# Patient Record
Sex: MALE | Race: WHITE | URBAN - METROPOLITAN AREA
[De-identification: names, ages, dates, MRNs, and addresses within clinical notes are randomized per-mention and may not be internally consistent; named-entity substitution may affect disease eponyms.]

---

## 2018-12-19 ENCOUNTER — INPATIENT (INPATIENT)
Facility: HOSPITAL | Age: 83
LOS: 1 days | Discharge: HOME | End: 2018-12-21
Attending: INTERNAL MEDICINE | Admitting: INTERNAL MEDICINE
Payer: MEDICARE

## 2018-12-19 VITALS — HEIGHT: 68 IN | WEIGHT: 145.06 LBS

## 2018-12-19 LAB
ALBUMIN SERPL ELPH-MCNC: 3.3 G/DL — SIGNIFICANT CHANGE UP (ref 3.3–5)
ALP SERPL-CCNC: 84 U/L — SIGNIFICANT CHANGE UP (ref 40–120)
ALT FLD-CCNC: 21 U/L — SIGNIFICANT CHANGE UP (ref 12–78)
ANION GAP SERPL CALC-SCNC: 10 MMOL/L — SIGNIFICANT CHANGE UP (ref 5–17)
APTT BLD: 29.1 SEC — SIGNIFICANT CHANGE UP (ref 27.5–36.3)
AST SERPL-CCNC: 44 U/L — HIGH (ref 15–37)
BILIRUB SERPL-MCNC: 0.7 MG/DL — SIGNIFICANT CHANGE UP (ref 0.2–1.2)
BLD GP AB SCN SERPL QL: SIGNIFICANT CHANGE UP
BUN SERPL-MCNC: 43 MG/DL — HIGH (ref 7–23)
CALCIUM SERPL-MCNC: 10.3 MG/DL — HIGH (ref 8.5–10.1)
CHLORIDE SERPL-SCNC: 100 MMOL/L — SIGNIFICANT CHANGE UP (ref 96–108)
CK SERPL-CCNC: 128 U/L — SIGNIFICANT CHANGE UP (ref 26–308)
CO2 SERPL-SCNC: 29 MMOL/L — SIGNIFICANT CHANGE UP (ref 22–31)
CREAT SERPL-MCNC: 4.99 MG/DL — HIGH (ref 0.5–1.3)
GLUCOSE SERPL-MCNC: 100 MG/DL — HIGH (ref 70–99)
HCT VFR BLD CALC: 22.3 % — LOW (ref 39–50)
HGB BLD-MCNC: 6.8 G/DL — CRITICAL LOW (ref 13–17)
INR BLD: 1.19 RATIO — HIGH (ref 0.88–1.16)
MAGNESIUM SERPL-MCNC: 2.2 MG/DL — SIGNIFICANT CHANGE UP (ref 1.6–2.6)
MCHC RBC-ENTMCNC: 30.5 GM/DL — LOW (ref 32–36)
MCHC RBC-ENTMCNC: 31.3 PG — SIGNIFICANT CHANGE UP (ref 27–34)
MCV RBC AUTO: 102.8 FL — HIGH (ref 80–100)
PLATELET # BLD AUTO: 232 K/UL — SIGNIFICANT CHANGE UP (ref 150–400)
POTASSIUM SERPL-MCNC: 5 MMOL/L — SIGNIFICANT CHANGE UP (ref 3.5–5.3)
POTASSIUM SERPL-SCNC: 5 MMOL/L — SIGNIFICANT CHANGE UP (ref 3.5–5.3)
PROT SERPL-MCNC: 6.7 GM/DL — SIGNIFICANT CHANGE UP (ref 6–8.3)
PROTHROM AB SERPL-ACNC: 13.3 SEC — HIGH (ref 10–12.9)
RBC # BLD: 2.17 M/UL — LOW (ref 4.2–5.8)
RBC # FLD: 19.2 % — HIGH (ref 10.3–14.5)
SODIUM SERPL-SCNC: 139 MMOL/L — SIGNIFICANT CHANGE UP (ref 135–145)
TROPONIN I SERPL-MCNC: 0.72 NG/ML — HIGH (ref 0.01–0.04)
TYPE + AB SCN PNL BLD: SIGNIFICANT CHANGE UP
WBC # BLD: 7.94 K/UL — SIGNIFICANT CHANGE UP (ref 3.8–10.5)
WBC # FLD AUTO: 7.94 K/UL — SIGNIFICANT CHANGE UP (ref 3.8–10.5)

## 2018-12-19 PROCEDURE — 74176 CT ABD & PELVIS W/O CONTRAST: CPT | Mod: 26

## 2018-12-19 PROCEDURE — 93010 ELECTROCARDIOGRAM REPORT: CPT

## 2018-12-19 PROCEDURE — 71045 X-RAY EXAM CHEST 1 VIEW: CPT | Mod: 26

## 2018-12-19 NOTE — ED ADULT NURSE NOTE - OBJECTIVE STATEMENT
pt presents to the ed c/o low h+h, was sent from his md's office for blood transfusion. Pt denies sob / chest pain, no visual signs of acute distress.

## 2018-12-19 NOTE — ED PROVIDER NOTE - OBJECTIVE STATEMENT
84 y/o male with a PMHx of CAD with obstructing lesions unable to be stented 2 weeks ago, cardiomyopathy (recent EF 10%), ESRD newly on dialysis since oct 2018, HTN, HLD presents to the ED BIB daughter for anemia from outpatient labs drawn 2 days ago. Pt's daughter is an oncology nurse, rashaun labs and sent them to [place] dialysis center. Pt called back and pt's hemoglobin was 6.3. Pt denies HA, chest pain, SOB, weakness. States 2 weeks ago after cardiac catheterization he had hematoma in right groin, as of today no pain in right groin, abdomen or back. daughter is checking hematoma and states it is shrinking. No h/o blood transfusion but was sent to ED by Dr. Rogers, new nephrologist as of this week. Pt due for dialysis tomorrow. Denies blood in stools. Also has slight bruising associated with RUE fistula. 84 y/o male with a PMHx of CAD with obstructing lesions unable to be stented 2 weeks ago, cardiomyopathy (recent EF 10%), ESRD newly on dialysis since oct 2018, emphysema on home O2 PRN, HTN, HLD presents to the ED BIB daughter for anemia from outpatient labs drawn 2 days ago. Pt's daughter is an oncology nurse, rashaun labs and sent them to [place] dialysis center. Pt called back and pt's hemoglobin was 6.3. Pt denies HA, chest pain, SOB, weakness. States 2 weeks ago after cardiac catheterization he had hematoma in right groin, as of today no pain in right groin, abdomen or back. daughter is checking hematoma and states it is shrinking. No h/o blood transfusion but was sent to ED by Dr. Rogers, new nephrologist as of this week. Pt due for dialysis tomorrow. Denies blood in stools. Also has slight bruising associated with RUE fistula.

## 2018-12-19 NOTE — ED PROVIDER NOTE - PROGRESS NOTE DETAILS
Paged Dr. Rogers to discuss transfusion and dialysis that is needed in AM. Patients daughter who is a nurse has copy of patients EKG with her and todays EKG shows no changes.  Creatinine in [facility] in October was 11.  EF 10%.  Hgb on last discharge = 8 per daughter.  No aspirin will be given at this time due to no known etiology for drop in hgb.  CT pending to r/o retroperitoneal hematoma.

## 2018-12-19 NOTE — ED PROVIDER NOTE - MEDICAL DECISION MAKING DETAILS
Plan to recheck labs, EKG, call Dr. Bryson to discuss dialysis in the morning with concurrent transfusion.

## 2018-12-19 NOTE — ED ADULT NURSE NOTE - NSIMPLEMENTINTERV_GEN_ALL_ED
Implemented All Fall with Harm Risk Interventions:  [place] to call system. Call bell, personal items and telephone within reach. Instruct patient to call for assistance. Room bathroom lighting operational. Non-slip footwear when patient is off stretcher. Physically safe environment: no spills, clutter or unnecessary equipment. Stretcher in lowest position, wheels locked, appropriate side rails in place. Provide visual cue, wrist band, yellow gown, etc. Monitor gait and stability. Monitor for mental status changes and reorient to person, place, and time. Review medications for side effects contributing to fall risk. Reinforce activity limits and safety measures with patient and family. Provide visual clues: red socks.

## 2018-12-19 NOTE — ED PROVIDER NOTE - SKIN, MLM
+palpable thrill in RUE at fistula site, old bruising associated, no evidence of hematoma. Old bruising on right lower abdomen from heparin injections from prior admission. Healing purple and yellow areas of ecchymosis in right groin. No palpable tenderness or hematoma.

## 2018-12-19 NOTE — ED PROVIDER NOTE - NOTES
Dialysis recommended in AM.  1u pRBCs while in ED and 2nd unit while getting dialysis.  Agrees with ED workup.

## 2018-12-19 NOTE — ED ADULT TRIAGE NOTE - CHIEF COMPLAINT QUOTE
pt sent in by Dr. Rogers for low h&h. pt denies sob, dizziness. h&h 6.4 20.2. pt on dialysis tues thurs sat

## 2018-12-19 NOTE — ED ADULT NURSE NOTE - CHPI ED NUR SYMPTOMS NEG
no vomiting/no dizziness/no decreased eating/drinking/no weakness/no tingling/no fever/no nausea/no chills/no pain

## 2018-12-20 PROBLEM — Z00.00 ENCOUNTER FOR PREVENTIVE HEALTH EXAMINATION: Status: ACTIVE | Noted: 2018-12-20

## 2018-12-20 LAB
ANISOCYTOSIS BLD QL: SLIGHT — SIGNIFICANT CHANGE UP
BASOPHILS # BLD AUTO: 0.04 K/UL — SIGNIFICANT CHANGE UP (ref 0–0.2)
BASOPHILS NFR BLD AUTO: 0.5 % — SIGNIFICANT CHANGE UP (ref 0–2)
ELLIPTOCYTES BLD QL SMEAR: SLIGHT — SIGNIFICANT CHANGE UP
EOSINOPHIL # BLD AUTO: 0.76 K/UL — HIGH (ref 0–0.5)
EOSINOPHIL NFR BLD AUTO: 9.6 % — HIGH (ref 0–6)
HAV IGM SER-ACNC: SIGNIFICANT CHANGE UP
HBV CORE IGM SER-ACNC: SIGNIFICANT CHANGE UP
HBV SURFACE AG SER-ACNC: SIGNIFICANT CHANGE UP
HCT VFR BLD CALC: 24.4 % — LOW (ref 39–50)
HCV AB S/CO SERPL IA: 0.05 S/CO — SIGNIFICANT CHANGE UP
HCV AB SERPL-IMP: SIGNIFICANT CHANGE UP
HGB BLD-MCNC: 7.8 G/DL — LOW (ref 13–17)
HYPOCHROMIA BLD QL: SIGNIFICANT CHANGE UP
IMM GRANULOCYTES NFR BLD AUTO: 0.5 % — SIGNIFICANT CHANGE UP (ref 0–1.5)
LYMPHOCYTES # BLD AUTO: 0.96 K/UL — LOW (ref 1–3.3)
LYMPHOCYTES # BLD AUTO: 12.1 % — LOW (ref 13–44)
MACROCYTES BLD QL: SLIGHT — SIGNIFICANT CHANGE UP
MANUAL SMEAR VERIFICATION: SIGNIFICANT CHANGE UP
MONOCYTES # BLD AUTO: 1 K/UL — HIGH (ref 0–0.9)
MONOCYTES NFR BLD AUTO: 12.6 % — SIGNIFICANT CHANGE UP (ref 2–14)
NEUTROPHILS # BLD AUTO: 5.14 K/UL — SIGNIFICANT CHANGE UP (ref 1.8–7.4)
NEUTROPHILS NFR BLD AUTO: 64.7 % — SIGNIFICANT CHANGE UP (ref 43–77)
NRBC # BLD: 0 /100 WBCS — SIGNIFICANT CHANGE UP (ref 0–0)
OVALOCYTES BLD QL SMEAR: SIGNIFICANT CHANGE UP
PLAT MORPH BLD: NORMAL — SIGNIFICANT CHANGE UP
POIKILOCYTOSIS BLD QL AUTO: SIGNIFICANT CHANGE UP
RBC BLD AUTO: ABNORMAL
SCHISTOCYTES BLD QL AUTO: SLIGHT — SIGNIFICANT CHANGE UP
TROPONIN I SERPL-MCNC: 0.66 NG/ML — HIGH (ref 0.01–0.04)
TROPONIN I SERPL-MCNC: 0.71 NG/ML — HIGH (ref 0.01–0.04)

## 2018-12-20 PROCEDURE — 93306 TTE W/DOPPLER COMPLETE: CPT | Mod: 26

## 2018-12-20 PROCEDURE — 99285 EMERGENCY DEPT VISIT HI MDM: CPT

## 2018-12-20 RX ORDER — LEVOTHYROXINE SODIUM 125 MCG
88 TABLET ORAL DAILY
Qty: 0 | Refills: 0 | Status: DISCONTINUED | OUTPATIENT
Start: 2018-12-20 | End: 2018-12-21

## 2018-12-20 RX ORDER — ASPIRIN/CALCIUM CARB/MAGNESIUM 324 MG
81 TABLET ORAL DAILY
Qty: 0 | Refills: 0 | Status: DISCONTINUED | OUTPATIENT
Start: 2018-12-20 | End: 2018-12-21

## 2018-12-20 RX ORDER — CALCIUM ACETATE 667 MG
1334 TABLET ORAL
Qty: 0 | Refills: 0 | Status: DISCONTINUED | OUTPATIENT
Start: 2018-12-20 | End: 2018-12-21

## 2018-12-20 RX ORDER — DOCUSATE SODIUM 100 MG
100 CAPSULE ORAL THREE TIMES A DAY
Qty: 0 | Refills: 0 | Status: DISCONTINUED | OUTPATIENT
Start: 2018-12-20 | End: 2018-12-21

## 2018-12-20 RX ORDER — ATORVASTATIN CALCIUM 80 MG/1
20 TABLET, FILM COATED ORAL AT BEDTIME
Qty: 0 | Refills: 0 | Status: DISCONTINUED | OUTPATIENT
Start: 2018-12-20 | End: 2018-12-21

## 2018-12-20 RX ORDER — ONDANSETRON 8 MG/1
4 TABLET, FILM COATED ORAL EVERY 6 HOURS
Qty: 0 | Refills: 0 | Status: DISCONTINUED | OUTPATIENT
Start: 2018-12-20 | End: 2018-12-21

## 2018-12-20 RX ORDER — LANOLIN ALCOHOL/MO/W.PET/CERES
5 CREAM (GRAM) TOPICAL AT BEDTIME
Qty: 0 | Refills: 0 | Status: DISCONTINUED | OUTPATIENT
Start: 2018-12-20 | End: 2018-12-21

## 2018-12-20 RX ORDER — SENNA PLUS 8.6 MG/1
2 TABLET ORAL AT BEDTIME
Qty: 0 | Refills: 0 | Status: DISCONTINUED | OUTPATIENT
Start: 2018-12-20 | End: 2018-12-21

## 2018-12-20 RX ORDER — ACETAMINOPHEN 500 MG
650 TABLET ORAL EVERY 6 HOURS
Qty: 0 | Refills: 0 | Status: DISCONTINUED | OUTPATIENT
Start: 2018-12-20 | End: 2018-12-21

## 2018-12-20 RX ORDER — LOSARTAN POTASSIUM 100 MG/1
25 TABLET, FILM COATED ORAL DAILY
Qty: 0 | Refills: 0 | Status: DISCONTINUED | OUTPATIENT
Start: 2018-12-20 | End: 2018-12-21

## 2018-12-20 RX ORDER — ERYTHROPOIETIN 10000 [IU]/ML
8000 INJECTION, SOLUTION INTRAVENOUS; SUBCUTANEOUS
Qty: 0 | Refills: 0 | Status: DISCONTINUED | OUTPATIENT
Start: 2018-12-20 | End: 2018-12-21

## 2018-12-20 RX ORDER — CARVEDILOL PHOSPHATE 80 MG/1
3.12 CAPSULE, EXTENDED RELEASE ORAL EVERY 12 HOURS
Qty: 0 | Refills: 0 | Status: DISCONTINUED | OUTPATIENT
Start: 2018-12-20 | End: 2018-12-21

## 2018-12-20 RX ADMIN — Medication 5 MILLIGRAM(S): at 21:42

## 2018-12-20 RX ADMIN — Medication 100 MILLIGRAM(S): at 21:42

## 2018-12-20 RX ADMIN — Medication 1334 MILLIGRAM(S): at 18:05

## 2018-12-20 RX ADMIN — Medication 1 TABLET(S): at 18:06

## 2018-12-20 RX ADMIN — Medication 81 MILLIGRAM(S): at 18:05

## 2018-12-20 RX ADMIN — ATORVASTATIN CALCIUM 20 MILLIGRAM(S): 80 TABLET, FILM COATED ORAL at 21:42

## 2018-12-20 RX ADMIN — CARVEDILOL PHOSPHATE 3.12 MILLIGRAM(S): 80 CAPSULE, EXTENDED RELEASE ORAL at 18:05

## 2018-12-20 RX ADMIN — LOSARTAN POTASSIUM 25 MILLIGRAM(S): 100 TABLET, FILM COATED ORAL at 18:06

## 2018-12-20 RX ADMIN — ERYTHROPOIETIN 8000 UNIT(S): 10000 INJECTION, SOLUTION INTRAVENOUS; SUBCUTANEOUS at 16:15

## 2018-12-20 RX ADMIN — Medication 650 MILLIGRAM(S): at 19:53

## 2018-12-20 NOTE — CONSULT NOTE ADULT - ASSESSMENT
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 Pt's daughter is an oncology nurse, rashaun labs at dialysis center and was found to have hgb on 6.3. Pt denies HA, chest pain, SOB, weakness. States 2 weeks ago after cardiac catheterization he had hematoma in right groin, as of today no pain in right groin, abdomen or back. After further discussion with dalai, patient did not recieve any transfusion for the hematoma in [place] and his Hgb upon D/C was 7.5. His usual Hgb is 10 before starting HD.  Daughter is checking hematoma and states it is shrinking. Denies melena or hematochezia. No hematuria (pt still makes urine) PT is on DAPT for CEA in 2012.  Also has slight bruising associated with RUE fistula/hematoma which is now resolving  Pts HD labs from his previous HD unit confirmed that the HGB was low 2 days prior to his transfer.  Pts family was contacted ans advised to bring him in for evaluation ad further blood loss  IN ER pt is comfortable no SOB , has bruising over his R AVF.  pt received 1 U PRBC in ER   will repeat labs and transfuse on HD as needed  d/w pts daughter  CT negative for RP hematoma
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 Pt's daughter is an oncology nurse, rashaun labs at dialysis center and was found to have hgb on 6.3. Pt denies HA, chest pain, SOB, weakness. States 2 weeks ago after cardiac catheterization he had hematoma in right groin, as of today no pain in right groin, abdomen or back. After further discussion with savannah, patient did not recieve any transfusion for the hematoma in [place] and his Hgb upon D/C was 7.5. His usual Hgb is 10 before starting HD.  Daughter is checking hematoma and states it is shrinking. Denies melena or hematochezia. No hematuria (pt still makes urine) PT is on DAPT for CEA in 2012.  Also has slight bruising associated with RUE fistula/hematoma which is now resolving  he denies orthopnea, CP or SOb at the moment   he did recieve PRBCs since admiddion   echo- EF here is 50-55% but with mod to severe MR  EKG shows inverted T waves c/w ischmeia and or infarction anterolaterally , trop borderline elevated   1) cont medical management for now including fluid removal with HD , transfuse to keep Hb at least greater than 8 given CAD   2) cont asa/statin /DVT prophylaxis, resume plavix if H/H remains stable   3) will order right groin sonogram to look for persisitent leak and pseudoaneurysm

## 2018-12-20 NOTE — H&P ADULT - NSHPREVIEWOFSYSTEMS_GEN_ALL_CORE
REVIEW OF SYSTEMS:    CONSTITUTIONAL: No weakness, fevers or chills  EYES/ENT: No visual changes;  No vertigo or throat pain   NECK: No pain or stiffness  RESPIRATORY: No cough, wheezing, hemoptysis; No shortness of breath  CARDIOVASCULAR: No chest pain or palpitations  GASTROINTESTINAL: No abdominal or epigastric pain. No nausea, vomiting, or hematemesis; No diarrhea or constipation. No melena or hematochezia.  GENITOURINARY: No dysuria, frequency or hematuria  NEUROLOGICAL: No numbness or weakness  SKIN: No itching, burning, rashes, or lesions : right groin hematoma  All other review of systems is negative unless indicated above

## 2018-12-20 NOTE — H&P ADULT - NSHPLABSRESULTS_GEN_ALL_CORE
LABS: All Labs Reviewed:                        6.8    7.94  )-----------( 232      ( 19 Dec 2018 22:49 )             22.3     12-19    139  |  100  |  43<H>  ----------------------------<  100<H>  5.0   |  29  |  4.99<H>    Ca    10.3<H>      19 Dec 2018 22:50  Mg     2.2     12-19    TPro  6.7  /  Alb  3.3  /  TBili  0.7  /  DBili  x   /  AST  44<H>  /  ALT  21  /  AlkPhos  84  12-19    PT/INR - ( 19 Dec 2018 22:49 )   PT: 13.3 sec;   INR: 1.19 ratio         PTT - ( 19 Dec 2018 22:49 )  PTT:29.1 sec  CARDIAC MARKERS ( 20 Dec 2018 04:46 )  0.713 ng/mL / x     / x     / x     / x      CARDIAC MARKERS ( 20 Dec 2018 01:50 )  0.657 ng/mL / x     / x     / x     / x      CARDIAC MARKERS ( 19 Dec 2018 22:50 )  0.717 ng/mL / x     / 128 U/L / x     / x              Blood Culture:             < from: CT Abdomen and Pelvis No Cont (12.19.18 @ 23:54) >    IMPRESSION:     *  Right groin hematoma measuring 6.4 x 3.4 cm.  *  No retroperitoneal hematoma, as clinically questioned.    < end of copied text >

## 2018-12-20 NOTE — ED ADULT NURSE REASSESSMENT NOTE - NS ED NURSE REASSESS COMMENT FT1
pt resting comfortably in stretcher. denies pain. no s/s of acute distress noted. plan of care reviewed with pt. awaiting orders. will continue to monitor

## 2018-12-20 NOTE — CONSULT NOTE ADULT - SUBJECTIVE AND OBJECTIVE BOX
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 Pt's daughter is an oncology nurse, rashaun labs at dialysis center and was found to have hgb on 6.3. Pt denies HA, chest pain, SOB, weakness. States 2 weeks ago after cardiac catheterization he had hematoma in right groin, as of today no pain in right groin, abdomen or back. After further discussion with dalai, patient did not recieve any transfusion for the hematoma in [place] and his Hgb upon D/C was 7.5. His usual Hgb is 10 before starting HD.  Daughter is checking hematoma and states it is shrinking. Denies melena or hematochezia. No hematuria (pt still makes urine) PT is on DAPT for CEA in .  Also has slight bruising associated with RUE fistula/hematoma which is now resolving  Pts HD labs from his previous HD unit confirmed that the HGB was low 2 days prior to his transfer.  Pts family was contacted ans advised to bring him in for evaluation ad further blood loss  IN ER pt is comfortable no SOB , has bruising over his R AVF.  pt received 1 U PRBC in ER   will repeat labs and transfuse on HD as needed  d/w pts daughter	    	    PAST MEDICAL/SURGICAL/FAMILY/SOCIAL HISTORY:    Past Medical History:  CAD (coronary artery disease)    Cardiomyopathy    HLD (hyperlipidemia)    HTN (hypertension).    Shx: Appendectomy, CEA, balloon angioplasty, RUE fistula     Family History:  Mother  of MI at age 79    · Social Concerns: No toxic habits	     Tobacco Usage:  · Tobacco Usage	Former smoker (20 Dec 2018 09:36)      PAST MEDICAL & SURGICAL HISTORY:  HLD (hyperlipidemia)  HTN (hypertension)  Cardiomyopathy  CAD (coronary artery disease)      FAMILY HISTORY:  No pertinent family history in first degree relatives      MEDICATIONS  (STANDING):  aspirin  chewable 81 milliGRAM(s) Oral daily  atorvastatin 20 milliGRAM(s) Oral at bedtime  calcium acetate 1334 milliGRAM(s) Oral three times a day with meals  carvedilol 3.125 milliGRAM(s) Oral every 12 hours  docusate sodium 100 milliGRAM(s) Oral three times a day  epoetin brianna Injectable 8000 Unit(s) IV Push <User Schedule>  levothyroxine 88 MICROGram(s) Oral daily  losartan 25 milliGRAM(s) Oral daily  multivitamin 1 Tablet(s) Oral daily    MEDICATIONS  (PRN):  acetaminophen   Tablet .. 650 milliGRAM(s) Oral every 6 hours PRN Mild Pain (1 - 3)  ondansetron Injectable 4 milliGRAM(s) IV Push every 6 hours PRN Nausea  senna 2 Tablet(s) Oral at bedtime PRN Constipation        Allergies    No Known Allergies    Intolerances        I&O's Summary        REVIEW OF SYSTEMS:    CONSTITUTIONAL:  As per HPI.  CONSTITUTIONAL: No weakness, fevers or chills  EYES/ENT: No visual changes;  No vertigo or throat pain   NECK: No pain or stiffness  CARDIOVASCULAR: No chest pain or palpitations  GASTROINTESTINAL: No abdominal or epigastric pain. No nausea, vomiting, or hematemesis; No diarrhea or constipation. No melena or hematochezia.  GENITOURINARY: No dysuria, frequency or hematuria  NEUROLOGICAL: No numbness or weakness  SKIN: Bruising over R groin and AVF  All other review of systems is negative unless indicated above      Vital Signs Last 24 Hrs  T(C): 36.8 (20 Dec 2018 11:45), Max: 37.1 (19 Dec 2018 22:27)  T(F): 98.3 (20 Dec 2018 11:45), Max: 98.8 (19 Dec 2018 22:27)  HR: 67 (20 Dec 2018 11:45) (62 - 73)  BP: 140/59 (20 Dec 2018 11:45) (117/58 - 140/59)  BP(mean): --  RR: 18 (20 Dec 2018 11:45) (16 - 26)  SpO2: 96% (20 Dec 2018 11:45) (92% - 99%)  Daily Height in cm: 172.72 (19 Dec 2018 22:26)    Daily   I&O's Summary      PHYSICAL EXAM:    General:  Alert, well-developed ,No acute distress.    Neuro:  Alert and oriented to person, place, and time. Able to communicate  well.     lungs clear   heart RR   abd soft non tender   ext no edema  R avf w ecchymosis      LABS:                                   6.8    7.94  )-----------( 232      ( 19 Dec 2018 22:49 )             22.3           139  |  100  |  43<H>  ----------------------------<  100<H>  5.0   |  29  |  4.99<H>    Ca    10.3<H>      19 Dec 2018 22:50  Mg     2.2         TPro  6.7  /  Alb  3.3  /  TBili  0.7  /  DBili  x   /  AST  44<H>  /  ALT  21  /  AlkPhos  84

## 2018-12-20 NOTE — H&P ADULT - ASSESSMENT
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 Pt's daughter is an oncology nurse, rashaun labs at dialysis center and was found to have hgb on 6.3. Pt denies HA, chest pain, SOB, weakness. States 2 weeks ago after cardiac catheterization he had hematoma in right groin, as of today no pain in right groin, abdomen or back. After further discussion with savannah, patient did not recieve any transfusion for the hematoma in [place] and his Hgb upon D/C was 7.5. His usual Hgb is 10 before starting HD.  Daughter is checking hematoma and states it is shrinking. Denies melena or hematochezia. No hematuria (pt still makes urine) PT is on DAPT for CEA in 2012.  Also has slight bruising associated with RUE fistula/hematoma which is now resolving          A:  Acute blood loss anemia 2/2 to groin hematoma 2/2 to recent cath performed in [place] (present on arrival)  ESRD on HD  Cardiomyopathy with EF 20% -recent balloon angioplasty  Elevated troponins with no anginal equivalent      P:  Admit to tele  1 unit pRBC given by ED. Planned for 1 add'l unti during HD today  Renal and cardio consults.   Per Daughter, size of hematoma has been decreasing in size. No RP bleed seen on CT  Has obstructive lesions and h/o CEA.   will need Serial H/H and supportive care with transfusions  Would cont ASA for now. Hold plavix for now and resume when H/H stable. Cardio consult. Consider EP if ectopy. ARB/BB for CM.   Daily EKG. Fluid restrict  IMPROVE VTE Individual Risk Assessment    RISK                                                                Points    [  ] Previous VTE                                                  3    [  ] Thrombophilia                                               2    [  ] Lower limb paralysis                                      2        (unable to hold up >15 seconds)      [  ] Current Cancer                                              2         (within 6 months)    [  ] Immobilization > 24 hrs                                1    [  ] ICU/CCU stay > 24 hours                              1    [  x] Age > 60                                                      1  1  IMPROVE VTE Score _________    SCD for now with hematoma

## 2018-12-20 NOTE — CONSULT NOTE ADULT - SUBJECTIVE AND OBJECTIVE BOX
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 Pt's daughter is an oncology nurse, rashaun labs at dialysis center and was found to have hgb on 6.3. Pt denies HA, chest pain, SOB, weakness. States 2 weeks ago after cardiac catheterization he had hematoma in right groin, as of today no pain in right groin, abdomen or back. After further discussion with savannah, patient did not recieve any transfusion for the hematoma in [place] and his Hgb upon D/C was 7.5. His usual Hgb is 10 before starting HD.  Daughter is checking hematoma and states it is shrinking. Denies melena or hematochezia. No hematuria (pt still makes urine) PT is on DAPT for CEA in .  Also has slight bruising associated with RUE fistula/hematoma which is now resolving	  · Presenting Symptoms: +bruising	  · Negative Findings: no fever	  · Timing: constant	  · Duration: day(s)	  · Context: unknown	    PAST MEDICAL/SURGICAL/FAMILY/SOCIAL HISTORY:    Past Medical History:  CAD (coronary artery disease)    Cardiomyopathy    HLD (hyperlipidemia)    HTN (hypertension).    Shx: Appendectomy, CEA, balloon angioplasty, RUE fistula     Family History:  Mother  of MI at age 79    · Social Concerns: No toxic habits	     Tobacco Usage:  · Tobacco Usage	Former smoker (20 Dec 2018 09:36)      PAST MEDICAL & SURGICAL HISTORY:  HLD (hyperlipidemia)  HTN (hypertension)  Cardiomyopathy  CAD (coronary artery disease)                                    MEDICATIONS  (STANDING):  aspirin  chewable 81 milliGRAM(s) Oral daily  atorvastatin 20 milliGRAM(s) Oral at bedtime  calcium acetate 1334 milliGRAM(s) Oral three times a day with meals  carvedilol 3.125 milliGRAM(s) Oral every 12 hours  docusate sodium 100 milliGRAM(s) Oral three times a day  epoetin brianna Injectable 8000 Unit(s) IV Push <User Schedule>  levothyroxine 88 MICROGram(s) Oral daily  losartan 25 milliGRAM(s) Oral daily  multivitamin 1 Tablet(s) Oral daily    MEDICATIONS  (PRN):  acetaminophen   Tablet .. 650 milliGRAM(s) Oral every 6 hours PRN Mild Pain (1 - 3)  ondansetron Injectable 4 milliGRAM(s) IV Push every 6 hours PRN Nausea  senna 2 Tablet(s) Oral at bedtime PRN Constipation      FAMILY HISTORY:  No pertinent family history in first degree relatives      SOCIAL HISTORY: nonsmoker    CIGARETTES:        Vital Signs Last 24 Hrs  T(C): 36.8 (20 Dec 2018 18:03), Max: 37.1 (19 Dec 2018 22:27)  T(F): 98.2 (20 Dec 2018 18:03), Max: 98.8 (19 Dec 2018 22:27)  HR: 74 (20 Dec 2018 18:03) (62 - 78)  BP: 141/67 (20 Dec 2018 18:03) (105/56 - 141/67)  BP(mean): --  RR: 17 (20 Dec 2018 17:06) (16 - 26)  SpO2: 95% (20 Dec 2018 18:03) (92% - 99%)            INTERPRETATION OF TELEMETRY:    ECG:    I&O's Detail    20 Dec 2018 07:01  -  20 Dec 2018 18:39  --------------------------------------------------------  IN:    Packed Red Blood Cells: 314 mL  Total IN: 314 mL    OUT:  Total OUT: 0 mL    Total NET: 314 mL          LABS:                        7.8    x     )-----------( x        ( 20 Dec 2018 14:10 )             24.4         139  |  100  |  43<H>  ----------------------------<  100<H>  5.0   |  29  |  4.99<H>    Ca    10.3<H>      19 Dec 2018 22:50  Mg     2.2         TPro  6.7  /  Alb  3.3  /  TBili  0.7  /  DBili  x   /  AST  44<H>  /  ALT  21  /  AlkPhos  84  12-19    CARDIAC MARKERS ( 20 Dec 2018 04:46 )  0.713 ng/mL / x     / x     / x     / x      CARDIAC MARKERS ( 20 Dec 2018 01:50 )  0.657 ng/mL / x     / x     / x     / x      CARDIAC MARKERS ( 19 Dec 2018 22:50 )  0.717 ng/mL / x     / 128 U/L / x     / x          PT/INR - ( 19 Dec 2018 22:49 )   PT: 13.3 sec;   INR: 1.19 ratio         PTT - ( 19 Dec 2018 22:49 )  PTT:29.1 sec    I&O's Summary    20 Dec 2018 07:01  -  20 Dec 2018 18:39  --------------------------------------------------------  IN: 314 mL / OUT: 0 mL / NET: 314 mL      BNP  RADIOLOGY & ADDITIONAL STUDIES:

## 2018-12-20 NOTE — H&P ADULT - NSHPPHYSICALEXAM_GEN_ALL_CORE
PHYSICAL EXAM:    Constitutional: NAD, awake and alert, well-developed  HEENT: PERR, EOMI, Normal Hearing, MMM  Neck: Soft and supple, No LAD, No JVD  Respiratory: b/l rales at bases  Cardiovascular: S1 and S2, regular rate and rhythm, no Murmurs, gallops or rubs  Gastrointestinal: Bowel Sounds present, soft, nontender, nondistended, no guarding, no rebound  Extremities: No peripheral edema  Vascular: 2+ peripheral pulses;   Neurological: A/O x 3, no focal deficits  Musculoskeletal: 5/5 strength b/l upper and lower extremities: + right groin hematoma approx 3x 5 cm in size  Skin: No rashes

## 2018-12-20 NOTE — H&P ADULT - HISTORY OF PRESENT ILLNESS
· HPI Objective Statement: 82 y/o male with a PMHx of CAD with obstructing lesions unable to be stented 2 weeks ago, mild dementia cardiomyopathy (recent EF 20%), ESRD newly on dialysis since oct 2018 (T//S, emphysema on home O2 PRN, HTN, HLD presents to the ED BIB daughter for anemia from outpatient labs drawn 2 days ago. Pt's daughter is an oncology nurse, rashaun labs at dialysis center and was found to have hgb on 6.3. Pt denies HA, chest pain, SOB, weakness. States 2 weeks ago after cardiac catheterization he had hematoma in right groin, as of today no pain in right groin, abdomen or back. After further discussion with savannah, patient did not recieve any transfusion for the hematoma in [place] and his Hgb upon D/C was 7.5. His usual Hgb is 10 before starting HD.  Daughter is checking hematoma and states it is shrinking. Denies melena or hematochezia. No hematuria (pt still makes urine) PT is on DAPT for CEA in .  Also has slight bruising associated with RUE fistula/hematoma which is now resolving	  · Presenting Symptoms: +bruising	  · Negative Findings: no fever	  · Timing: constant	  · Duration: day(s)	  · Context: unknown	    PAST MEDICAL/SURGICAL/FAMILY/SOCIAL HISTORY:    Past Medical History:  CAD (coronary artery disease)    Cardiomyopathy    HLD (hyperlipidemia)    HTN (hypertension).    Shx: Appendectomy, CEA, balloon angioplasty, RUE fistula     Family History:  Mother  of MI at age 79    · Social Concerns: No toxic habits	     Tobacco Usage:  · Tobacco Usage	Former smoker

## 2018-12-21 ENCOUNTER — TRANSCRIPTION ENCOUNTER (OUTPATIENT)
Age: 83
End: 2018-12-21

## 2018-12-21 VITALS
RESPIRATION RATE: 18 BRPM | DIASTOLIC BLOOD PRESSURE: 67 MMHG | OXYGEN SATURATION: 90 % | SYSTOLIC BLOOD PRESSURE: 137 MMHG | TEMPERATURE: 98 F | HEART RATE: 73 BPM

## 2018-12-21 LAB
ANION GAP SERPL CALC-SCNC: 9 MMOL/L — SIGNIFICANT CHANGE UP (ref 5–17)
BUN SERPL-MCNC: 31 MG/DL — HIGH (ref 7–23)
CALCIUM SERPL-MCNC: 9.1 MG/DL — SIGNIFICANT CHANGE UP (ref 8.5–10.1)
CHLORIDE SERPL-SCNC: 103 MMOL/L — SIGNIFICANT CHANGE UP (ref 96–108)
CHOLEST SERPL-MCNC: 120 MG/DL — SIGNIFICANT CHANGE UP (ref 10–199)
CO2 SERPL-SCNC: 27 MMOL/L — SIGNIFICANT CHANGE UP (ref 22–31)
CREAT SERPL-MCNC: 3.5 MG/DL — HIGH (ref 0.5–1.3)
GLUCOSE SERPL-MCNC: 86 MG/DL — SIGNIFICANT CHANGE UP (ref 70–99)
HBA1C BLD-MCNC: 4.9 % — SIGNIFICANT CHANGE UP (ref 4–5.6)
HCT VFR BLD CALC: 25.9 % — LOW (ref 39–50)
HDLC SERPL-MCNC: 61 MG/DL — SIGNIFICANT CHANGE UP
HGB BLD-MCNC: 8.2 G/DL — LOW (ref 13–17)
IRON SATN MFR SERPL: 49 UG/DL — SIGNIFICANT CHANGE UP (ref 45–165)
LIPID PNL WITH DIRECT LDL SERPL: 48 MG/DL — SIGNIFICANT CHANGE UP
MCHC RBC-ENTMCNC: 31.3 PG — SIGNIFICANT CHANGE UP (ref 27–34)
MCHC RBC-ENTMCNC: 31.7 GM/DL — LOW (ref 32–36)
MCV RBC AUTO: 98.9 FL — SIGNIFICANT CHANGE UP (ref 80–100)
NRBC # BLD: 0 /100 WBCS — SIGNIFICANT CHANGE UP (ref 0–0)
PLATELET # BLD AUTO: 205 K/UL — SIGNIFICANT CHANGE UP (ref 150–400)
POTASSIUM SERPL-MCNC: 4 MMOL/L — SIGNIFICANT CHANGE UP (ref 3.5–5.3)
POTASSIUM SERPL-SCNC: 4 MMOL/L — SIGNIFICANT CHANGE UP (ref 3.5–5.3)
RBC # BLD: 2.62 M/UL — LOW (ref 4.2–5.8)
RBC # FLD: 19.3 % — HIGH (ref 10.3–14.5)
SODIUM SERPL-SCNC: 139 MMOL/L — SIGNIFICANT CHANGE UP (ref 135–145)
TOTAL CHOLESTEROL/HDL RATIO MEASUREMENT: 2 RATIO — LOW (ref 3.4–9.6)
TRANSFERRIN SERPL-MCNC: 213 MG/DL — SIGNIFICANT CHANGE UP (ref 200–360)
TRIGL SERPL-MCNC: 54 MG/DL — SIGNIFICANT CHANGE UP (ref 10–149)
TSH SERPL-MCNC: 5.11 UU/ML — HIGH (ref 0.34–4.82)
WBC # BLD: 9.21 K/UL — SIGNIFICANT CHANGE UP (ref 3.8–10.5)
WBC # FLD AUTO: 9.21 K/UL — SIGNIFICANT CHANGE UP (ref 3.8–10.5)

## 2018-12-21 PROCEDURE — 93926 LOWER EXTREMITY STUDY: CPT | Mod: 26,RT

## 2018-12-21 RX ADMIN — LOSARTAN POTASSIUM 25 MILLIGRAM(S): 100 TABLET, FILM COATED ORAL at 05:17

## 2018-12-21 RX ADMIN — Medication 1 TABLET(S): at 12:34

## 2018-12-21 RX ADMIN — Medication 1334 MILLIGRAM(S): at 12:34

## 2018-12-21 RX ADMIN — Medication 100 MILLIGRAM(S): at 05:17

## 2018-12-21 RX ADMIN — Medication 1334 MILLIGRAM(S): at 10:14

## 2018-12-21 RX ADMIN — CARVEDILOL PHOSPHATE 3.12 MILLIGRAM(S): 80 CAPSULE, EXTENDED RELEASE ORAL at 05:17

## 2018-12-21 RX ADMIN — Medication 88 MICROGRAM(S): at 05:17

## 2018-12-21 RX ADMIN — Medication 81 MILLIGRAM(S): at 12:34

## 2018-12-21 NOTE — PROGRESS NOTE ADULT - ASSESSMENT
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 Pt's daughter is an oncology nurse, rashaun labs at dialysis center and was found to have hgb on 6.3. Pt denies HA, chest pain, SOB, weakness. States 2 weeks ago after cardiac catheterization he had hematoma in right groin, as of today no pain in right groin, abdomen or back. After further discussion with savannah, patient did not recieve any transfusion for the hematoma in [place] and his Hgb upon D/C was 7.5. His usual Hgb is 10 before starting HD.  Daughter is checking hematoma and states it is shrinking. Denies melena or hematochezia. No hematuria (pt still makes urine) PT is on DAPT for CEA in 2012.  Also has slight bruising associated with RUE fistula/hematoma which is now resolving  Pts HD labs from his previous HD unit confirmed that the HGB was low 2 days prior to his transfer.  Pts family was contacted ans advised to bring him in for evaluation ad further blood loss  IN ER pt is comfortable no SOB , has bruising over his R AVF.  pt received 1 U PRBC in ER   will repeat labs and transfuse on HD as needed  d/w pts daughter  CT negative for RP hematoma    12/21  feels well   for dc home  d/w pt and daughter, will have labs done monday at [facility] dialysis   f/u anemia,
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 Pt's daughter is an oncology nurse, rashaun labs at dialysis center and was found to have hgb on 6.3. Pt denies HA, chest pain, SOB, weakness. States 2 weeks ago after cardiac catheterization he had hematoma in right groin, as of today no pain in right groin, abdomen or back. After further discussion with savannah, patient did not recieve any transfusion for the hematoma in [place] and his Hgb upon D/C was 7.5. His usual Hgb is 10 before starting HD.  Daughter is checking hematoma and states it is shrinking. Denies melena or hematochezia. No hematuria (pt still makes urine) PT is on DAPT for CEA in 2012.  Also has slight bruising associated with RUE fistula/hematoma which is now resolving  he denies orthopnea, CP or SOb at the moment   he did recieve PRBCs since admiddion   echo- EF here is 50-55% but with mod to severe MR  EKG shows inverted T waves c/w ischmeia and or infarction anterolaterally , trop borderline elevated   1) cont medical management for now including fluid removal with HD , transfuse to keep Hb at least greater than 8 given CAD   2) cont asa/statin /DVT prophylaxis, resume plavix if H/H remains stable   3) will order right groin sonogram to look for persisitent leak and pseudoaneurysm

## 2018-12-21 NOTE — PROGRESS NOTE ADULT - SUBJECTIVE AND OBJECTIVE BOX
Patient is a 83y old  Male who presents with a chief complaint of Anemia (20 Dec 2018 18:30)    12/21- denies CP or SOb hb now above 8    MEDICATIONS  (STANDING):  aspirin  chewable 81 milliGRAM(s) Oral daily  atorvastatin 20 milliGRAM(s) Oral at bedtime  calcium acetate 1334 milliGRAM(s) Oral three times a day with meals  carvedilol 3.125 milliGRAM(s) Oral every 12 hours  docusate sodium 100 milliGRAM(s) Oral three times a day  epoetin brianna Injectable 8000 Unit(s) IV Push <User Schedule>  levothyroxine 88 MICROGram(s) Oral daily  losartan 25 milliGRAM(s) Oral daily  multivitamin 1 Tablet(s) Oral daily    MEDICATIONS  (PRN):  acetaminophen   Tablet .. 650 milliGRAM(s) Oral every 6 hours PRN Mild Pain (1 - 3)  melatonin 5 milliGRAM(s) Oral at bedtime PRN Insomnia  ondansetron Injectable 4 milliGRAM(s) IV Push every 6 hours PRN Nausea  senna 2 Tablet(s) Oral at bedtime PRN Constipation            Vital Signs Last 24 Hrs  T(C): 36.8 (21 Dec 2018 04:20), Max: 36.8 (20 Dec 2018 09:16)  T(F): 98.3 (21 Dec 2018 04:20), Max: 98.3 (20 Dec 2018 11:45)  HR: 64 (21 Dec 2018 04:20) (63 - 78)  BP: 130/50 (21 Dec 2018 04:20) (105/56 - 141/67)  BP(mean): --  RR: 17 (20 Dec 2018 17:06) (16 - 20)  SpO2: 93% (21 Dec 2018 04:20) (93% - 98%)            INTERPRETATION OF TELEMETRY:  SR   ECG:        LABS:                        8.2    9.21  )-----------( 205      ( 21 Dec 2018 05:47 )             25.9     12-21    139  |  103  |  31<H>  ----------------------------<  86  4.0   |  27  |  3.50<H>    Ca    9.1      21 Dec 2018 05:47  Mg     2.2     12-19    TPro  6.7  /  Alb  3.3  /  TBili  0.7  /  DBili  x   /  AST  44<H>  /  ALT  21  /  AlkPhos  84  12-19    CARDIAC MARKERS ( 20 Dec 2018 04:46 )  0.713 ng/mL / x     / x     / x     / x      CARDIAC MARKERS ( 20 Dec 2018 01:50 )  0.657 ng/mL / x     / x     / x     / x      CARDIAC MARKERS ( 19 Dec 2018 22:50 )  0.717 ng/mL / x     / 128 U/L / x     / x          PT/INR - ( 19 Dec 2018 22:49 )   PT: 13.3 sec;   INR: 1.19 ratio         PTT - ( 19 Dec 2018 22:49 )  PTT:29.1 sec    I&O's Summary    20 Dec 2018 07:01  -  21 Dec 2018 07:00  --------------------------------------------------------  IN: 314 mL / OUT: 0 mL / NET: 314 mL      BNP  RADIOLOGY & ADDITIONAL STUDIES:

## 2018-12-21 NOTE — DISCHARGE NOTE ADULT - CARE PROVIDER_API CALL
Martín Pugh (MD), Cardiovascular Disease; Nuclear Cardiology  [address] [place] [address]  Phone: [phone]  Fax: [phone]

## 2018-12-21 NOTE — DISCHARGE NOTE ADULT - PATIENT PORTAL LINK FT
You can access the [facility] Patient Portal, offered by [facility], by registering with the following website: [email or web address]

## 2018-12-21 NOTE — DISCHARGE NOTE ADULT - CARE PLAN
Principal Discharge DX:	Symptomatic anemia  Goal:	prevent further admission  Assessment and plan of treatment:	Follow up with PMD

## 2018-12-21 NOTE — PROGRESS NOTE ADULT - SUBJECTIVE AND OBJECTIVE BOX
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 Pt's daughter is an oncology nurse, rashaun labs at dialysis center and was found to have hgb on 6.3. Pt denies HA, chest pain, SOB, weakness. States 2 weeks ago after cardiac catheterization he had hematoma in right groin, as of today no pain in right groin, abdomen or back. After further discussion with dalai, patient did not recieve any transfusion for the hematoma in [place] and his Hgb upon D/C was 7.5. His usual Hgb is 10 before starting HD.  Daughter is checking hematoma and states it is shrinking. Denies melena or hematochezia. No hematuria (pt still makes urine) PT is on DAPT for CEA in .  Also has slight bruising associated with RUE fistula/hematoma which is now resolving  Pts HD labs from his previous HD unit confirmed that the HGB was low 2 days prior to his transfer.  Pts family was contacted ans advised to bring him in for evaluation ad further blood loss  IN ER pt is comfortable no SOB , has bruising over his R AVF.  pt received 1 U PRBC in ER   will repeat labs and transfuse on HD as needed  d/w pts daughter      feels well   for dc home  d/w pt and daughter, will have labs done monday at [facility] dialysis   f/u anemia, 	    	    PAST MEDICAL/SURGICAL/FAMILY/SOCIAL HISTORY:    Past Medical History:  CAD (coronary artery disease)    Cardiomyopathy    HLD (hyperlipidemia)    HTN (hypertension).    Shx: Appendectomy, CEA, balloon angioplasty, RUE fistula     Family History:  Mother  of MI at age 79    · Social Concerns: No toxic habits	     Tobacco Usage:  · Tobacco Usage	Former smoker (20 Dec 2018 09:36)      PAST MEDICAL & SURGICAL HISTORY:  HLD (hyperlipidemia)  HTN (hypertension)  Cardiomyopathy  CAD (coronary artery disease)      FAMILY HISTORY:  No pertinent family history in first degree relatives      MEDICATIONS  (STANDING):  aspirin  chewable 81 milliGRAM(s) Oral daily  atorvastatin 20 milliGRAM(s) Oral at bedtime  calcium acetate 1334 milliGRAM(s) Oral three times a day with meals  carvedilol 3.125 milliGRAM(s) Oral every 12 hours  docusate sodium 100 milliGRAM(s) Oral three times a day  epoetin brianna Injectable 8000 Unit(s) IV Push <User Schedule>  levothyroxine 88 MICROGram(s) Oral daily  losartan 25 milliGRAM(s) Oral daily  multivitamin 1 Tablet(s) Oral daily    MEDICATIONS  (PRN):  acetaminophen   Tablet .. 650 milliGRAM(s) Oral every 6 hours PRN Mild Pain (1 - 3)  ondansetron Injectable 4 milliGRAM(s) IV Push every 6 hours PRN Nausea  senna 2 Tablet(s) Oral at bedtime PRN Constipation        Allergies    No Known Allergies    Intolerances        I&O's Summary        REVIEW OF SYSTEMS:    CONSTITUTIONAL:  As per HPI.  CONSTITUTIONAL: No weakness, fevers or chills  EYES/ENT: No visual changes;  No vertigo or throat pain   NECK: No pain or stiffness  CARDIOVASCULAR: No chest pain or palpitations  GASTROINTESTINAL: No abdominal or epigastric pain. No nausea, vomiting, or hematemesis; No diarrhea or constipation. No melena or hematochezia.  GENITOURINARY: No dysuria, frequency or hematuria  NEUROLOGICAL: No numbness or weakness  SKIN: Bruising over R groin and AVF  All other review of systems is negative unless indicated above      Vital Signs Last 24 Hrs  T(C): 36.8 (20 Dec 2018 11:45), Max: 37.1 (19 Dec 2018 22:27)  T(F): 98.3 (20 Dec 2018 11:45), Max: 98.8 (19 Dec 2018 22:27)  HR: 67 (20 Dec 2018 11:45) (62 - 73)  BP: 140/59 (20 Dec 2018 11:45) (117/58 - 140/59)  BP(mean): --  RR: 18 (20 Dec 2018 11:45) (16 - 26)  SpO2: 96% (20 Dec 2018 11:45) (92% - 99%)  Daily Height in cm: 172.72 (19 Dec 2018 22:26)    Daily   I&O's Summary      PHYSICAL EXAM:    General:  Alert, well-developed ,No acute distress.    Neuro:  Alert and oriented to person, place, and time. Able to communicate  well.     lungs clear   heart RR   abd soft non tender   ext no edema  R avf w ecchymosis      LABS:                                   8.2    9.21  )-----------( 205      ( 21 Dec 2018 05:47 )             25.9

## 2018-12-21 NOTE — DISCHARGE NOTE ADULT - HOSPITAL COURSE
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 Pt's daughter is an oncology nurse, rashaun labs at dialysis center and was found to have hgb on 6.3. Pt denies HA, chest pain, SOB, weakness. States 2 weeks ago after cardiac catheterization he had hematoma in right groin, as of today no pain in right groin, abdomen or back. After further discussion with daugther, patient did not recieve any transfusion for the hematoma in [place] and his Hgb upon D/C was 7.5. His usual Hgb is 10 before starting HD.  Daughter is checking hematoma and states it is shrinking. Denies melena or hematochezia. No hematuria (pt still makes urine) PT is on DAPT for CEA in 2012.  Also has slight bruising associated with RUE fistula/hematoma which is now resolving.    Patient received  one unit  PRBC yesterday on HD. Patient feels much better today and anxious to go home. US: no psudoaneurysm. Discussed with daughter regarding d/c plan.       Vital Signs Last 24 Hrs  T(C): 36.8 (21 Dec 2018 11:52), Max: 36.8 (20 Dec 2018 18:03)  T(F): 98.2 (21 Dec 2018 11:52), Max: 98.3 (21 Dec 2018 04:20)  HR: 73 (21 Dec 2018 11:52) (64 - 76)  BP: 137/67 (21 Dec 2018 11:52) (110/53 - 141/67)  BP(mean): --  RR: 18 (21 Dec 2018 11:52) (17 - 18)  SpO2: 90% (21 Dec 2018 11:52) (90% - 95%)        PHYSICAL EXAM:    	Constitutional: NAD, awake and alert, well-developed  	HEENT: PERR, EOMI, Normal Hearing, MMM  	Neck: Soft and supple, No LAD, No JVD  	Respiratory: b/l rales at bases  	Cardiovascular: S1 and S2, regular rate and rhythm, no Murmurs, gallops or rubs  	Gastrointestinal: Bowel Sounds present, soft, nontender, nondistended, no guarding, no rebound  	Extremities: No peripheral edema  	Vascular: 2+ peripheral pulses;   	Neurological: A/O x 3, no focal deficits  	Musculoskeletal: 5/5 strength b/l upper and lower extremities: + right groin hematoma approx 3x 5 cm in size  Skin: No rashes      A:  Acute blood loss anemia 2/2 to groin hematoma 2/2 to recent cath performed in [place] (present on arrival)  ESRD on HD  Cardiomyopathy with EF 20% -recent balloon angioplasty  Elevated troponins with no anginal equivalent      P:  1 unit pRBC given by ED.   Renal and cardio consults appreciated  Per Daughter, size of hematoma has been decreasing in size. No RP bleed seen on CT  Has obstructive lesions and h/o CEA.   HGB 8.2 today  Home today  PMD aware of discharge  Spent more than 30 min to prepare the discharge.

## 2018-12-21 NOTE — DISCHARGE NOTE ADULT - MEDICATION SUMMARY - MEDICATIONS TO TAKE
I will START or STAY ON the medications listed below when I get home from the hospital:    aspirin 81 mg oral tablet  -- 1 tab(s) by mouth once a day  -- Indication: For CAD (coronary artery disease)    losartan 25 mg oral tablet  -- 1 tab(s) by mouth once a day  -- Indication: For HTN (hypertension)    Lipitor 20 mg oral tablet  -- 1 tab(s) by mouth once a day  -- Indication: For HLD (hyperlipidemia)    Plavix 75 mg oral tablet  -- 1 tab(s) by mouth once a day  -- Indication: For CAD (coronary artery disease)    Coreg 3.125 mg oral tablet  -- 1 tab(s) by mouth 2 times a day  -- Indication: For CAD (coronary artery disease)    calcium acetate 667 mg oral tablet  -- 2 tab(s) by mouth 3 times a day  -- Indication: For ESRD    Synthroid 88 mcg (0.088 mg) oral tablet  -- 1 tab(s) by mouth once a day  -- Indication: For Hypothyroidism    Nephrocaps oral capsule  -- 1 cap(s) by mouth once a day  -- Indication: For ESRD

## 2018-12-22 LAB — FERRITIN SERPL-MCNC: 397 NG/ML — SIGNIFICANT CHANGE UP (ref 30–400)
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
## 2018-12-26 DIAGNOSIS — D64.9 ANEMIA, UNSPECIFIED: ICD-10-CM

## 2018-12-26 DIAGNOSIS — I25.10 ATHEROSCLEROTIC HEART DISEASE OF NATIVE CORONARY ARTERY WITHOUT ANGINA PECTORIS: ICD-10-CM

## 2018-12-26 DIAGNOSIS — E78.5 HYPERLIPIDEMIA, UNSPECIFIED: ICD-10-CM

## 2018-12-26 DIAGNOSIS — Z87.891 PERSONAL HISTORY OF NICOTINE DEPENDENCE: ICD-10-CM

## 2018-12-26 DIAGNOSIS — D62 ACUTE POSTHEMORRHAGIC ANEMIA: ICD-10-CM

## 2018-12-26 DIAGNOSIS — D63.1 ANEMIA IN CHRONIC KIDNEY DISEASE: ICD-10-CM

## 2018-12-26 DIAGNOSIS — Z99.2 DEPENDENCE ON RENAL DIALYSIS: ICD-10-CM

## 2018-12-26 DIAGNOSIS — I12.0 HYPERTENSIVE CHRONIC KIDNEY DISEASE WITH STAGE 5 CHRONIC KIDNEY DISEASE OR END STAGE RENAL DISEASE: ICD-10-CM

## 2018-12-26 DIAGNOSIS — L76.32 POSTPROCEDURAL HEMATOMA OF SKIN AND SUBCUTANEOUS TISSUE FOLLOWING OTHER PROCEDURE: ICD-10-CM
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
## 2018-12-26 DIAGNOSIS — T82.898A OTHER SPECIFIED COMPLICATION OF VASCULAR PROSTHETIC DEVICES, IMPLANTS AND GRAFTS, INITIAL ENCOUNTER: ICD-10-CM
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
## 2018-12-26 DIAGNOSIS — N18.6 END STAGE RENAL DISEASE: ICD-10-CM

## 2019-03-12 NOTE — ED PROVIDER NOTE - NSTIMEPROVIDERCAREINITIATE_GEN_ER
ATTENDING STATEMENT    I discussed the case with the Resident. I agree with the Resident's findings and plan, as documented in today's note    Franklin Collins MD     19-Dec-2018 22:43

## 2021-08-09 NOTE — ED ADULT TRIAGE NOTE - DIRECT TO ROOM CARE INITIATED:
Appointment For: Sen Hunter ([id number])   Visit Type: MY ILLNESS/INJURY ([id number])      8/16/2021    2:10 PM  20 mins.  Tomeka Johnson MD      [facility] [id number]       Patient Comments:   Possible torn bicep tendon       To RN please triage message    19-Dec-2018 22:28

## 2022-01-01 ENCOUNTER — TRANSCRIPTION ENCOUNTER (OUTPATIENT)
Age: 87
End: 2022-01-01

## 2022-01-01 ENCOUNTER — EMERGENCY (EMERGENCY)
Facility: HOSPITAL | Age: 87
LOS: 0 days | Discharge: HOME | End: 2022-08-22
Attending: EMERGENCY MEDICINE
Payer: MEDICARE

## 2022-01-01 ENCOUNTER — INPATIENT (INPATIENT)
Facility: HOSPITAL | Age: 87
LOS: 8 days | Discharge: NURSING, REHAB OR LONG-TERM CARE | DRG: 811 | End: 2022-12-19
Attending: HOSPITALIST | Admitting: HOSPITALIST
Payer: MEDICARE

## 2022-01-01 ENCOUNTER — INPATIENT (INPATIENT)
Facility: HOSPITAL | Age: 87
LOS: 2 days | Discharge: NURSING, REHAB OR LONG-TERM CARE | DRG: 551 | End: 2022-09-07
Attending: INTERNAL MEDICINE | Admitting: HOSPITALIST
Payer: MEDICARE

## 2022-01-01 ENCOUNTER — EMERGENCY (EMERGENCY)
Facility: HOSPITAL | Age: 87
LOS: 0 days | Discharge: HOME | End: 2022-10-12
Attending: EMERGENCY MEDICINE
Payer: MEDICARE

## 2022-01-01 VITALS
SYSTOLIC BLOOD PRESSURE: 117 MMHG | RESPIRATION RATE: 18 BRPM | HEART RATE: 86 BPM | TEMPERATURE: 98 F | DIASTOLIC BLOOD PRESSURE: 61 MMHG

## 2022-01-01 VITALS
TEMPERATURE: 99 F | HEIGHT: 68 IN | WEIGHT: 179.9 LBS | DIASTOLIC BLOOD PRESSURE: 67 MMHG | SYSTOLIC BLOOD PRESSURE: 134 MMHG | HEART RATE: 80 BPM | RESPIRATION RATE: 18 BRPM | OXYGEN SATURATION: 96 %

## 2022-01-01 VITALS
TEMPERATURE: 99 F | OXYGEN SATURATION: 86 % | HEIGHT: 68 IN | RESPIRATION RATE: 18 BRPM | DIASTOLIC BLOOD PRESSURE: 56 MMHG | SYSTOLIC BLOOD PRESSURE: 93 MMHG | HEART RATE: 87 BPM | WEIGHT: 179.9 LBS

## 2022-01-01 VITALS
DIASTOLIC BLOOD PRESSURE: 85 MMHG | SYSTOLIC BLOOD PRESSURE: 138 MMHG | TEMPERATURE: 97 F | RESPIRATION RATE: 18 BRPM | HEART RATE: 82 BPM

## 2022-01-01 VITALS
SYSTOLIC BLOOD PRESSURE: 122 MMHG | DIASTOLIC BLOOD PRESSURE: 57 MMHG | RESPIRATION RATE: 18 BRPM | HEART RATE: 86 BPM | OXYGEN SATURATION: 93 % | TEMPERATURE: 98 F

## 2022-01-01 VITALS — WEIGHT: 149.91 LBS | HEIGHT: 68 IN

## 2022-01-01 VITALS — WEIGHT: 179.9 LBS | HEIGHT: 68 IN

## 2022-01-01 VITALS — SYSTOLIC BLOOD PRESSURE: 160 MMHG | HEART RATE: 78 BPM | DIASTOLIC BLOOD PRESSURE: 66 MMHG

## 2022-01-01 DIAGNOSIS — S90.512A ABRASION, LEFT ANKLE, INITIAL ENCOUNTER: ICD-10-CM

## 2022-01-01 DIAGNOSIS — S91.012A LACERATION WITHOUT FOREIGN BODY, LEFT ANKLE, INITIAL ENCOUNTER: ICD-10-CM

## 2022-01-01 DIAGNOSIS — Z79.2 LONG TERM (CURRENT) USE OF ANTIBIOTICS: ICD-10-CM

## 2022-01-01 DIAGNOSIS — Z86.79 PERSONAL HISTORY OF OTHER DISEASES OF THE CIRCULATORY SYSTEM: ICD-10-CM

## 2022-01-01 DIAGNOSIS — I77.0 ARTERIOVENOUS FISTULA, ACQUIRED: Chronic | ICD-10-CM

## 2022-01-01 DIAGNOSIS — I49.3 VENTRICULAR PREMATURE DEPOLARIZATION: ICD-10-CM

## 2022-01-01 DIAGNOSIS — J43.9 EMPHYSEMA, UNSPECIFIED: ICD-10-CM

## 2022-01-01 DIAGNOSIS — Z87.891 PERSONAL HISTORY OF NICOTINE DEPENDENCE: ICD-10-CM

## 2022-01-01 DIAGNOSIS — E78.5 HYPERLIPIDEMIA, UNSPECIFIED: ICD-10-CM

## 2022-01-01 DIAGNOSIS — Z86.79 PERSONAL HISTORY OF OTHER DISEASES OF THE CIRCULATORY SYSTEM: Chronic | ICD-10-CM

## 2022-01-01 DIAGNOSIS — U07.1 COVID-19: ICD-10-CM

## 2022-01-01 DIAGNOSIS — D63.1 ANEMIA IN CHRONIC KIDNEY DISEASE: ICD-10-CM

## 2022-01-01 DIAGNOSIS — Z79.01 LONG TERM (CURRENT) USE OF ANTICOAGULANTS: ICD-10-CM

## 2022-01-01 DIAGNOSIS — E87.6 HYPOKALEMIA: ICD-10-CM

## 2022-01-01 DIAGNOSIS — S32.010A WEDGE COMPRESSION FRACTURE OF FIRST LUMBAR VERTEBRA, INITIAL ENCOUNTER FOR CLOSED FRACTURE: ICD-10-CM

## 2022-01-01 DIAGNOSIS — K80.20 CALCULUS OF GALLBLADDER WITHOUT CHOLECYSTITIS WITHOUT OBSTRUCTION: ICD-10-CM
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
## 2022-01-01 DIAGNOSIS — G57.62 LESION OF PLANTAR NERVE, LEFT LOWER LIMB: ICD-10-CM

## 2022-01-01 DIAGNOSIS — E03.9 HYPOTHYROIDISM, UNSPECIFIED: ICD-10-CM

## 2022-01-01 DIAGNOSIS — Z79.82 LONG TERM (CURRENT) USE OF ASPIRIN: ICD-10-CM
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
## 2022-01-01 DIAGNOSIS — I27.20 PULMONARY HYPERTENSION, UNSPECIFIED: ICD-10-CM

## 2022-01-01 DIAGNOSIS — I42.9 CARDIOMYOPATHY, UNSPECIFIED: ICD-10-CM

## 2022-01-01 DIAGNOSIS — Z79.899 OTHER LONG TERM (CURRENT) DRUG THERAPY: ICD-10-CM
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
## 2022-01-01 DIAGNOSIS — C80.1 MALIGNANT (PRIMARY) NEOPLASM, UNSPECIFIED: ICD-10-CM

## 2022-01-01 DIAGNOSIS — M81.0 AGE-RELATED OSTEOPOROSIS WITHOUT CURRENT PATHOLOGICAL FRACTURE: ICD-10-CM

## 2022-01-01 DIAGNOSIS — R55 SYNCOPE AND COLLAPSE: ICD-10-CM

## 2022-01-01 DIAGNOSIS — Z99.2 DEPENDENCE ON RENAL DIALYSIS: ICD-10-CM

## 2022-01-01 DIAGNOSIS — I25.10 ATHEROSCLEROTIC HEART DISEASE OF NATIVE CORONARY ARTERY WITHOUT ANGINA PECTORIS: ICD-10-CM

## 2022-01-01 DIAGNOSIS — M48.061 SPINAL STENOSIS, LUMBAR REGION WITHOUT NEUROGENIC CLAUDICATION: ICD-10-CM

## 2022-01-01 DIAGNOSIS — N18.6 END STAGE RENAL DISEASE: ICD-10-CM

## 2022-01-01 DIAGNOSIS — T82.9XXA UNSPECIFIED COMPLICATION OF CARDIAC AND VASCULAR PROSTHETIC DEVICE, IMPLANT AND GRAFT, INITIAL ENCOUNTER: ICD-10-CM

## 2022-01-01 DIAGNOSIS — Z99.81 DEPENDENCE ON SUPPLEMENTAL OXYGEN: ICD-10-CM

## 2022-01-01 DIAGNOSIS — Z79.83 LONG TERM (CURRENT) USE OF BISPHOSPHONATES: ICD-10-CM

## 2022-01-01 DIAGNOSIS — D69.6 THROMBOCYTOPENIA, UNSPECIFIED: ICD-10-CM

## 2022-01-01 DIAGNOSIS — J96.90 RESPIRATORY FAILURE, UNSPECIFIED, UNSPECIFIED WHETHER WITH HYPOXIA OR HYPERCAPNIA: ICD-10-CM

## 2022-01-01 DIAGNOSIS — I47.1 SUPRAVENTRICULAR TACHYCARDIA: ICD-10-CM

## 2022-01-01 DIAGNOSIS — M51.26 OTHER INTERVERTEBRAL DISC DISPLACEMENT, LUMBAR REGION: ICD-10-CM

## 2022-01-01 DIAGNOSIS — D69.9 HEMORRHAGIC CONDITION, UNSPECIFIED: ICD-10-CM

## 2022-01-01 DIAGNOSIS — M43.16 SPONDYLOLISTHESIS, LUMBAR REGION: ICD-10-CM
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
## 2022-01-01 DIAGNOSIS — E27.8 OTHER SPECIFIED DISORDERS OF ADRENAL GLAND: ICD-10-CM

## 2022-01-01 DIAGNOSIS — K57.30 DIVERTICULOSIS OF LARGE INTESTINE WITHOUT PERFORATION OR ABSCESS WITHOUT BLEEDING: ICD-10-CM

## 2022-01-01 DIAGNOSIS — Z79.890 HORMONE REPLACEMENT THERAPY: ICD-10-CM

## 2022-01-01 DIAGNOSIS — I13.2 HYPERTENSIVE HEART AND CHRONIC KIDNEY DISEASE WITH HEART FAILURE AND WITH STAGE 5 CHRONIC KIDNEY DISEASE, OR END STAGE RENAL DISEASE: ICD-10-CM

## 2022-01-01 DIAGNOSIS — R74.8 ABNORMAL LEVELS OF OTHER SERUM ENZYMES: ICD-10-CM

## 2022-01-01 DIAGNOSIS — Z86.16 PERSONAL HISTORY OF COVID-19: ICD-10-CM
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
## 2022-01-01 DIAGNOSIS — F03.90 UNSPECIFIED DEMENTIA, UNSPECIFIED SEVERITY, WITHOUT BEHAVIORAL DISTURBANCE, PSYCHOTIC DISTURBANCE, MOOD DISTURBANCE, AND ANXIETY: ICD-10-CM

## 2022-01-01 DIAGNOSIS — I12.0 HYPERTENSIVE CHRONIC KIDNEY DISEASE WITH STAGE 5 CHRONIC KIDNEY DISEASE OR END STAGE RENAL DISEASE: ICD-10-CM

## 2022-01-01 DIAGNOSIS — I50.32 CHRONIC DIASTOLIC (CONGESTIVE) HEART FAILURE: ICD-10-CM

## 2022-01-01 DIAGNOSIS — M48.56XA COLLAPSED VERTEBRA, NOT ELSEWHERE CLASSIFIED, LUMBAR REGION, INITIAL ENCOUNTER FOR FRACTURE: ICD-10-CM

## 2022-01-01 DIAGNOSIS — I10 ESSENTIAL (PRIMARY) HYPERTENSION: ICD-10-CM

## 2022-01-01 DIAGNOSIS — F03.90 UNSPECIFIED DEMENTIA WITHOUT BEHAVIORAL DISTURBANCE: ICD-10-CM

## 2022-01-01 DIAGNOSIS — Z79.51 LONG TERM (CURRENT) USE OF INHALED STEROIDS: ICD-10-CM

## 2022-01-01 DIAGNOSIS — S32.040A WEDGE COMPRESSION FRACTURE OF FOURTH LUMBAR VERTEBRA, INITIAL ENCOUNTER FOR CLOSED FRACTURE: ICD-10-CM

## 2022-01-01 DIAGNOSIS — I50.9 HEART FAILURE, UNSPECIFIED: ICD-10-CM

## 2022-01-01 DIAGNOSIS — I07.1 RHEUMATIC TRICUSPID INSUFFICIENCY: ICD-10-CM

## 2022-01-01 DIAGNOSIS — N18.9 CHRONIC KIDNEY DISEASE, UNSPECIFIED: ICD-10-CM

## 2022-01-01 DIAGNOSIS — S33.141A DISLOCATION OF L4/L5 LUMBAR VERTEBRA, INITIAL ENCOUNTER: ICD-10-CM

## 2022-01-01 DIAGNOSIS — I82.461 ACUTE EMBOLISM AND THROMBOSIS OF RIGHT CALF MUSCULAR VEIN: ICD-10-CM

## 2022-01-01 DIAGNOSIS — E07.9 DISORDER OF THYROID, UNSPECIFIED: ICD-10-CM
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
## 2022-01-01 DIAGNOSIS — I24.8 OTHER FORMS OF ACUTE ISCHEMIC HEART DISEASE: ICD-10-CM

## 2022-01-01 DIAGNOSIS — F10.90 ALCOHOL USE, UNSPECIFIED, UNCOMPLICATED: ICD-10-CM
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
## 2022-01-01 DIAGNOSIS — E43 UNSPECIFIED SEVERE PROTEIN-CALORIE MALNUTRITION: ICD-10-CM

## 2022-01-01 DIAGNOSIS — T80.89XA OTHER COMPLICATIONS FOLLOWING INFUSION, TRANSFUSION AND THERAPEUTIC INJECTION, INITIAL ENCOUNTER: ICD-10-CM

## 2022-01-01 DIAGNOSIS — E88.09 OTHER DISORDERS OF PLASMA-PROTEIN METABOLISM, NOT ELSEWHERE CLASSIFIED: ICD-10-CM
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
## 2022-01-01 DIAGNOSIS — R09.89 OTHER SPECIFIED SYMPTOMS AND SIGNS INVOLVING THE CIRCULATORY AND RESPIRATORY SYSTEMS: ICD-10-CM

## 2022-01-01 DIAGNOSIS — J96.11 CHRONIC RESPIRATORY FAILURE WITH HYPOXIA: ICD-10-CM

## 2022-01-01 DIAGNOSIS — S81.802A UNSPECIFIED OPEN WOUND, LEFT LOWER LEG, INITIAL ENCOUNTER: ICD-10-CM

## 2022-01-01 DIAGNOSIS — E78.00 PURE HYPERCHOLESTEROLEMIA, UNSPECIFIED: ICD-10-CM

## 2022-01-01 DIAGNOSIS — E86.0 DEHYDRATION: ICD-10-CM

## 2022-01-01 DIAGNOSIS — Z91.81 HISTORY OF FALLING: ICD-10-CM

## 2022-01-01 DIAGNOSIS — I48.20 CHRONIC ATRIAL FIBRILLATION, UNSPECIFIED: ICD-10-CM

## 2022-01-01 LAB
ADD ON TEST-SPECIMEN IN LAB: SIGNIFICANT CHANGE UP
ALBUMIN SERPL ELPH-MCNC: 2.7 G/DL — LOW (ref 3.3–5)
ALBUMIN SERPL ELPH-MCNC: 2.8 G/DL — LOW (ref 3.3–5)
ALBUMIN SERPL ELPH-MCNC: 2.8 G/DL — LOW (ref 3.3–5)
ALBUMIN SERPL ELPH-MCNC: 2.9 G/DL — LOW (ref 3.3–5)
ALBUMIN SERPL ELPH-MCNC: 3 G/DL — LOW (ref 3.3–5)
ALBUMIN SERPL ELPH-MCNC: 3.1 G/DL — LOW (ref 3.3–5)
ALBUMIN SERPL ELPH-MCNC: 3.2 G/DL — LOW (ref 3.3–5)
ALBUMIN SERPL ELPH-MCNC: 3.2 G/DL — LOW (ref 3.3–5)
ALBUMIN SERPL ELPH-MCNC: 3.4 G/DL — SIGNIFICANT CHANGE UP (ref 3.3–5)
ALP SERPL-CCNC: 101 U/L — SIGNIFICANT CHANGE UP (ref 40–120)
ALP SERPL-CCNC: 125 U/L — HIGH (ref 40–120)
ALP SERPL-CCNC: 133 U/L — HIGH (ref 40–120)
ALP SERPL-CCNC: 140 U/L — HIGH (ref 40–120)
ALP SERPL-CCNC: 86 U/L — SIGNIFICANT CHANGE UP (ref 40–120)
ALT FLD-CCNC: 23 U/L — SIGNIFICANT CHANGE UP (ref 12–78)
ALT FLD-CCNC: 23 U/L — SIGNIFICANT CHANGE UP (ref 12–78)
ALT FLD-CCNC: 26 U/L — SIGNIFICANT CHANGE UP (ref 12–78)
ALT FLD-CCNC: 28 U/L — SIGNIFICANT CHANGE UP (ref 12–78)
ALT FLD-CCNC: 29 U/L — SIGNIFICANT CHANGE UP (ref 12–78)
ANION GAP SERPL CALC-SCNC: 10 MMOL/L — SIGNIFICANT CHANGE UP (ref 5–17)
ANION GAP SERPL CALC-SCNC: 11 MMOL/L — SIGNIFICANT CHANGE UP (ref 5–17)
ANION GAP SERPL CALC-SCNC: 12 MMOL/L — SIGNIFICANT CHANGE UP (ref 5–17)
ANION GAP SERPL CALC-SCNC: 12 MMOL/L — SIGNIFICANT CHANGE UP (ref 5–17)
ANION GAP SERPL CALC-SCNC: 5 MMOL/L — SIGNIFICANT CHANGE UP (ref 5–17)
ANION GAP SERPL CALC-SCNC: 7 MMOL/L — SIGNIFICANT CHANGE UP (ref 5–17)
ANION GAP SERPL CALC-SCNC: 8 MMOL/L — SIGNIFICANT CHANGE UP (ref 5–17)
ANION GAP SERPL CALC-SCNC: 9 MMOL/L — SIGNIFICANT CHANGE UP (ref 5–17)
APTT BLD: 136.6 SEC — CRITICAL HIGH (ref 27.5–35.5)
APTT BLD: 140.8 SEC — CRITICAL HIGH (ref 27.5–35.5)
APTT BLD: 163.5 SEC — CRITICAL HIGH (ref 27.5–35.5)
APTT BLD: 35.8 SEC — HIGH (ref 27.5–35.5)
APTT BLD: 37.1 SEC — HIGH (ref 27.5–35.5)
APTT BLD: 41.4 SEC — HIGH (ref 27.5–35.5)
APTT BLD: 50.7 SEC — HIGH (ref 27.5–35.5)
APTT BLD: 56.4 SEC — HIGH (ref 27.5–35.5)
APTT BLD: 65.3 SEC — HIGH (ref 27.5–35.5)
APTT BLD: 69.9 SEC — HIGH (ref 27.5–35.5)
APTT BLD: 70.4 SEC — HIGH (ref 27.5–35.5)
APTT BLD: 72.1 SEC — HIGH (ref 27.5–35.5)
APTT BLD: 78.4 SEC — HIGH (ref 27.5–35.5)
APTT BLD: 78.8 SEC — HIGH (ref 27.5–35.5)
APTT BLD: 81 SEC — HIGH (ref 27.5–35.5)
APTT BLD: 87.8 SEC — HIGH (ref 27.5–35.5)
APTT BLD: 97.3 SEC — HIGH (ref 27.5–35.5)
APTT BLD: > 200 SEC (ref 27.5–35.5)
APTT BLD: > 200 SEC (ref 27.5–35.5)
AST SERPL-CCNC: 31 U/L — SIGNIFICANT CHANGE UP (ref 15–37)
AST SERPL-CCNC: 32 U/L — SIGNIFICANT CHANGE UP (ref 15–37)
AST SERPL-CCNC: 36 U/L — SIGNIFICANT CHANGE UP (ref 15–37)
AST SERPL-CCNC: 36 U/L — SIGNIFICANT CHANGE UP (ref 15–37)
AST SERPL-CCNC: 41 U/L — HIGH (ref 15–37)
BASOPHILS # BLD AUTO: 0.03 K/UL — SIGNIFICANT CHANGE UP (ref 0–0.2)
BASOPHILS # BLD AUTO: 0.04 K/UL — SIGNIFICANT CHANGE UP (ref 0–0.2)
BASOPHILS # BLD AUTO: 0.05 K/UL — SIGNIFICANT CHANGE UP (ref 0–0.2)
BASOPHILS NFR BLD AUTO: 0.5 % — SIGNIFICANT CHANGE UP (ref 0–2)
BASOPHILS NFR BLD AUTO: 0.5 % — SIGNIFICANT CHANGE UP (ref 0–2)
BASOPHILS NFR BLD AUTO: 0.6 % — SIGNIFICANT CHANGE UP (ref 0–2)
BASOPHILS NFR BLD AUTO: 0.6 % — SIGNIFICANT CHANGE UP (ref 0–2)
BASOPHILS NFR BLD AUTO: 0.7 % — SIGNIFICANT CHANGE UP (ref 0–2)
BILIRUB SERPL-MCNC: 0.5 MG/DL — SIGNIFICANT CHANGE UP (ref 0.2–1.2)
BILIRUB SERPL-MCNC: 0.7 MG/DL — SIGNIFICANT CHANGE UP (ref 0.2–1.2)
BILIRUB SERPL-MCNC: 0.7 MG/DL — SIGNIFICANT CHANGE UP (ref 0.2–1.2)
BILIRUB SERPL-MCNC: 0.8 MG/DL — SIGNIFICANT CHANGE UP (ref 0.2–1.2)
BILIRUB SERPL-MCNC: 0.8 MG/DL — SIGNIFICANT CHANGE UP (ref 0.2–1.2)
BUN SERPL-MCNC: 22 MG/DL — SIGNIFICANT CHANGE UP (ref 7–23)
BUN SERPL-MCNC: 22 MG/DL — SIGNIFICANT CHANGE UP (ref 7–23)
BUN SERPL-MCNC: 23 MG/DL — SIGNIFICANT CHANGE UP (ref 7–23)
BUN SERPL-MCNC: 25 MG/DL — HIGH (ref 7–23)
BUN SERPL-MCNC: 38 MG/DL — HIGH (ref 7–23)
BUN SERPL-MCNC: 39 MG/DL — HIGH (ref 7–23)
BUN SERPL-MCNC: 39 MG/DL — HIGH (ref 7–23)
BUN SERPL-MCNC: 43 MG/DL — HIGH (ref 7–23)
BUN SERPL-MCNC: 49 MG/DL — HIGH (ref 7–23)
BUN SERPL-MCNC: 50 MG/DL — HIGH (ref 7–23)
BUN SERPL-MCNC: 53 MG/DL — HIGH (ref 7–23)
BUN SERPL-MCNC: 61 MG/DL — HIGH (ref 7–23)
BUN SERPL-MCNC: 63 MG/DL — HIGH (ref 7–23)
BUN SERPL-MCNC: 8 MG/DL — SIGNIFICANT CHANGE UP (ref 7–23)
BUN SERPL-MCNC: 8 MG/DL — SIGNIFICANT CHANGE UP (ref 7–23)
CALCIUM SERPL-MCNC: 10.2 MG/DL — HIGH (ref 8.5–10.1)
CALCIUM SERPL-MCNC: 10.2 MG/DL — HIGH (ref 8.5–10.1)
CALCIUM SERPL-MCNC: 8.8 MG/DL — SIGNIFICANT CHANGE UP (ref 8.5–10.1)
CALCIUM SERPL-MCNC: 9 MG/DL — SIGNIFICANT CHANGE UP (ref 8.5–10.1)
CALCIUM SERPL-MCNC: 9.1 MG/DL — SIGNIFICANT CHANGE UP (ref 8.5–10.1)
CALCIUM SERPL-MCNC: 9.2 MG/DL — SIGNIFICANT CHANGE UP (ref 8.5–10.1)
CALCIUM SERPL-MCNC: 9.3 MG/DL — SIGNIFICANT CHANGE UP (ref 8.5–10.1)
CALCIUM SERPL-MCNC: 9.5 MG/DL — SIGNIFICANT CHANGE UP (ref 8.5–10.1)
CALCIUM SERPL-MCNC: 9.5 MG/DL — SIGNIFICANT CHANGE UP (ref 8.5–10.1)
CALCIUM SERPL-MCNC: 9.6 MG/DL — SIGNIFICANT CHANGE UP (ref 8.5–10.1)
CALCIUM SERPL-MCNC: 9.7 MG/DL — SIGNIFICANT CHANGE UP (ref 8.5–10.1)
CALCIUM SERPL-MCNC: 9.7 MG/DL — SIGNIFICANT CHANGE UP (ref 8.5–10.1)
CALCIUM SERPL-MCNC: 9.8 MG/DL — SIGNIFICANT CHANGE UP (ref 8.5–10.1)
CHLORIDE SERPL-SCNC: 100 MMOL/L — SIGNIFICANT CHANGE UP (ref 96–108)
CHLORIDE SERPL-SCNC: 94 MMOL/L — LOW (ref 96–108)
CHLORIDE SERPL-SCNC: 95 MMOL/L — LOW (ref 96–108)
CHLORIDE SERPL-SCNC: 95 MMOL/L — LOW (ref 96–108)
CHLORIDE SERPL-SCNC: 96 MMOL/L — SIGNIFICANT CHANGE UP (ref 96–108)
CHLORIDE SERPL-SCNC: 96 MMOL/L — SIGNIFICANT CHANGE UP (ref 96–108)
CHLORIDE SERPL-SCNC: 97 MMOL/L — SIGNIFICANT CHANGE UP (ref 96–108)
CHLORIDE SERPL-SCNC: 98 MMOL/L — SIGNIFICANT CHANGE UP (ref 96–108)
CHLORIDE SERPL-SCNC: 99 MMOL/L — SIGNIFICANT CHANGE UP (ref 96–108)
CO2 SERPL-SCNC: 22 MMOL/L — SIGNIFICANT CHANGE UP (ref 22–31)
CO2 SERPL-SCNC: 24 MMOL/L — SIGNIFICANT CHANGE UP (ref 22–31)
CO2 SERPL-SCNC: 25 MMOL/L — SIGNIFICANT CHANGE UP (ref 22–31)
CO2 SERPL-SCNC: 26 MMOL/L — SIGNIFICANT CHANGE UP (ref 22–31)
CO2 SERPL-SCNC: 27 MMOL/L — SIGNIFICANT CHANGE UP (ref 22–31)
CO2 SERPL-SCNC: 27 MMOL/L — SIGNIFICANT CHANGE UP (ref 22–31)
CO2 SERPL-SCNC: 28 MMOL/L — SIGNIFICANT CHANGE UP (ref 22–31)
CO2 SERPL-SCNC: 28 MMOL/L — SIGNIFICANT CHANGE UP (ref 22–31)
CO2 SERPL-SCNC: 29 MMOL/L — SIGNIFICANT CHANGE UP (ref 22–31)
CO2 SERPL-SCNC: 30 MMOL/L — SIGNIFICANT CHANGE UP (ref 22–31)
CO2 SERPL-SCNC: 30 MMOL/L — SIGNIFICANT CHANGE UP (ref 22–31)
CO2 SERPL-SCNC: 31 MMOL/L — SIGNIFICANT CHANGE UP (ref 22–31)
CREAT SERPL-MCNC: 3.29 MG/DL — HIGH (ref 0.5–1.3)
CREAT SERPL-MCNC: 3.46 MG/DL — HIGH (ref 0.5–1.3)
CREAT SERPL-MCNC: 3.87 MG/DL — HIGH (ref 0.5–1.3)
CREAT SERPL-MCNC: 3.94 MG/DL — HIGH (ref 0.5–1.3)
CREAT SERPL-MCNC: 4.68 MG/DL — HIGH (ref 0.5–1.3)
CREAT SERPL-MCNC: 4.99 MG/DL — HIGH (ref 0.5–1.3)
CREAT SERPL-MCNC: 5.45 MG/DL — HIGH (ref 0.5–1.3)
CREAT SERPL-MCNC: 5.47 MG/DL — HIGH (ref 0.5–1.3)
CREAT SERPL-MCNC: 5.73 MG/DL — HIGH (ref 0.5–1.3)
CREAT SERPL-MCNC: 6.39 MG/DL — HIGH (ref 0.5–1.3)
CREAT SERPL-MCNC: 6.93 MG/DL — HIGH (ref 0.5–1.3)
CREAT SERPL-MCNC: 6.99 MG/DL — HIGH (ref 0.5–1.3)
CREAT SERPL-MCNC: 7.65 MG/DL — HIGH (ref 0.5–1.3)
CREAT SERPL-MCNC: 8.71 MG/DL — HIGH (ref 0.5–1.3)
CREAT SERPL-MCNC: 8.85 MG/DL — HIGH (ref 0.5–1.3)
D DIMER BLD IA.RAPID-MCNC: 307 NG/ML DDU — HIGH
EGFR: 10 ML/MIN/1.73M2 — LOW
EGFR: 10 ML/MIN/1.73M2 — LOW
EGFR: 11 ML/MIN/1.73M2 — LOW
EGFR: 12 ML/MIN/1.73M2 — LOW
EGFR: 14 ML/MIN/1.73M2 — LOW
EGFR: 15 ML/MIN/1.73M2 — LOW
EGFR: 17 ML/MIN/1.73M2 — LOW
EGFR: 18 ML/MIN/1.73M2 — LOW
EGFR: 5 ML/MIN/1.73M2 — LOW
EGFR: 6 ML/MIN/1.73M2 — LOW
EGFR: 6 ML/MIN/1.73M2 — LOW
EGFR: 7 ML/MIN/1.73M2 — LOW
EGFR: 7 ML/MIN/1.73M2 — LOW
EGFR: 8 ML/MIN/1.73M2 — LOW
EGFR: 9 ML/MIN/1.73M2 — LOW
EOSINOPHIL # BLD AUTO: 0.13 K/UL — SIGNIFICANT CHANGE UP (ref 0–0.5)
EOSINOPHIL # BLD AUTO: 0.16 K/UL — SIGNIFICANT CHANGE UP (ref 0–0.5)
EOSINOPHIL # BLD AUTO: 0.19 K/UL — SIGNIFICANT CHANGE UP (ref 0–0.5)
EOSINOPHIL # BLD AUTO: 0.21 K/UL — SIGNIFICANT CHANGE UP (ref 0–0.5)
EOSINOPHIL # BLD AUTO: 0.27 K/UL — SIGNIFICANT CHANGE UP (ref 0–0.5)
EOSINOPHIL NFR BLD AUTO: 1.9 % — SIGNIFICANT CHANGE UP (ref 0–6)
EOSINOPHIL NFR BLD AUTO: 2.3 % — SIGNIFICANT CHANGE UP (ref 0–6)
EOSINOPHIL NFR BLD AUTO: 2.8 % — SIGNIFICANT CHANGE UP (ref 0–6)
EOSINOPHIL NFR BLD AUTO: 2.9 % — SIGNIFICANT CHANGE UP (ref 0–6)
EOSINOPHIL NFR BLD AUTO: 4.2 % — SIGNIFICANT CHANGE UP (ref 0–6)
ETHANOL SERPL-MCNC: 113 MG/DL — HIGH (ref 0–10)
FLUAV AG NPH QL: SIGNIFICANT CHANGE UP
FLUBV AG NPH QL: SIGNIFICANT CHANGE UP
FOLATE SERPL-MCNC: >20 NG/ML — SIGNIFICANT CHANGE UP
GLUCOSE SERPL-MCNC: 102 MG/DL — HIGH (ref 70–99)
GLUCOSE SERPL-MCNC: 114 MG/DL — HIGH (ref 70–99)
GLUCOSE SERPL-MCNC: 122 MG/DL — HIGH (ref 70–99)
GLUCOSE SERPL-MCNC: 140 MG/DL — HIGH (ref 70–99)
GLUCOSE SERPL-MCNC: 142 MG/DL — HIGH (ref 70–99)
GLUCOSE SERPL-MCNC: 167 MG/DL — HIGH (ref 70–99)
GLUCOSE SERPL-MCNC: 67 MG/DL — LOW (ref 70–99)
GLUCOSE SERPL-MCNC: 84 MG/DL — SIGNIFICANT CHANGE UP (ref 70–99)
GLUCOSE SERPL-MCNC: 87 MG/DL — SIGNIFICANT CHANGE UP (ref 70–99)
GLUCOSE SERPL-MCNC: 89 MG/DL — SIGNIFICANT CHANGE UP (ref 70–99)
GLUCOSE SERPL-MCNC: 90 MG/DL — SIGNIFICANT CHANGE UP (ref 70–99)
GLUCOSE SERPL-MCNC: 91 MG/DL — SIGNIFICANT CHANGE UP (ref 70–99)
GLUCOSE SERPL-MCNC: 92 MG/DL — SIGNIFICANT CHANGE UP (ref 70–99)
GLUCOSE SERPL-MCNC: 95 MG/DL — SIGNIFICANT CHANGE UP (ref 70–99)
GLUCOSE SERPL-MCNC: 96 MG/DL — SIGNIFICANT CHANGE UP (ref 70–99)
HAV IGM SER-ACNC: SIGNIFICANT CHANGE UP
HBV CORE IGM SER-ACNC: SIGNIFICANT CHANGE UP
HBV SURFACE AG SER-ACNC: SIGNIFICANT CHANGE UP
HCT VFR BLD CALC: 28 % — LOW (ref 39–50)
HCT VFR BLD CALC: 28.9 % — LOW (ref 39–50)
HCT VFR BLD CALC: 29 % — LOW (ref 39–50)
HCT VFR BLD CALC: 29.3 % — LOW (ref 39–50)
HCT VFR BLD CALC: 29.3 % — LOW (ref 39–50)
HCT VFR BLD CALC: 29.5 % — LOW (ref 39–50)
HCT VFR BLD CALC: 29.7 % — LOW (ref 39–50)
HCT VFR BLD CALC: 30 % — LOW (ref 39–50)
HCT VFR BLD CALC: 30 % — LOW (ref 39–50)
HCT VFR BLD CALC: 30.4 % — LOW (ref 39–50)
HCT VFR BLD CALC: 30.9 % — LOW (ref 39–50)
HCT VFR BLD CALC: 30.9 % — LOW (ref 39–50)
HCT VFR BLD CALC: 31 % — LOW (ref 39–50)
HCT VFR BLD CALC: 31.3 % — LOW (ref 39–50)
HCT VFR BLD CALC: 32 % — LOW (ref 39–50)
HCT VFR BLD CALC: 32.4 % — LOW (ref 39–50)
HCT VFR BLD CALC: 33.1 % — LOW (ref 39–50)
HCT VFR BLD CALC: 33.9 % — LOW (ref 39–50)
HCV AB S/CO SERPL IA: 0.08 S/CO — SIGNIFICANT CHANGE UP (ref 0–0.99)
HCV AB S/CO SERPL IA: 0.08 S/CO — SIGNIFICANT CHANGE UP (ref 0–0.99)
HCV AB S/CO SERPL IA: 0.09 S/CO — SIGNIFICANT CHANGE UP (ref 0–0.99)
HCV AB SERPL-IMP: SIGNIFICANT CHANGE UP
HGB BLD-MCNC: 10.1 G/DL — LOW (ref 13–17)
HGB BLD-MCNC: 10.1 G/DL — LOW (ref 13–17)
HGB BLD-MCNC: 10.2 G/DL — LOW (ref 13–17)
HGB BLD-MCNC: 10.3 G/DL — LOW (ref 13–17)
HGB BLD-MCNC: 10.3 G/DL — LOW (ref 13–17)
HGB BLD-MCNC: 10.4 G/DL — LOW (ref 13–17)
HGB BLD-MCNC: 10.5 G/DL — LOW (ref 13–17)
HGB BLD-MCNC: 10.6 G/DL — LOW (ref 13–17)
HGB BLD-MCNC: 10.7 G/DL — LOW (ref 13–17)
HGB BLD-MCNC: 10.9 G/DL — LOW (ref 13–17)
HGB BLD-MCNC: 9.5 G/DL — LOW (ref 13–17)
HGB BLD-MCNC: 9.5 G/DL — LOW (ref 13–17)
HGB BLD-MCNC: 9.6 G/DL — LOW (ref 13–17)
HGB BLD-MCNC: 9.7 G/DL — LOW (ref 13–17)
HGB BLD-MCNC: 9.7 G/DL — LOW (ref 13–17)
HGB BLD-MCNC: 9.8 G/DL — LOW (ref 13–17)
HGB BLD-MCNC: 9.8 G/DL — LOW (ref 13–17)
HGB BLD-MCNC: 9.9 G/DL — LOW (ref 13–17)
IMM GRANULOCYTES NFR BLD AUTO: 0.3 % — SIGNIFICANT CHANGE UP (ref 0–0.9)
IMM GRANULOCYTES NFR BLD AUTO: 0.3 % — SIGNIFICANT CHANGE UP (ref 0–0.9)
IMM GRANULOCYTES NFR BLD AUTO: 0.3 % — SIGNIFICANT CHANGE UP (ref 0–1.5)
IMM GRANULOCYTES NFR BLD AUTO: 0.4 % — SIGNIFICANT CHANGE UP (ref 0–1.5)
IMM GRANULOCYTES NFR BLD AUTO: 0.6 % — SIGNIFICANT CHANGE UP (ref 0–1.5)
INR BLD: 1.36 RATIO — HIGH (ref 0.88–1.16)
INR BLD: 1.36 RATIO — HIGH (ref 0.88–1.16)
INR BLD: 1.4 RATIO — HIGH (ref 0.88–1.16)
INR BLD: 1.75 RATIO — HIGH (ref 0.88–1.16)
INR BLD: 1.76 RATIO — HIGH (ref 0.88–1.16)
INR BLD: 2.1 RATIO — HIGH (ref 0.88–1.16)
INR BLD: 2.11 RATIO — HIGH (ref 0.88–1.16)
INR BLD: 2.63 RATIO — HIGH (ref 0.88–1.16)
LYMPHOCYTES # BLD AUTO: 0.56 K/UL — LOW (ref 1–3.3)
LYMPHOCYTES # BLD AUTO: 0.62 K/UL — LOW (ref 1–3.3)
LYMPHOCYTES # BLD AUTO: 0.67 K/UL — LOW (ref 1–3.3)
LYMPHOCYTES # BLD AUTO: 0.87 K/UL — LOW (ref 1–3.3)
LYMPHOCYTES # BLD AUTO: 0.91 K/UL — LOW (ref 1–3.3)
LYMPHOCYTES # BLD AUTO: 10.6 % — LOW (ref 13–44)
LYMPHOCYTES # BLD AUTO: 11.6 % — LOW (ref 13–44)
LYMPHOCYTES # BLD AUTO: 14.2 % — SIGNIFICANT CHANGE UP (ref 13–44)
LYMPHOCYTES # BLD AUTO: 7.7 % — LOW (ref 13–44)
LYMPHOCYTES # BLD AUTO: 9.3 % — LOW (ref 13–44)
MAGNESIUM SERPL-MCNC: 2.1 MG/DL — SIGNIFICANT CHANGE UP (ref 1.6–2.6)
MCHC RBC-ENTMCNC: 32.2 GM/DL — SIGNIFICANT CHANGE UP (ref 32–36)
MCHC RBC-ENTMCNC: 32.3 GM/DL — SIGNIFICANT CHANGE UP (ref 32–36)
MCHC RBC-ENTMCNC: 32.3 GM/DL — SIGNIFICANT CHANGE UP (ref 32–36)
MCHC RBC-ENTMCNC: 32.4 GM/DL — SIGNIFICANT CHANGE UP (ref 32–36)
MCHC RBC-ENTMCNC: 32.7 GM/DL — SIGNIFICANT CHANGE UP (ref 32–36)
MCHC RBC-ENTMCNC: 32.7 GM/DL — SIGNIFICANT CHANGE UP (ref 32–36)
MCHC RBC-ENTMCNC: 32.8 GM/DL — SIGNIFICANT CHANGE UP (ref 32–36)
MCHC RBC-ENTMCNC: 32.9 GM/DL — SIGNIFICANT CHANGE UP (ref 32–36)
MCHC RBC-ENTMCNC: 33 GM/DL — SIGNIFICANT CHANGE UP (ref 32–36)
MCHC RBC-ENTMCNC: 33.1 GM/DL — SIGNIFICANT CHANGE UP (ref 32–36)
MCHC RBC-ENTMCNC: 33.2 GM/DL — SIGNIFICANT CHANGE UP (ref 32–36)
MCHC RBC-ENTMCNC: 33.2 GM/DL — SIGNIFICANT CHANGE UP (ref 32–36)
MCHC RBC-ENTMCNC: 33.3 GM/DL — SIGNIFICANT CHANGE UP (ref 32–36)
MCHC RBC-ENTMCNC: 33.4 GM/DL — SIGNIFICANT CHANGE UP (ref 32–36)
MCHC RBC-ENTMCNC: 33.6 GM/DL — SIGNIFICANT CHANGE UP (ref 32–36)
MCHC RBC-ENTMCNC: 33.7 GM/DL — SIGNIFICANT CHANGE UP (ref 32–36)
MCHC RBC-ENTMCNC: 33.9 GM/DL — SIGNIFICANT CHANGE UP (ref 32–36)
MCHC RBC-ENTMCNC: 34 PG — SIGNIFICANT CHANGE UP (ref 27–34)
MCHC RBC-ENTMCNC: 34.1 PG — HIGH (ref 27–34)
MCHC RBC-ENTMCNC: 34.1 PG — HIGH (ref 27–34)
MCHC RBC-ENTMCNC: 34.3 GM/DL — SIGNIFICANT CHANGE UP (ref 32–36)
MCHC RBC-ENTMCNC: 34.3 PG — HIGH (ref 27–34)
MCHC RBC-ENTMCNC: 34.6 PG — HIGH (ref 27–34)
MCHC RBC-ENTMCNC: 34.7 PG — HIGH (ref 27–34)
MCHC RBC-ENTMCNC: 34.7 PG — HIGH (ref 27–34)
MCHC RBC-ENTMCNC: 34.8 PG — HIGH (ref 27–34)
MCHC RBC-ENTMCNC: 34.9 PG — HIGH (ref 27–34)
MCHC RBC-ENTMCNC: 35.1 PG — HIGH (ref 27–34)
MCHC RBC-ENTMCNC: 35.3 PG — HIGH (ref 27–34)
MCHC RBC-ENTMCNC: 35.3 PG — HIGH (ref 27–34)
MCHC RBC-ENTMCNC: 35.5 PG — HIGH (ref 27–34)
MCHC RBC-ENTMCNC: 36 PG — HIGH (ref 27–34)
MCHC RBC-ENTMCNC: 36.3 PG — HIGH (ref 27–34)
MCHC RBC-ENTMCNC: 36.4 PG — HIGH (ref 27–34)
MCV RBC AUTO: 104.4 FL — HIGH (ref 80–100)
MCV RBC AUTO: 104.5 FL — HIGH (ref 80–100)
MCV RBC AUTO: 104.6 FL — HIGH (ref 80–100)
MCV RBC AUTO: 104.7 FL — HIGH (ref 80–100)
MCV RBC AUTO: 104.7 FL — HIGH (ref 80–100)
MCV RBC AUTO: 105 FL — HIGH (ref 80–100)
MCV RBC AUTO: 105.1 FL — HIGH (ref 80–100)
MCV RBC AUTO: 105.1 FL — HIGH (ref 80–100)
MCV RBC AUTO: 105.4 FL — HIGH (ref 80–100)
MCV RBC AUTO: 105.6 FL — HIGH (ref 80–100)
MCV RBC AUTO: 105.6 FL — HIGH (ref 80–100)
MCV RBC AUTO: 105.7 FL — HIGH (ref 80–100)
MCV RBC AUTO: 106 FL — HIGH (ref 80–100)
MCV RBC AUTO: 106.2 FL — HIGH (ref 80–100)
MCV RBC AUTO: 106.6 FL — HIGH (ref 80–100)
MCV RBC AUTO: 106.9 FL — HIGH (ref 80–100)
MCV RBC AUTO: 107.7 FL — HIGH (ref 80–100)
MCV RBC AUTO: 110 FL — HIGH (ref 80–100)
MONOCYTES # BLD AUTO: 0.76 K/UL — SIGNIFICANT CHANGE UP (ref 0–0.9)
MONOCYTES # BLD AUTO: 0.78 K/UL — SIGNIFICANT CHANGE UP (ref 0–0.9)
MONOCYTES # BLD AUTO: 0.83 K/UL — SIGNIFICANT CHANGE UP (ref 0–0.9)
MONOCYTES # BLD AUTO: 0.92 K/UL — HIGH (ref 0–0.9)
MONOCYTES # BLD AUTO: 1 K/UL — HIGH (ref 0–0.9)
MONOCYTES NFR BLD AUTO: 11.7 % — SIGNIFICANT CHANGE UP (ref 2–14)
MONOCYTES NFR BLD AUTO: 11.9 % — SIGNIFICANT CHANGE UP (ref 2–14)
MONOCYTES NFR BLD AUTO: 12.2 % — SIGNIFICANT CHANGE UP (ref 2–14)
MONOCYTES NFR BLD AUTO: 12.7 % — SIGNIFICANT CHANGE UP (ref 2–14)
MONOCYTES NFR BLD AUTO: 14.4 % — HIGH (ref 2–14)
NEUTROPHILS # BLD AUTO: 4.06 K/UL — SIGNIFICANT CHANGE UP (ref 1.8–7.4)
NEUTROPHILS # BLD AUTO: 4.39 K/UL — SIGNIFICANT CHANGE UP (ref 1.8–7.4)
NEUTROPHILS # BLD AUTO: 5.09 K/UL — SIGNIFICANT CHANGE UP (ref 1.8–7.4)
NEUTROPHILS # BLD AUTO: 5.5 K/UL — SIGNIFICANT CHANGE UP (ref 1.8–7.4)
NEUTROPHILS # BLD AUTO: 6.08 K/UL — SIGNIFICANT CHANGE UP (ref 1.8–7.4)
NEUTROPHILS NFR BLD AUTO: 68.5 % — SIGNIFICANT CHANGE UP (ref 43–77)
NEUTROPHILS NFR BLD AUTO: 70.4 % — SIGNIFICANT CHANGE UP (ref 43–77)
NEUTROPHILS NFR BLD AUTO: 74 % — SIGNIFICANT CHANGE UP (ref 43–77)
NEUTROPHILS NFR BLD AUTO: 75.8 % — SIGNIFICANT CHANGE UP (ref 43–77)
NEUTROPHILS NFR BLD AUTO: 76.1 % — SIGNIFICANT CHANGE UP (ref 43–77)
PHOSPHATE SERPL-MCNC: 2.8 MG/DL — SIGNIFICANT CHANGE UP (ref 2.5–4.5)
PHOSPHATE SERPL-MCNC: 2.9 MG/DL — SIGNIFICANT CHANGE UP (ref 2.5–4.5)
PHOSPHATE SERPL-MCNC: 3.1 MG/DL — SIGNIFICANT CHANGE UP (ref 2.5–4.5)
PHOSPHATE SERPL-MCNC: 3.7 MG/DL — SIGNIFICANT CHANGE UP (ref 2.5–4.5)
PHOSPHATE SERPL-MCNC: 4.3 MG/DL — SIGNIFICANT CHANGE UP (ref 2.5–4.5)
PHOSPHATE SERPL-MCNC: 4.6 MG/DL — HIGH (ref 2.5–4.5)
PLATELET # BLD AUTO: 111 K/UL — LOW (ref 150–400)
PLATELET # BLD AUTO: 114 K/UL — LOW (ref 150–400)
PLATELET # BLD AUTO: 119 K/UL — LOW (ref 150–400)
PLATELET # BLD AUTO: 120 K/UL — LOW (ref 150–400)
PLATELET # BLD AUTO: 122 K/UL — LOW (ref 150–400)
PLATELET # BLD AUTO: 123 K/UL — LOW (ref 150–400)
PLATELET # BLD AUTO: 125 K/UL — LOW (ref 150–400)
PLATELET # BLD AUTO: 126 K/UL — LOW (ref 150–400)
PLATELET # BLD AUTO: 126 K/UL — LOW (ref 150–400)
PLATELET # BLD AUTO: 129 K/UL — LOW (ref 150–400)
PLATELET # BLD AUTO: 130 K/UL — LOW (ref 150–400)
PLATELET # BLD AUTO: 138 K/UL — LOW (ref 150–400)
PLATELET # BLD AUTO: 141 K/UL — LOW (ref 150–400)
PLATELET # BLD AUTO: 143 K/UL — LOW (ref 150–400)
PLATELET # BLD AUTO: 164 K/UL — SIGNIFICANT CHANGE UP (ref 150–400)
PLATELET # BLD AUTO: 172 K/UL — SIGNIFICANT CHANGE UP (ref 150–400)
PLATELET # BLD AUTO: 181 K/UL — SIGNIFICANT CHANGE UP (ref 150–400)
PLATELET # BLD AUTO: 182 K/UL — SIGNIFICANT CHANGE UP (ref 150–400)
POTASSIUM SERPL-MCNC: 3.1 MMOL/L — LOW (ref 3.5–5.3)
POTASSIUM SERPL-MCNC: 3.5 MMOL/L — SIGNIFICANT CHANGE UP (ref 3.5–5.3)
POTASSIUM SERPL-MCNC: 3.5 MMOL/L — SIGNIFICANT CHANGE UP (ref 3.5–5.3)
POTASSIUM SERPL-MCNC: 3.6 MMOL/L — SIGNIFICANT CHANGE UP (ref 3.5–5.3)
POTASSIUM SERPL-MCNC: 3.6 MMOL/L — SIGNIFICANT CHANGE UP (ref 3.5–5.3)
POTASSIUM SERPL-MCNC: 4 MMOL/L — SIGNIFICANT CHANGE UP (ref 3.5–5.3)
POTASSIUM SERPL-MCNC: 4.1 MMOL/L — SIGNIFICANT CHANGE UP (ref 3.5–5.3)
POTASSIUM SERPL-MCNC: 4.4 MMOL/L — SIGNIFICANT CHANGE UP (ref 3.5–5.3)
POTASSIUM SERPL-MCNC: 4.5 MMOL/L — SIGNIFICANT CHANGE UP (ref 3.5–5.3)
POTASSIUM SERPL-MCNC: 4.6 MMOL/L — SIGNIFICANT CHANGE UP (ref 3.5–5.3)
POTASSIUM SERPL-MCNC: 4.9 MMOL/L — SIGNIFICANT CHANGE UP (ref 3.5–5.3)
POTASSIUM SERPL-SCNC: 3.1 MMOL/L — LOW (ref 3.5–5.3)
POTASSIUM SERPL-SCNC: 3.5 MMOL/L — SIGNIFICANT CHANGE UP (ref 3.5–5.3)
POTASSIUM SERPL-SCNC: 3.5 MMOL/L — SIGNIFICANT CHANGE UP (ref 3.5–5.3)
POTASSIUM SERPL-SCNC: 3.6 MMOL/L — SIGNIFICANT CHANGE UP (ref 3.5–5.3)
POTASSIUM SERPL-SCNC: 3.6 MMOL/L — SIGNIFICANT CHANGE UP (ref 3.5–5.3)
POTASSIUM SERPL-SCNC: 4 MMOL/L — SIGNIFICANT CHANGE UP (ref 3.5–5.3)
POTASSIUM SERPL-SCNC: 4.1 MMOL/L — SIGNIFICANT CHANGE UP (ref 3.5–5.3)
POTASSIUM SERPL-SCNC: 4.4 MMOL/L — SIGNIFICANT CHANGE UP (ref 3.5–5.3)
POTASSIUM SERPL-SCNC: 4.5 MMOL/L — SIGNIFICANT CHANGE UP (ref 3.5–5.3)
POTASSIUM SERPL-SCNC: 4.6 MMOL/L — SIGNIFICANT CHANGE UP (ref 3.5–5.3)
POTASSIUM SERPL-SCNC: 4.9 MMOL/L — SIGNIFICANT CHANGE UP (ref 3.5–5.3)
PROT SERPL-MCNC: 6.5 GM/DL — SIGNIFICANT CHANGE UP (ref 6–8.3)
PROT SERPL-MCNC: 6.6 GM/DL — SIGNIFICANT CHANGE UP (ref 6–8.3)
PROT SERPL-MCNC: 6.9 GM/DL — SIGNIFICANT CHANGE UP (ref 6–8.3)
PROT SERPL-MCNC: 7.1 GM/DL — SIGNIFICANT CHANGE UP (ref 6–8.3)
PROT SERPL-MCNC: 7.4 GM/DL — SIGNIFICANT CHANGE UP (ref 6–8.3)
PROTHROM AB SERPL-ACNC: 15.8 SEC — HIGH (ref 10.5–13.4)
PROTHROM AB SERPL-ACNC: 15.8 SEC — HIGH (ref 10.5–13.4)
PROTHROM AB SERPL-ACNC: 16.3 SEC — HIGH (ref 10.5–13.4)
PROTHROM AB SERPL-ACNC: 20.4 SEC — HIGH (ref 10.5–13.4)
PROTHROM AB SERPL-ACNC: 20.5 SEC — HIGH (ref 10.5–13.4)
PROTHROM AB SERPL-ACNC: 24.5 SEC — HIGH (ref 10.5–13.4)
PROTHROM AB SERPL-ACNC: 24.7 SEC — HIGH (ref 10.5–13.4)
PROTHROM AB SERPL-ACNC: 30.8 SEC — HIGH (ref 10.5–13.4)
RBC # BLD: 2.62 M/UL — LOW (ref 4.2–5.8)
RBC # BLD: 2.72 M/UL — LOW (ref 4.2–5.8)
RBC # BLD: 2.75 M/UL — LOW (ref 4.2–5.8)
RBC # BLD: 2.76 M/UL — LOW (ref 4.2–5.8)
RBC # BLD: 2.79 M/UL — LOW (ref 4.2–5.8)
RBC # BLD: 2.79 M/UL — LOW (ref 4.2–5.8)
RBC # BLD: 2.83 M/UL — LOW (ref 4.2–5.8)
RBC # BLD: 2.84 M/UL — LOW (ref 4.2–5.8)
RBC # BLD: 2.87 M/UL — LOW (ref 4.2–5.8)
RBC # BLD: 2.88 M/UL — LOW (ref 4.2–5.8)
RBC # BLD: 2.95 M/UL — LOW (ref 4.2–5.8)
RBC # BLD: 2.95 M/UL — LOW (ref 4.2–5.8)
RBC # BLD: 2.97 M/UL — LOW (ref 4.2–5.8)
RBC # BLD: 2.97 M/UL — LOW (ref 4.2–5.8)
RBC # BLD: 3.01 M/UL — LOW (ref 4.2–5.8)
RBC # BLD: 3.03 M/UL — LOW (ref 4.2–5.8)
RBC # BLD: 3.05 M/UL — LOW (ref 4.2–5.8)
RBC # BLD: 3.18 M/UL — LOW (ref 4.2–5.8)
RBC # FLD: 14.9 % — HIGH (ref 10.3–14.5)
RBC # FLD: 14.9 % — HIGH (ref 10.3–14.5)
RBC # FLD: 15 % — HIGH (ref 10.3–14.5)
RBC # FLD: 15.2 % — HIGH (ref 10.3–14.5)
RBC # FLD: 15.9 % — HIGH (ref 10.3–14.5)
RBC # FLD: 18.6 % — HIGH (ref 10.3–14.5)
RBC # FLD: 18.6 % — HIGH (ref 10.3–14.5)
RBC # FLD: 18.7 % — HIGH (ref 10.3–14.5)
RBC # FLD: 18.7 % — HIGH (ref 10.3–14.5)
RBC # FLD: 18.8 % — HIGH (ref 10.3–14.5)
RBC # FLD: 18.9 % — HIGH (ref 10.3–14.5)
RBC # FLD: 18.9 % — HIGH (ref 10.3–14.5)
RBC # FLD: 19.2 % — HIGH (ref 10.3–14.5)
RBC # FLD: 19.3 % — HIGH (ref 10.3–14.5)
RBC # FLD: 19.4 % — HIGH (ref 10.3–14.5)
RBC # FLD: 19.4 % — HIGH (ref 10.3–14.5)
RBC # FLD: 19.5 % — HIGH (ref 10.3–14.5)
RBC # FLD: 19.5 % — HIGH (ref 10.3–14.5)
RSV RNA NPH QL NAA+NON-PROBE: SIGNIFICANT CHANGE UP
SARS-COV-2 RNA SPEC QL NAA+PROBE: DETECTED
SARS-COV-2 RNA SPEC QL NAA+PROBE: SIGNIFICANT CHANGE UP
SODIUM SERPL-SCNC: 128 MMOL/L — LOW (ref 135–145)
SODIUM SERPL-SCNC: 130 MMOL/L — LOW (ref 135–145)
SODIUM SERPL-SCNC: 131 MMOL/L — LOW (ref 135–145)
SODIUM SERPL-SCNC: 132 MMOL/L — LOW (ref 135–145)
SODIUM SERPL-SCNC: 133 MMOL/L — LOW (ref 135–145)
SODIUM SERPL-SCNC: 134 MMOL/L — LOW (ref 135–145)
SODIUM SERPL-SCNC: 134 MMOL/L — LOW (ref 135–145)
SODIUM SERPL-SCNC: 135 MMOL/L — SIGNIFICANT CHANGE UP (ref 135–145)
TROPONIN I, HIGH SENSITIVITY RESULT: 111.19 NG/L — HIGH
TROPONIN I, HIGH SENSITIVITY RESULT: 374.48 NG/L — HIGH
TROPONIN I, HIGH SENSITIVITY RESULT: 782.69 NG/L — HIGH
TSH SERPL-MCNC: 3.97 UU/ML — SIGNIFICANT CHANGE UP (ref 0.34–4.82)
VIT B12 SERPL-MCNC: 838 PG/ML — SIGNIFICANT CHANGE UP (ref 232–1245)
WBC # BLD: 5.77 K/UL — SIGNIFICANT CHANGE UP (ref 3.8–10.5)
WBC # BLD: 6.41 K/UL — SIGNIFICANT CHANGE UP (ref 3.8–10.5)
WBC # BLD: 6.44 K/UL — SIGNIFICANT CHANGE UP (ref 3.8–10.5)
WBC # BLD: 6.47 K/UL — SIGNIFICANT CHANGE UP (ref 3.8–10.5)
WBC # BLD: 6.54 K/UL — SIGNIFICANT CHANGE UP (ref 3.8–10.5)
WBC # BLD: 6.56 K/UL — SIGNIFICANT CHANGE UP (ref 3.8–10.5)
WBC # BLD: 6.59 K/UL — SIGNIFICANT CHANGE UP (ref 3.8–10.5)
WBC # BLD: 6.69 K/UL — SIGNIFICANT CHANGE UP (ref 3.8–10.5)
WBC # BLD: 6.77 K/UL — SIGNIFICANT CHANGE UP (ref 3.8–10.5)
WBC # BLD: 6.79 K/UL — SIGNIFICANT CHANGE UP (ref 3.8–10.5)
WBC # BLD: 6.87 K/UL — SIGNIFICANT CHANGE UP (ref 3.8–10.5)
WBC # BLD: 6.94 K/UL — SIGNIFICANT CHANGE UP (ref 3.8–10.5)
WBC # BLD: 7.04 K/UL — SIGNIFICANT CHANGE UP (ref 3.8–10.5)
WBC # BLD: 7.1 K/UL — SIGNIFICANT CHANGE UP (ref 3.8–10.5)
WBC # BLD: 7.25 K/UL — SIGNIFICANT CHANGE UP (ref 3.8–10.5)
WBC # BLD: 8.12 K/UL — SIGNIFICANT CHANGE UP (ref 3.8–10.5)
WBC # BLD: 8.21 K/UL — SIGNIFICANT CHANGE UP (ref 3.8–10.5)
WBC # BLD: 8.51 K/UL — SIGNIFICANT CHANGE UP (ref 3.8–10.5)
WBC # FLD AUTO: 5.77 K/UL — SIGNIFICANT CHANGE UP (ref 3.8–10.5)
WBC # FLD AUTO: 6.41 K/UL — SIGNIFICANT CHANGE UP (ref 3.8–10.5)
WBC # FLD AUTO: 6.44 K/UL — SIGNIFICANT CHANGE UP (ref 3.8–10.5)
WBC # FLD AUTO: 6.47 K/UL — SIGNIFICANT CHANGE UP (ref 3.8–10.5)
WBC # FLD AUTO: 6.54 K/UL — SIGNIFICANT CHANGE UP (ref 3.8–10.5)
WBC # FLD AUTO: 6.56 K/UL — SIGNIFICANT CHANGE UP (ref 3.8–10.5)
WBC # FLD AUTO: 6.59 K/UL — SIGNIFICANT CHANGE UP (ref 3.8–10.5)
WBC # FLD AUTO: 6.69 K/UL — SIGNIFICANT CHANGE UP (ref 3.8–10.5)
WBC # FLD AUTO: 6.77 K/UL — SIGNIFICANT CHANGE UP (ref 3.8–10.5)
WBC # FLD AUTO: 6.79 K/UL — SIGNIFICANT CHANGE UP (ref 3.8–10.5)
WBC # FLD AUTO: 6.87 K/UL — SIGNIFICANT CHANGE UP (ref 3.8–10.5)
WBC # FLD AUTO: 6.94 K/UL — SIGNIFICANT CHANGE UP (ref 3.8–10.5)
WBC # FLD AUTO: 7.04 K/UL — SIGNIFICANT CHANGE UP (ref 3.8–10.5)
WBC # FLD AUTO: 7.1 K/UL — SIGNIFICANT CHANGE UP (ref 3.8–10.5)
WBC # FLD AUTO: 7.25 K/UL — SIGNIFICANT CHANGE UP (ref 3.8–10.5)
WBC # FLD AUTO: 8.12 K/UL — SIGNIFICANT CHANGE UP (ref 3.8–10.5)
WBC # FLD AUTO: 8.21 K/UL — SIGNIFICANT CHANGE UP (ref 3.8–10.5)
WBC # FLD AUTO: 8.51 K/UL — SIGNIFICANT CHANGE UP (ref 3.8–10.5)

## 2022-01-01 PROCEDURE — 86880 COOMBS TEST DIRECT: CPT

## 2022-01-01 PROCEDURE — 84484 ASSAY OF TROPONIN QUANT: CPT

## 2022-01-01 PROCEDURE — 97116 GAIT TRAINING THERAPY: CPT | Mod: GP

## 2022-01-01 PROCEDURE — 80069 RENAL FUNCTION PANEL: CPT

## 2022-01-01 PROCEDURE — 93306 TTE W/DOPPLER COMPLETE: CPT

## 2022-01-01 PROCEDURE — 85730 THROMBOPLASTIN TIME PARTIAL: CPT

## 2022-01-01 PROCEDURE — 85027 COMPLETE CBC AUTOMATED: CPT

## 2022-01-01 PROCEDURE — 94640 AIRWAY INHALATION TREATMENT: CPT

## 2022-01-01 PROCEDURE — 93010 ELECTROCARDIOGRAM REPORT: CPT

## 2022-01-01 PROCEDURE — 99232 SBSQ HOSP IP/OBS MODERATE 35: CPT

## 2022-01-01 PROCEDURE — P9047: CPT

## 2022-01-01 PROCEDURE — 73620 X-RAY EXAM OF FOOT: CPT | Mod: LT

## 2022-01-01 PROCEDURE — 72148 MRI LUMBAR SPINE W/O DYE: CPT | Mod: 26

## 2022-01-01 PROCEDURE — 80053 COMPREHEN METABOLIC PANEL: CPT

## 2022-01-01 PROCEDURE — 83735 ASSAY OF MAGNESIUM: CPT

## 2022-01-01 PROCEDURE — 84100 ASSAY OF PHOSPHORUS: CPT

## 2022-01-01 PROCEDURE — 93970 EXTREMITY STUDY: CPT | Mod: 26

## 2022-01-01 PROCEDURE — 80074 ACUTE HEPATITIS PANEL: CPT

## 2022-01-01 PROCEDURE — 36415 COLL VENOUS BLD VENIPUNCTURE: CPT

## 2022-01-01 PROCEDURE — 90935 HEMODIALYSIS ONE EVALUATION: CPT

## 2022-01-01 PROCEDURE — 99233 SBSQ HOSP IP/OBS HIGH 50: CPT

## 2022-01-01 PROCEDURE — 72148 MRI LUMBAR SPINE W/O DYE: CPT

## 2022-01-01 PROCEDURE — 97163 PT EVAL HIGH COMPLEX 45 MIN: CPT | Mod: GP

## 2022-01-01 PROCEDURE — 12345: CPT | Mod: NC

## 2022-01-01 PROCEDURE — 71275 CT ANGIOGRAPHY CHEST: CPT

## 2022-01-01 PROCEDURE — 99223 1ST HOSP IP/OBS HIGH 75: CPT

## 2022-01-01 PROCEDURE — U0005: CPT

## 2022-01-01 PROCEDURE — 85610 PROTHROMBIN TIME: CPT

## 2022-01-01 PROCEDURE — 86077 PHYS BLOOD BANK SERV XMATCH: CPT

## 2022-01-01 PROCEDURE — U0003: CPT

## 2022-01-01 PROCEDURE — 80048 BASIC METABOLIC PNL TOTAL CA: CPT

## 2022-01-01 PROCEDURE — 99239 HOSP IP/OBS DSCHRG MGMT >30: CPT

## 2022-01-01 PROCEDURE — 84443 ASSAY THYROID STIM HORMONE: CPT

## 2022-01-01 PROCEDURE — 74176 CT ABD & PELVIS W/O CONTRAST: CPT | Mod: 26,MA

## 2022-01-01 PROCEDURE — 93005 ELECTROCARDIOGRAM TRACING: CPT

## 2022-01-01 PROCEDURE — 93306 TTE W/DOPPLER COMPLETE: CPT | Mod: 26

## 2022-01-01 PROCEDURE — 85025 COMPLETE CBC W/AUTO DIFF WBC: CPT

## 2022-01-01 PROCEDURE — 86850 RBC ANTIBODY SCREEN: CPT

## 2022-01-01 PROCEDURE — 85379 FIBRIN DEGRADATION QUANT: CPT

## 2022-01-01 PROCEDURE — 99284 EMERGENCY DEPT VISIT MOD MDM: CPT | Mod: 25

## 2022-01-01 PROCEDURE — 99285 EMERGENCY DEPT VISIT HI MDM: CPT

## 2022-01-01 PROCEDURE — 71045 X-RAY EXAM CHEST 1 VIEW: CPT | Mod: 26

## 2022-01-01 PROCEDURE — 99283 EMERGENCY DEPT VISIT LOW MDM: CPT

## 2022-01-01 PROCEDURE — 72125 CT NECK SPINE W/O DYE: CPT | Mod: 26,MA

## 2022-01-01 PROCEDURE — 70450 CT HEAD/BRAIN W/O DYE: CPT | Mod: 26,MA

## 2022-01-01 PROCEDURE — 87635 SARS-COV-2 COVID-19 AMP PRB: CPT

## 2022-01-01 PROCEDURE — 82607 VITAMIN B-12: CPT

## 2022-01-01 PROCEDURE — 71045 X-RAY EXAM CHEST 1 VIEW: CPT

## 2022-01-01 PROCEDURE — 97110 THERAPEUTIC EXERCISES: CPT | Mod: GP

## 2022-01-01 PROCEDURE — 73620 X-RAY EXAM OF FOOT: CPT | Mod: 26,LT

## 2022-01-01 PROCEDURE — 99497 ADVNCD CARE PLAN 30 MIN: CPT | Mod: 25

## 2022-01-01 PROCEDURE — 86900 BLOOD TYPING SEROLOGIC ABO: CPT

## 2022-01-01 PROCEDURE — 71275 CT ANGIOGRAPHY CHEST: CPT | Mod: 26

## 2022-01-01 PROCEDURE — 90999 UNLISTED DIALYSIS PROCEDURE: CPT

## 2022-01-01 PROCEDURE — 86901 BLOOD TYPING SEROLOGIC RH(D): CPT

## 2022-01-01 PROCEDURE — 93970 EXTREMITY STUDY: CPT

## 2022-01-01 PROCEDURE — 99284 EMERGENCY DEPT VISIT MOD MDM: CPT | Mod: CS

## 2022-01-01 PROCEDURE — 82746 ASSAY OF FOLIC ACID SERUM: CPT

## 2022-01-01 PROCEDURE — 86870 RBC ANTIBODY IDENTIFICATION: CPT

## 2022-01-01 RX ORDER — CARVEDILOL PHOSPHATE 80 MG/1
3.12 CAPSULE, EXTENDED RELEASE ORAL EVERY 12 HOURS
Refills: 0 | Status: DISCONTINUED | OUTPATIENT
Start: 2022-01-01 | End: 2022-01-01

## 2022-01-01 RX ORDER — MONTELUKAST 4 MG/1
10 TABLET, CHEWABLE ORAL AT BEDTIME
Refills: 0 | Status: DISCONTINUED | OUTPATIENT
Start: 2022-01-01 | End: 2022-01-01

## 2022-01-01 RX ORDER — LEVOTHYROXINE SODIUM 125 MCG
88 TABLET ORAL DAILY
Refills: 0 | Status: DISCONTINUED | OUTPATIENT
Start: 2022-01-01 | End: 2022-01-01

## 2022-01-01 RX ORDER — SUCROFERRIC OXYHYDROXIDE 500 MG/1
500 TABLET, CHEWABLE ORAL
Refills: 0 | Status: ACTIVE | COMMUNITY

## 2022-01-01 RX ORDER — HEPARIN SODIUM 5000 [USP'U]/ML
2500 INJECTION INTRAVENOUS; SUBCUTANEOUS ONCE
Refills: 0 | Status: COMPLETED | OUTPATIENT
Start: 2022-01-01 | End: 2022-01-01

## 2022-01-01 RX ORDER — ALBUMIN HUMAN 25 %
50 VIAL (ML) INTRAVENOUS
Refills: 0 | Status: DISCONTINUED | OUTPATIENT
Start: 2022-01-01 | End: 2022-01-01

## 2022-01-01 RX ORDER — OXYCODONE AND ACETAMINOPHEN 5; 325 MG/1; MG/1
1 TABLET ORAL EVERY 4 HOURS
Refills: 0 | Status: DISCONTINUED | OUTPATIENT
Start: 2022-01-01 | End: 2022-01-01

## 2022-01-01 RX ORDER — CEPHALEXIN 500 MG
1 CAPSULE ORAL
Qty: 14 | Refills: 0
Start: 2022-01-01 | End: 2022-01-01

## 2022-01-01 RX ORDER — HEPARIN SODIUM 5000 [USP'U]/ML
INJECTION INTRAVENOUS; SUBCUTANEOUS
Qty: 25000 | Refills: 0 | Status: DISCONTINUED | OUTPATIENT
Start: 2022-01-01 | End: 2022-01-01

## 2022-01-01 RX ORDER — CARVEDILOL 3.12 MG/1
3.12 TABLET, FILM COATED ORAL
Refills: 0 | Status: ACTIVE | COMMUNITY

## 2022-01-01 RX ORDER — FLUTICASONE PROPIONATE 50 MCG
1 SPRAY, SUSPENSION NASAL
Qty: 0 | Refills: 0 | DISCHARGE

## 2022-01-01 RX ORDER — ALBUTEROL 90 UG/1
2 AEROSOL, METERED ORAL
Qty: 0 | Refills: 0 | DISCHARGE
Start: 2022-01-01

## 2022-01-01 RX ORDER — MIDODRINE HYDROCHLORIDE 2.5 MG/1
5 TABLET ORAL
Refills: 0 | Status: DISCONTINUED | OUTPATIENT
Start: 2022-01-01 | End: 2022-01-01

## 2022-01-01 RX ORDER — ASPIRIN ENTERIC COATED TABLETS 81 MG 81 MG/1
81 TABLET, DELAYED RELEASE ORAL
Refills: 0 | Status: ACTIVE | COMMUNITY

## 2022-01-01 RX ORDER — CYCLOBENZAPRINE HYDROCHLORIDE 10 MG/1
5 TABLET, FILM COATED ORAL EVERY 8 HOURS
Refills: 0 | Status: DISCONTINUED | OUTPATIENT
Start: 2022-01-01 | End: 2022-01-01

## 2022-01-01 RX ORDER — HEPARIN SODIUM 5000 [USP'U]/ML
3000 INJECTION INTRAVENOUS; SUBCUTANEOUS EVERY 6 HOURS
Refills: 0 | Status: DISCONTINUED | OUTPATIENT
Start: 2022-01-01 | End: 2022-01-01

## 2022-01-01 RX ORDER — MORPHINE SULFATE 50 MG/1
2 CAPSULE, EXTENDED RELEASE ORAL EVERY 4 HOURS
Refills: 0 | Status: DISCONTINUED | OUTPATIENT
Start: 2022-01-01 | End: 2022-01-01

## 2022-01-01 RX ORDER — PSYLLIUM SEED (WITH DEXTROSE)
1 POWDER (GRAM) ORAL DAILY
Refills: 0 | Status: DISCONTINUED | OUTPATIENT
Start: 2022-01-01 | End: 2022-01-01

## 2022-01-01 RX ORDER — WARFARIN SODIUM 2.5 MG/1
5 TABLET ORAL DAILY
Refills: 0 | Status: COMPLETED | OUTPATIENT
Start: 2022-01-01 | End: 2022-01-01

## 2022-01-01 RX ORDER — MIDODRINE HYDROCHLORIDE 2.5 MG/1
1 TABLET ORAL
Qty: 0 | Refills: 0 | DISCHARGE
Start: 2022-01-01

## 2022-01-01 RX ORDER — HEPARIN SODIUM 5000 [USP'U]/ML
1300 INJECTION INTRAVENOUS; SUBCUTANEOUS
Qty: 25000 | Refills: 0 | Status: DISCONTINUED | OUTPATIENT
Start: 2022-01-01 | End: 2022-01-01

## 2022-01-01 RX ORDER — POTASSIUM CHLORIDE 20 MEQ
40 PACKET (EA) ORAL ONCE
Refills: 0 | Status: COMPLETED | OUTPATIENT
Start: 2022-01-01 | End: 2022-01-01

## 2022-01-01 RX ORDER — LIDOCAINE 4 G/100G
1 CREAM TOPICAL DAILY
Refills: 0 | Status: DISCONTINUED | OUTPATIENT
Start: 2022-01-01 | End: 2022-01-01

## 2022-01-01 RX ORDER — CALCIUM ACETATE 667 MG
667 TABLET ORAL
Refills: 0 | Status: DISCONTINUED | OUTPATIENT
Start: 2022-01-01 | End: 2022-01-01

## 2022-01-01 RX ORDER — ATORVASTATIN CALCIUM 20 MG/1
20 TABLET, FILM COATED ORAL
Refills: 0 | Status: ACTIVE | COMMUNITY

## 2022-01-01 RX ORDER — TRANEXAMIC ACID 100 MG/ML
5 INJECTION, SOLUTION INTRAVENOUS ONCE
Refills: 0 | Status: COMPLETED | OUTPATIENT
Start: 2022-01-01 | End: 2022-01-01

## 2022-01-01 RX ORDER — INFLUENZA VIRUS VACCINE 15; 15; 15; 15 UG/.5ML; UG/.5ML; UG/.5ML; UG/.5ML
0.7 SUSPENSION INTRAMUSCULAR ONCE
Refills: 0 | Status: DISCONTINUED | OUTPATIENT
Start: 2022-01-01 | End: 2022-01-01

## 2022-01-01 RX ORDER — ASPIRIN/CALCIUM CARB/MAGNESIUM 324 MG
324 TABLET ORAL ONCE
Refills: 0 | Status: DISCONTINUED | OUTPATIENT
Start: 2022-01-01 | End: 2022-01-01

## 2022-01-01 RX ORDER — HEPARIN SODIUM 5000 [USP'U]/ML
5000 INJECTION INTRAVENOUS; SUBCUTANEOUS EVERY 12 HOURS
Refills: 0 | Status: DISCONTINUED | OUTPATIENT
Start: 2022-01-01 | End: 2022-01-01

## 2022-01-01 RX ORDER — MIDODRINE HYDROCHLORIDE 5 MG/1
5 TABLET ORAL
Refills: 0 | Status: ACTIVE | COMMUNITY

## 2022-01-01 RX ORDER — PSYLLIUM SEED (WITH DEXTROSE)
1 POWDER (GRAM) ORAL
Qty: 0 | Refills: 0 | DISCHARGE

## 2022-01-01 RX ORDER — WARFARIN SODIUM 2.5 MG/1
1 TABLET ORAL
Qty: 0 | Refills: 0 | DISCHARGE
Start: 2022-01-01

## 2022-01-01 RX ORDER — HEPARIN SODIUM 5000 [USP'U]/ML
2500 INJECTION INTRAVENOUS; SUBCUTANEOUS EVERY 6 HOURS
Refills: 0 | Status: DISCONTINUED | OUTPATIENT
Start: 2022-01-01 | End: 2022-01-01

## 2022-01-01 RX ORDER — LIDOCAINE AND PRILOCAINE CREAM 25; 25 MG/G; MG/G
1 CREAM TOPICAL
Refills: 0 | Status: DISCONTINUED | OUTPATIENT
Start: 2022-01-01 | End: 2022-01-01

## 2022-01-01 RX ORDER — TRANEXAMIC ACID 100 MG/ML
1 INJECTION, SOLUTION INTRAVENOUS ONCE
Refills: 0 | Status: DISCONTINUED | OUTPATIENT
Start: 2022-01-01 | End: 2022-01-01

## 2022-01-01 RX ORDER — LANOLIN ALCOHOL/MO/W.PET/CERES
3 CREAM (GRAM) TOPICAL AT BEDTIME
Refills: 0 | Status: DISCONTINUED | OUTPATIENT
Start: 2022-01-01 | End: 2022-01-01

## 2022-01-01 RX ORDER — WARFARIN SODIUM 2.5 MG/1
5 TABLET ORAL DAILY
Refills: 0 | Status: DISCONTINUED | OUTPATIENT
Start: 2022-01-01 | End: 2022-01-01

## 2022-01-01 RX ORDER — ERYTHROPOIETIN 10000 [IU]/ML
5000 INJECTION, SOLUTION INTRAVENOUS; SUBCUTANEOUS
Refills: 0 | Status: DISCONTINUED | OUTPATIENT
Start: 2022-01-01 | End: 2022-01-01

## 2022-01-01 RX ORDER — POLYETHYLENE GLYCOL 3350 17 G/17G
17 POWDER, FOR SOLUTION ORAL DAILY
Refills: 0 | Status: DISCONTINUED | OUTPATIENT
Start: 2022-01-01 | End: 2022-01-01

## 2022-01-01 RX ORDER — POLYETHYLENE GLYCOL 3350 17 G/17G
17 POWDER, FOR SOLUTION ORAL
Qty: 0 | Refills: 0 | DISCHARGE
Start: 2022-01-01

## 2022-01-01 RX ORDER — ALBUTEROL 90 UG/1
2 AEROSOL, METERED ORAL EVERY 6 HOURS
Refills: 0 | Status: DISCONTINUED | OUTPATIENT
Start: 2022-01-01 | End: 2022-01-01

## 2022-01-01 RX ORDER — MORPHINE SULFATE 50 MG/1
4 CAPSULE, EXTENDED RELEASE ORAL EVERY 4 HOURS
Refills: 0 | Status: DISCONTINUED | OUTPATIENT
Start: 2022-01-01 | End: 2022-01-01

## 2022-01-01 RX ORDER — POTASSIUM CHLORIDE 20 MEQ
20 PACKET (EA) ORAL ONCE
Refills: 0 | Status: COMPLETED | OUTPATIENT
Start: 2022-01-01 | End: 2022-01-01

## 2022-01-01 RX ORDER — NALOXONE HYDROCHLORIDE 4 MG/.1ML
0.4 SPRAY NASAL ONCE
Refills: 0 | Status: DISCONTINUED | OUTPATIENT
Start: 2022-01-01 | End: 2022-01-01

## 2022-01-01 RX ORDER — NITROGLYCERIN 6.5 MG
1 CAPSULE, EXTENDED RELEASE ORAL
Qty: 0 | Refills: 0 | DISCHARGE

## 2022-01-01 RX ORDER — HEPARIN SODIUM 5000 [USP'U]/ML
6500 INJECTION INTRAVENOUS; SUBCUTANEOUS EVERY 6 HOURS
Refills: 0 | Status: DISCONTINUED | OUTPATIENT
Start: 2022-01-01 | End: 2022-01-01

## 2022-01-01 RX ORDER — HEPARIN SODIUM 5000 [USP'U]/ML
5500 INJECTION INTRAVENOUS; SUBCUTANEOUS EVERY 6 HOURS
Refills: 0 | Status: DISCONTINUED | OUTPATIENT
Start: 2022-01-01 | End: 2022-01-01

## 2022-01-01 RX ORDER — GLUCOSAMINE HCL/CHONDROITIN SU 500-400 MG
3 CAPSULE ORAL
Refills: 0 | Status: ACTIVE | COMMUNITY

## 2022-01-01 RX ORDER — CEPHALEXIN 500 MG
250 CAPSULE ORAL ONCE
Refills: 0 | Status: COMPLETED | OUTPATIENT
Start: 2022-01-01 | End: 2022-01-01

## 2022-01-01 RX ORDER — ONDANSETRON 8 MG/1
4 TABLET, FILM COATED ORAL EVERY 8 HOURS
Refills: 0 | Status: DISCONTINUED | OUTPATIENT
Start: 2022-01-01 | End: 2022-01-01

## 2022-01-01 RX ORDER — ATORVASTATIN CALCIUM 80 MG/1
20 TABLET, FILM COATED ORAL AT BEDTIME
Refills: 0 | Status: DISCONTINUED | OUTPATIENT
Start: 2022-01-01 | End: 2022-01-01

## 2022-01-01 RX ORDER — ACETAMINOPHEN 500 MG
650 TABLET ORAL EVERY 6 HOURS
Refills: 0 | Status: DISCONTINUED | OUTPATIENT
Start: 2022-01-01 | End: 2022-01-01

## 2022-01-01 RX ORDER — BUDESONIDE AND FORMOTEROL FUMARATE DIHYDRATE 160; 4.5 UG/1; UG/1
2 AEROSOL RESPIRATORY (INHALATION)
Refills: 0 | Status: DISCONTINUED | OUTPATIENT
Start: 2022-01-01 | End: 2022-01-01

## 2022-01-01 RX ORDER — OXYCODONE AND ACETAMINOPHEN 5; 325 MG/1; MG/1
1 TABLET ORAL
Qty: 0 | Refills: 0 | DISCHARGE
Start: 2022-01-01

## 2022-01-01 RX ORDER — ASPIRIN/CALCIUM CARB/MAGNESIUM 324 MG
81 TABLET ORAL DAILY
Refills: 0 | Status: DISCONTINUED | OUTPATIENT
Start: 2022-01-01 | End: 2022-01-01

## 2022-01-01 RX ORDER — SUCROFERRIC OXYHYDROXIDE 500 MG/1
2 TABLET, CHEWABLE ORAL
Qty: 0 | Refills: 0 | DISCHARGE

## 2022-01-01 RX ORDER — MORPHINE SULFATE 50 MG/1
4 CAPSULE, EXTENDED RELEASE ORAL ONCE
Refills: 0 | Status: DISCONTINUED | OUTPATIENT
Start: 2022-01-01 | End: 2022-01-01

## 2022-01-01 RX ORDER — HEPARIN SODIUM 5000 [USP'U]/ML
6500 INJECTION INTRAVENOUS; SUBCUTANEOUS ONCE
Refills: 0 | Status: COMPLETED | OUTPATIENT
Start: 2022-01-01 | End: 2022-01-01

## 2022-01-01 RX ORDER — PSYLLIUM HUSK 0.4 G
CAPSULE ORAL
Refills: 0 | Status: ACTIVE | COMMUNITY

## 2022-01-01 RX ORDER — ASPIRIN/CALCIUM CARB/MAGNESIUM 324 MG
1 TABLET ORAL
Qty: 0 | Refills: 0 | DISCHARGE

## 2022-01-01 RX ADMIN — HEPARIN SODIUM 900 UNIT(S)/HR: 5000 INJECTION INTRAVENOUS; SUBCUTANEOUS at 09:23

## 2022-01-01 RX ADMIN — Medication 1 PACKET(S): at 09:21

## 2022-01-01 RX ADMIN — HEPARIN SODIUM 1500 UNIT(S)/HR: 5000 INJECTION INTRAVENOUS; SUBCUTANEOUS at 19:00

## 2022-01-01 RX ADMIN — Medication 650 MILLIGRAM(S): at 09:30

## 2022-01-01 RX ADMIN — Medication 3 MILLIGRAM(S): at 21:39

## 2022-01-01 RX ADMIN — Medication 1 TABLET(S): at 13:29

## 2022-01-01 RX ADMIN — BUDESONIDE AND FORMOTEROL FUMARATE DIHYDRATE 2 PUFF(S): 160; 4.5 AEROSOL RESPIRATORY (INHALATION) at 08:21

## 2022-01-01 RX ADMIN — HEPARIN SODIUM 1200 UNIT(S)/HR: 5000 INJECTION INTRAVENOUS; SUBCUTANEOUS at 19:25

## 2022-01-01 RX ADMIN — CARVEDILOL PHOSPHATE 3.12 MILLIGRAM(S): 80 CAPSULE, EXTENDED RELEASE ORAL at 15:41

## 2022-01-01 RX ADMIN — HEPARIN SODIUM 1000 UNIT(S)/HR: 5000 INJECTION INTRAVENOUS; SUBCUTANEOUS at 10:28

## 2022-01-01 RX ADMIN — BUDESONIDE AND FORMOTEROL FUMARATE DIHYDRATE 2 PUFF(S): 160; 4.5 AEROSOL RESPIRATORY (INHALATION) at 22:15

## 2022-01-01 RX ADMIN — LIDOCAINE AND PRILOCAINE CREAM 1 APPLICATION(S): 25; 25 CREAM TOPICAL at 12:17

## 2022-01-01 RX ADMIN — MORPHINE SULFATE 4 MILLIGRAM(S): 50 CAPSULE, EXTENDED RELEASE ORAL at 18:36

## 2022-01-01 RX ADMIN — HEPARIN SODIUM 800 UNIT(S)/HR: 5000 INJECTION INTRAVENOUS; SUBCUTANEOUS at 16:57

## 2022-01-01 RX ADMIN — HEPARIN SODIUM 0 UNIT(S)/HR: 5000 INJECTION INTRAVENOUS; SUBCUTANEOUS at 18:23

## 2022-01-01 RX ADMIN — HEPARIN SODIUM 900 UNIT(S)/HR: 5000 INJECTION INTRAVENOUS; SUBCUTANEOUS at 19:27

## 2022-01-01 RX ADMIN — ATORVASTATIN CALCIUM 20 MILLIGRAM(S): 80 TABLET, FILM COATED ORAL at 21:52

## 2022-01-01 RX ADMIN — Medication 650 MILLIGRAM(S): at 03:00

## 2022-01-01 RX ADMIN — Medication 88 MICROGRAM(S): at 06:27

## 2022-01-01 RX ADMIN — Medication 1 TABLET(S): at 11:09

## 2022-01-01 RX ADMIN — HEPARIN SODIUM 3000 UNIT(S): 5000 INJECTION INTRAVENOUS; SUBCUTANEOUS at 10:20

## 2022-01-01 RX ADMIN — CARVEDILOL PHOSPHATE 3.12 MILLIGRAM(S): 80 CAPSULE, EXTENDED RELEASE ORAL at 09:45

## 2022-01-01 RX ADMIN — Medication 1 TABLET(S): at 09:37

## 2022-01-01 RX ADMIN — Medication 1 TABLET(S): at 12:55

## 2022-01-01 RX ADMIN — BUDESONIDE AND FORMOTEROL FUMARATE DIHYDRATE 2 PUFF(S): 160; 4.5 AEROSOL RESPIRATORY (INHALATION) at 21:12

## 2022-01-01 RX ADMIN — Medication 1 TABLET(S): at 09:20

## 2022-01-01 RX ADMIN — Medication 667 MILLIGRAM(S): at 17:03

## 2022-01-01 RX ADMIN — HEPARIN SODIUM 5000 UNIT(S): 5000 INJECTION INTRAVENOUS; SUBCUTANEOUS at 09:46

## 2022-01-01 RX ADMIN — CARVEDILOL PHOSPHATE 3.12 MILLIGRAM(S): 80 CAPSULE, EXTENDED RELEASE ORAL at 12:54

## 2022-01-01 RX ADMIN — ATORVASTATIN CALCIUM 20 MILLIGRAM(S): 80 TABLET, FILM COATED ORAL at 22:35

## 2022-01-01 RX ADMIN — HEPARIN SODIUM 5000 UNIT(S): 5000 INJECTION INTRAVENOUS; SUBCUTANEOUS at 21:38

## 2022-01-01 RX ADMIN — ATORVASTATIN CALCIUM 20 MILLIGRAM(S): 80 TABLET, FILM COATED ORAL at 21:51

## 2022-01-01 RX ADMIN — Medication 667 MILLIGRAM(S): at 14:00

## 2022-01-01 RX ADMIN — LIDOCAINE 1 PATCH: 4 CREAM TOPICAL at 19:48

## 2022-01-01 RX ADMIN — Medication 3 MILLIGRAM(S): at 21:51

## 2022-01-01 RX ADMIN — Medication 81 MILLIGRAM(S): at 08:35

## 2022-01-01 RX ADMIN — Medication 88 MICROGRAM(S): at 06:13

## 2022-01-01 RX ADMIN — TRANEXAMIC ACID 5 MILLILITER(S): 100 INJECTION, SOLUTION INTRAVENOUS at 18:53

## 2022-01-01 RX ADMIN — HEPARIN SODIUM 0 UNIT(S)/HR: 5000 INJECTION INTRAVENOUS; SUBCUTANEOUS at 19:14

## 2022-01-01 RX ADMIN — HEPARIN SODIUM 800 UNIT(S)/HR: 5000 INJECTION INTRAVENOUS; SUBCUTANEOUS at 10:02

## 2022-01-01 RX ADMIN — Medication 325 MILLIGRAM(S): at 08:43

## 2022-01-01 RX ADMIN — Medication 667 MILLIGRAM(S): at 13:07

## 2022-01-01 RX ADMIN — Medication 88 MICROGRAM(S): at 05:42

## 2022-01-01 RX ADMIN — Medication 1 TABLET(S): at 10:03

## 2022-01-01 RX ADMIN — Medication 667 MILLIGRAM(S): at 17:00

## 2022-01-01 RX ADMIN — MONTELUKAST 10 MILLIGRAM(S): 4 TABLET, CHEWABLE ORAL at 23:01

## 2022-01-01 RX ADMIN — Medication 667 MILLIGRAM(S): at 10:09

## 2022-01-01 RX ADMIN — Medication 667 MILLIGRAM(S): at 16:52

## 2022-01-01 RX ADMIN — HEPARIN SODIUM 1200 UNIT(S)/HR: 5000 INJECTION INTRAVENOUS; SUBCUTANEOUS at 01:29

## 2022-01-01 RX ADMIN — MONTELUKAST 10 MILLIGRAM(S): 4 TABLET, CHEWABLE ORAL at 21:10

## 2022-01-01 RX ADMIN — Medication 88 MICROGRAM(S): at 05:51

## 2022-01-01 RX ADMIN — LIDOCAINE 1 PATCH: 4 CREAM TOPICAL at 21:42

## 2022-01-01 RX ADMIN — BUDESONIDE AND FORMOTEROL FUMARATE DIHYDRATE 2 PUFF(S): 160; 4.5 AEROSOL RESPIRATORY (INHALATION) at 20:05

## 2022-01-01 RX ADMIN — CARVEDILOL PHOSPHATE 3.12 MILLIGRAM(S): 80 CAPSULE, EXTENDED RELEASE ORAL at 21:39

## 2022-01-01 RX ADMIN — Medication 20 MILLIEQUIVALENT(S): at 06:26

## 2022-01-01 RX ADMIN — HEPARIN SODIUM 900 UNIT(S)/HR: 5000 INJECTION INTRAVENOUS; SUBCUTANEOUS at 11:54

## 2022-01-01 RX ADMIN — BUDESONIDE AND FORMOTEROL FUMARATE DIHYDRATE 2 PUFF(S): 160; 4.5 AEROSOL RESPIRATORY (INHALATION) at 22:08

## 2022-01-01 RX ADMIN — CARVEDILOL PHOSPHATE 3.12 MILLIGRAM(S): 80 CAPSULE, EXTENDED RELEASE ORAL at 10:40

## 2022-01-01 RX ADMIN — Medication 50 MILLILITER(S): at 14:17

## 2022-01-01 RX ADMIN — HEPARIN SODIUM 5000 UNIT(S): 5000 INJECTION INTRAVENOUS; SUBCUTANEOUS at 09:22

## 2022-01-01 RX ADMIN — LIDOCAINE AND PRILOCAINE CREAM 1 APPLICATION(S): 25; 25 CREAM TOPICAL at 17:30

## 2022-01-01 RX ADMIN — Medication 667 MILLIGRAM(S): at 10:03

## 2022-01-01 RX ADMIN — Medication 1 TABLET(S): at 08:35

## 2022-01-01 RX ADMIN — LIDOCAINE 1 PATCH: 4 CREAM TOPICAL at 08:36

## 2022-01-01 RX ADMIN — Medication 667 MILLIGRAM(S): at 10:04

## 2022-01-01 RX ADMIN — ERYTHROPOIETIN 5000 UNIT(S): 10000 INJECTION, SOLUTION INTRAVENOUS; SUBCUTANEOUS at 08:47

## 2022-01-01 RX ADMIN — LIDOCAINE AND PRILOCAINE CREAM 1 APPLICATION(S): 25; 25 CREAM TOPICAL at 07:50

## 2022-01-01 RX ADMIN — HEPARIN SODIUM 0 UNIT(S)/HR: 5000 INJECTION INTRAVENOUS; SUBCUTANEOUS at 17:37

## 2022-01-01 RX ADMIN — HEPARIN SODIUM 0 UNIT(S)/HR: 5000 INJECTION INTRAVENOUS; SUBCUTANEOUS at 18:17

## 2022-01-01 RX ADMIN — Medication 88 MICROGRAM(S): at 06:34

## 2022-01-01 RX ADMIN — LIDOCAINE 1 PATCH: 4 CREAM TOPICAL at 19:45

## 2022-01-01 RX ADMIN — ERYTHROPOIETIN 5000 UNIT(S): 10000 INJECTION, SOLUTION INTRAVENOUS; SUBCUTANEOUS at 11:35

## 2022-01-01 RX ADMIN — Medication 88 MICROGRAM(S): at 05:18

## 2022-01-01 RX ADMIN — Medication 667 MILLIGRAM(S): at 13:29

## 2022-01-01 RX ADMIN — Medication 250 MILLIGRAM(S): at 20:01

## 2022-01-01 RX ADMIN — MONTELUKAST 10 MILLIGRAM(S): 4 TABLET, CHEWABLE ORAL at 21:38

## 2022-01-01 RX ADMIN — BUDESONIDE AND FORMOTEROL FUMARATE DIHYDRATE 2 PUFF(S): 160; 4.5 AEROSOL RESPIRATORY (INHALATION) at 08:02

## 2022-01-01 RX ADMIN — HEPARIN SODIUM 800 UNIT(S)/HR: 5000 INJECTION INTRAVENOUS; SUBCUTANEOUS at 08:37

## 2022-01-01 RX ADMIN — LIDOCAINE AND PRILOCAINE CREAM 1 APPLICATION(S): 25; 25 CREAM TOPICAL at 07:38

## 2022-01-01 RX ADMIN — WARFARIN SODIUM 5 MILLIGRAM(S): 2.5 TABLET ORAL at 21:10

## 2022-01-01 RX ADMIN — MONTELUKAST 10 MILLIGRAM(S): 4 TABLET, CHEWABLE ORAL at 21:28

## 2022-01-01 RX ADMIN — CARVEDILOL PHOSPHATE 3.12 MILLIGRAM(S): 80 CAPSULE, EXTENDED RELEASE ORAL at 21:21

## 2022-01-01 RX ADMIN — HEPARIN SODIUM 900 UNIT(S)/HR: 5000 INJECTION INTRAVENOUS; SUBCUTANEOUS at 02:35

## 2022-01-01 RX ADMIN — Medication 88 MICROGRAM(S): at 06:20

## 2022-01-01 RX ADMIN — Medication 667 MILLIGRAM(S): at 17:37

## 2022-01-01 RX ADMIN — MONTELUKAST 10 MILLIGRAM(S): 4 TABLET, CHEWABLE ORAL at 21:39

## 2022-01-01 RX ADMIN — MONTELUKAST 10 MILLIGRAM(S): 4 TABLET, CHEWABLE ORAL at 21:29

## 2022-01-01 RX ADMIN — ATORVASTATIN CALCIUM 20 MILLIGRAM(S): 80 TABLET, FILM COATED ORAL at 21:38

## 2022-01-01 RX ADMIN — MONTELUKAST 10 MILLIGRAM(S): 4 TABLET, CHEWABLE ORAL at 21:51

## 2022-01-01 RX ADMIN — Medication 1 TABLET(S): at 09:42

## 2022-01-01 RX ADMIN — HEPARIN SODIUM 1200 UNIT(S)/HR: 5000 INJECTION INTRAVENOUS; SUBCUTANEOUS at 07:30

## 2022-01-01 RX ADMIN — CARVEDILOL PHOSPHATE 3.12 MILLIGRAM(S): 80 CAPSULE, EXTENDED RELEASE ORAL at 21:35

## 2022-01-01 RX ADMIN — HEPARIN SODIUM 900 UNIT(S)/HR: 5000 INJECTION INTRAVENOUS; SUBCUTANEOUS at 07:14

## 2022-01-01 RX ADMIN — Medication 1 TABLET(S): at 10:10

## 2022-01-01 RX ADMIN — BUDESONIDE AND FORMOTEROL FUMARATE DIHYDRATE 2 PUFF(S): 160; 4.5 AEROSOL RESPIRATORY (INHALATION) at 09:00

## 2022-01-01 RX ADMIN — HEPARIN SODIUM 800 UNIT(S)/HR: 5000 INJECTION INTRAVENOUS; SUBCUTANEOUS at 19:55

## 2022-01-01 RX ADMIN — BUDESONIDE AND FORMOTEROL FUMARATE DIHYDRATE 2 PUFF(S): 160; 4.5 AEROSOL RESPIRATORY (INHALATION) at 09:45

## 2022-01-01 RX ADMIN — Medication 667 MILLIGRAM(S): at 08:29

## 2022-01-01 RX ADMIN — HEPARIN SODIUM 800 UNIT(S)/HR: 5000 INJECTION INTRAVENOUS; SUBCUTANEOUS at 19:16

## 2022-01-01 RX ADMIN — ATORVASTATIN CALCIUM 20 MILLIGRAM(S): 80 TABLET, FILM COATED ORAL at 23:01

## 2022-01-01 RX ADMIN — HEPARIN SODIUM 900 UNIT(S)/HR: 5000 INJECTION INTRAVENOUS; SUBCUTANEOUS at 04:38

## 2022-01-01 RX ADMIN — ATORVASTATIN CALCIUM 20 MILLIGRAM(S): 80 TABLET, FILM COATED ORAL at 21:29

## 2022-01-01 RX ADMIN — Medication 88 MICROGRAM(S): at 05:46

## 2022-01-01 RX ADMIN — BUDESONIDE AND FORMOTEROL FUMARATE DIHYDRATE 2 PUFF(S): 160; 4.5 AEROSOL RESPIRATORY (INHALATION) at 20:53

## 2022-01-01 RX ADMIN — Medication 88 MICROGRAM(S): at 06:07

## 2022-01-01 RX ADMIN — Medication 3 MILLIGRAM(S): at 21:29

## 2022-01-01 RX ADMIN — BUDESONIDE AND FORMOTEROL FUMARATE DIHYDRATE 2 PUFF(S): 160; 4.5 AEROSOL RESPIRATORY (INHALATION) at 09:13

## 2022-01-01 RX ADMIN — WARFARIN SODIUM 5 MILLIGRAM(S): 2.5 TABLET ORAL at 21:29

## 2022-01-01 RX ADMIN — ATORVASTATIN CALCIUM 20 MILLIGRAM(S): 80 TABLET, FILM COATED ORAL at 21:28

## 2022-01-01 RX ADMIN — Medication 667 MILLIGRAM(S): at 18:24

## 2022-01-01 RX ADMIN — HEPARIN SODIUM 900 UNIT(S)/HR: 5000 INJECTION INTRAVENOUS; SUBCUTANEOUS at 02:34

## 2022-01-01 RX ADMIN — CARVEDILOL PHOSPHATE 3.12 MILLIGRAM(S): 80 CAPSULE, EXTENDED RELEASE ORAL at 10:04

## 2022-01-01 RX ADMIN — Medication 1 TABLET(S): at 09:07

## 2022-01-01 RX ADMIN — BUDESONIDE AND FORMOTEROL FUMARATE DIHYDRATE 2 PUFF(S): 160; 4.5 AEROSOL RESPIRATORY (INHALATION) at 21:03

## 2022-01-01 RX ADMIN — Medication 650 MILLIGRAM(S): at 02:28

## 2022-01-01 RX ADMIN — MORPHINE SULFATE 4 MILLIGRAM(S): 50 CAPSULE, EXTENDED RELEASE ORAL at 19:10

## 2022-01-01 RX ADMIN — CARVEDILOL PHOSPHATE 3.12 MILLIGRAM(S): 80 CAPSULE, EXTENDED RELEASE ORAL at 09:07

## 2022-01-01 RX ADMIN — CARVEDILOL PHOSPHATE 3.12 MILLIGRAM(S): 80 CAPSULE, EXTENDED RELEASE ORAL at 21:09

## 2022-01-01 RX ADMIN — Medication 667 MILLIGRAM(S): at 13:36

## 2022-01-01 RX ADMIN — BUDESONIDE AND FORMOTEROL FUMARATE DIHYDRATE 2 PUFF(S): 160; 4.5 AEROSOL RESPIRATORY (INHALATION) at 21:00

## 2022-01-01 RX ADMIN — Medication 667 MILLIGRAM(S): at 14:38

## 2022-01-01 RX ADMIN — HEPARIN SODIUM 900 UNIT(S)/HR: 5000 INJECTION INTRAVENOUS; SUBCUTANEOUS at 07:21

## 2022-01-01 RX ADMIN — BUDESONIDE AND FORMOTEROL FUMARATE DIHYDRATE 2 PUFF(S): 160; 4.5 AEROSOL RESPIRATORY (INHALATION) at 20:41

## 2022-01-01 RX ADMIN — Medication 667 MILLIGRAM(S): at 17:24

## 2022-01-01 RX ADMIN — Medication 667 MILLIGRAM(S): at 09:07

## 2022-01-01 RX ADMIN — ERYTHROPOIETIN 5000 UNIT(S): 10000 INJECTION, SOLUTION INTRAVENOUS; SUBCUTANEOUS at 14:29

## 2022-01-01 RX ADMIN — MONTELUKAST 10 MILLIGRAM(S): 4 TABLET, CHEWABLE ORAL at 21:36

## 2022-01-01 RX ADMIN — Medication 3 MILLIGRAM(S): at 23:01

## 2022-01-01 RX ADMIN — Medication 40 MILLIEQUIVALENT(S): at 14:08

## 2022-01-01 RX ADMIN — Medication 88 MICROGRAM(S): at 05:32

## 2022-01-01 RX ADMIN — Medication 88 MICROGRAM(S): at 05:56

## 2022-01-01 RX ADMIN — HEPARIN SODIUM 5000 UNIT(S): 5000 INJECTION INTRAVENOUS; SUBCUTANEOUS at 21:52

## 2022-01-01 RX ADMIN — LIDOCAINE 1 PATCH: 4 CREAM TOPICAL at 21:52

## 2022-01-01 RX ADMIN — BUDESONIDE AND FORMOTEROL FUMARATE DIHYDRATE 2 PUFF(S): 160; 4.5 AEROSOL RESPIRATORY (INHALATION) at 08:45

## 2022-01-01 RX ADMIN — CARVEDILOL PHOSPHATE 3.12 MILLIGRAM(S): 80 CAPSULE, EXTENDED RELEASE ORAL at 23:01

## 2022-01-01 RX ADMIN — ATORVASTATIN CALCIUM 20 MILLIGRAM(S): 80 TABLET, FILM COATED ORAL at 21:36

## 2022-01-01 RX ADMIN — WARFARIN SODIUM 5 MILLIGRAM(S): 2.5 TABLET ORAL at 21:39

## 2022-01-01 RX ADMIN — HEPARIN SODIUM 900 UNIT(S)/HR: 5000 INJECTION INTRAVENOUS; SUBCUTANEOUS at 08:38

## 2022-01-01 RX ADMIN — HEPARIN SODIUM 2500 UNIT(S): 5000 INJECTION INTRAVENOUS; SUBCUTANEOUS at 02:14

## 2022-01-01 RX ADMIN — CARVEDILOL PHOSPHATE 3.12 MILLIGRAM(S): 80 CAPSULE, EXTENDED RELEASE ORAL at 10:03

## 2022-01-01 RX ADMIN — HEPARIN SODIUM 900 UNIT(S)/HR: 5000 INJECTION INTRAVENOUS; SUBCUTANEOUS at 07:36

## 2022-01-01 RX ADMIN — CARVEDILOL PHOSPHATE 3.12 MILLIGRAM(S): 80 CAPSULE, EXTENDED RELEASE ORAL at 21:51

## 2022-01-01 RX ADMIN — Medication 667 MILLIGRAM(S): at 09:41

## 2022-01-01 RX ADMIN — HEPARIN SODIUM 0 UNIT(S)/HR: 5000 INJECTION INTRAVENOUS; SUBCUTANEOUS at 09:04

## 2022-01-01 RX ADMIN — Medication 667 MILLIGRAM(S): at 13:42

## 2022-01-01 RX ADMIN — HEPARIN SODIUM 1500 UNIT(S)/HR: 5000 INJECTION INTRAVENOUS; SUBCUTANEOUS at 19:22

## 2022-01-01 RX ADMIN — HEPARIN SODIUM 900 UNIT(S)/HR: 5000 INJECTION INTRAVENOUS; SUBCUTANEOUS at 10:51

## 2022-01-01 RX ADMIN — Medication 667 MILLIGRAM(S): at 17:39

## 2022-01-01 RX ADMIN — ATORVASTATIN CALCIUM 20 MILLIGRAM(S): 80 TABLET, FILM COATED ORAL at 21:21

## 2022-01-01 RX ADMIN — MONTELUKAST 10 MILLIGRAM(S): 4 TABLET, CHEWABLE ORAL at 21:21

## 2022-01-01 RX ADMIN — ERYTHROPOIETIN 5000 UNIT(S): 10000 INJECTION, SOLUTION INTRAVENOUS; SUBCUTANEOUS at 11:13

## 2022-01-01 RX ADMIN — BUDESONIDE AND FORMOTEROL FUMARATE DIHYDRATE 2 PUFF(S): 160; 4.5 AEROSOL RESPIRATORY (INHALATION) at 08:47

## 2022-01-01 RX ADMIN — CARVEDILOL PHOSPHATE 3.12 MILLIGRAM(S): 80 CAPSULE, EXTENDED RELEASE ORAL at 08:35

## 2022-01-01 RX ADMIN — HEPARIN SODIUM 800 UNIT(S)/HR: 5000 INJECTION INTRAVENOUS; SUBCUTANEOUS at 08:03

## 2022-01-01 RX ADMIN — CARVEDILOL PHOSPHATE 3.12 MILLIGRAM(S): 80 CAPSULE, EXTENDED RELEASE ORAL at 09:20

## 2022-01-01 RX ADMIN — BUDESONIDE AND FORMOTEROL FUMARATE DIHYDRATE 2 PUFF(S): 160; 4.5 AEROSOL RESPIRATORY (INHALATION) at 08:27

## 2022-01-01 RX ADMIN — BUDESONIDE AND FORMOTEROL FUMARATE DIHYDRATE 2 PUFF(S): 160; 4.5 AEROSOL RESPIRATORY (INHALATION) at 09:42

## 2022-01-01 RX ADMIN — HEPARIN SODIUM 800 UNIT(S)/HR: 5000 INJECTION INTRAVENOUS; SUBCUTANEOUS at 19:21

## 2022-01-01 RX ADMIN — MONTELUKAST 10 MILLIGRAM(S): 4 TABLET, CHEWABLE ORAL at 21:52

## 2022-01-01 RX ADMIN — BUDESONIDE AND FORMOTEROL FUMARATE DIHYDRATE 2 PUFF(S): 160; 4.5 AEROSOL RESPIRATORY (INHALATION) at 08:35

## 2022-01-01 RX ADMIN — HEPARIN SODIUM 800 UNIT(S)/HR: 5000 INJECTION INTRAVENOUS; SUBCUTANEOUS at 03:47

## 2022-01-01 RX ADMIN — HEPARIN SODIUM 900 UNIT(S)/HR: 5000 INJECTION INTRAVENOUS; SUBCUTANEOUS at 10:13

## 2022-01-01 RX ADMIN — Medication 667 MILLIGRAM(S): at 15:33

## 2022-01-01 RX ADMIN — HEPARIN SODIUM 900 UNIT(S)/HR: 5000 INJECTION INTRAVENOUS; SUBCUTANEOUS at 08:35

## 2022-01-01 RX ADMIN — MIDODRINE HYDROCHLORIDE 5 MILLIGRAM(S): 2.5 TABLET ORAL at 13:14

## 2022-01-01 RX ADMIN — CARVEDILOL PHOSPHATE 3.12 MILLIGRAM(S): 80 CAPSULE, EXTENDED RELEASE ORAL at 22:36

## 2022-01-01 RX ADMIN — Medication 3 MILLIGRAM(S): at 21:10

## 2022-01-01 RX ADMIN — Medication 81 MILLIGRAM(S): at 09:20

## 2022-01-01 RX ADMIN — HEPARIN SODIUM 900 UNIT(S)/HR: 5000 INJECTION INTRAVENOUS; SUBCUTANEOUS at 16:54

## 2022-01-01 RX ADMIN — HEPARIN SODIUM 0 UNIT(S)/HR: 5000 INJECTION INTRAVENOUS; SUBCUTANEOUS at 19:33

## 2022-01-01 RX ADMIN — HEPARIN SODIUM 1300 UNIT(S)/HR: 5000 INJECTION INTRAVENOUS; SUBCUTANEOUS at 07:24

## 2022-01-01 RX ADMIN — HEPARIN SODIUM 900 UNIT(S)/HR: 5000 INJECTION INTRAVENOUS; SUBCUTANEOUS at 07:29

## 2022-01-01 RX ADMIN — HEPARIN SODIUM 900 UNIT(S)/HR: 5000 INJECTION INTRAVENOUS; SUBCUTANEOUS at 19:50

## 2022-01-01 RX ADMIN — ATORVASTATIN CALCIUM 20 MILLIGRAM(S): 80 TABLET, FILM COATED ORAL at 21:09

## 2022-01-01 RX ADMIN — HEPARIN SODIUM 1200 UNIT(S)/HR: 5000 INJECTION INTRAVENOUS; SUBCUTANEOUS at 16:48

## 2022-01-01 RX ADMIN — HEPARIN SODIUM 800 UNIT(S)/HR: 5000 INJECTION INTRAVENOUS; SUBCUTANEOUS at 23:45

## 2022-01-01 RX ADMIN — Medication 667 MILLIGRAM(S): at 19:04

## 2022-01-01 RX ADMIN — HEPARIN SODIUM 1100 UNIT(S)/HR: 5000 INJECTION INTRAVENOUS; SUBCUTANEOUS at 16:05

## 2022-01-01 RX ADMIN — Medication 1 TABLET(S): at 10:04

## 2022-01-01 RX ADMIN — HEPARIN SODIUM 5000 UNIT(S): 5000 INJECTION INTRAVENOUS; SUBCUTANEOUS at 08:36

## 2022-01-01 RX ADMIN — MONTELUKAST 10 MILLIGRAM(S): 4 TABLET, CHEWABLE ORAL at 22:37

## 2022-01-01 RX ADMIN — HEPARIN SODIUM 900 UNIT(S)/HR: 5000 INJECTION INTRAVENOUS; SUBCUTANEOUS at 19:51

## 2022-01-01 RX ADMIN — Medication 3 MILLIGRAM(S): at 21:21

## 2022-01-01 RX ADMIN — CARVEDILOL PHOSPHATE 3.12 MILLIGRAM(S): 80 CAPSULE, EXTENDED RELEASE ORAL at 13:29

## 2022-01-01 RX ADMIN — Medication 3 MILLIGRAM(S): at 22:36

## 2022-01-01 RX ADMIN — BUDESONIDE AND FORMOTEROL FUMARATE DIHYDRATE 2 PUFF(S): 160; 4.5 AEROSOL RESPIRATORY (INHALATION) at 20:09

## 2022-01-01 RX ADMIN — LIDOCAINE 1 PATCH: 4 CREAM TOPICAL at 09:45

## 2022-01-01 RX ADMIN — Medication 81 MILLIGRAM(S): at 09:45

## 2022-01-01 RX ADMIN — HEPARIN SODIUM 1000 UNIT(S)/HR: 5000 INJECTION INTRAVENOUS; SUBCUTANEOUS at 10:17

## 2022-01-01 RX ADMIN — HEPARIN SODIUM 0 UNIT(S)/HR: 5000 INJECTION INTRAVENOUS; SUBCUTANEOUS at 10:16

## 2022-01-01 RX ADMIN — MIDODRINE HYDROCHLORIDE 5 MILLIGRAM(S): 2.5 TABLET ORAL at 13:56

## 2022-01-01 RX ADMIN — BUDESONIDE AND FORMOTEROL FUMARATE DIHYDRATE 2 PUFF(S): 160; 4.5 AEROSOL RESPIRATORY (INHALATION) at 07:55

## 2022-01-01 RX ADMIN — WARFARIN SODIUM 5 MILLIGRAM(S): 2.5 TABLET ORAL at 21:20

## 2022-01-01 RX ADMIN — HEPARIN SODIUM 900 UNIT(S)/HR: 5000 INJECTION INTRAVENOUS; SUBCUTANEOUS at 02:17

## 2022-01-01 RX ADMIN — HEPARIN SODIUM 6500 UNIT(S): 5000 INJECTION INTRAVENOUS; SUBCUTANEOUS at 18:59

## 2022-01-01 RX ADMIN — WARFARIN SODIUM 5 MILLIGRAM(S): 2.5 TABLET ORAL at 21:35

## 2022-01-01 RX ADMIN — Medication 88 MICROGRAM(S): at 05:30

## 2022-01-01 RX ADMIN — HEPARIN SODIUM 1300 UNIT(S)/HR: 5000 INJECTION INTRAVENOUS; SUBCUTANEOUS at 02:59

## 2022-01-01 RX ADMIN — OXYCODONE AND ACETAMINOPHEN 1 TABLET(S): 5; 325 TABLET ORAL at 23:22

## 2022-01-01 RX ADMIN — Medication 3 MILLIGRAM(S): at 21:36

## 2022-01-01 RX ADMIN — Medication 3 MILLIGRAM(S): at 21:38

## 2022-01-01 RX ADMIN — CARVEDILOL PHOSPHATE 3.12 MILLIGRAM(S): 80 CAPSULE, EXTENDED RELEASE ORAL at 09:08

## 2022-01-01 RX ADMIN — Medication 88 MICROGRAM(S): at 05:55

## 2022-01-01 RX ADMIN — HEPARIN SODIUM 800 UNIT(S)/HR: 5000 INJECTION INTRAVENOUS; SUBCUTANEOUS at 18:51

## 2022-01-01 RX ADMIN — WARFARIN SODIUM 5 MILLIGRAM(S): 2.5 TABLET ORAL at 22:38

## 2022-01-01 RX ADMIN — HEPARIN SODIUM 900 UNIT(S)/HR: 5000 INJECTION INTRAVENOUS; SUBCUTANEOUS at 07:12

## 2022-01-01 RX ADMIN — CARVEDILOL PHOSPHATE 3.12 MILLIGRAM(S): 80 CAPSULE, EXTENDED RELEASE ORAL at 21:28

## 2022-01-01 RX ADMIN — OXYCODONE AND ACETAMINOPHEN 1 TABLET(S): 5; 325 TABLET ORAL at 00:05

## 2022-01-01 RX ADMIN — HEPARIN SODIUM 1100 UNIT(S)/HR: 5000 INJECTION INTRAVENOUS; SUBCUTANEOUS at 11:19

## 2022-01-01 RX ADMIN — HEPARIN SODIUM 900 UNIT(S)/HR: 5000 INJECTION INTRAVENOUS; SUBCUTANEOUS at 20:17

## 2022-01-01 RX ADMIN — Medication 667 MILLIGRAM(S): at 18:56

## 2022-01-01 RX ADMIN — Medication 1 PACKET(S): at 09:45

## 2022-02-06 ENCOUNTER — INPATIENT (INPATIENT)
Facility: HOSPITAL | Age: 87
LOS: 2 days | Discharge: HOME HEALTH | DRG: 291 | End: 2022-02-09
Attending: INTERNAL MEDICINE | Admitting: STUDENT IN AN ORGANIZED HEALTH CARE EDUCATION/TRAINING PROGRAM
Payer: MEDICARE

## 2022-02-06 VITALS — HEIGHT: 68 IN | WEIGHT: 151.9 LBS

## 2022-02-06 DIAGNOSIS — I50.9 HEART FAILURE, UNSPECIFIED: ICD-10-CM

## 2022-02-06 PROBLEM — I42.9 CARDIOMYOPATHY, UNSPECIFIED: Chronic | Status: ACTIVE | Noted: 2018-12-20

## 2022-02-06 PROBLEM — E78.5 HYPERLIPIDEMIA, UNSPECIFIED: Chronic | Status: ACTIVE | Noted: 2018-12-20

## 2022-02-06 PROBLEM — I10 ESSENTIAL (PRIMARY) HYPERTENSION: Chronic | Status: ACTIVE | Noted: 2018-12-20

## 2022-02-06 PROBLEM — I25.10 ATHEROSCLEROTIC HEART DISEASE OF NATIVE CORONARY ARTERY WITHOUT ANGINA PECTORIS: Chronic | Status: ACTIVE | Noted: 2018-12-20

## 2022-02-06 LAB
APTT BLD: 34.3 SEC — SIGNIFICANT CHANGE UP (ref 27.5–35.5)
BASOPHILS # BLD AUTO: 0.05 K/UL — SIGNIFICANT CHANGE UP (ref 0–0.2)
BASOPHILS NFR BLD AUTO: 0.9 % — SIGNIFICANT CHANGE UP (ref 0–2)
EOSINOPHIL # BLD AUTO: 0.1 K/UL — SIGNIFICANT CHANGE UP (ref 0–0.5)
EOSINOPHIL NFR BLD AUTO: 1.8 % — SIGNIFICANT CHANGE UP (ref 0–6)
HCT VFR BLD CALC: 34.1 % — LOW (ref 39–50)
HGB BLD-MCNC: 11.1 G/DL — LOW (ref 13–17)
IMM GRANULOCYTES NFR BLD AUTO: 0.2 % — SIGNIFICANT CHANGE UP (ref 0–1.5)
INR BLD: 1.33 RATIO — HIGH (ref 0.88–1.16)
LYMPHOCYTES # BLD AUTO: 0.56 K/UL — LOW (ref 1–3.3)
LYMPHOCYTES # BLD AUTO: 9.9 % — LOW (ref 13–44)
MCHC RBC-ENTMCNC: 32.6 GM/DL — SIGNIFICANT CHANGE UP (ref 32–36)
MCHC RBC-ENTMCNC: 36.3 PG — HIGH (ref 27–34)
MCV RBC AUTO: 111.4 FL — HIGH (ref 80–100)
MONOCYTES # BLD AUTO: 0.76 K/UL — SIGNIFICANT CHANGE UP (ref 0–0.9)
MONOCYTES NFR BLD AUTO: 13.5 % — SIGNIFICANT CHANGE UP (ref 2–14)
NEUTROPHILS # BLD AUTO: 4.16 K/UL — SIGNIFICANT CHANGE UP (ref 1.8–7.4)
NEUTROPHILS NFR BLD AUTO: 73.7 % — SIGNIFICANT CHANGE UP (ref 43–77)
PLATELET # BLD AUTO: 115 K/UL — LOW (ref 150–400)
PROTHROM AB SERPL-ACNC: 15.4 SEC — HIGH (ref 10.6–13.6)
RAPID RVP RESULT: SIGNIFICANT CHANGE UP
RBC # BLD: 3.06 M/UL — LOW (ref 4.2–5.8)
RBC # FLD: 16.2 % — HIGH (ref 10.3–14.5)
SARS-COV-2 RNA SPEC QL NAA+PROBE: SIGNIFICANT CHANGE UP
TROPONIN I, HIGH SENSITIVITY RESULT: 33.59 NG/L — SIGNIFICANT CHANGE UP
WBC # BLD: 5.64 K/UL — SIGNIFICANT CHANGE UP (ref 3.8–10.5)
WBC # FLD AUTO: 5.64 K/UL — SIGNIFICANT CHANGE UP (ref 3.8–10.5)

## 2022-02-06 PROCEDURE — 83880 ASSAY OF NATRIURETIC PEPTIDE: CPT

## 2022-02-06 PROCEDURE — 84100 ASSAY OF PHOSPHORUS: CPT

## 2022-02-06 PROCEDURE — 83036 HEMOGLOBIN GLYCOSYLATED A1C: CPT

## 2022-02-06 PROCEDURE — 80069 RENAL FUNCTION PANEL: CPT

## 2022-02-06 PROCEDURE — 83735 ASSAY OF MAGNESIUM: CPT

## 2022-02-06 PROCEDURE — 80074 ACUTE HEPATITIS PANEL: CPT

## 2022-02-06 PROCEDURE — 93306 TTE W/DOPPLER COMPLETE: CPT

## 2022-02-06 PROCEDURE — 84484 ASSAY OF TROPONIN QUANT: CPT

## 2022-02-06 PROCEDURE — 36415 COLL VENOUS BLD VENIPUNCTURE: CPT

## 2022-02-06 PROCEDURE — 93010 ELECTROCARDIOGRAM REPORT: CPT

## 2022-02-06 PROCEDURE — 94640 AIRWAY INHALATION TREATMENT: CPT

## 2022-02-06 PROCEDURE — 84443 ASSAY THYROID STIM HORMONE: CPT

## 2022-02-06 PROCEDURE — 80053 COMPREHEN METABOLIC PANEL: CPT

## 2022-02-06 PROCEDURE — 86905 BLOOD TYPING RBC ANTIGENS: CPT

## 2022-02-06 PROCEDURE — 80061 LIPID PANEL: CPT

## 2022-02-06 PROCEDURE — 82550 ASSAY OF CK (CPK): CPT

## 2022-02-06 PROCEDURE — 85025 COMPLETE CBC W/AUTO DIFF WBC: CPT

## 2022-02-06 PROCEDURE — 83605 ASSAY OF LACTIC ACID: CPT

## 2022-02-06 PROCEDURE — 93005 ELECTROCARDIOGRAM TRACING: CPT

## 2022-02-06 PROCEDURE — 71250 CT THORAX DX C-: CPT

## 2022-02-06 PROCEDURE — 71045 X-RAY EXAM CHEST 1 VIEW: CPT | Mod: 26

## 2022-02-06 PROCEDURE — 99223 1ST HOSP IP/OBS HIGH 75: CPT

## 2022-02-06 PROCEDURE — 99285 EMERGENCY DEPT VISIT HI MDM: CPT

## 2022-02-06 PROCEDURE — 99261: CPT

## 2022-02-06 RX ORDER — PREGABALIN 225 MG/1
1000 CAPSULE ORAL DAILY
Refills: 0 | Status: DISCONTINUED | OUTPATIENT
Start: 2022-02-06 | End: 2022-02-09

## 2022-02-06 RX ORDER — SEVELAMER CARBONATE 2400 MG/1
800 POWDER, FOR SUSPENSION ORAL
Refills: 0 | Status: DISCONTINUED | OUTPATIENT
Start: 2022-02-06 | End: 2022-02-09

## 2022-02-06 RX ORDER — HEPARIN SODIUM 5000 [USP'U]/ML
5000 INJECTION INTRAVENOUS; SUBCUTANEOUS EVERY 8 HOURS
Refills: 0 | Status: DISCONTINUED | OUTPATIENT
Start: 2022-02-06 | End: 2022-02-08

## 2022-02-06 RX ORDER — LOSARTAN POTASSIUM 100 MG/1
1 TABLET, FILM COATED ORAL
Qty: 0 | Refills: 0 | DISCHARGE

## 2022-02-06 RX ORDER — CARVEDILOL PHOSPHATE 80 MG/1
1 CAPSULE, EXTENDED RELEASE ORAL
Qty: 0 | Refills: 0 | DISCHARGE

## 2022-02-06 RX ORDER — LANOLIN ALCOHOL/MO/W.PET/CERES
3 CREAM (GRAM) TOPICAL AT BEDTIME
Refills: 0 | Status: DISCONTINUED | OUTPATIENT
Start: 2022-02-06 | End: 2022-02-09

## 2022-02-06 RX ORDER — NITROGLYCERIN 6.5 MG
0.4 CAPSULE, EXTENDED RELEASE ORAL
Refills: 0 | Status: DISCONTINUED | OUTPATIENT
Start: 2022-02-06 | End: 2022-02-09

## 2022-02-06 RX ORDER — LOSARTAN POTASSIUM 100 MG/1
25 TABLET, FILM COATED ORAL
Refills: 0 | Status: DISCONTINUED | OUTPATIENT
Start: 2022-02-06 | End: 2022-02-09

## 2022-02-06 RX ORDER — ASCORBIC ACID 60 MG
500 TABLET,CHEWABLE ORAL DAILY
Refills: 0 | Status: DISCONTINUED | OUTPATIENT
Start: 2022-02-06 | End: 2022-02-09

## 2022-02-06 RX ORDER — ASPIRIN/CALCIUM CARB/MAGNESIUM 324 MG
81 TABLET ORAL DAILY
Refills: 0 | Status: DISCONTINUED | OUTPATIENT
Start: 2022-02-06 | End: 2022-02-09

## 2022-02-06 RX ORDER — ATORVASTATIN CALCIUM 80 MG/1
20 TABLET, FILM COATED ORAL AT BEDTIME
Refills: 0 | Status: DISCONTINUED | OUTPATIENT
Start: 2022-02-06 | End: 2022-02-09

## 2022-02-06 RX ORDER — LORATADINE 10 MG/1
10 TABLET ORAL DAILY
Refills: 0 | Status: DISCONTINUED | OUTPATIENT
Start: 2022-02-06 | End: 2022-02-09

## 2022-02-06 RX ORDER — CLOPIDOGREL BISULFATE 75 MG/1
1 TABLET, FILM COATED ORAL
Qty: 0 | Refills: 0 | DISCHARGE

## 2022-02-06 RX ORDER — PSYLLIUM SEED (WITH DEXTROSE)
1 POWDER (GRAM) ORAL
Refills: 0 | Status: DISCONTINUED | OUTPATIENT
Start: 2022-02-06 | End: 2022-02-09

## 2022-02-06 RX ORDER — CARVEDILOL PHOSPHATE 80 MG/1
6.25 CAPSULE, EXTENDED RELEASE ORAL
Refills: 0 | Status: DISCONTINUED | OUTPATIENT
Start: 2022-02-06 | End: 2022-02-09

## 2022-02-06 RX ORDER — CALCIUM ACETATE 667 MG
2 TABLET ORAL
Qty: 0 | Refills: 0 | DISCHARGE

## 2022-02-06 RX ORDER — LEVOTHYROXINE SODIUM 125 MCG
88 TABLET ORAL DAILY
Refills: 0 | Status: DISCONTINUED | OUTPATIENT
Start: 2022-02-06 | End: 2022-02-09

## 2022-02-06 RX ORDER — FLUTICASONE PROPIONATE 50 MCG
1 SPRAY, SUSPENSION NASAL DAILY
Refills: 0 | Status: DISCONTINUED | OUTPATIENT
Start: 2022-02-06 | End: 2022-02-09

## 2022-02-06 RX ADMIN — LOSARTAN POTASSIUM 25 MILLIGRAM(S): 100 TABLET, FILM COATED ORAL at 21:21

## 2022-02-06 RX ADMIN — ATORVASTATIN CALCIUM 20 MILLIGRAM(S): 80 TABLET, FILM COATED ORAL at 21:21

## 2022-02-06 RX ADMIN — Medication 600 MILLIGRAM(S): at 21:39

## 2022-02-06 RX ADMIN — Medication 3 MILLIGRAM(S): at 21:21

## 2022-02-06 RX ADMIN — CARVEDILOL PHOSPHATE 6.25 MILLIGRAM(S): 80 CAPSULE, EXTENDED RELEASE ORAL at 21:22

## 2022-02-06 RX ADMIN — SEVELAMER CARBONATE 800 MILLIGRAM(S): 2400 POWDER, FOR SUSPENSION ORAL at 21:20

## 2022-02-06 NOTE — H&P ADULT - HISTORY OF PRESENT ILLNESS
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 Just moved in with daughter 1 week ago, and reportedly has covid diagnosed this past week. Pt vaccinated. COVID neg in ED.  Denies cp, but + SOB however poor historian. Requiring 4L nc and oxygenator was not functioning well.  CXR suggestive mild PVC so treating as CHF in ED. CAD (coronary artery disease)    Cardiomyopathy    HTN (hypertension)    HLD (hyperlipidemia)    No significant past surgical history      	  Vitals:  T(C): 36.5 (02-06-22 @ 14:59), Max: 36.5 (02-06-22 @ 14:59)  HR: 84 (02-06-22 @ 14:59) (84 - 87)  BP: 108/76 (02-06-22 @ 14:59) (108/76 - 112/80)  RR: 18 (02-06-22 @ 14:59) (18 - 23)  SpO2: 95% (02-06-22 @ 14:59) (95% - 100%)  Wt(kg): --  I&O's Summary    Height (cm): 172.7 (02-06 @ 12:36)  Weight (kg): 68.9 (02-06 @ 12:36)  BMI (kg/m2): 23.1 (02-06 @ 12:36)    PHYSICAL EXAM:  Appearance: Comfortable. No acute distress  HEENT:  Head and neck: Atraumatic. Normocephalic.  Normal oral mucosa, PERRL, Neck is supple. No JVD, No carotid bruit.   Neurologic: A & O x 3, no focal deficits. EOMI , Cranial nerves are intact.  Lymphatic: No cervical lymphadenopathy  Cardiovascular: Normal S1 S2, No murmur, rubs/gallops. No JVD, No edema  Respiratory: Lungs clear to auscultation  Gastrointestinal:  Soft, Non-tender, + BS  Lower Extremities: No edema  Psychiatry: Patient is calm. No agitation. Mood & affect appropriate  Skin: No rashes/ ecchymoses/cyanosis/ulcers visualized on the face, hands or feet.    CURRENT MEDICATIONS:  carvedilol Oral Tab/Cap - Peds 6.25 milliGRAM(s) Oral two times a day  losartan Oral Tab/Cap - Peds 25 milliGRAM(s) Oral two times a day  nitroglycerin     SubLingual 0.4 milliGRAM(s) SubLingual every 5 minutes PRN    loratadine  ascorbic acid 500 Oral  cyanocobalamin 1000 Oral  fluticasone propionate (50 MICROgram(s)/actuation) Nasal Spray - Peds 1 Alternating Nostrils  sevelamer carbonate 800 Oral  melatonin  psyllium Powder  atorvastatin  levothyroxine  ascorbic acid  aspirin  chewable  cyanocobalamin  fluticasone propionate (50 MICROgram(s)/actuation) Nasal Spray - Peds      LABS:	 	  CARDIAC MARKERS ( 06 Feb 2022 13:54 )  x     / x     / x     / x     / x      p-BNP 06 Feb 2022 13:54: 69540 pg/mL                            11.1   5.64  )-----------( 115      ( 06 Feb 2022 13:06 )             34.1     02-06    131<L>  |  92<L>  |  39<H>  ----------------------------<  99  3.5   |  32<H>  |  6.27<H>    Ca    8.9      06 Feb 2022 13:54  Mg     2.5     02-06    TPro  6.8  /  Alb  2.9<L>  /  TBili  0.9  /  DBili  x   /  AST  50<H>  /  ALT  40  /  AlkPhos  136<H>  02-06    proBNP: Serum Pro-Brain Natriuretic Peptide: 04003 pg/mL (02-06 @ 13:54)    12/20/2018   Moderate to severe (3+) mitral regurgitation is present.   Mild mitral annular calcification is present.   Mild aortic sclerosis is present with normal valvular opening.   Moderate (2+) aortic regurgitation is present.   Moderate (2+) tricuspid valve regurgitation is present.   Normal appearing pulmonic valve structure and function.   Trace pulmonic valvular regurgitation is present.   The left atrium is moderately dilated.   The left ventricle is normal in size, wall motion and contractility.   Mild concentric left ventricular hypertrophy is present.   Estimated left ventricular ejection fraction is 50-55 %.   Normal appearing right atrium.   Normal appearing right ventricle structure and function.   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 Just moved in with daughter 1 week ago, and reportedly has covid diagnosed this past week. Pt vaccinated. COVID neg in ED.  Denies cp, but + SOB however poor historian. Requiring 4L nc initially and oxygenator was not functioning well.  CXR suggestive mild PVC so treating as CHF in ED. CAD (coronary artery disease)  EKG MAT vs Afib    Cardiomyopathy    HTN (hypertension)    HLD (hyperlipidemia)    No significant past surgical history    	  Vitals:  T(C): 36.5 (02-06-22 @ 14:59), Max: 36.5 (02-06-22 @ 14:59)  HR: 84 (02-06-22 @ 14:59) (84 - 87)  BP: 108/76 (02-06-22 @ 14:59) (108/76 - 112/80)  RR: 18 (02-06-22 @ 14:59) (18 - 23)  SpO2: 95% (02-06-22 @ 14:59) (95% - 100%)  Wt(kg): --  I&O's Summary    Height (cm): 172.7 (02-06 @ 12:36)  Weight (kg): 68.9 (02-06 @ 12:36)  BMI (kg/m2): 23.1 (02-06 @ 12:36)    PHYSICAL EXAM:  Appearance: Comfortable. No acute distress on 2L NC  HEENT:  Head and neck: Atraumatic. Normocephalic.  Normal oral mucosa, PERRL, Neck is supple. No JVD, No carotid bruit. s/p CEA  Neurologic: A & O x 3, no focal deficits. EOMI , Cranial nerves are intact.  Cardiovascular: Normal S1 S2, No major murmur, rubs/gallops. No JVD, trace edema  Respiratory: R >L crackle vs crepitus, decreased air entry peripherally  Gastrointestinal:  Soft, Non-tender, + BS  Lower Extremities: trace edema  Psychiatry: Patient is calm. No agitation. Mood & affect appropriate  Skin: Few ecchymoses no rashes/cyanosis/ulcers visualized on the face, hands or feet.    CURRENT MEDICATIONS:  carvedilol Oral Tab/Cap - Peds 6.25 milliGRAM(s) Oral two times a day  losartan Oral Tab/Cap - Peds 25 milliGRAM(s) Oral two times a day  nitroglycerin     SubLingual 0.4 milliGRAM(s) SubLingual every 5 minutes PRN    loratadine  ascorbic acid 500 Oral  cyanocobalamin 1000 Oral  fluticasone propionate (50 MICROgram(s)/actuation) Nasal Spray - Peds 1 Alternating Nostrils  sevelamer carbonate 800 Oral  melatonin  psyllium Powder  atorvastatin  levothyroxine  ascorbic acid  aspirin  chewable  cyanocobalamin  fluticasone propionate (50 MICROgram(s)/actuation) Nasal Spray - Peds      LABS:	 	  CARDIAC MARKERS ( 06 Feb 2022 13:54 )  x     / x     / x     / x     / x      p-BNP 06 Feb 2022 13:54: 56013 pg/mL                            11.1   5.64  )-----------( 115      ( 06 Feb 2022 13:06 )             34.1     02-06    131<L>  |  92<L>  |  39<H>  ----------------------------<  99  3.5   |  32<H>  |  6.27<H>    Ca    8.9      06 Feb 2022 13:54  Mg     2.5     02-06    TPro  6.8  /  Alb  2.9<L>  /  TBili  0.9  /  DBili  x   /  AST  50<H>  /  ALT  40  /  AlkPhos  136<H>  02-06    proBNP: Serum Pro-Brain Natriuretic Peptide: 95935 pg/mL (02-06 @ 13:54)    12/20/2018   Moderate to severe (3+) mitral regurgitation is present.   Mild mitral annular calcification is present.   Mild aortic sclerosis is present with normal valvular opening.   Moderate (2+) aortic regurgitation is present.   Moderate (2+) tricuspid valve regurgitation is present.   Normal appearing pulmonic valve structure and function.   Trace pulmonic valvular regurgitation is present.   The left atrium is moderately dilated.   The left ventricle is normal in size, wall motion and contractility.   Mild concentric left ventricular hypertrophy is present.   Estimated left ventricular ejection fraction is 50-55 %.   Normal appearing right atrium.   Normal appearing right ventricle structure and function.   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 Just moved in with daughter 1 week ago, and reportedly has covid diagnosed this past week. Pt vaccinated. COVID neg in ED.  Denies cp, but + SOB n/v/d ha urinary symptoms. Requiring 4L nc initially and oxygenator was not functioning well.  CXR suggestive mild PVC so treating as CHF in ED. CAD (coronary artery disease)  EKG MAT vs Afib    Cardiomyopathy    HTN (hypertension)    HLD (hyperlipidemia)    No significant past surgical history    	  Vitals:  T(C): 36.5 (02-06-22 @ 14:59), Max: 36.5 (02-06-22 @ 14:59)  HR: 84 (02-06-22 @ 14:59) (84 - 87)  BP: 108/76 (02-06-22 @ 14:59) (108/76 - 112/80)  RR: 18 (02-06-22 @ 14:59) (18 - 23)  SpO2: 95% (02-06-22 @ 14:59) (95% - 100%)  Wt(kg): --  I&O's Summary    Height (cm): 172.7 (02-06 @ 12:36)  Weight (kg): 68.9 (02-06 @ 12:36)  BMI (kg/m2): 23.1 (02-06 @ 12:36)    PHYSICAL EXAM:  Appearance: Comfortable. No acute distress on 2L NC  HEENT:  Head and neck: Atraumatic. Normocephalic.  Normal oral mucosa, PERRL, Neck is supple. No JVD, No carotid bruit. s/p CEA  Neurologic: A & O x 3, no focal deficits. EOMI , Cranial nerves are intact.  Cardiovascular: Normal S1 S2, No major murmur, rubs/gallops. No JVD, trace edema  Respiratory: R >L crackle vs crepitus, decreased air entry peripherally  Gastrointestinal:  Soft, Non-tender, + BS  Lower Extremities: trace edema  Psychiatry: Patient is calm. No agitation. Mood & affect appropriate  Skin: Few ecchymoses no rashes/cyanosis/ulcers visualized on the face, hands or feet.    CURRENT MEDICATIONS:  carvedilol Oral Tab/Cap - Peds 6.25 milliGRAM(s) Oral two times a day  losartan Oral Tab/Cap - Peds 25 milliGRAM(s) Oral two times a day  nitroglycerin     SubLingual 0.4 milliGRAM(s) SubLingual every 5 minutes PRN    loratadine  ascorbic acid 500 Oral  cyanocobalamin 1000 Oral  fluticasone propionate (50 MICROgram(s)/actuation) Nasal Spray - Peds 1 Alternating Nostrils  sevelamer carbonate 800 Oral  melatonin  psyllium Powder  atorvastatin  levothyroxine  ascorbic acid  aspirin  chewable  cyanocobalamin  fluticasone propionate (50 MICROgram(s)/actuation) Nasal Spray - Peds      LABS:	 	  CARDIAC MARKERS ( 06 Feb 2022 13:54 )  x     / x     / x     / x     / x      p-BNP 06 Feb 2022 13:54: 24609 pg/mL                            11.1   5.64  )-----------( 115      ( 06 Feb 2022 13:06 )             34.1     02-06    131<L>  |  92<L>  |  39<H>  ----------------------------<  99  3.5   |  32<H>  |  6.27<H>    Ca    8.9      06 Feb 2022 13:54  Mg     2.5     02-06    TPro  6.8  /  Alb  2.9<L>  /  TBili  0.9  /  DBili  x   /  AST  50<H>  /  ALT  40  /  AlkPhos  136<H>  02-06    proBNP: Serum Pro-Brain Natriuretic Peptide: 78491 pg/mL (02-06 @ 13:54)    12/20/2018   Moderate to severe (3+) mitral regurgitation is present.   Mild mitral annular calcification is present.   Mild aortic sclerosis is present with normal valvular opening.   Moderate (2+) aortic regurgitation is present.   Moderate (2+) tricuspid valve regurgitation is present.   Normal appearing pulmonic valve structure and function.   Trace pulmonic valvular regurgitation is present.   The left atrium is moderately dilated.   The left ventricle is normal in size, wall motion and contractility.   Mild concentric left ventricular hypertrophy is present.   Estimated left ventricular ejection fraction is 50-55 %.   Normal appearing right atrium.   Normal appearing right ventricle structure and function.   The IVC is dilated with decreased respiratory variation   No evidence of pericardial effusion

## 2022-02-06 NOTE — ED PROVIDER NOTE - NSICDXPASTMEDICALHX_GEN_ALL_CORE_FT
PAST MEDICAL HISTORY:  CAD (coronary artery disease)     Cardiomyopathy     HLD (hyperlipidemia)     HTN (hypertension)

## 2022-02-06 NOTE — ED PROVIDER NOTE - PROGRESS NOTE DETAILS
Shubham GARCIA: I spoke with patient's daughter- Diane- [phone]; reports that patient recently moved from [place] 1 week ago as patient was living by himself; patient with hx of ESRD on HD- 2018- recently established are with Dr. Herrera; admitted for CHF exacerbation in April 2021, productive cough last night; issue with o2 concentrator last night; daughter unable to get sat>90% this AM prompting visit; patient's granddaughter who patient now lives with just tested positive for covid last night; patient is full code per daughter. Shubham DO: S/o to Dr. Cortes pending work up at this time and admission. 87 y/o M presents w/ SOB. pt with SOB and cough since yesterday. Pt on 2L O2 at baseline, O2 was increased to 4L today. Last dialysis on friday. No other complaints at this time. -Singh Flores PA-C Discussed with Dr. Crawford who will admit pt. -Singh Flores PA-C

## 2022-02-06 NOTE — H&P ADULT - ASSESSMENT
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 Just moved in with daughter 1 week ago, and reportedly has covid diagnosed this past week. Pt vaccinated. COVID neg in ED.  Denies cp, but + SOB however poor historian. Requiring 4L nc initially and oxygenator was not functioning well.  CXR suggestive mild PVC so treating as CHF in ED. CAD (coronary artery disease)  EKG MAT vs Afib    SOBOE - emphysema in acute exacerbation likely  CHF hx also in mild exacerbation  No prior BNP level, currently 43k in setting of ESRD  Initial EKG consistent with likely MAT which suggestive of more pulmonary than cardiac complication  Treat with solumedrol 40mg IV q12     HimpEF 50% - daughter reports ECHO done last year in [place] with EF 50%  Given question of pulmonary vs cardiac etiology will include echo  CT pending  Continue GDMT with losartan BID, coreg BID   ASA  Statin    Initial EKG consistent with likely MAT which suggestive of more pulmonary than cardiac complication  However patient does have significant substrate for Afib as well - monitor overnight and repeat EKG to eval for afib vs MAT. Discussed with daughter and given bleeding hx (not very significant hx) but she prefers to wait on any anticoagulants and understands stroke risk is present especially with Carotid hx as risk factor.    PAD/Carotid disease - s/p CEA 2012  No acute intervention or complications noted, continue ASA only, was on DAPT after initial     HTN/HLDCAD obstructive but non-op unable to place stent in 2018 at [place].   ASA/Lipitor/GDMT as above  trop neg  ekg with     ESRD - MWF via LUE AVF  Resume HD tomorrow  Hyperphosphatemia - start renvela 800mg TID with meals    Hypothyroidism - TSH, FT4  Resume home dose LT4    VTE ppx  heparin sub q q8    GOC full code  Discussed all of above with daughter

## 2022-02-06 NOTE — ED PROVIDER NOTE - OBJECTIVE STATEMENT
85 y/o male with a PMHx of HLD, HTN, cardiomyopathy, CAD, emphysema on 2L of O2, ESRD on HD (MWF) presents to the ED c/o SOB since yesterday. 85 y/o male with a PMHx of HLD, HTN, cardiomyopathy, CAD, emphysema on 2L of O2, ESRD on HD (MWF) presents to the ED c/o SOB since yesterday. No cough, fever CP. Reports he has not missed dialysis, last done02/04.  Did get COVID vax and booster. Had +covid contact. 87 y/o male with a PMHx of HLD, HTN, cardiomyopathy, CAD, emphysema on 2L of O2, ESRD on HD (MWF) presents to the ED c/o SOB since yesterday. No cough, fever CP. Reports he has not missed dialysis, last on Friday- 02/04.  Did get COVID vax and booster. Had +covid contact at home; hx limited as patient not good historian.

## 2022-02-06 NOTE — ED PROVIDER NOTE - MUSCULOSKELETAL, MLM
Spine appears normal, range of motion is not limited, no muscle or joint tenderness Spine appears normal, range of motion is not limited, no muscle or joint tenderness, right AV fistula good bruit and thrill.

## 2022-02-06 NOTE — ED PROVIDER NOTE - CLINICAL SUMMARY MEDICAL DECISION MAKING FREE TEXT BOX
87 y/o male with a PMHx of HLD, HTN, cardiomyopathy, CAD, emphysema on 2L of O2, ESRD (MWF) presents to the ED c/o SOB. Plan: EKG, CXR, labs, likely ADM.

## 2022-02-06 NOTE — ED ADULT NURSE NOTE - OBJECTIVE STATEMENT
Pt presented to the ER with c/o SOB that started this morning. Pt stated that he has some complications with his home O2 machine and he is unsure if he was receiving the O2 that he needed. Pt is ESRD on HD received last treatment Friday which was a full treatment. No respiratory distress at this time. Pt denies any fevers, cough, or CP at this time.

## 2022-02-06 NOTE — ED PROVIDER NOTE - ATTENDING CONTRIBUTION TO CARE
I, Latanya Jalloh DO,  performed the initial face to face bedside interview with this patient regarding history of present illness, review of symptoms and relevant past medical, social and family history.  I completed an independent physical examination.  I was the initial provider who evaluated this patient.   I personally saw the patient and performed a substantive portion of the visit including all aspects of the medical decision making.  I have signed out the follow up of any pending tests (i.e. labs, radiological studies) to the ACP.  I have communicated the patient’s plan of care and disposition with the ACP.  The history, relevant review of systems, past medical and surgical history, medical decision making, and physical examination was documented by the scribe in my presence and I attest to the accuracy of the documentation.

## 2022-02-06 NOTE — ED ADULT TRIAGE NOTE - CHIEF COMPLAINT QUOTE
Pt c/o SOB since yesterday. Pt normally on 2L of o2 for emphysema and ESRD (MWF). has had increased cough and congestion. pt also had close contact with +covid person

## 2022-02-06 NOTE — PATIENT PROFILE ADULT - VISION (WITH CORRECTIVE LENSES IF THE PATIENT USUALLY WEARS THEM):
Normal vision: sees adequately in most situations; can see medication labels, newsprint
Grossly./no strength deficits were identified

## 2022-02-07 LAB
A1C WITH ESTIMATED AVERAGE GLUCOSE RESULT: 5.7 % — HIGH (ref 4–5.6)
ALBUMIN SERPL ELPH-MCNC: 2.8 G/DL — LOW (ref 3.3–5)
ALP SERPL-CCNC: 120 U/L — SIGNIFICANT CHANGE UP (ref 40–120)
ALT FLD-CCNC: 35 U/L — SIGNIFICANT CHANGE UP (ref 12–78)
ANION GAP SERPL CALC-SCNC: 12 MMOL/L — SIGNIFICANT CHANGE UP (ref 5–17)
AST SERPL-CCNC: 44 U/L — HIGH (ref 15–37)
BASOPHILS # BLD AUTO: 0.06 K/UL — SIGNIFICANT CHANGE UP (ref 0–0.2)
BASOPHILS NFR BLD AUTO: 0.9 % — SIGNIFICANT CHANGE UP (ref 0–2)
BILIRUB SERPL-MCNC: 1 MG/DL — SIGNIFICANT CHANGE UP (ref 0.2–1.2)
BUN SERPL-MCNC: 48 MG/DL — HIGH (ref 7–23)
CALCIUM SERPL-MCNC: 9.2 MG/DL — SIGNIFICANT CHANGE UP (ref 8.5–10.1)
CHLORIDE SERPL-SCNC: 93 MMOL/L — LOW (ref 96–108)
CHOLEST SERPL-MCNC: 100 MG/DL — SIGNIFICANT CHANGE UP
CK SERPL-CCNC: 52 U/L — SIGNIFICANT CHANGE UP (ref 26–308)
CO2 SERPL-SCNC: 28 MMOL/L — SIGNIFICANT CHANGE UP (ref 22–31)
CREAT SERPL-MCNC: 7.34 MG/DL — HIGH (ref 0.5–1.3)
EOSINOPHIL # BLD AUTO: 0.14 K/UL — SIGNIFICANT CHANGE UP (ref 0–0.5)
EOSINOPHIL NFR BLD AUTO: 2.2 % — SIGNIFICANT CHANGE UP (ref 0–6)
ESTIMATED AVERAGE GLUCOSE: 117 MG/DL — HIGH (ref 68–114)
GLUCOSE SERPL-MCNC: 98 MG/DL — SIGNIFICANT CHANGE UP (ref 70–99)
HAV IGM SER-ACNC: SIGNIFICANT CHANGE UP
HBV CORE IGM SER-ACNC: SIGNIFICANT CHANGE UP
HBV SURFACE AG SER-ACNC: SIGNIFICANT CHANGE UP
HCT VFR BLD CALC: 33.4 % — LOW (ref 39–50)
HCV AB S/CO SERPL IA: 0.12 S/CO — SIGNIFICANT CHANGE UP (ref 0–0.99)
HCV AB SERPL-IMP: SIGNIFICANT CHANGE UP
HDLC SERPL-MCNC: 64 MG/DL — SIGNIFICANT CHANGE UP
HGB BLD-MCNC: 11 G/DL — LOW (ref 13–17)
IMM GRANULOCYTES NFR BLD AUTO: 0.3 % — SIGNIFICANT CHANGE UP (ref 0–1.5)
LACTATE SERPL-SCNC: 1 MMOL/L — SIGNIFICANT CHANGE UP (ref 0.7–2)
LIPID PNL WITH DIRECT LDL SERPL: 29 MG/DL — SIGNIFICANT CHANGE UP
LYMPHOCYTES # BLD AUTO: 0.54 K/UL — LOW (ref 1–3.3)
LYMPHOCYTES # BLD AUTO: 8.3 % — LOW (ref 13–44)
MAGNESIUM SERPL-MCNC: 2.5 MG/DL — SIGNIFICANT CHANGE UP (ref 1.6–2.6)
MCHC RBC-ENTMCNC: 32.9 GM/DL — SIGNIFICANT CHANGE UP (ref 32–36)
MCHC RBC-ENTMCNC: 36.3 PG — HIGH (ref 27–34)
MCV RBC AUTO: 110.2 FL — HIGH (ref 80–100)
MONOCYTES # BLD AUTO: 0.81 K/UL — SIGNIFICANT CHANGE UP (ref 0–0.9)
MONOCYTES NFR BLD AUTO: 12.4 % — SIGNIFICANT CHANGE UP (ref 2–14)
NEUTROPHILS # BLD AUTO: 4.94 K/UL — SIGNIFICANT CHANGE UP (ref 1.8–7.4)
NEUTROPHILS NFR BLD AUTO: 75.9 % — SIGNIFICANT CHANGE UP (ref 43–77)
NON HDL CHOLESTEROL: 36 MG/DL — SIGNIFICANT CHANGE UP
NT-PROBNP SERPL-SCNC: HIGH PG/ML (ref 0–450)
PHOSPHATE SERPL-MCNC: 4.5 MG/DL — SIGNIFICANT CHANGE UP (ref 2.5–4.5)
PLATELET # BLD AUTO: 107 K/UL — LOW (ref 150–400)
POTASSIUM SERPL-MCNC: 4 MMOL/L — SIGNIFICANT CHANGE UP (ref 3.5–5.3)
POTASSIUM SERPL-SCNC: 4 MMOL/L — SIGNIFICANT CHANGE UP (ref 3.5–5.3)
PROT SERPL-MCNC: 6.5 GM/DL — SIGNIFICANT CHANGE UP (ref 6–8.3)
RBC # BLD: 3.03 M/UL — LOW (ref 4.2–5.8)
RBC # FLD: 15.9 % — HIGH (ref 10.3–14.5)
SODIUM SERPL-SCNC: 133 MMOL/L — LOW (ref 135–145)
TRIGL SERPL-MCNC: 34 MG/DL — SIGNIFICANT CHANGE UP
TSH SERPL-MCNC: 2.46 UU/ML — SIGNIFICANT CHANGE UP (ref 0.34–4.82)
WBC # BLD: 6.51 K/UL — SIGNIFICANT CHANGE UP (ref 3.8–10.5)
WBC # FLD AUTO: 6.51 K/UL — SIGNIFICANT CHANGE UP (ref 3.8–10.5)

## 2022-02-07 PROCEDURE — 99233 SBSQ HOSP IP/OBS HIGH 50: CPT

## 2022-02-07 PROCEDURE — 71250 CT THORAX DX C-: CPT | Mod: 26

## 2022-02-07 RX ORDER — HEPARIN SODIUM 5000 [USP'U]/ML
500 INJECTION INTRAVENOUS; SUBCUTANEOUS
Refills: 0 | Status: DISCONTINUED | OUTPATIENT
Start: 2022-02-07 | End: 2022-02-09

## 2022-02-07 RX ORDER — HEPARIN SODIUM 5000 [USP'U]/ML
1000 INJECTION INTRAVENOUS; SUBCUTANEOUS
Refills: 0 | Status: DISCONTINUED | OUTPATIENT
Start: 2022-02-07 | End: 2022-02-09

## 2022-02-07 RX ORDER — AZITHROMYCIN 500 MG/1
500 TABLET, FILM COATED ORAL DAILY
Refills: 0 | Status: DISCONTINUED | OUTPATIENT
Start: 2022-02-07 | End: 2022-02-09

## 2022-02-07 RX ADMIN — PREGABALIN 1000 MICROGRAM(S): 225 CAPSULE ORAL at 10:04

## 2022-02-07 RX ADMIN — HEPARIN SODIUM 1000 UNIT(S): 5000 INJECTION INTRAVENOUS; SUBCUTANEOUS at 13:13

## 2022-02-07 RX ADMIN — HEPARIN SODIUM 5000 UNIT(S): 5000 INJECTION INTRAVENOUS; SUBCUTANEOUS at 21:01

## 2022-02-07 RX ADMIN — LORATADINE 10 MILLIGRAM(S): 10 TABLET ORAL at 10:03

## 2022-02-07 RX ADMIN — Medication 500 MILLIGRAM(S): at 10:04

## 2022-02-07 RX ADMIN — Medication 81 MILLIGRAM(S): at 10:02

## 2022-02-07 RX ADMIN — CARVEDILOL PHOSPHATE 6.25 MILLIGRAM(S): 80 CAPSULE, EXTENDED RELEASE ORAL at 21:01

## 2022-02-07 RX ADMIN — AZITHROMYCIN 500 MILLIGRAM(S): 500 TABLET, FILM COATED ORAL at 16:05

## 2022-02-07 RX ADMIN — SEVELAMER CARBONATE 800 MILLIGRAM(S): 2400 POWDER, FOR SUSPENSION ORAL at 17:10

## 2022-02-07 RX ADMIN — Medication 88 MICROGRAM(S): at 05:58

## 2022-02-07 RX ADMIN — Medication 1 PACKET(S): at 10:08

## 2022-02-07 RX ADMIN — HEPARIN SODIUM 500 UNIT(S): 5000 INJECTION INTRAVENOUS; SUBCUTANEOUS at 15:00

## 2022-02-07 RX ADMIN — SEVELAMER CARBONATE 800 MILLIGRAM(S): 2400 POWDER, FOR SUSPENSION ORAL at 11:41

## 2022-02-07 RX ADMIN — HEPARIN SODIUM 500 UNIT(S): 5000 INJECTION INTRAVENOUS; SUBCUTANEOUS at 14:04

## 2022-02-07 RX ADMIN — Medication 1 SPRAY(S): at 10:07

## 2022-02-07 RX ADMIN — CARVEDILOL PHOSPHATE 6.25 MILLIGRAM(S): 80 CAPSULE, EXTENDED RELEASE ORAL at 10:03

## 2022-02-07 RX ADMIN — LOSARTAN POTASSIUM 25 MILLIGRAM(S): 100 TABLET, FILM COATED ORAL at 21:10

## 2022-02-07 RX ADMIN — SEVELAMER CARBONATE 800 MILLIGRAM(S): 2400 POWDER, FOR SUSPENSION ORAL at 08:52

## 2022-02-07 RX ADMIN — Medication 3 MILLIGRAM(S): at 21:07

## 2022-02-07 RX ADMIN — LOSARTAN POTASSIUM 25 MILLIGRAM(S): 100 TABLET, FILM COATED ORAL at 10:04

## 2022-02-07 RX ADMIN — ATORVASTATIN CALCIUM 20 MILLIGRAM(S): 80 TABLET, FILM COATED ORAL at 21:00

## 2022-02-07 NOTE — PROGRESS NOTE ADULT - SUBJECTIVE AND OBJECTIVE BOX
History of Present Illness:     87 yo M PMH ESRD on HD MWF via AVF, oliguric, (Dr Eastman), obstructive CAD s/p attempted unsuccessful PCI 2018, Chf, EF 50% (prior 20%) with hx of bleeding (due to LE hematoma post cath due to reported pseudoaneurysm) follows in [place] predominantly, hx of dementia, Hx of R side CEA (2012) HTN, hypothyroidism, HLD and emphysema on home o2 2L presents with worsening SOB and productive cough (brown sputum) since Friday evening after HD. Just moved in with daughter 1 week ago, and reportedly has covid diagnosed this past week.   Pt vaccinated. COVID neg in ED.  Denies cp, but + SOB n/v/d ha urinary symptoms. Requiring 4L nc initially and oxygenator was not functioning well.  CXR suggestive mild PVC so treating as CHF in ED. CAD (coronary artery disease)    2.07: no distress, no cp, no sob at rest; +coughing with white sputum production        REVIEW OF SYSTEMS:    CONSTITUTIONAL: No weakness, No fevers or chills  ENT: No ear ache, No sorethroat  NECK: No pain, No stiffness  RESPIRATORY: No cough, No wheezing, No hemoptysis; No dyspnea  CARDIOVASCULAR: No chest pain, No palpitations  GASTROINTESTINAL: No abd pain, No nausea, No vomiting, No hematemesis, No diarrhea or constipation. No melena, No hematochezia.  GENITOURINARY: No dysuria, No  hematuria  NEUROLOGICAL: No diplopia, No paresthesia, No motor dysfunction  MUSCULOSKELETAL: No arthralgia, No myalgia  SKIN: No rashes, or lesions   PSYCH: no anxiety, no suicidal ideation    All other review of systems is negative unless indicated above    Vital Signs Last 24 Hrs  T(C): 36.1 (07 Feb 2022 12:50), Max: 36.8 (06 Feb 2022 16:25)  T(F): 97 (07 Feb 2022 12:50), Max: 98.2 (06 Feb 2022 16:25)  HR: 93 (07 Feb 2022 13:05) (79 - 97)  BP: 112/72 (07 Feb 2022 13:05) (108/76 - 139/66)  BP(mean): 81 (07 Feb 2022 07:27) (81 - 94)  RR: 18 (07 Feb 2022 13:05) (18 - 20)  SpO2: 92% (07 Feb 2022 07:27) (92% - 95%)    PHYSICAL EXAM:    GENERAL: NAD  HEENT:  NC/AT, EOMI, PERRLA, No scleral icterus, Moist mucous membranes  NECK: Supple, No JVD  CNS:  Alert & Oriented X3, Motor Strength 5/5 B/L upper and lower extremities; DTRs 2+ intact   LUNG: Normal Breath sounds, Clear to auscultation bilaterally, +scattered rales, No rhonchi, No wheezing  HEART: RRR; No murmurs, No rubs  ABDOMEN: +BS, ST/ND/NT  GENITOURINARY: Voiding, Bladder not distended  EXTREMITIES:  2+ Peripheral Pulses, No clubbing, No cyanosis, No tibial edema  MUSCULOSKELTAL: Joints normal ROM, No TTP, No effusion  SKIN: no rashes  RECTAL: deferred, not indicated  BREAST: deferred                          11.0   6.51  )-----------( 107      ( 07 Feb 2022 07:35 )             33.4     02-07    133<L>  |  93<L>  |  48<H>  ----------------------------<  98  4.0   |  28  |  7.34<H>    Ca    9.2      07 Feb 2022 07:35  Phos  4.5     02-07  Mg     2.5     02-07    TPro  6.5  /  Alb  2.8<L>  /  TBili  1.0  /  DBili  x   /  AST  44<H>  /  ALT  35  /  AlkPhos  120  02-07    Vancomycin levels:   Cultures:     MEDICATIONS  (STANDING):  ascorbic acid 500 milliGRAM(s) Oral daily  aspirin  chewable 81 milliGRAM(s) Oral daily  atorvastatin 20 milliGRAM(s) Oral at bedtime  carvedilol Oral Tab/Cap - Peds 6.25 milliGRAM(s) Oral two times a day  cyanocobalamin 1000 MICROGram(s) Oral daily  fluticasone propionate (50 MICROgram(s)/actuation) Nasal Spray - Peds 1 Spray(s) Alternating Nostrils daily  levothyroxine 88 MICROGram(s) Oral daily  loratadine 10 milliGRAM(s) Oral daily  losartan Oral Tab/Cap - Peds 25 milliGRAM(s) Oral two times a day  melatonin 3 milliGRAM(s) Oral at bedtime  psyllium Powder 1 Packet(s) Oral two times a day  sevelamer carbonate 800 milliGRAM(s) Oral three times a day with meals    MEDICATIONS  (PRN):  nitroglycerin     SubLingual 0.4 milliGRAM(s) SubLingual every 5 minutes PRN Chest Pain      all labs reviewed  all imaging reviewed      A/P:      1. Acute on chronic diastolic Chf:  -remove fluid at HD per nephrology  EF 50%  c/w Coreg/Losartan    2. Bronchitis:  Zithromax  ICS    3. ESRD:  c/w HD per renal specialist            History of Present Illness:     85 yo M PMH ESRD on HD MWF via AVF, oliguric, (Dr Eastman), obstructive CAD s/p attempted unsuccessful PCI 2018, Chf, EF 50% (prior 20%) with hx of bleeding (due to LE hematoma post cath due to reported pseudoaneurysm) follows in [place] predominantly, hx of dementia, Hx of R side CEA (2012) HTN, hypothyroidism, HLD and emphysema on home o2 2L presents with worsening SOB and productive cough (brown sputum) since Friday evening after HD. Just moved in with daughter 1 week ago, and reportedly has covid diagnosed this past week.   Pt vaccinated. COVID neg in ED.  Denies cp, but + SOB n/v/d ha urinary symptoms. Requiring 4L nc initially and oxygenator was not functioning well.  CXR suggestive mild PVC so treating as CHF in ED. CAD (coronary artery disease)    2.07: no distress, no cp, no sob at rest; +coughing with white sputum production        REVIEW OF SYSTEMS:    CONSTITUTIONAL: No weakness, No fevers or chills  ENT: No ear ache, No sorethroat  NECK: No pain, No stiffness  RESPIRATORY: No cough, No wheezing, No hemoptysis; No dyspnea  CARDIOVASCULAR: No chest pain, No palpitations  GASTROINTESTINAL: No abd pain, No nausea, No vomiting, No hematemesis, No diarrhea or constipation. No melena, No hematochezia.  GENITOURINARY: No dysuria, No  hematuria  NEUROLOGICAL: No diplopia, No paresthesia, No motor dysfunction  MUSCULOSKELETAL: No arthralgia, No myalgia  SKIN: No rashes, or lesions   PSYCH: no anxiety, no suicidal ideation    All other review of systems is negative unless indicated above    Vital Signs Last 24 Hrs  T(C): 36.1 (07 Feb 2022 12:50), Max: 36.8 (06 Feb 2022 16:25)  T(F): 97 (07 Feb 2022 12:50), Max: 98.2 (06 Feb 2022 16:25)  HR: 93 (07 Feb 2022 13:05) (79 - 97)  BP: 112/72 (07 Feb 2022 13:05) (108/76 - 139/66)  BP(mean): 81 (07 Feb 2022 07:27) (81 - 94)  RR: 18 (07 Feb 2022 13:05) (18 - 20)  SpO2: 92% (07 Feb 2022 07:27) (92% - 95%)    PHYSICAL EXAM:    GENERAL: NAD  HEENT:  NC/AT, EOMI, PERRLA, No scleral icterus, Moist mucous membranes  NECK: Supple, No JVD  CNS:  Alert & Oriented X3, Motor Strength 5/5 B/L upper and lower extremities; DTRs 2+ intact   LUNG: Normal Breath sounds, Clear to auscultation bilaterally, +scattered rales, No rhonchi, No wheezing  HEART: RRR; No murmurs, No rubs  ABDOMEN: +BS, ST/ND/NT  GENITOURINARY: Voiding, Bladder not distended  EXTREMITIES:  2+ Peripheral Pulses, No clubbing, No cyanosis, No tibial edema  MUSCULOSKELTAL: Joints normal ROM, No TTP, No effusion  SKIN: no rashes  RECTAL: deferred, not indicated  BREAST: deferred                          11.0   6.51  )-----------( 107      ( 07 Feb 2022 07:35 )             33.4     02-07    133<L>  |  93<L>  |  48<H>  ----------------------------<  98  4.0   |  28  |  7.34<H>    Ca    9.2      07 Feb 2022 07:35  Phos  4.5     02-07  Mg     2.5     02-07    TPro  6.5  /  Alb  2.8<L>  /  TBili  1.0  /  DBili  x   /  AST  44<H>  /  ALT  35  /  AlkPhos  120  02-07    Vancomycin levels:   Cultures:     MEDICATIONS  (STANDING):  ascorbic acid 500 milliGRAM(s) Oral daily  aspirin  chewable 81 milliGRAM(s) Oral daily  atorvastatin 20 milliGRAM(s) Oral at bedtime  carvedilol Oral Tab/Cap - Peds 6.25 milliGRAM(s) Oral two times a day  cyanocobalamin 1000 MICROGram(s) Oral daily  fluticasone propionate (50 MICROgram(s)/actuation) Nasal Spray - Peds 1 Spray(s) Alternating Nostrils daily  levothyroxine 88 MICROGram(s) Oral daily  loratadine 10 milliGRAM(s) Oral daily  losartan Oral Tab/Cap - Peds 25 milliGRAM(s) Oral two times a day  melatonin 3 milliGRAM(s) Oral at bedtime  psyllium Powder 1 Packet(s) Oral two times a day  sevelamer carbonate 800 milliGRAM(s) Oral three times a day with meals    MEDICATIONS  (PRN):  nitroglycerin     SubLingual 0.4 milliGRAM(s) SubLingual every 5 minutes PRN Chest Pain      all labs reviewed  all imaging reviewed      A/P:      1. Acute on chronic diastolic Chf:  -remove fluid at HD per nephrology  EF 50%  c/w Coreg/Losartan    2. Bronchitis:  Zithromax  ICS  CT Chest     3. ESRD:  c/w HD per renal specialist     4. Afib:  rate controlled  c/w Coreg  will anticoagulatin with cardiology

## 2022-02-07 NOTE — CONSULT NOTE ADULT - ASSESSMENT
?    85 yo M PMH ESRD on HD MWF via AVF, recently moved from [facility] w , obstructive CAD s/p attempted unsuccessful PCI 2018, HFimpEF 50% (prior 20%) with hx of bleeding (due to LE hematoma post cath due to reported pseudoaneurysm) follows in [place] predominantly, hx of dementia, Hx of R side CEA (2012) HTN, hypothyroidism, HLD and emphysema on home o2 2L presents with worsening SOB and productive cough (brown sputum) since Friday evening after HD. Just moved in with daughter 1 week ago, and reportedly has covid diagnosed this past week. Pt vaccinated. COVID neg in ED.  Denies cp, but + SOB n/v/d ha urinary symptoms. Requiring 4L nc initially and oxygenator was not functioning well.  CXR suggestive mild PVC so treating as CHF in ED. CAD (coronary artery diseae    C0ugh, SOB, sx of CHF s PNA    - new onset - acute CHF    - if diureitcs per Cardiology   -  no ace or arb no ace    ESRD on HD MWF   will arrange for today w low BP abd cough, sob per wife       * pt seen earlier today - d./w HD RN  ?    85 yo M PMH ESRD on HD MWF via AVF, recently moved from [facility] , obstructive CAD s/p attempted unsuccessful PCI 2018, HFimpEF 50% (prior 20%) with hx of bleeding (due to LE hematoma post cath due to reported pseudoaneurysm) follows in [place] predominantly, hx of dementia, Hx of R side CEA (2012) HTN, hypothyroidism, HLD and emphysema on home o2 2L presents with worsening SOB and productive cough (brown sputum) since Friday evening after HD. Just moved in with daughter 1 week ago, and reportedly has covid diagnosed this past week. Pt vaccinated. COVID neg in ED.  Denies cp, but + SOB n/v/d ha urinary symptoms. Requiring 4L nc initially and oxygenator was not functioning well.  CXR suggestive mild PVC so treating as CHF in ED. CAD (coronary artery diseae    C0ugh, SOB, sx of CHF vs PNA  new Afib related - new onset   - acute CHF       ESRD on HD MWF   will arrange for today - continue HD    uf as BP allows        * pt seen earlier today - d./w HD RN

## 2022-02-08 DIAGNOSIS — I50.33 ACUTE ON CHRONIC DIASTOLIC (CONGESTIVE) HEART FAILURE: ICD-10-CM

## 2022-02-08 DIAGNOSIS — R06.02 SHORTNESS OF BREATH: ICD-10-CM

## 2022-02-08 DIAGNOSIS — I25.10 ATHEROSCLEROTIC HEART DISEASE OF NATIVE CORONARY ARTERY WITHOUT ANGINA PECTORIS: ICD-10-CM

## 2022-02-08 DIAGNOSIS — I48.91 UNSPECIFIED ATRIAL FIBRILLATION: ICD-10-CM

## 2022-02-08 PROCEDURE — 93306 TTE W/DOPPLER COMPLETE: CPT | Mod: 26

## 2022-02-08 PROCEDURE — 99232 SBSQ HOSP IP/OBS MODERATE 35: CPT

## 2022-02-08 PROCEDURE — 93010 ELECTROCARDIOGRAM REPORT: CPT

## 2022-02-08 PROCEDURE — 99223 1ST HOSP IP/OBS HIGH 75: CPT

## 2022-02-08 RX ORDER — APIXABAN 2.5 MG/1
2.5 TABLET, FILM COATED ORAL
Refills: 0 | Status: DISCONTINUED | OUTPATIENT
Start: 2022-02-08 | End: 2022-02-09

## 2022-02-08 RX ADMIN — Medication 81 MILLIGRAM(S): at 10:22

## 2022-02-08 RX ADMIN — APIXABAN 2.5 MILLIGRAM(S): 2.5 TABLET, FILM COATED ORAL at 21:34

## 2022-02-08 RX ADMIN — HEPARIN SODIUM 5000 UNIT(S): 5000 INJECTION INTRAVENOUS; SUBCUTANEOUS at 05:46

## 2022-02-08 RX ADMIN — Medication 1 PACKET(S): at 10:27

## 2022-02-08 RX ADMIN — Medication 1 PACKET(S): at 21:34

## 2022-02-08 RX ADMIN — Medication 1 SPRAY(S): at 12:01

## 2022-02-08 RX ADMIN — SEVELAMER CARBONATE 800 MILLIGRAM(S): 2400 POWDER, FOR SUSPENSION ORAL at 10:27

## 2022-02-08 RX ADMIN — LOSARTAN POTASSIUM 25 MILLIGRAM(S): 100 TABLET, FILM COATED ORAL at 10:25

## 2022-02-08 RX ADMIN — LOSARTAN POTASSIUM 25 MILLIGRAM(S): 100 TABLET, FILM COATED ORAL at 21:34

## 2022-02-08 RX ADMIN — Medication 3 MILLIGRAM(S): at 21:34

## 2022-02-08 RX ADMIN — PREGABALIN 1000 MICROGRAM(S): 225 CAPSULE ORAL at 10:25

## 2022-02-08 RX ADMIN — ATORVASTATIN CALCIUM 20 MILLIGRAM(S): 80 TABLET, FILM COATED ORAL at 21:34

## 2022-02-08 RX ADMIN — Medication 88 MICROGRAM(S): at 05:46

## 2022-02-08 RX ADMIN — CARVEDILOL PHOSPHATE 6.25 MILLIGRAM(S): 80 CAPSULE, EXTENDED RELEASE ORAL at 10:26

## 2022-02-08 RX ADMIN — AZITHROMYCIN 500 MILLIGRAM(S): 500 TABLET, FILM COATED ORAL at 10:25

## 2022-02-08 RX ADMIN — SEVELAMER CARBONATE 800 MILLIGRAM(S): 2400 POWDER, FOR SUSPENSION ORAL at 13:07

## 2022-02-08 RX ADMIN — Medication 500 MILLIGRAM(S): at 10:26

## 2022-02-08 RX ADMIN — SEVELAMER CARBONATE 800 MILLIGRAM(S): 2400 POWDER, FOR SUSPENSION ORAL at 16:56

## 2022-02-08 RX ADMIN — CARVEDILOL PHOSPHATE 6.25 MILLIGRAM(S): 80 CAPSULE, EXTENDED RELEASE ORAL at 21:34

## 2022-02-08 RX ADMIN — LORATADINE 10 MILLIGRAM(S): 10 TABLET ORAL at 10:27

## 2022-02-08 NOTE — PROGRESS NOTE ADULT - SUBJECTIVE AND OBJECTIVE BOX
History of Present Illness:     85 yo M PMH ESRD on HD MWF via AVF, oliguric, (Dr Eastman), obstructive CAD s/p attempted unsuccessful PCI 2018, Chf, EF 50% (prior 20%) with hx of bleeding (due to LE hematoma post cath due to reported pseudoaneurysm) follows in [place] predominantly, hx of dementia, Hx of R side CEA (2012) HTN, hypothyroidism, HLD and emphysema on home o2 2L presents with worsening SOB and productive cough (brown sputum) since Friday evening after HD. Just moved in with daughter 1 week ago, and reportedly has covid diagnosed this past week.   Pt vaccinated. COVID neg in ED.  Denies cp, but + SOB n/v/d ha urinary symptoms. Requiring 4L nc initially and oxygenator was not functioning well.  CXR suggestive mild PVC so treating as CHF in ED. CAD (coronary artery disease)    2.07: no distress, no cp, no sob at rest; +coughing with white sputum production  2.08: no distress        REVIEW OF SYSTEMS:    CONSTITUTIONAL: No weakness, No fevers or chills  ENT: No ear ache, No sorethroat  NECK: No pain, No stiffness  RESPIRATORY: No cough, No wheezing, No hemoptysis; No dyspnea  CARDIOVASCULAR: No chest pain, No palpitations  GASTROINTESTINAL: No abd pain, No nausea, No vomiting, No hematemesis, No diarrhea or constipation. No melena, No hematochezia.  GENITOURINARY: No dysuria, No  hematuria  NEUROLOGICAL: No diplopia, No paresthesia, No motor dysfunction  MUSCULOSKELETAL: No arthralgia, No myalgia  SKIN: No rashes, or lesions   PSYCH: no anxiety, no suicidal ideation    All other review of systems is negative unless indicated above    Vital Signs Last 24 Hrs  T(C): 36.1 (07 Feb 2022 12:50), Max: 36.8 (06 Feb 2022 16:25)  T(F): 97 (07 Feb 2022 12:50), Max: 98.2 (06 Feb 2022 16:25)  HR: 93 (07 Feb 2022 13:05) (79 - 97)  BP: 112/72 (07 Feb 2022 13:05) (108/76 - 139/66)  BP(mean): 81 (07 Feb 2022 07:27) (81 - 94)  RR: 18 (07 Feb 2022 13:05) (18 - 20)  SpO2: 92% (07 Feb 2022 07:27) (92% - 95%)    PHYSICAL EXAM:    GENERAL: NAD  HEENT:  NC/AT, EOMI, PERRLA, No scleral icterus, Moist mucous membranes  NECK: Supple, No JVD  CNS:  Alert & Oriented X3, Motor Strength 5/5 B/L upper and lower extremities; DTRs 2+ intact   LUNG: Normal Breath sounds, Clear to auscultation bilaterally, +scattered rales, No rhonchi, No wheezing  HEART: RRR; No murmurs, No rubs  ABDOMEN: +BS, ST/ND/NT  GENITOURINARY: Voiding, Bladder not distended  EXTREMITIES:  2+ Peripheral Pulses, No clubbing, No cyanosis, No tibial edema  MUSCULOSKELTAL: Joints normal ROM, No TTP, No effusion  SKIN: no rashes  RECTAL: deferred, not indicated  BREAST: deferred                          11.0   6.51  )-----------( 107      ( 07 Feb 2022 07:35 )             33.4     02-07    133<L>  |  93<L>  |  48<H>  ----------------------------<  98  4.0   |  28  |  7.34<H>    Ca    9.2      07 Feb 2022 07:35  Phos  4.5     02-07  Mg     2.5     02-07    TPro  6.5  /  Alb  2.8<L>  /  TBili  1.0  /  DBili  x   /  AST  44<H>  /  ALT  35  /  AlkPhos  120  02-07    Vancomycin levels:   Cultures:     MEDICATIONS  (STANDING):  ascorbic acid 500 milliGRAM(s) Oral daily  aspirin  chewable 81 milliGRAM(s) Oral daily  atorvastatin 20 milliGRAM(s) Oral at bedtime  carvedilol Oral Tab/Cap - Peds 6.25 milliGRAM(s) Oral two times a day  cyanocobalamin 1000 MICROGram(s) Oral daily  fluticasone propionate (50 MICROgram(s)/actuation) Nasal Spray - Peds 1 Spray(s) Alternating Nostrils daily  levothyroxine 88 MICROGram(s) Oral daily  loratadine 10 milliGRAM(s) Oral daily  losartan Oral Tab/Cap - Peds 25 milliGRAM(s) Oral two times a day  melatonin 3 milliGRAM(s) Oral at bedtime  psyllium Powder 1 Packet(s) Oral two times a day  sevelamer carbonate 800 milliGRAM(s) Oral three times a day with meals    MEDICATIONS  (PRN):  nitroglycerin     SubLingual 0.4 milliGRAM(s) SubLingual every 5 minutes PRN Chest Pain      all labs reviewed  all imaging reviewed      A/P:      1. Acute on chronic diastolic Chf:  -remove fluid at HD per nephrology  EF 50-55%, Echocardiogram noted  c/w Coreg/Losartan    2. Advanced Copd with acute Bronchitis:  Chronic resp failure due to Copd on home O2  Zithromax  ICS  CT Chest noted: severe emphysema     3. ESRD:  c/w HD per renal specialist     4. Afib:  rate controlled  c/w Coreg  will anticoagulate with DOAC

## 2022-02-08 NOTE — CONSULT NOTE ADULT - PROBLEM SELECTOR RECOMMENDATION 4
exacerbation copd ?severe emphysema , pleural effusion  and may be component of volume overload , now feeling better , continue home oxygen,

## 2022-02-08 NOTE — CONSULT NOTE ADULT - SUBJECTIVE AND OBJECTIVE BOX
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 Just moved in with daughter 1 week ago, and reportedly has covid diagnosed this past week. Pt vaccinated. COVID neg in ED.  Denies cp, but + SOB n/v/d ha urinary symptoms. Requiring 4L nc initially and oxygenator was not functioning well.  CXR suggestive mild PVC so treating as CHF in ED.  patient says his breathing got better after dialysis , patient denies any chest pain, monitor showed atrial fibrillation with mild RVR episodes , his blood work showed elevated BNP  in setting of renal failure  , normal troponin level .      PAST MEDICAL & SURGICAL HISTORY:  CAD (coronary artery disease)    Cardiomyopathy    HTN (hypertension)    HLD (hyperlipidemia)    No significant past surgical history        Allergies    No Known Allergies    Intolerances        SOCIAL HISTORY: former smoker 45 years    FAMILY HISTORY:  No pertinent family history in first degree relatives        MEDICATIONS:  MEDICATIONS  (STANDING):  ascorbic acid 500 milliGRAM(s) Oral daily  aspirin  chewable 81 milliGRAM(s) Oral daily  atorvastatin 20 milliGRAM(s) Oral at bedtime  azithromycin   Tablet 500 milliGRAM(s) Oral daily  carvedilol Oral Tab/Cap - Peds 6.25 milliGRAM(s) Oral two times a day  cyanocobalamin 1000 MICROGram(s) Oral daily  fluticasone propionate (50 MICROgram(s)/actuation) Nasal Spray - Peds 1 Spray(s) Alternating Nostrils daily  heparin   Injectable 5000 Unit(s) SubCutaneous every 8 hours  heparin   Injectable. 1000 Unit(s) Dialysis. <User Schedule>  heparin   Injectable. 500 Unit(s) Dialysis. <User Schedule>  levothyroxine 88 MICROGram(s) Oral daily  loratadine 10 milliGRAM(s) Oral daily  losartan Oral Tab/Cap - Peds 25 milliGRAM(s) Oral two times a day  melatonin 3 milliGRAM(s) Oral at bedtime  psyllium Powder 1 Packet(s) Oral two times a day  sevelamer carbonate 800 milliGRAM(s) Oral three times a day with meals    MEDICATIONS  (PRN):  nitroglycerin     SubLingual 0.4 milliGRAM(s) SubLingual every 5 minutes PRN Chest Pain      REVIEW OF SYSTEMS:    CONSTITUTIONAL: No weakness, fevers or chills  EYES/ENT: No visual changes;  No vertigo or throat pain   NECK: No pain or stiffness  RESPIRATORY: positive for  cough,  shortness of breath  CARDIOVASCULAR: No chest pain or palpitations  GASTROINTESTINAL: No abdominal or epigastric pain. No nausea, vomiting, or hematemesis; No diarrhea or constipation. No melena or hematochezia.  GENITOURINARY: No dysuria, frequency or hematuria  NEUROLOGICAL: No numbness or weakness  SKIN: No itching, burning, rashes, or lesions   All other review of systems is negative unless indicated above    Vital Signs Last 24 Hrs  T(C): 36.6 (08 Feb 2022 07:50), Max: 36.6 (08 Feb 2022 07:50)  T(F): 97.9 (08 Feb 2022 07:50), Max: 97.9 (08 Feb 2022 07:50)  HR: 83 (08 Feb 2022 07:50) (77 - 93)  BP: 116/72 (08 Feb 2022 07:50) (92/45 - 129/71)  BP(mean): 80 (08 Feb 2022 07:50) (80 - 82)  RR: 18 (08 Feb 2022 07:50) (16 - 19)  SpO2: 92% (08 Feb 2022 07:50) (92% - 97%)    I&O's Summary      PHYSICAL EXAM:    Constitutional: NAD, awake and alert, well-developed  HEENT: PERR, EOMI,  No oral cyananosis.  Neck:  supple,  No JVD  Respiratory: Breath sounds are decreased both side   Cardiovascular: S1 and S2, IR IR controlled rate   Gastrointestinal: Bowel Sounds present, soft, nontender.   Extremities: No peripheral edema. No clubbing or cyanosis.  Vascular: 1+ distal peripheral pulses  Neurological: A/O x 3, no focal deficits  Musculoskeletal: no calf tenderness.  Skin: No rashes.      LABS: All Labs Reviewed:                        11.0   6.51  )-----------( 107      ( 07 Feb 2022 07:35 )             33.4                         11.1   5.64  )-----------( 115      ( 06 Feb 2022 13:06 )             34.1     07 Feb 2022 07:35    133    |  93     |  48     ----------------------------<  98     4.0     |  28     |  7.34   06 Feb 2022 13:54    131    |  92     |  39     ----------------------------<  99     3.5     |  32     |  6.27     Ca    9.2        07 Feb 2022 07:35  Ca    8.9        06 Feb 2022 13:54  Phos  4.5       07 Feb 2022 07:35  Mg     2.5       07 Feb 2022 07:35  Mg     2.5       06 Feb 2022 13:54    TPro  6.5    /  Alb  2.8    /  TBili  1.0    /  DBili  x      /  AST  44     /  ALT  35     /  AlkPhos  120    07 Feb 2022 07:35  TPro  6.8    /  Alb  2.9    /  TBili  0.9    /  DBili  x      /  AST  50     /  ALT  40     /  AlkPhos  136    06 Feb 2022 13:54    PT/INR - ( 06 Feb 2022 13:06 )   PT: 15.4 sec;   INR: 1.33 ratio         PTT - ( 06 Feb 2022 13:06 )  PTT:34.3 sec  CARDIAC MARKERS ( 07 Feb 2022 07:35 )  x     / x     / 52 U/L / x     / x            Blood Culture:   02-07 @ 07:35  Pro Bnp [id number]  02-06 @ 13:54  Pro Bnp [id number]    02-07 @ 07:35  TSH: 2.46      RADIOLOGY/EKG:    < from: 12 Lead ECG (02.06.22 @ 12:48) >   Atrial fibrillation with a competing junctional pacemaker with premature ventricular or aberrantly conducted complexes  Nonspecific T wave abnormality  Prolonged QT  Abnormal ECG  When compared with ECG of 19-DEC-2018 23:36,  Atrial fibrillation has replaced Sinus rhythm  Criteria for Septal infarct are no longer Present  T wave inversion no longer evident in Anterior leads  Confirmed by PAULA OROZCO, ESTHER ADORNO ([id number]) on 2/7/2022 8:33:39 AM    < end of copied text >  < from: CT Chest No Cont (02.07.22 @ 17:48) >  MPRESSION:  Severe emphysema. Bilateral pleural effusions with underlying compressive   atelectasis.    Cardiomegaly. Coronary artery disease and/or stents. 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    Severe age-indeterminate L1 compression fracture with 5 mm osseous   retropulsion.    < end of copied text >      
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 Just moved in with daughter 1 week ago, and reportedly has covid diagnosed this past week. Pt vaccinated. COVID neg in ED.  Denies cp, but + SOB n/v/d ha urinary symptoms. Requiring 4L nc initially and oxygenator was not functioning well.  CXR suggestive mild PVC so treating as CHF in ED. CAD (coronary artery disease)  EKG MAT vs Afib     ESRD on HD MWF at [facility] started last week after moving from [place]   Forgetful *  but remembers me    PAST MEDICAL & SURGICAL HISTORY:  CAD (coronary artery disease)    Cardiomyopathy    HTN (hypertension)    HLD (hyperlipidemia)    No significant past surgical history        MEDICATIONS  (STANDING):  ascorbic acid 500 milliGRAM(s) Oral daily  aspirin  chewable 81 milliGRAM(s) Oral daily  atorvastatin 20 milliGRAM(s) Oral at bedtime  azithromycin   Tablet 500 milliGRAM(s) Oral daily  carvedilol Oral Tab/Cap - Peds 6.25 milliGRAM(s) Oral two times a day  cyanocobalamin 1000 MICROGram(s) Oral daily  fluticasone propionate (50 MICROgram(s)/actuation) Nasal Spray - Peds 1 Spray(s) Alternating Nostrils daily  heparin   Injectable 5000 Unit(s) SubCutaneous every 8 hours  heparin   Injectable. 1000 Unit(s) Dialysis. <User Schedule>  heparin   Injectable. 500 Unit(s) Dialysis. <User Schedule>  levothyroxine 88 MICROGram(s) Oral daily  loratadine 10 milliGRAM(s) Oral daily  losartan Oral Tab/Cap - Peds 25 milliGRAM(s) Oral two times a day  melatonin 3 milliGRAM(s) Oral at bedtime  psyllium Powder 1 Packet(s) Oral two times a day  sevelamer carbonate 800 milliGRAM(s) Oral three times a day with meals      Allergies    No Known Allergies    Intolerances        SOCIAL HISTORY:  Denies ETOh,Smoking,     FAMILY HISTORY:  No pertinent family history in first degree relatives        REVIEW OF SYSTEMS:    CONSTITUTIONAL: No weakness, fevers or chills  EYES/ENT: No visual changes;  No vertigo or throat pain   NECK: No pain or stiffness  RESPIRATORY: No cough, wheezing, hemoptysis; No shortness of breath  CARDIOVASCULAR: No chest pain or palpitations  GASTROINTESTINAL: No abdominal or epigastric pain. No nausea, vomiting, or hematemesis; No diarrhea or constipation. No melena or hematochezia.  GENITOURINARY: No dysuria, frequency or hematuria  NEUROLOGICAL: No numbness or weakness  SKIN: No itching, burning, rashes, or lesions   All other review of systems is negative unless indicated above.    VITAL:  Vital Signs Last 24 Hrs  T(C): 36.4 (07 Feb 2022 19:37), Max: 36.8 (07 Feb 2022 07:27)  T(F): 97.6 (07 Feb 2022 19:37), Max: 98.2 (07 Feb 2022 07:27)  HR: 84 (07 Feb 2022 19:37) (77 - 97)  BP: 129/65 (07 Feb 2022 19:37) (92/45 - 129/71)  BP(mean): 82 (07 Feb 2022 19:37) (81 - 82)  RR: 16 (07 Feb 2022 16:32) (16 - 19)  SpO2: 97% (07 Feb 2022 19:37) (92% - 97%)      I&O's Detail    06 Feb 2022 07:01  -  07 Feb 2022 07:00  --------------------------------------------------------  IN:    Oral Fluid: 200 mL  Total IN: 200 mL    OUT:    Voided (mL): 100 mL  Total OUT: 100 mL    Total NET: 100 mL                PHYSICAL EXAM:    Constitutional: NAD  HEENT:, EOMI,  MMM  Neck: No LAD, No JVD  Respiratory: CTAB  Cardiovascular: S1 and S2  Gastrointestinal: BS+, soft, NT/ND  Extremities: No peripheral edema  Neurological: A/O x 3, no focal deficits  : No Chopra  Skin: No rashes  Access: AVf  +thrill , bruit     LABS:                        11.0   6.51  )-----------( 107      ( 07 Feb 2022 07:35 )             33.4     133    |  93     |  48     ----------------------------<  98        07 Feb 2022 07:35  4.0     |  28     |  7.34     131    |  92     |  39     ----------------------------<  99        06 Feb 2022 13:54  3.5     |  32     |  6.27     Ca    9.2        07 Feb 2022 07:35  Ca    8.9        06 Feb 2022 13:54    Phos  4.5       07 Feb 2022 07:35    Mg     2.5       07 Feb 2022 07:35  Mg     2.5       06 Feb 2022 13:54    TPro  6.5    /  Alb  2.8    /  TBili  1.0    /        07 Feb 2022 07:35  DBili  x      /  AST  44     /  ALT  35     /  AlkPhos  120      TPro  6.8    /  Alb  2.9    /  TBili  0.9    /        06 Feb 2022 13:54  DBili  x      /  AST  50     /  ALT  40     /  AlkPhos  136            Urine Studies:          RADIOLOGY & ADDITIONAL STUDIES:

## 2022-02-08 NOTE — CONSULT NOTE ADULT - PROBLEM SELECTOR RECOMMENDATION 9
patient with above hx acute worsening of sob possibly due to copd exacerbation and possible volume load which is improved after dialysis , negative troponin ,  prior ef 50% as per daughter , will continue his home medication , regular dialysis , low salt diet , follow up echo

## 2022-02-08 NOTE — CONSULT NOTE ADULT - PROBLEM SELECTOR RECOMMENDATION 2
unknown whether old or new as per daughter , episodes of rapid rate , currently controlled continue coreg , will need chronic anticoagulation  , follow up echo to assess ventricular function and valvular disease.  discussed with pharmacy , will start on  eliquis 2.5 mg po BID

## 2022-02-09 ENCOUNTER — TRANSCRIPTION ENCOUNTER (OUTPATIENT)
Age: 87
End: 2022-02-09

## 2022-02-09 VITALS — DIASTOLIC BLOOD PRESSURE: 72 MMHG | SYSTOLIC BLOOD PRESSURE: 119 MMHG | HEART RATE: 81 BPM | RESPIRATION RATE: 17 BRPM

## 2022-02-09 LAB
ALBUMIN SERPL ELPH-MCNC: 2.9 G/DL — LOW (ref 3.3–5)
ANION GAP SERPL CALC-SCNC: 8 MMOL/L — SIGNIFICANT CHANGE UP (ref 5–17)
BASOPHILS # BLD AUTO: 0.05 K/UL — SIGNIFICANT CHANGE UP (ref 0–0.2)
BASOPHILS NFR BLD AUTO: 0.8 % — SIGNIFICANT CHANGE UP (ref 0–2)
BUN SERPL-MCNC: 45 MG/DL — HIGH (ref 7–23)
CALCIUM SERPL-MCNC: 9.5 MG/DL — SIGNIFICANT CHANGE UP (ref 8.5–10.1)
CHLORIDE SERPL-SCNC: 97 MMOL/L — SIGNIFICANT CHANGE UP (ref 96–108)
CO2 SERPL-SCNC: 27 MMOL/L — SIGNIFICANT CHANGE UP (ref 22–31)
CREAT SERPL-MCNC: 6.52 MG/DL — HIGH (ref 0.5–1.3)
EOSINOPHIL # BLD AUTO: 0.19 K/UL — SIGNIFICANT CHANGE UP (ref 0–0.5)
EOSINOPHIL NFR BLD AUTO: 3 % — SIGNIFICANT CHANGE UP (ref 0–6)
GLUCOSE SERPL-MCNC: 96 MG/DL — SIGNIFICANT CHANGE UP (ref 70–99)
HCT VFR BLD CALC: 33.6 % — LOW (ref 39–50)
HGB BLD-MCNC: 11.2 G/DL — LOW (ref 13–17)
IMM GRANULOCYTES NFR BLD AUTO: 0.3 % — SIGNIFICANT CHANGE UP (ref 0–1.5)
LYMPHOCYTES # BLD AUTO: 0.64 K/UL — LOW (ref 1–3.3)
LYMPHOCYTES # BLD AUTO: 10.3 % — LOW (ref 13–44)
MCHC RBC-ENTMCNC: 33.3 GM/DL — SIGNIFICANT CHANGE UP (ref 32–36)
MCHC RBC-ENTMCNC: 36.8 PG — HIGH (ref 27–34)
MCV RBC AUTO: 110.5 FL — HIGH (ref 80–100)
MONOCYTES # BLD AUTO: 0.8 K/UL — SIGNIFICANT CHANGE UP (ref 0–0.9)
MONOCYTES NFR BLD AUTO: 12.8 % — SIGNIFICANT CHANGE UP (ref 2–14)
NEUTROPHILS # BLD AUTO: 4.54 K/UL — SIGNIFICANT CHANGE UP (ref 1.8–7.4)
NEUTROPHILS NFR BLD AUTO: 72.8 % — SIGNIFICANT CHANGE UP (ref 43–77)
PHOSPHATE SERPL-MCNC: 5.3 MG/DL — HIGH (ref 2.5–4.5)
PLATELET # BLD AUTO: 121 K/UL — LOW (ref 150–400)
POTASSIUM SERPL-MCNC: 4.3 MMOL/L — SIGNIFICANT CHANGE UP (ref 3.5–5.3)
POTASSIUM SERPL-SCNC: 4.3 MMOL/L — SIGNIFICANT CHANGE UP (ref 3.5–5.3)
RBC # BLD: 3.04 M/UL — LOW (ref 4.2–5.8)
RBC # FLD: 15.9 % — HIGH (ref 10.3–14.5)
SODIUM SERPL-SCNC: 132 MMOL/L — LOW (ref 135–145)
WBC # BLD: 6.24 K/UL — SIGNIFICANT CHANGE UP (ref 3.8–10.5)
WBC # FLD AUTO: 6.24 K/UL — SIGNIFICANT CHANGE UP (ref 3.8–10.5)

## 2022-02-09 PROCEDURE — 99239 HOSP IP/OBS DSCHRG MGMT >30: CPT

## 2022-02-09 RX ORDER — AMLODIPINE BESYLATE 2.5 MG/1
1 TABLET ORAL
Qty: 0 | Refills: 0 | DISCHARGE

## 2022-02-09 RX ORDER — APIXABAN 2.5 MG/1
1 TABLET, FILM COATED ORAL
Qty: 60 | Refills: 0
Start: 2022-02-09

## 2022-02-09 RX ORDER — CX-024414 0.2 MG/ML
0.5 INJECTION, SUSPENSION INTRAMUSCULAR
Qty: 0 | Refills: 0 | DISCHARGE

## 2022-02-09 RX ORDER — INFLUENZA VIRUS VACCINE 15; 15; 15; 15 UG/.5ML; UG/.5ML; UG/.5ML; UG/.5ML
0.5 SUSPENSION INTRAMUSCULAR
Qty: 0 | Refills: 0 | DISCHARGE

## 2022-02-09 RX ORDER — LIDOCAINE AND PRILOCAINE CREAM 25; 25 MG/G; MG/G
1 CREAM TOPICAL
Refills: 0 | Status: DISCONTINUED | OUTPATIENT
Start: 2022-02-09 | End: 2022-02-09

## 2022-02-09 RX ADMIN — SEVELAMER CARBONATE 800 MILLIGRAM(S): 2400 POWDER, FOR SUSPENSION ORAL at 09:28

## 2022-02-09 RX ADMIN — SEVELAMER CARBONATE 800 MILLIGRAM(S): 2400 POWDER, FOR SUSPENSION ORAL at 15:50

## 2022-02-09 RX ADMIN — APIXABAN 2.5 MILLIGRAM(S): 2.5 TABLET, FILM COATED ORAL at 09:28

## 2022-02-09 RX ADMIN — HEPARIN SODIUM 500 UNIT(S): 5000 INJECTION INTRAVENOUS; SUBCUTANEOUS at 13:20

## 2022-02-09 RX ADMIN — Medication 81 MILLIGRAM(S): at 09:28

## 2022-02-09 RX ADMIN — HEPARIN SODIUM 1000 UNIT(S): 5000 INJECTION INTRAVENOUS; SUBCUTANEOUS at 11:20

## 2022-02-09 RX ADMIN — PREGABALIN 1000 MICROGRAM(S): 225 CAPSULE ORAL at 09:29

## 2022-02-09 RX ADMIN — CARVEDILOL PHOSPHATE 6.25 MILLIGRAM(S): 80 CAPSULE, EXTENDED RELEASE ORAL at 09:28

## 2022-02-09 RX ADMIN — Medication 1 PACKET(S): at 09:31

## 2022-02-09 RX ADMIN — Medication 88 MICROGRAM(S): at 05:30

## 2022-02-09 RX ADMIN — LORATADINE 10 MILLIGRAM(S): 10 TABLET ORAL at 09:30

## 2022-02-09 RX ADMIN — AZITHROMYCIN 500 MILLIGRAM(S): 500 TABLET, FILM COATED ORAL at 15:50

## 2022-02-09 RX ADMIN — Medication 500 MILLIGRAM(S): at 09:29

## 2022-02-09 RX ADMIN — HEPARIN SODIUM 500 UNIT(S): 5000 INJECTION INTRAVENOUS; SUBCUTANEOUS at 12:20

## 2022-02-09 RX ADMIN — Medication 1 SPRAY(S): at 09:30

## 2022-02-09 NOTE — DISCHARGE NOTE PROVIDER - NSDCHHPTASSISTHOME_GEN_ALL_CORE
Patient Needs Assistance to Leave Residence... PAST SURGICAL HISTORY:  Benign tumor of pituitary gland removed

## 2022-02-09 NOTE — PHARMACOTHERAPY INTERVENTION NOTE - COMMENTS
Dialysis + age > 80 - 2.5 mg BID
Medication history complete, reviewed with patient's daughter at [phone] and confirmed with talitat
contacted [facility] pharmacy no insurance on file as patient recently moved from [place] - pt will bring insurance card to pharmacy; have provided pt with a free 30 day coupon

## 2022-02-09 NOTE — DISCHARGE NOTE NURSING/CASE MANAGEMENT/SOCIAL WORK - PATIENT PORTAL LINK FT
You can access the FollowMyHealth Patient Portal offered by [facility] by registering at the following website: [email or web address]. By joining [facility]’s FollowMyHealth portal, you will also be able to view your health information using other applications (apps) compatible with our system.

## 2022-02-09 NOTE — DISCHARGE NOTE PROVIDER - NSDCCPCAREPLAN_GEN_ALL_CORE_FT
PRINCIPAL DISCHARGE DIAGNOSIS  Diagnosis: Acute on chronic diastolic congestive heart failure  Assessment and Plan of Treatment:       SECONDARY DISCHARGE DIAGNOSES  Diagnosis: Atrial fibrillation  Assessment and Plan of Treatment:

## 2022-02-09 NOTE — DISCHARGE NOTE NURSING/CASE MANAGEMENT/SOCIAL WORK - NSDCPEFALRISK_GEN_ALL_CORE
For information on Fall & Injury Prevention, visit: [email or web address] OR  [email or web address] OR  https://www.cdc.gov/steadi/patient.html

## 2022-02-09 NOTE — H&P ADULT - FAMILY HISTORY
No pertinent family history in first degree relatives [Alert] : alert [Normocephalic] : normocephalic [Flat Open Anterior [place]] : flat open anterior fontanelle [Red Reflex] : red reflex bilateral [PERRL] : PERRL [Normally Placed Ears] : normally placed ears [Auricles Well Formed] : auricles well formed [Clear Tympanic membranes] : clear tympanic membranes [Light reflex present] : light reflex present [Bony landmarks visible] : bony landmarks visible [Nares Patent] : nares patent [Palate Intact] : palate intact [Uvula Midline] : uvula midline [Supple, full passive range of motion] : supple, full passive range of motion [Symmetric Chest Rise] : symmetric chest rise [Clear to Auscultation Bilaterally] : clear to auscultation bilaterally [Regular Rate and Rhythm] : regular rate and rhythm [S1, S2 present] : S1, S2 present [+2 Femoral Pulses] : (+) 2 femoral pulses [Soft] : soft [Bowel Sounds] : bowel sounds present [Normal External Genitalia] : normal external genitalia [Normal Vaginal Introitus] : normal vaginal introitus [Patent] : patent [Normally Placed] : normally placed [No Abnormal Lymph Nodes Palpated] : no abnormal lymph nodes palpated [Symmetric Buttocks Creases] : symmetric buttocks creases [Plantar Grasp] : plantar grasp reflex present [Cranial Nerves Grossly Intact] : cranial nerves grossly intact [Acute Distress] : no acute distress [Discharge] : no discharge [Tooth Eruption] : no tooth eruption [Palpable Masses] : no palpable masses [Murmurs] : no murmurs [Tender] : nontender [Distended] : nondistended [Hepatomegaly] : no hepatomegaly [Splenomegaly] : no splenomegaly [Clitoromegaly] : no clitoromegaly [Song-Ortolani] : negative Song-Ortolani [Allis Sign] : negative Allis sign [Spinal Dimple] : no spinal dimple [Tuft of Hair] : no tuft of hair [Rash or Lesions] : no rash/lesions

## 2022-02-09 NOTE — DISCHARGE NOTE PROVIDER - HOSPITAL COURSE
History of Present Illness:     87 yo M PMH ESRD on HD MWF via AVF, oliguric, (Dr Eastman), obstructive CAD s/p attempted unsuccessful PCI 2018, Chf, EF 50% (prior 20%) with hx of bleeding (due to LE hematoma post cath due to reported pseudoaneurysm) follows in [place] predominantly, hx of dementia, Hx of R side CEA (2012) HTN, hypothyroidism, HLD and emphysema on home o2 2L presents with worsening SOB and productive cough (brown sputum) since Friday evening after HD. Just moved in with daughter 1 week ago, and reportedly has covid diagnosed this past week.   Pt vaccinated. COVID neg in ED.  Denies cp, but + SOB n/v/d ha urinary symptoms. Requiring 4L nc initially and oxygenator was not functioning well.  CXR suggestive mild PVC so treating as CHF in ED. CAD (coronary artery disease)    2.07: no distress, no cp, no sob at rest; +coughing with white sputum production  2.08: no distress  2.09: no distress, no cp, no sob, tolerated Hd well         REVIEW OF SYSTEMS:    CONSTITUTIONAL: No weakness, No fevers or chills  ENT: No ear ache, No sorethroat  NECK: No pain, No stiffness  RESPIRATORY: No cough, No wheezing, No hemoptysis; No dyspnea  CARDIOVASCULAR: No chest pain, No palpitations  GASTROINTESTINAL: No abd pain, No nausea, No vomiting, No hematemesis, No diarrhea or constipation. No melena, No hematochezia.  GENITOURINARY: No dysuria, No  hematuria  NEUROLOGICAL: No diplopia, No paresthesia, No motor dysfunction  MUSCULOSKELETAL: No arthralgia, No myalgia  SKIN: No rashes, or lesions   PSYCH: no anxiety, no suicidal ideation    All other review of systems is negative unless indicated above    Vital Signs Last 24 Hrs  T(C): 36.1 (07 Feb 2022 12:50), Max: 36.8 (06 Feb 2022 16:25)  T(F): 97 (07 Feb 2022 12:50), Max: 98.2 (06 Feb 2022 16:25)  HR: 93 (07 Feb 2022 13:05) (79 - 97)  BP: 112/72 (07 Feb 2022 13:05) (108/76 - 139/66)  BP(mean): 81 (07 Feb 2022 07:27) (81 - 94)  RR: 18 (07 Feb 2022 13:05) (18 - 20)  SpO2: 92% (07 Feb 2022 07:27) (92% - 95%)    PHYSICAL EXAM:    GENERAL: NAD  HEENT:  NC/AT, EOMI, PERRLA, No scleral icterus, Moist mucous membranes  NECK: Supple, No JVD  CNS:  Alert & Oriented X3, Motor Strength 5/5 B/L upper and lower extremities; DTRs 2+ intact   LUNG: Normal Breath sounds, Clear to auscultation bilaterally, +scattered rales, No rhonchi, No wheezing  HEART: RRR; No murmurs, No rubs  ABDOMEN: +BS, ST/ND/NT  GENITOURINARY: Voiding, Bladder not distended  EXTREMITIES:  2+ Peripheral Pulses, No clubbing, No cyanosis, No tibial edema  MUSCULOSKELTAL: Joints normal ROM, No TTP, No effusion  SKIN: no rashes  RECTAL: deferred, not indicated  BREAST: deferred      A/P:      1. Acute on chronic diastolic Chf: resolving   -removed fluid at HD per nephrology  EF 50-55%, Echocardiogram noted  c/w Coreg/Losartan    2. Advanced Copd with acute Bronchitis:  Severe emphysema   Chronic respiratory failure due to Copd on home O2  Zithromax 500mg given for 3days   ICS  CT Chest noted    3. ESRD:  c/w HD per renal specialist     4. Afib:  rate controlled  c/w Coreg  will anticoagulate with DOAC as outpatient

## 2022-02-09 NOTE — DISCHARGE NOTE PROVIDER - NSDCMRMEDTOKEN_GEN_ALL_CORE_FT
apixaban 2.5 mg oral tablet: 1 tab(s) orally 2 times a day  aspirin 81 mg oral tablet: 1 tab(s) orally once a day  Coreg 6.25 mg oral tablet: 1 tab(s) orally 2 times a day  Flonase 50 mcg/inh nasal spray: 1 spray(s) nasal once a day  Lipitor 20 mg oral tablet: 1 tab(s) orally once a day  loratadine 10 mg oral tablet: 1 tab(s) orally once a day  losartan 25 mg oral tablet: 1 tab(s) orally 2 times a day  Melatonin 3 mg oral tablet: 1 tab(s) orally once a day (at bedtime)  Metamucil 400 mg oral capsule: 1 cap(s) orally once a day  Mucinex 600 mg oral tablet, extended release: 1 tab(s) orally every 12 hours, As Needed  nitroglycerin 0.4 mg sublingual tablet: 1 tab(s) sublingual every 5 minutes, As Needed  Caroline-Juan Ramon oral tablet: 1 tab(s) orally once a day  Synthroid 88 mcg (0.088 mg) oral tablet: 1 tab(s) orally once a day  Velphoro 500 mg oral tablet, chewable: 2 tab(s) orally 3 times a day (with meals)  Vitamin B-12 1000 mcg oral tablet: 1 tab(s) orally once a day  Vitamin C 500 mg oral tablet: 1 tab(s) orally once a day

## 2022-02-09 NOTE — PROGRESS NOTE ADULT - SUBJECTIVE AND OBJECTIVE BOX
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 Just moved in with daughter 1 week ago, and reportedly has covid diagnosed this past week. Pt vaccinated. COVID neg in ED.  Denies cp, but + SOB n/v/d ha urinary symptoms. Requiring 4L nc initially and oxygenator was not functioning well.  CXR suggestive mild PVC so treating as CHF in ED. CAD (coronary artery disease)  EKG MAT vs Afib     ESRD on HD MWF at [facility] started last week after moving from [place]   Forgetful *  but remembers me    today    pt feeling well    denies sob    seen on HD    uf gal 1.5 Liters         PAST MEDICAL & SURGICAL HISTORY:  CAD (coronary artery disease)    Cardiomyopathy    HTN (hypertension)    HLD (hyperlipidemia)    No significant past surgical historysd      MEDICATIONS  (STANDING):  apixaban 2.5 milliGRAM(s) Oral two times a day  ascorbic acid 500 milliGRAM(s) Oral daily  aspirin  chewable 81 milliGRAM(s) Oral daily  atorvastatin 20 milliGRAM(s) Oral at bedtime  azithromycin   Tablet 500 milliGRAM(s) Oral daily  carvedilol Oral Tab/Cap - Peds 6.25 milliGRAM(s) Oral two times a day  cyanocobalamin 1000 MICROGram(s) Oral daily  fluticasone propionate (50 MICROgram(s)/actuation) Nasal Spray - Peds 1 Spray(s) Alternating Nostrils daily  heparin   Injectable. 1000 Unit(s) Dialysis. <User Schedule>  heparin   Injectable. 500 Unit(s) Dialysis. <User Schedule>  levothyroxine 88 MICROGram(s) Oral daily  loratadine 10 milliGRAM(s) Oral daily  losartan Oral Tab/Cap - Peds 25 milliGRAM(s) Oral two times a day  melatonin 3 milliGRAM(s) Oral at bedtime  psyllium Powder 1 Packet(s) Oral two times a day  sevelamer carbonate 800 milliGRAM(s) Oral three times a day with meals      Allergies    No Known Allergies    Intolerances        SOCIAL HISTORY:  Denies ETOh,Smoking,     FAMILY HISTORY:  No pertinent family history in first degree relatives        REVIEW OF SYSTEMS:    CONSTITUTIONAL: No weakness, fevers or chills  EYES/ENT: No visual changes;  No vertigo or throat pain   NECK: No pain or stiffness  RESPIRATORY: No cough, wheezing, hemoptysis; No shortness of breath  CARDIOVASCULAR: No chest pain or palpitations  GASTROINTESTINAL: No abdominal or epigastric pain. No nausea, vomiting, or hematemesis; No diarrhea or constipation. No melena or hematochezia.  GENITOURINARY: No dysuria, frequency or hematuria  NEUROLOGICAL: No numbness or weakness  SKIN: No itching, burning, rashes, or lesions   All other review of systems is negative unless indicated above.    Vital Signs Last 24 Hrs  T(C): 36.4 (09 Feb 2022 14:50), Max: 36.7 (09 Feb 2022 07:49)  T(F): 97.6 (09 Feb 2022 14:50), Max: 98.1 (09 Feb 2022 07:49)  HR: 81 (09 Feb 2022 15:10) (74 - 94)  BP: 119/72 (09 Feb 2022 15:10) (107/55 - 119/72)  BP(mean): 64 (09 Feb 2022 07:49) (64 - 64)  RR: 17 (09 Feb 2022 15:10) (16 - 18)  SpO2: 97% (09 Feb 2022 14:50) (95% - 97%)      I&O's Summary    09 Feb 2022 07:01  -  09 Feb 2022 23:27  --------------------------------------------------------  IN: 0 mL / OUT: 1500 mL / NET: -1500 mL    PHYSICAL EXAM:    Constitutional: NAD  HEENT:, EOMI,  MMM  Neck: No LAD, No JVD  Respiratory: CTAB  Cardiovascular: S1 and S2  Gastrointestinal: BS+, soft, NT/ND  Extremities: No peripheral edema  Neurological: A/O x 3, no focal deficits  : No Chopra  Skin: No rashes  Access: AVf  +thrill , bruit     LABS:                        11.2   6.24  )-----------( 121      ( 09 Feb 2022 08:45 )             33.6           132    |  97     |  45     ----------------------------<  96        09 Feb 2022 08:45  4.3     |  27     |  6.52     133    |  93     |  48     ----------------------------<  98        07 Feb 2022 07:35  4.0     |  28     |  7.34     131    |  92     |  39     ----------------------------<  99        06 Feb 2022 13:54  3.5     |  32     |  6.27     Ca    9.5        09 Feb 2022 08:45  Ca    9.2        07 Feb 2022 07:35    Phos  5.3       09 Feb 2022 08:45  Phos  4.5       07 Feb 2022 07:35    Mg     2.5       07 Feb 2022 07:35  Mg     2.5       06 Feb 2022 13:54    TPro  x      /  Alb  2.9    /  TBili  x      /        09 Feb 2022 08:45  DBili  x      /  AST  x      /  ALT  x      /  AlkPhos  x        TPro  6.5    /  Alb  2.8    /  TBili  1.0    /        07 Feb 2022 07:35  DBili  x      /  AST  44     /  ALT  35     /  AlkPhos  120            Urine Studies:          RADIOLOGY & ADDITIONAL STUDIES:

## 2022-02-09 NOTE — DISCHARGE NOTE PROVIDER - CARE PROVIDER_API CALL
Palla, Venugopal R (MD)  Cardiovascular Disease; Internal Medicine  [address], [place] [id number]  Phone: [phone]  Fax: [phone]  Follow Up Time: 1 week

## 2022-02-09 NOTE — PROGRESS NOTE ADULT - ASSESSMENT
?    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 Just moved in with daughter 1 week ago, and reportedly has covid diagnosed this past week. Pt vaccinated. COVID neg in ED.  Denies cp, but + SOB n/v/d ha urinary symptoms. Requiring 4L nc initially and oxygenator was not functioning well.  CXR suggestive mild PVC so treating as CHF in ED. CAD (coronary artery diseae    C0ugh, SOB, sx of CHF vs PNA  new Afib related - new onset   - acute CHF     ESRD on HD MWF   HD today - continue HD    uf as BP allows      MARIS    at HD    Afib    a/c /rate control per Cardiology         * pt seen earlier today - d./w HD RN

## 2022-02-10 ENCOUNTER — INPATIENT (INPATIENT)
Facility: HOSPITAL | Age: 87
LOS: 7 days | Discharge: NURSING, REHAB OR LONG-TERM CARE | DRG: 291 | End: 2022-02-18
Attending: FAMILY MEDICINE | Admitting: HOSPITALIST
Payer: MEDICARE

## 2022-02-10 VITALS — HEIGHT: 68 IN | OXYGEN SATURATION: 90 % | WEIGHT: 149.91 LBS | HEART RATE: 79 BPM

## 2022-02-10 DIAGNOSIS — R09.02 HYPOXEMIA: ICD-10-CM

## 2022-02-10 LAB
ALBUMIN SERPL ELPH-MCNC: 2.8 G/DL — LOW (ref 3.3–5)
ALP SERPL-CCNC: 114 U/L — SIGNIFICANT CHANGE UP (ref 40–120)
ALT FLD-CCNC: 28 U/L — SIGNIFICANT CHANGE UP (ref 12–78)
ANION GAP SERPL CALC-SCNC: 6 MMOL/L — SIGNIFICANT CHANGE UP (ref 5–17)
APTT BLD: 39.1 SEC — HIGH (ref 27.5–35.5)
AST SERPL-CCNC: 37 U/L — SIGNIFICANT CHANGE UP (ref 15–37)
BASOPHILS # BLD AUTO: 0.06 K/UL — SIGNIFICANT CHANGE UP (ref 0–0.2)
BASOPHILS NFR BLD AUTO: 1 % — SIGNIFICANT CHANGE UP (ref 0–2)
BILIRUB SERPL-MCNC: 0.9 MG/DL — SIGNIFICANT CHANGE UP (ref 0.2–1.2)
BUN SERPL-MCNC: 30 MG/DL — HIGH (ref 7–23)
CALCIUM SERPL-MCNC: 9.2 MG/DL — SIGNIFICANT CHANGE UP (ref 8.5–10.1)
CHLORIDE SERPL-SCNC: 98 MMOL/L — SIGNIFICANT CHANGE UP (ref 96–108)
CO2 SERPL-SCNC: 29 MMOL/L — SIGNIFICANT CHANGE UP (ref 22–31)
CREAT SERPL-MCNC: 4.94 MG/DL — HIGH (ref 0.5–1.3)
EOSINOPHIL # BLD AUTO: 0.19 K/UL — SIGNIFICANT CHANGE UP (ref 0–0.5)
EOSINOPHIL NFR BLD AUTO: 3.2 % — SIGNIFICANT CHANGE UP (ref 0–6)
FLUAV AG NPH QL: SIGNIFICANT CHANGE UP
FLUBV AG NPH QL: SIGNIFICANT CHANGE UP
GLUCOSE SERPL-MCNC: 88 MG/DL — SIGNIFICANT CHANGE UP (ref 70–99)
HCT VFR BLD CALC: 32.7 % — LOW (ref 39–50)
HGB BLD-MCNC: 10.5 G/DL — LOW (ref 13–17)
IMM GRANULOCYTES NFR BLD AUTO: 0.5 % — SIGNIFICANT CHANGE UP (ref 0–1.5)
INR BLD: 1.82 RATIO — HIGH (ref 0.88–1.16)
LYMPHOCYTES # BLD AUTO: 0.61 K/UL — LOW (ref 1–3.3)
LYMPHOCYTES # BLD AUTO: 10.4 % — LOW (ref 13–44)
MCHC RBC-ENTMCNC: 32.1 GM/DL — SIGNIFICANT CHANGE UP (ref 32–36)
MCHC RBC-ENTMCNC: 35.5 PG — HIGH (ref 27–34)
MCV RBC AUTO: 110.5 FL — HIGH (ref 80–100)
MONOCYTES # BLD AUTO: 0.78 K/UL — SIGNIFICANT CHANGE UP (ref 0–0.9)
MONOCYTES NFR BLD AUTO: 13.2 % — SIGNIFICANT CHANGE UP (ref 2–14)
NEUTROPHILS # BLD AUTO: 4.22 K/UL — SIGNIFICANT CHANGE UP (ref 1.8–7.4)
NEUTROPHILS NFR BLD AUTO: 71.7 % — SIGNIFICANT CHANGE UP (ref 43–77)
NT-PROBNP SERPL-SCNC: HIGH PG/ML (ref 0–450)
PLATELET # BLD AUTO: 126 K/UL — LOW (ref 150–400)
POTASSIUM SERPL-MCNC: 3.8 MMOL/L — SIGNIFICANT CHANGE UP (ref 3.5–5.3)
POTASSIUM SERPL-SCNC: 3.8 MMOL/L — SIGNIFICANT CHANGE UP (ref 3.5–5.3)
PROT SERPL-MCNC: 6.7 GM/DL — SIGNIFICANT CHANGE UP (ref 6–8.3)
PROTHROM AB SERPL-ACNC: 20.7 SEC — HIGH (ref 10.6–13.6)
RBC # BLD: 2.96 M/UL — LOW (ref 4.2–5.8)
RBC # FLD: 15.9 % — HIGH (ref 10.3–14.5)
RSV RNA NPH QL NAA+NON-PROBE: SIGNIFICANT CHANGE UP
SARS-COV-2 RNA SPEC QL NAA+PROBE: SIGNIFICANT CHANGE UP
SODIUM SERPL-SCNC: 133 MMOL/L — LOW (ref 135–145)
TROPONIN I, HIGH SENSITIVITY RESULT: 43.97 NG/L — SIGNIFICANT CHANGE UP
WBC # BLD: 5.89 K/UL — SIGNIFICANT CHANGE UP (ref 3.8–10.5)
WBC # FLD AUTO: 5.89 K/UL — SIGNIFICANT CHANGE UP (ref 3.8–10.5)

## 2022-02-10 PROCEDURE — 99220: CPT

## 2022-02-10 PROCEDURE — 97162 PT EVAL MOD COMPLEX 30 MIN: CPT | Mod: GP

## 2022-02-10 PROCEDURE — 92610 EVALUATE SWALLOWING FUNCTION: CPT | Mod: GN

## 2022-02-10 PROCEDURE — 92523 SPEECH SOUND LANG COMPREHEN: CPT | Mod: GN

## 2022-02-10 PROCEDURE — 84100 ASSAY OF PHOSPHORUS: CPT

## 2022-02-10 PROCEDURE — 99261: CPT

## 2022-02-10 PROCEDURE — 93010 ELECTROCARDIOGRAM REPORT: CPT

## 2022-02-10 PROCEDURE — 97116 GAIT TRAINING THERAPY: CPT | Mod: GP

## 2022-02-10 PROCEDURE — 71275 CT ANGIOGRAPHY CHEST: CPT

## 2022-02-10 PROCEDURE — 71045 X-RAY EXAM CHEST 1 VIEW: CPT | Mod: 26

## 2022-02-10 PROCEDURE — 87635 SARS-COV-2 COVID-19 AMP PRB: CPT

## 2022-02-10 PROCEDURE — 36415 COLL VENOUS BLD VENIPUNCTURE: CPT

## 2022-02-10 PROCEDURE — 99285 EMERGENCY DEPT VISIT HI MDM: CPT | Mod: CS

## 2022-02-10 PROCEDURE — 76700 US EXAM ABDOM COMPLETE: CPT

## 2022-02-10 PROCEDURE — 83735 ASSAY OF MAGNESIUM: CPT

## 2022-02-10 PROCEDURE — U0005: CPT

## 2022-02-10 PROCEDURE — 85027 COMPLETE CBC AUTOMATED: CPT

## 2022-02-10 PROCEDURE — 94640 AIRWAY INHALATION TREATMENT: CPT

## 2022-02-10 PROCEDURE — U0003: CPT

## 2022-02-10 PROCEDURE — 94760 N-INVAS EAR/PLS OXIMETRY 1: CPT

## 2022-02-10 PROCEDURE — 97530 THERAPEUTIC ACTIVITIES: CPT | Mod: GP

## 2022-02-10 PROCEDURE — 80048 BASIC METABOLIC PNL TOTAL CA: CPT

## 2022-02-10 PROCEDURE — 80053 COMPREHEN METABOLIC PANEL: CPT

## 2022-02-10 RX ORDER — HEPARIN SODIUM 5000 [USP'U]/ML
1000 INJECTION INTRAVENOUS; SUBCUTANEOUS
Refills: 0 | Status: DISCONTINUED | OUTPATIENT
Start: 2022-02-10 | End: 2022-02-10

## 2022-02-10 RX ORDER — HEPARIN SODIUM 5000 [USP'U]/ML
5000 INJECTION INTRAVENOUS; SUBCUTANEOUS EVERY 12 HOURS
Refills: 0 | Status: DISCONTINUED | OUTPATIENT
Start: 2022-02-10 | End: 2022-02-10

## 2022-02-10 RX ORDER — LANOLIN ALCOHOL/MO/W.PET/CERES
3 CREAM (GRAM) TOPICAL AT BEDTIME
Refills: 0 | Status: DISCONTINUED | OUTPATIENT
Start: 2022-02-10 | End: 2022-02-18

## 2022-02-10 RX ORDER — APIXABAN 2.5 MG/1
2.5 TABLET, FILM COATED ORAL
Refills: 0 | Status: DISCONTINUED | OUTPATIENT
Start: 2022-02-10 | End: 2022-02-18

## 2022-02-10 RX ORDER — ERYTHROPOIETIN 10000 [IU]/ML
10000 INJECTION, SOLUTION INTRAVENOUS; SUBCUTANEOUS
Refills: 0 | Status: DISCONTINUED | OUTPATIENT
Start: 2022-02-10 | End: 2022-02-14

## 2022-02-10 RX ORDER — BUDESONIDE AND FORMOTEROL FUMARATE DIHYDRATE 160; 4.5 UG/1; UG/1
2 AEROSOL RESPIRATORY (INHALATION)
Refills: 0 | Status: DISCONTINUED | OUTPATIENT
Start: 2022-02-10 | End: 2022-02-11

## 2022-02-10 RX ORDER — LOSARTAN POTASSIUM 100 MG/1
25 TABLET, FILM COATED ORAL
Refills: 0 | Status: DISCONTINUED | OUTPATIENT
Start: 2022-02-10 | End: 2022-02-10

## 2022-02-10 RX ORDER — LEVOTHYROXINE SODIUM 125 MCG
88 TABLET ORAL DAILY
Refills: 0 | Status: DISCONTINUED | OUTPATIENT
Start: 2022-02-10 | End: 2022-02-18

## 2022-02-10 RX ORDER — MONTELUKAST 4 MG/1
10 TABLET, CHEWABLE ORAL AT BEDTIME
Refills: 0 | Status: DISCONTINUED | OUTPATIENT
Start: 2022-02-10 | End: 2022-02-18

## 2022-02-10 RX ORDER — CARVEDILOL PHOSPHATE 80 MG/1
6.25 CAPSULE, EXTENDED RELEASE ORAL
Refills: 0 | Status: DISCONTINUED | OUTPATIENT
Start: 2022-02-10 | End: 2022-02-13

## 2022-02-10 RX ORDER — HEPARIN SODIUM 5000 [USP'U]/ML
500 INJECTION INTRAVENOUS; SUBCUTANEOUS
Refills: 0 | Status: DISCONTINUED | OUTPATIENT
Start: 2022-02-10 | End: 2022-02-10

## 2022-02-10 RX ORDER — IPRATROPIUM/ALBUTEROL SULFATE 18-103MCG
3 AEROSOL WITH ADAPTER (GRAM) INHALATION ONCE
Refills: 0 | Status: COMPLETED | OUTPATIENT
Start: 2022-02-10 | End: 2022-02-10

## 2022-02-10 RX ORDER — MIDODRINE HYDROCHLORIDE 2.5 MG/1
5 TABLET ORAL
Refills: 0 | Status: DISCONTINUED | OUTPATIENT
Start: 2022-02-10 | End: 2022-02-18

## 2022-02-10 RX ORDER — ALBUTEROL 90 UG/1
2 AEROSOL, METERED ORAL EVERY 6 HOURS
Refills: 0 | Status: DISCONTINUED | OUTPATIENT
Start: 2022-02-10 | End: 2022-02-18

## 2022-02-10 RX ADMIN — Medication 3 MILLIGRAM(S): at 20:29

## 2022-02-10 RX ADMIN — APIXABAN 2.5 MILLIGRAM(S): 2.5 TABLET, FILM COATED ORAL at 20:28

## 2022-02-10 RX ADMIN — MONTELUKAST 10 MILLIGRAM(S): 4 TABLET, CHEWABLE ORAL at 20:28

## 2022-02-10 RX ADMIN — MIDODRINE HYDROCHLORIDE 5 MILLIGRAM(S): 2.5 TABLET ORAL at 15:43

## 2022-02-10 RX ADMIN — CARVEDILOL PHOSPHATE 6.25 MILLIGRAM(S): 80 CAPSULE, EXTENDED RELEASE ORAL at 20:28

## 2022-02-10 RX ADMIN — Medication 3 MILLILITER(S): at 08:19

## 2022-02-10 NOTE — H&P ADULT - ATTENDING COMMENTS
PT seen and examined. I have personally reviewed the pertinent elements of this H&P and have disucssed the A&P with PGY-3 resident Dr Matrinez. Agree with plan set forth.

## 2022-02-10 NOTE — H&P ADULT - NSHPLABSRESULTS_GEN_ALL_CORE
10.5   5.89  )-----------( 126      ( 10 Feb 2022 08:08 )             32.7   02-10    133<L>  |  98  |  30<H>  ----------------------------<  88  3.8   |  29  |  4.94<H>    Ca    9.2      10 Feb 2022 08:08  Phos  5.3     02-09    TPro  6.7  /  Alb  2.8<L>  /  TBili  0.9  /  DBili  x   /  AST  37  /  ALT  28  /  AlkPhos  114  02-10    EKG reviewed:   Afib with controlled rate   No ST segment changes   Unchanged from previous    CXR:   b/l trace pulmonary effusions, CHF

## 2022-02-10 NOTE — ED PROVIDER NOTE - OBJECTIVE STATEMENT
87 yo male w/PMH of ESRD (on dialysis), HLD, HTN, CAD, presents to the ED for SOB. Pt was living independently in [place] for x10 years, recently had to be placed in assisted living. Needed to much care for assisted living including home O2, moved to [place] x1 weeks ago and was quickly admitted to [facility]. Pt was discharged yesterday and returned due of low O2 level. Pt is also coughing up sputum. No fever/chills or CP. During admission was diagnosed with afib and is on Eliquis.

## 2022-02-10 NOTE — CHART NOTE - NSCHARTNOTEFT_GEN_A_CORE
ED Sw met with Pt at bedside to assess needs and to provide supportive resources. Pts daughter/EC Diane ([phone]) with Pt to provide additional support.   Sw provided Pt ans daughter list of facilities for [facility] : 1. [facility]. [facility] 3. [facility]  Pt A&O x 3 - lacks insight to current situation, Pt pleasant and cooperative. Pt receives HD- [facility] MWF 11am-4pm (had HD at [facility] Wed)  Pt was independent in ADLs until this past Sunday where daughter noticed decline in health and ADLs. Pt has home O2 and NO Other DME. Pt was driving until most recent ED visit.  Pt moved from [place] to live with daughter- lives on ground level floor (3 ext steps into home). PCP- Dr. Gandara ([place])  Pt agreeable to [facility] or any other options such as homecare/home PT.  Sw to continue to follow Pt.   [facility] ref pending

## 2022-02-10 NOTE — PATIENT PROFILE ADULT - NSPROGENOTHERPROVIDER_GEN_A_NUR
----- Message from CHANTELLE Mishra sent at 2/10/2020  9:03 AM CST -----  Labs were essentially unremarkable however the patient does show elevated liver enzymes on this lab and previous labs.  She also had a CT scan show possible chronic liver disease.  If she is not being followed for this by her PCP then she will need an appointment with me after her scope further evaluate her liver   none

## 2022-02-10 NOTE — H&P ADULT - NSHPREVIEWOFSYSTEMS_GEN_ALL_CORE
REVIEW OF SYSTEMS      General:	Denies dizziness or HA  ENMT: Denies	  Respiratory and Thorax: Admits to SOB, cough and orthopnea  Cardiovascular: Denies CP	  Gastrointestinal: Denies abdominal pain, n/v/d/c	  Genitourinary: Has not made urine today	  Musculoskeletal: Denies	  Neurological: Denies	  Psychiatric: Denies	    Hematology/Lymphatics:	    Endocrine:	    Allergic/Immunologic:

## 2022-02-10 NOTE — ED PROVIDER NOTE - MUSCULOSKELETAL, MLM
Spine appears normal, range of motion is not limited, no muscle or joint , no edema or calf tenderness

## 2022-02-10 NOTE — PATIENT PROFILE ADULT - LONGEST PERIOD TOBACCO-FREE
DATE:  11/02/2018



SUBJECTIVE:  The patient is in [facility] room 376, bed 2. 

He is on a cardiac monitor.  The patient was admitted with chest pain

yesterday and pain in his legs.  The patient had also complaint of some

movement of his two fingers on the left hand.



PAST MEDICAL HISTORY:  The patient has past history of degenerative

arthritis, he has possibly history of gastritis.



PHYSICAL EXAMINATION:

VITAL SIGNS:  Pulse is 51, blood pressure 104/62, respirations are 20, O2

sat is 96% on room air.  The patient's temperature is normal.

HEENT:  Examination of the head is within normal limits.

NECK:  The thyroid is not enlarged.  Carotid pulses are present.  JVP is

flat.  There is no lymphadenopathy in the neck.

LUNGS:  Trachea central.  Breath sounds are vesicular.  No adventitious

sounds.  Respiratory rate is normal.

HEART:  Shows normal sinus rhythm, sinus bradycardia.  There is transient

reduction of the heart rate.  Initially the patient had a heart rate of

170.

ABDOMEN:  Soft, some minimal tenderness in the central abdominal area, no

masses felt.  Liver and spleen not palpable.

CNS:  The patient is conscious, rational, oriented.  Cranial nerves are

intact.  The patient's sensory and motor functions are clinically normal at

this time.



LABORATORY DATA:  The patient's ultrasound of the leg was done, it is not

read.  The patient's CT scan report is posted.



ASSESSMENT AND PLAN:  The patient had evaluation with the cardiologist who

is planning to do a stress test this morning.  The patient will have

further test and determination. At this point, we have placed the patient with

antibiotics and Lovenox prophylaxis.  The patient is n.p.o. this morning but

the patient is on a heart-healthy diet.  We will continue further

management based on the evaluation, today's test results and we will follow

up.







__________________________________________

Lora Smith MD



DD:  11/02/2018 8:06:23

DT:  11/02/2018 9:34:48

Job # [id number]

MTDD 20 years

## 2022-02-10 NOTE — ED PROVIDER NOTE - PROGRESS NOTE DETAILS
Pt with episodes of hypoxia, abnormal ekg and labs, requiring additional oxygen supplementation and admission.

## 2022-02-10 NOTE — H&P ADULT - HISTORY OF PRESENT ILLNESS
86 year old male with pmhx of ESRD on HD MWF via AVF, oliguric, (Dr Eastman), obstructive CAD s/p attempted unsuccessful PCI 2018, HFpEF 50% (prior 20%), severe pulmonary HTN, with hx of bleeding (due to LE hematoma post cath due to 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 States that his shortness of breath became worse and "2L doesn't cut it anymore". He also admits to occasional cough but denies chest pain, n/v/d/c. He states he completed his HD yesterday without any complications.   Daughter expressed to ED doctor that he is having trouble walking and would like to go to [facility].

## 2022-02-10 NOTE — ED ADULT NURSE NOTE - CHIEF COMPLAINT QUOTE
Pt presents to er with SOB, as per daughter Diane [phone], pt has been sob all night and had to increase oxygen level to 4L last night and typically only requires 2L, pt's oxygen level was in 70's on 2L last night, daughter requesting further evaluation at this time. PCP .

## 2022-02-10 NOTE — PROGRESS NOTE ADULT - SUBJECTIVE AND OBJECTIVE BOX
second visit   pt seen on HD = completing Uf goal 2.5 L    = required Midodrine earlier   pt feeling sl better   Full HD tomorrow and weight pt post HD to establish new EDW   d/w HD RN

## 2022-02-10 NOTE — ED ADULT TRIAGE NOTE - LOCATION:
"[facility] Hospital Medicine  Progress Note    Patient Name: Cali Mabry  MRN: [id number]  Patient Class: IP- Inpatient   Admission Date: 3/26/2020  Length of Stay: 14 days  Attending Physician: Michoacano Holbrook MD  Primary Care Provider: Primary Doctor     Hospital Medicine Team: [facility] HOSP MED 1 Ayanna Edward MD    Subjective:     Principal Problem:Acute respiratory failure with hypoxia    HPI:  Cali Mabry is a 55 y.o. with DM II, hepatitis C, HTN, polysubstance abuse/IVDU, pancreatitis who presents, after transfer from [facility], with a 2 day history of chest pain and shortness of breath.  Chest pain is centralized and just described as "painful." Pain aggravated with movement and deep breathing. He reports an intermittent cough that is non-productive. He denies sick contacts, fever, chills, sore throat. Upon questioning, patient stated "I have the virus so just treat me for it." Educated patient that testing is still in process. Inquired about IV drug use however he was unable to recall when he last used. Patient swatted examiner's hand after chest palpation, yelling "that hurts!" Patient became aggravated, reported his pain was now worse and requested pain medication. Per nurse, patient given PRN dose an hour prior. Patient encouraged to continue discussing his plan of care however stated, "I don't want to chat."   ED: Afebrile with leukocytosis. CTA chest from 3/26 with dense consolidative changes within the right lower lobe with internal cavitation containing air and fluid; Occlusion is seen of the right lower lobe bronchi which may be secondary to mucoid impaction or aspirated material; Additional multifocal opacities and cavitations.  Vancomycin and Zosyn administered. On 2L NC. VSS. Admitted to hospital medicine for further evaluation and COVID-19 rule out.        Overview/Hospital Course:  54 yo male with IVDU with Hep C, hx of MRSA (V sens), Pancreatitis, DM2, who presents " as a transfer from [facility] for SOB and CP, CT + multifocal apical cavitations and opacities, assoc. w/ air +fluid, suspected due to MRSA pneumonia c/b parapneumonic empyema and possible septic embolization. covid -, s/d 3/28. Chest tube placed 3/30 qhs on TPA x 6 doses (2/6 doses on 3/30 and 3/29). CTS no intervention for now. TTE negative. Chest tube was pulled. ID's final recommendations included: plans todischarge with IV vanc + cefpedoxime or augmentin for 4-6 weeks total (end date 05/09/2020). Repeat imaging in 4 weeks and may require doxycyline following re imaging if worsening. If patient leaves AMA, can offer linezolid, but that would be suboptimal treatment.     Throughout hospital stay, patient's insulin was titrated to maintain -180.    Interval History: No acute overnight events. Patient reports taking hydroxyzine at night so he is not sure if helped with anxiety but would like to try it this morning.     Review of Systems   Constitutional: Positive for fatigue. Negative for fever.   HENT: Negative for congestion and sore throat.    Eyes: Negative for pain and visual disturbance.   Respiratory: Negative for cough and shortness of breath.    Cardiovascular: Negative for chest pain and leg swelling.   Gastrointestinal: Negative for abdominal pain, diarrhea, nausea and vomiting.   Genitourinary: Negative for difficulty urinating and dysuria.   Musculoskeletal: Negative for back pain and neck pain.   Neurological: Negative for syncope and headaches.   Psychiatric/Behavioral: Negative for behavioral problems and confusion.     Objective:     Vital Signs (Most Recent):  Temp: 97.7 °F (36.5 °C) (04/10/20 0854)  Pulse: 86 (04/10/20 0854)  Resp: 18 (04/10/20 0854)  BP: (!) 104/55 (04/10/20 0854)  SpO2: 97 % (04/10/20 0854) Vital Signs (24h Range):  Temp:  [97.7 °F (36.5 °C)-98.6 °F (37 °C)] 97.7 °F (36.5 °C)  Pulse:  [80-86] 86  Resp:  [18] 18  SpO2:  [93 %-97 %] 97 %  BP: (104-119)/(55-67) 104/10      Weight: 59.5 kg (131 lb 2.8 oz)  Body mass index is 19.94 kg/m².    Intake/Output Summary (Last 24 hours) at 4/10/2020 1048  Last data filed at 4/10/2020 0000  Gross per 24 hour   Intake 361 ml   Output 1950 ml   Net -1589 ml      Physical Exam   Constitutional: He is oriented to person, place, and time. He appears well-developed. No distress.   Thin   HENT:   Head: Normocephalic and atraumatic.   Eyes: Pupils are equal, round, and reactive to light. EOM are normal.   Neck: Normal range of motion. No tracheal deviation present. No thyromegaly present.   Cardiovascular: Normal rate and normal heart sounds.   No murmur heard.  Pulmonary/Chest: Effort normal and breath sounds normal. No respiratory distress. He has no rales.   Abdominal: Bowel sounds are normal. He exhibits no distension.   Musculoskeletal: Normal range of motion. He exhibits no edema.   Neurological: He is alert and oriented to person, place, and time. No cranial nerve deficit.   Skin: He is not diaphoretic.   Nursing note and vitals reviewed.      Significant Labs:   CBC:   Recent Labs   Lab 04/09/20  0349   WBC 7.39   HGB 8.9*   HCT 30.5*   *     CMP:   Recent Labs   Lab 04/09/20  0348   *   K 3.9   CL 98   CO2 27   *   BUN 23*   CREATININE 1.2   CALCIUM 8.7   PROT 8.1   ALBUMIN 2.1*   BILITOT 0.3   ALKPHOS 74   AST 27   ALT 26   ANIONGAP 10   EGFRNONAA >60.0       Significant Imaging: I have reviewed all pertinent imaging results/findings within the past 24 hours.      Assessment/Plan:      * Acute respiratory failure with hypoxia  55M with IVDU with Hep C, hx of MRSA (V sens), Pancreatitis, DM2, who presents as a transfer from [facility] for multifocal apical cavitations and opacities, assoc. w/ air +fluid, suspected due to MRSA pneumonia c/b parapneumonic empyema and possible septic embolization. covid -, s/d 3/28   Remains stable on vanc and zosyn, cultures remain clear. Disposition- unable to place to  due to hx of  IVDU. Patient does not desire outside facility transfer.     RESOLVED as patient is stable on RA.    Plan:  - Recent Blood cultures remain negative, WBC count now coming down.    - Continue TB rule out, pending AFB samples. No acid fast bacilli on gram stain.   - Follow up with ID: continue broad spectrum therapy, on vanc and zosyn. STOP ceftriaxone on 4/3, per ID, lactobacillus in pleural fluid can be resistant, also with E Coli/Klebsiella.  -plans to discharge with IV vanc + cefpedoxime or augmentin for 4-6 weeks total (end date 05/09/2020). Repeat imaging in 4 weeks and may require doxycyline following re imaging if worsening.   - If patient leaves AMA, can offer linezolid, but would be suboptimal treatment.   - CTS consulted, appreciate assistance: unlikely to perform intervention as he has had good output and decrease on O2 requirements. TTE negative.  - TPA per pulmonology, received 5/5 doses with good output in chest tube (placed 03/30) and improvement in respiratory status. CT pulled.    Reactive thrombocytosis  Likely 2/2 infection  Or possible bleeding, please see anemia   Trend CBC    Iron deficiency anemia        Parapneumonic effusion  See above       Septic embolism  TTE negative  Neuro checks q4h.       Hyponatremia  Improved with blood sugar control and fluid hydration. Likely related to hyperglycemia as corrected Na within normal.   Component of decreased effective art volume    Continue to trend     Opioid abuse        Tobacco abuse        Chronic hepatitis C        Anemia of chronic disease        Intravenous drug abuse    -counseled on cessation      Hyperlipidemia  - continue statin       Normocytic normochromic anemia  Hb 6.5 on 4/4/2020 s/p transfusion x 1 prbc  Stable with appropriate response Hb.   No active bleeding  Type/cross  Iron studies consistent with Iron deficiency and ACD        Essential hypertension  Stable, not on meds       Type 2 diabetes mellitus, uncontrolled  - HgbA1c  Left arm; 11%  - Insulin adjusted to 20U levemir AM, 16U levemir PM, 7U aspart TID   - LDSII  - BG goal 140-180    Opioid dependence, continuous          Patient seen, and case discussed with staff, Dr. Holbrook. Attending attestation to follow.    VTE Risk Mitigation (From admission, onward)         Ordered     enoxaparin injection 40 mg  Daily      04/04/20 0800     IP VTE HIGH RISK PATIENT  Once      03/27/20 1511                  Ayanna Edward MD  Department of Hospital Medicine   [facility]

## 2022-02-10 NOTE — CONSULT NOTE ADULT - SUBJECTIVE AND OBJECTIVE BOX
86 year old male with pmhx of ESRD on HD MWF via AVF, oliguric, (Dr Eastman), obstructive CAD s/p attempted unsuccessful PCI 2018, HFpEF 50% (prior 20%), severe pulmonary HTN, with hx of bleeding (due to LE hematoma post cath due to 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  States that his shortness of breath became worse and "2L doesn't cut it anymore".   D/w daughter that his 02 sats were dropping last night in 80 despite 3- 4 L of o2 . She also noted cough w phlegm.   Of note pt completed HD at hospital before discharge yest and removed 1.5 liters    today    pt is seen in ED   he is sob    o2 sats  80's on 4-5 l Nc       PAST MEDICAL & SURGICAL HISTORY:  CAD (coronary artery disease)    Cardiomyopathy    HTN (hypertension)    HLD (hyperlipidemia)    No significant past surgical history      MEDICATIONS  (STANDING):  apixaban 2.5 milliGRAM(s) Oral two times a day  budesonide  80 MICROgram(s)/formoterol 4.5 MICROgram(s) Inhaler 2 Puff(s) Inhalation two times a day  carvedilol Oral Tab/Cap - Peds 6.25 milliGRAM(s) Oral two times a day  levothyroxine 88 MICROGram(s) Oral daily  melatonin 3 milliGRAM(s) Oral at bedtime  midodrine. 5 milliGRAM(s) Oral <User Schedule>  montelukast 10 milliGRAM(s) Oral at bedtime      Allergies    No Known Allergies    Intolerances        SOCIAL HISTORY:  Denies ETOh,Smoking,     FAMILY HISTORY:  No pertinent family history in first degree relatives        REVIEW OF SYSTEMS:    CONSTITUTIONAL: No weakness, fevers or chills  EYES/ENT: No visual changes;  No vertigo or throat pain   NECK: No pain or stiffness  RESPIRATORY: No  wheezing, hemoptysis; , ++ sob and cough   CARDIOVASCULAR: No chest pain or palpitations  GASTROINTESTINAL: No abdominal or epigastric pain. No nausea, vomiting, or hematemesis; No diarrhea or constipation. No melena or hematochezia.  GENITOURINARY: No dysuria, frequency or hematuria  NEUROLOGICAL: No numbness or weakness  SKIN: No itching, burning, rashes, or lesions   All other review of systems is negative unless indicated above.    VITAL:  T(C): , Max: 37.1 (02-10-22 @ 13:36)  T(F): , Max: 98.7 (02-10-22 @ 13:36)  HR: 75 (02-10-22 @ 17:15)  BP: 111/64 (02-10-22 @ 17:15)  BP(mean): 81 (02-10-22 @ 15:01)  RR: 18 (02-10-22 @ 17:15)      PHYSICAL EXAM:    Constitutional: frail   HEENT:  EOMI,  MMM  Neck: No LAD, No JVD  Respiratory:  bilateral fine creps bases   Cardiovascular: S1 and S2  Gastrointestinal: BS+, soft, NT/ND  Extremities: No peripheral edema  Neurological: A/O x 3, no focal deficits  : No Chopra  Skin: No rashes  Access: AV     LABS:                                   10.5   5.89  )-----------( 126      ( 10 Feb 2022 08:08 )             32.7                         11.2   6.24  )-----------( 121      ( 09 Feb 2022 08:45 )             33.6         133    |  98     |  30     ----------------------------<  88        10 Feb 2022 08:08  3.8     |  29     |  4.94     132    |  97     |  45     ----------------------------<  96        09 Feb 2022 08:45  4.3     |  27     |  6.52     133    |  93     |  48     ----------------------------<  98        07 Feb 2022 07:35  4.0     |  28     |  7.34     Ca    9.2        10 Feb 2022 08:08  Ca    9.5        09 Feb 2022 08:45    Phos  5.3       09 Feb 2022 08:45  Phos  4.5       07 Feb 2022 07:35    Mg     2.5       07 Feb 2022 07:35    TPro  6.7    /  Alb  2.8    /  TBili  0.9    /        10 Feb 2022 08:08  DBili  x      /  AST  37     /  ALT  28     /  AlkPhos  114      TPro  x      /  Alb  2.9    /  TBili  x      /        09 Feb 2022 08:45  DBili  x      /  AST  x      /  ALT  x      /  AlkPhos  x                RADIOLOGY & ADDITIONAL STUDIES:      COMPARISON: CT chest February 07, 2022, chest x-ray February 06, 2022    FINDINGS:  The heart is enlarged. There are small bilateral pleural effusions. There   are hazy airspace opacities throughout both lungs. There is no   pneumothorax. There is no acute bony abnormality.    IMPRESSION: Congestive heart failure    --- End of Report ---

## 2022-02-10 NOTE — H&P ADULT - NSHPPHYSICALEXAM_GEN_ALL_CORE
T(C): 37.1 (02-10-22 @ 13:36), Max: 37.1 (02-10-22 @ 13:36)  HR: 88 (02-10-22 @ 13:36) (79 - 94)  BP: 110/58 (02-10-22 @ 13:36) (97/63 - 119/72)  RR: 18 (02-10-22 @ 13:36) (16 - 18)  SpO2: 96% (02-10-22 @ 13:36) (90% - 97%)    CONSTITUTIONAL: Well groomed, no apparent distress  EYES: PERRLA and symmetric, EOMI, No conjunctival or scleral injection, non-icteric  ENMT: Oral mucosa with moist membranes.   NECK: Supple, symmetric and without tracheal deviation; t  RESPIRATORY: No respiratory distress, no use of accessory muscles; Rhonchi in RLL and RML. Clear left lung. No wheezing  CARDIOVASCULAR: Afib at rate of 80, JVD present; no peripheral edema  GASTROINTESTINAL: Soft, non tender, non distended, no rebound, no guarding; No palpable masses; no hepatosplenomegaly; no hernia palpated;  MUSCULOSKELETAL: No gross abnormalities  SKIN: No rashes or ulcers noted; no subcutaneous nodules or induration palpable  NEUROLOGIC: CN II-XII intact; normal reflexes in upper and lower extremities, sensation intact in upper and lower extremities b/l to light touch; Babinski down b/l; no Kernig’s sign, no Brudzinski’s sign  PSYCHIATRIC: Appropriate insight/judgment; AAOx 3, mood and affect appropriate, recent/remote memory intact

## 2022-02-10 NOTE — ED ADULT NURSE NOTE - OBJECTIVE STATEMENT
Pt presented to the ER with c/o SOB. Pt was D/C from the hospital yesterday where he was admitted for symptoms which started Sunday. Pt was at home last night and was experiencing SOB while laying down to go bed. Per daughter who is at bedside pt's O2 stat was reading in the 70s on 2L via NC. Pt moved to a chair to sleep while sitting up and daughter increased his O2 to 4L and O2 stat was still reading in the 80s. Per daughter pt was experiencing a productive cough, coughing up brown mucus. Pt does not present with any respiratory distress at this time. Pt denies any fevers, any urinary symptoms, or CP.

## 2022-02-10 NOTE — H&P ADULT - ASSESSMENT
86 year old male with pmhx of ESRD on HD MWF via AVF, oliguric, (Dr Eastman), obstructive CAD s/p attempted unsuccessful PCI 2018, HFpEF 50% (prior 20%), severe pulmonary HTN, with hx of bleeding (due to LE hematoma post cath due to 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 Admitted for [facility] placement and HD    End Stage Renal Disease on Dialysis, Heart Failure with preserved Ejection Fraction, Severe Pulmonary Hypertension, Dyspnea, Hypoxic respiratory failure  - Admit to medicine   - Doubt ACS  - Nephrology consulted- will do HD session today   - PT and Case management consulted for [facility] placement   - Continue all home medications with hold parameters   - Currently requiring 4-5L of O2 sating 92%; was desating to 70s on 2L at home    Emphysema, COPD  - Will consult pulm   - Continue O2 supplementation; caution not to over oxygenate (goal 88-92%)    Hypertension, Hyperlipidemia, CAD, Hypothyroidism, Afib  - Continue home medications   - DVT ppx with renally dosed Eliquis     GOC: Patient wants everything done. Full code.    86 year old male with pmhx of ESRD on HD MWF via AVF, oliguric, (Dr Eastman), obstructive CAD s/p attempted unsuccessful PCI 2018, HFpEF 50% (prior 20%), severe pulmonary HTN, with hx of bleeding (due to LE hematoma post cath due to 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 Admitted for [facility] placement and HD    End Stage Renal Disease on Dialysis, Heart Failure with preserved Ejection Fraction, Severe Pulmonary Hypertension, Dyspnea, Hypoxic respiratory failure  - Admit to medicine   - Doubt ACS  - Nephrology consulted- will do HD session today   - PT and Case management consulted for [facility] placement   - Continue all home medications with hold parameters   - Currently requiring 4-5L of O2 sating 92%; was desating to 70s on 2L at home    Emphysema, COPD  - Will consult pulm   - Continue O2 supplementation; caution not to over oxygenate (goal 88-92%)    Hypertension, Hyperlipidemia, CAD, Hypothyroidism, Afib  - Losartan held for hypotension  - Coreg continued with hold parameters  - DVT ppx with renally dosed Eliquis     GOC: Patient wants everything done. Full code.

## 2022-02-10 NOTE — ED ADULT TRIAGE NOTE - CHIEF COMPLAINT QUOTE
Pt presents to er with SOB, as per daughter Diane [phone], pt has been sob all night and had to increase oxygen level to 4L last night and typically only requires 2L, pt's oxygen level was in 70's on 2L last night, daughter requesting further evaluation at this time. PCP .

## 2022-02-11 ENCOUNTER — TRANSCRIPTION ENCOUNTER (OUTPATIENT)
Age: 87
End: 2022-02-11

## 2022-02-11 LAB
ALBUMIN SERPL ELPH-MCNC: 2.9 G/DL — LOW (ref 3.3–5)
ALP SERPL-CCNC: 127 U/L — HIGH (ref 40–120)
ALT FLD-CCNC: 30 U/L — SIGNIFICANT CHANGE UP (ref 12–78)
ANION GAP SERPL CALC-SCNC: 10 MMOL/L — SIGNIFICANT CHANGE UP (ref 5–17)
AST SERPL-CCNC: 42 U/L — HIGH (ref 15–37)
BILIRUB SERPL-MCNC: 1 MG/DL — SIGNIFICANT CHANGE UP (ref 0.2–1.2)
BUN SERPL-MCNC: 49 MG/DL — HIGH (ref 7–23)
CALCIUM SERPL-MCNC: 9.6 MG/DL — SIGNIFICANT CHANGE UP (ref 8.5–10.1)
CHLORIDE SERPL-SCNC: 98 MMOL/L — SIGNIFICANT CHANGE UP (ref 96–108)
CO2 SERPL-SCNC: 25 MMOL/L — SIGNIFICANT CHANGE UP (ref 22–31)
CREAT SERPL-MCNC: 6.67 MG/DL — HIGH (ref 0.5–1.3)
GLUCOSE SERPL-MCNC: 90 MG/DL — SIGNIFICANT CHANGE UP (ref 70–99)
HCT VFR BLD CALC: 33.8 % — LOW (ref 39–50)
HGB BLD-MCNC: 11 G/DL — LOW (ref 13–17)
MAGNESIUM SERPL-MCNC: 2.6 MG/DL — SIGNIFICANT CHANGE UP (ref 1.6–2.6)
MCHC RBC-ENTMCNC: 32.5 GM/DL — SIGNIFICANT CHANGE UP (ref 32–36)
MCHC RBC-ENTMCNC: 36.1 PG — HIGH (ref 27–34)
MCV RBC AUTO: 110.8 FL — HIGH (ref 80–100)
PHOSPHATE SERPL-MCNC: 5.7 MG/DL — HIGH (ref 2.5–4.5)
PLATELET # BLD AUTO: 142 K/UL — LOW (ref 150–400)
POTASSIUM SERPL-MCNC: 4.3 MMOL/L — SIGNIFICANT CHANGE UP (ref 3.5–5.3)
POTASSIUM SERPL-SCNC: 4.3 MMOL/L — SIGNIFICANT CHANGE UP (ref 3.5–5.3)
PROT SERPL-MCNC: 7 GM/DL — SIGNIFICANT CHANGE UP (ref 6–8.3)
RBC # BLD: 3.05 M/UL — LOW (ref 4.2–5.8)
RBC # FLD: 15.9 % — HIGH (ref 10.3–14.5)
SODIUM SERPL-SCNC: 133 MMOL/L — LOW (ref 135–145)
WBC # BLD: 7.01 K/UL — SIGNIFICANT CHANGE UP (ref 3.8–10.5)
WBC # FLD AUTO: 7.01 K/UL — SIGNIFICANT CHANGE UP (ref 3.8–10.5)

## 2022-02-11 PROCEDURE — 99232 SBSQ HOSP IP/OBS MODERATE 35: CPT

## 2022-02-11 PROCEDURE — 99223 1ST HOSP IP/OBS HIGH 75: CPT

## 2022-02-11 RX ORDER — HEPARIN SODIUM 5000 [USP'U]/ML
1000 INJECTION INTRAVENOUS; SUBCUTANEOUS ONCE
Refills: 0 | Status: COMPLETED | OUTPATIENT
Start: 2022-02-11 | End: 2022-02-11

## 2022-02-11 RX ORDER — BUDESONIDE AND FORMOTEROL FUMARATE DIHYDRATE 160; 4.5 UG/1; UG/1
2 AEROSOL RESPIRATORY (INHALATION)
Refills: 0 | Status: DISCONTINUED | OUTPATIENT
Start: 2022-02-11 | End: 2022-02-18

## 2022-02-11 RX ORDER — MIDODRINE HYDROCHLORIDE 2.5 MG/1
5 TABLET ORAL ONCE
Refills: 0 | Status: COMPLETED | OUTPATIENT
Start: 2022-02-11 | End: 2022-02-11

## 2022-02-11 RX ADMIN — MIDODRINE HYDROCHLORIDE 5 MILLIGRAM(S): 2.5 TABLET ORAL at 05:43

## 2022-02-11 RX ADMIN — MONTELUKAST 10 MILLIGRAM(S): 4 TABLET, CHEWABLE ORAL at 22:25

## 2022-02-11 RX ADMIN — APIXABAN 2.5 MILLIGRAM(S): 2.5 TABLET, FILM COATED ORAL at 13:17

## 2022-02-11 RX ADMIN — Medication 600 MILLIGRAM(S): at 02:47

## 2022-02-11 RX ADMIN — CARVEDILOL PHOSPHATE 6.25 MILLIGRAM(S): 80 CAPSULE, EXTENDED RELEASE ORAL at 13:17

## 2022-02-11 RX ADMIN — Medication 3 MILLIGRAM(S): at 22:24

## 2022-02-11 RX ADMIN — HEPARIN SODIUM 1000 UNIT(S): 5000 INJECTION INTRAVENOUS; SUBCUTANEOUS at 08:56

## 2022-02-11 RX ADMIN — APIXABAN 2.5 MILLIGRAM(S): 2.5 TABLET, FILM COATED ORAL at 22:25

## 2022-02-11 RX ADMIN — Medication 88 MICROGRAM(S): at 05:12

## 2022-02-11 RX ADMIN — MIDODRINE HYDROCHLORIDE 5 MILLIGRAM(S): 2.5 TABLET ORAL at 09:13

## 2022-02-11 RX ADMIN — CARVEDILOL PHOSPHATE 6.25 MILLIGRAM(S): 80 CAPSULE, EXTENDED RELEASE ORAL at 22:25

## 2022-02-11 RX ADMIN — BUDESONIDE AND FORMOTEROL FUMARATE DIHYDRATE 2 PUFF(S): 160; 4.5 AEROSOL RESPIRATORY (INHALATION) at 00:00

## 2022-02-11 NOTE — PROGRESS NOTE ADULT - SUBJECTIVE AND OBJECTIVE BOX
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 States that his shortness of breath became worse and "2L doesn't cut it anymore". He also admits to occasional cough but denies chest pain, n/v/d/c. He states he completed his HD yesterday without any complications. Daughter expressed to ED doctor that he is having trouble walking and would like to go to [facility].    Patient still requires above the baseline O2 support 6 L of O2. Care discussed with Dr. Herrera. Care discussed with patient's daughter.     REVIEW OF SYSTEMS:  General: NAD, hemodynamically stable, (-)  fever, (-) chills, (-) weakness  HEENT:  Eyes:  No visual loss, blurred vision, double vision or yellow sclerae. Ears, Nose, Throat:  No hearing loss, sneezing, congestion, runny nose or sore throat.  SKIN:  No rash or itching.  CARDIOVASCULAR:  No chest pain, chest pressure or chest discomfort. No palpitations or edema.  RESPIRATORY:  No shortness of breath, cough or sputum.  GASTROINTESTINAL:  No anorexia, nausea, vomiting or diarrhea. No abdominal pain or blood.  NEUROLOGICAL:  No headache, dizziness, syncope, paralysis, ataxia, numbness or tingling in the extremities. No change in bowel or bladder control.  MUSCULOSKELETAL:  No muscle, back pain, joint pain or stiffness.  HEMATOLOGIC:  No anemia, bleeding or bruising.  LYMPHATICS:  No enlarged nodes. No history of splenectomy.  ENDOCRINOLOGIC:  No reports of sweating, cold or heat intolerance. No polyuria or polydipsia.  ALLERGIES:  No history of asthma, hives, eczema or rhinitis.    Physical Exam:   GENERAL APPEARANCE:  NAD, hemodynamically stable  T(C): 36.1 (02-11-22 @ 08:24), Max: 37.1 (02-10-22 @ 13:36)  HR: 88 (02-11-22 @ 08:24) (74 - 91)  BP: 106/65 (02-11-22 @ 08:24) (95/57 - 126/67)  RR: 20 (02-11-22 @ 08:00) (17 - 22)  SpO2: 92% (02-11-22 @ 08:00) (84% - 100%)  HEENT:  Head is normocephalic    Skin:  Warm and dry without any rash   NECK:  Supple without lymphadenopathy.   HEART:  Regular rate and rhythm. normal S1 and S2, No M/R/G  LUNGS:  Good ins/exp effort, no W/R/R/C  ABDOMEN:  Soft, nontender, nondistended with good bowel sounds heard  EXTREMITIES:  Without cyanosis, clubbing or edema.   NEUROLOGICAL:  Gross nonfocal     Labs:   CBC Full  -  ( 10 Feb 2022 08:08 )  WBC Count : 5.89 K/uL  RBC Count : 2.96 M/uL  Hemoglobin : 10.5 g/dL  Hematocrit : 32.7 %  Platelet Count - Automated : 126 K/uL    PT/INR - ( 10 Feb 2022 08:08 )   PT: 20.7 sec;   INR: 1.82 ratio       PTT - ( 10 Feb 2022 08:08 )  PTT:39.1 sec    133<L>  |  98  |  30<H>  ----------------------------<  88  3.8   |  29  |  4.94<H>    Ca    9.2      10 Feb 2022 08:08  Phos  5.3     02-09    TPro  6.7  /  Alb  2.8<L>  /  TBili  0.9  /  DBili  x   /  AST  37  /  ALT  28  /  AlkPhos  114  02-10      Pt is a 86 year old male with pmhx of ESRD on HD MWF via AVF, oliguric, (Dr Eastman), obstructive CAD s/p attempted unsuccessful PCI 2018, HFpEF 50% (prior 20%), severe pulmonary HTN, with hx of bleeding (due to LE hematoma post cath due to 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 Admitted for [facility] placement and HD    # End Stage Renal Disease on Dialysis, Heart Failure with preserved Ejection Fraction, Severe Pulmonary Hypertension, Dyspnea, Hypoxic respiratory failure  - Admit to medicine   - Doubt ACS  - Nephrology consulted- will do HD session today   - PT and Case management consulted for [facility] placement   - Continue all home medications with hold parameters   - Currently requiring 4-5L of O2 sating 92%; was desating to 70s on 2L at home    # Emphysema, COPD  - Will consult pulm   - Continue O2 supplementation; caution not to over oxygenate (goal 88-92%)  - pending pulmonary eval    # Atrial fibrillation  - on AC  - pending cardiology eval    # Hypertension, Hyperlipidemia, CAD, Hypothyroidism   - Losartan held for hypotension  - Coreg continued with hold parameters  - DVT ppx with renally dosed Eliquis     # moderate calorie malnutrition

## 2022-02-11 NOTE — PROGRESS NOTE ADULT - CONVERSATION DETAILS
Patient at this time is full code  Daughter who is a nurse at [employer] oncology -  is not ready to speak with pallaitive care. She indicated that, at this time she wants to only focus on patient's shortness of breath and treating underlying causes of SOB.

## 2022-02-11 NOTE — DISCHARGE NOTE NURSING/CASE MANAGEMENT/SOCIAL WORK - NSDCPEFALRISK_GEN_ALL_CORE
For information on Fall & Injury Prevention, visit: [email or web address] OR  [email or web address] OR  https://www.cdc.gov/steadi/patient.html

## 2022-02-11 NOTE — CONSULT NOTE ADULT - SUBJECTIVE AND OBJECTIVE BOX
HISTORY OF PRESENT ILLNESS:     86 year-old man with a history of ESRD on HD MWF,  obstructive CAD s/p attempted unsuccessful PCI 2018, HFpEF 50% (prior 20%), severe pulmonary HTN, with hx of bleeding (due to LE hematoma post cath due to reported pseudoaneurysm) follows in [place] predominantly, hx of dementia, Hx of R side CEA (2012) HTN, hypothyroidism, HLD and emphysema on home O2 2L presents with worsening SOB and orthopnea. States that his shortness of breath became worse and "2L doesn't cut it anymore". He also admits to occasional cough but denies chest pain, n/v/d/c. He states he completed his HD yesterday without any complications.             PAST MEDICAL & PAST SURGICAL HISTORY:  ·	CAD (coronary artery disease)  ·	Cardiomyopathy  ·	HTN (hypertension)  ·	HLD (hyperlipidemia)  ·	No significant past surgical history        SOCIAL HISTORY:       FAMILY HISTORY:  ·	No pertinent family history in first degree relatives        MEDICATIONS  (STANDING):  apixaban 2.5 milliGRAM(s) Oral two times a day  budesonide  80 MICROgram(s)/formoterol 4.5 MICROgram(s) Inhaler 2 Puff(s) Inhalation two times a day  carvedilol Oral Tab/Cap - Peds 6.25 milliGRAM(s) Oral two times a day  epoetin brianna-epbx (RETACRIT) Injectable 49519 Unit(s) IV Push <User Schedule>  levothyroxine 88 MICROGram(s) Oral daily  melatonin 3 milliGRAM(s) Oral at bedtime  midodrine. 5 milliGRAM(s) Oral <User Schedule>  montelukast 10 milliGRAM(s) Oral at bedtime    MEDICATIONS  (PRN):  ALBUTerol    90 MICROgram(s) HFA Inhaler 2 Puff(s) Inhalation every 6 hours PRN Bronchospasm      Allergies    No Known Allergies    Intolerances        Vital Signs Last 24 Hrs  T(C): 36.1 (11 Feb 2022 08:24), Max: 37.1 (10 Feb 2022 13:36)  T(F): 97 (11 Feb 2022 08:24), Max: 98.7 (10 Feb 2022 13:36)  HR: 79 (11 Feb 2022 10:38) (74 - 91)  BP: 128/64 (11 Feb 2022 10:38) (95/57 - 128/64)  BP(mean): 85 (10 Feb 2022 18:28) (75 - 85)  RR: 18 (11 Feb 2022 10:38) (17 - 22)  SpO2: 92% (11 Feb 2022 08:00) (84% - 100%)      PHYSICAL EXAMINATION:    GENERAL APPEARANCE:  No distress. No accessory muscle use. Well appearing.   HEAD: Normocephalic atraumatic   EYES: Pupils are normal. No icterus. Sclera white.   HEENT:  Mucus membranes moist. Oropharynx normal.   NECK:  Supple. No cervical or supraclavicular lymphadenopathy appreciated. No stridor.  HEART:  Normal S1 and S2. There are no murmurs, rubs or gallops noted  CHEST:  Clear to ausculation bilaterally. Normal chest excursion. No wheezing. No crackles. No rhonchi   ABDOMEN:  Soft and nontender. Nondistended.   EXTREMITIES:  There is no cyanosis, clubbing or edema.   SKIN:  No rash or significant lesions are noted. No jaundice.  PSYCH: Normal affect.  NEURO: Alert and oriented. Responds to question appropriate. No focal deficits appreciated.         LABS:                        11.0   7.01  )-----------( 142      ( 11 Feb 2022 07:49 )             33.8     02-11    133<L>  |  98  |  49<H>  ----------------------------<  90  4.3   |  25  |  6.67<H>    Ca    9.6      11 Feb 2022 07:49  Phos  5.7     02-11  Mg     2.6     02-11    TPro  7.0  /  Alb  2.9<L>  /  TBili  1.0  /  DBili  x   /  AST  42<H>  /  ALT  30  /  AlkPhos  127<H>  02-11        RADIOLOGY & ADDITIONAL STUDIES:   *Imaging personally reviewed    < from: Xray Chest 1 View- PORTABLE-Urgent (02.10.22 @ 08:54) >    ACC: [id number] EXAM:  XR CHEST PORTABLE URGENT 1V                          PROCEDURE DATE:  02/10/2022          INTERPRETATION:  INDICATION: Chest pain    COMPARISON: CT chest February 07, 2022, chest x-ray February 06, 2022    FINDINGS:  The heart is enlarged. There are small bilateral pleural effusions. There   are hazy airspace opacities throughout both lungs. There is no   pneumothorax. There is no acute bony abnormality.    IMPRESSION: Congestive heart failure    < end of copied text >       HISTORY OF PRESENT ILLNESS:     86 year-old man with a history of ESRD on HD MWF,  obstructive CAD s/p attempted unsuccessful PCI 2018, HFpEF 50% (prior 20%), severe pulmonary HTN, with hx of bleeding (due to LE hematoma post cath due to reported pseudoaneurysm) follows in [place] predominantly, hx of dementia, Hx of R side CEA (2012) HTN, hypothyroidism, HLD and emphysema on home O2 2L presents with worsening SOB and orthopnea. States that his shortness of breath became worse and "2L doesn't cut it anymore". He also admits to occasional cough but denies chest pain, n/v/d/c. He states he completed his HD yesterday without any complications.     Seen and examined today during dialysis. He was on facemaske. He had no significant shortness of breath, but did endorse chronic cough and sputum prouction. No wheezing. No chest tightenss. he has No known histoyr of COPD. He was a former smoker, quit many years prior (40years prior).     ROS otherwise negative.     PAST MEDICAL & PAST SURGICAL HISTORY:  ·	CAD (coronary artery disease)  ·	Cardiomyopathy  ·	HTN (hypertension)  ·	HLD (hyperlipidemia)  ·	No significant past surgical history        SOCIAL HISTORY:   Former smoker    FAMILY HISTORY:  No family history of lung disorders - though notes his brother was significant smoker.       MEDICATIONS  (STANDING):  apixaban 2.5 milliGRAM(s) Oral two times a day  budesonide  80 MICROgram(s)/formoterol 4.5 MICROgram(s) Inhaler 2 Puff(s) Inhalation two times a day  carvedilol Oral Tab/Cap - Peds 6.25 milliGRAM(s) Oral two times a day  epoetin brianna-epbx (RETACRIT) Injectable 06585 Unit(s) IV Push <User Schedule>  levothyroxine 88 MICROGram(s) Oral daily  melatonin 3 milliGRAM(s) Oral at bedtime  midodrine. 5 milliGRAM(s) Oral <User Schedule>  montelukast 10 milliGRAM(s) Oral at bedtime    MEDICATIONS  (PRN):  ALBUTerol    90 MICROgram(s) HFA Inhaler 2 Puff(s) Inhalation every 6 hours PRN Bronchospasm      Allergies    No Known Allergies    Intolerances        Vital Signs Last 24 Hrs  T(C): 36.1 (11 Feb 2022 08:24), Max: 37.1 (10 Feb 2022 13:36)  T(F): 97 (11 Feb 2022 08:24), Max: 98.7 (10 Feb 2022 13:36)  HR: 79 (11 Feb 2022 10:38) (74 - 91)  BP: 128/64 (11 Feb 2022 10:38) (95/57 - 128/64)  BP(mean): 85 (10 Feb 2022 18:28) (75 - 85)  RR: 18 (11 Feb 2022 10:38) (17 - 22)  SpO2: 92% (11 Feb 2022 08:00) (84% - 100%)      PHYSICAL EXAMINATION:    GENERAL APPEARANCE:  No distress. No accessory muscle use.   HEAD: Normocephalic atraumatic   EYES: Pupils are normal. No icterus. Sclera white.   HEENT:  Mucus membranes moist. Oropharynx normal.   NECK:  Supple. No cervical or supraclavicular lymphadenopathy appreciated. No stridor.  HEART:  Normal S1 and S2. There are no murmurs  CHEST:  Good breath sounds, slight exp. wheez  ABDOMEN:  Soft and nontender. Nondistended.   EXTREMITIES:  There is no cyanosis, clubbing.  SKIN:  No rash or significant lesions are noted. No jaundice.  PSYCH: Normal affect.  NEURO: Alert and oriented. Responds to question appropriate. No focal deficits appreciated.         LABS:                        11.0   7.01  )-----------( 142      ( 11 Feb 2022 07:49 )             33.8     02-11    133<L>  |  98  |  49<H>  ----------------------------<  90  4.3   |  25  |  6.67<H>    Ca    9.6      11 Feb 2022 07:49  Phos  5.7     02-11  Mg     2.6     02-11    TPro  7.0  /  Alb  2.9<L>  /  TBili  1.0  /  DBili  x   /  AST  42<H>  /  ALT  30  /  AlkPhos  127<H>  02-11        RADIOLOGY & ADDITIONAL STUDIES:   *Imaging personally reviewed    < from: Xray Chest 1 View- PORTABLE-Urgent (02.10.22 @ 08:54) >    ACC: [id number] EXAM:  XR CHEST PORTABLE URGENT 1V                          PROCEDURE DATE:  02/10/2022          INTERPRETATION:  INDICATION: Chest pain    COMPARISON: CT chest February 07, 2022, chest x-ray February 06, 2022    FINDINGS:  The heart is enlarged. There are small bilateral pleural effusions. There   are hazy airspace opacities throughout both lungs. There is no   pneumothorax. There is no acute bony abnormality.    IMPRESSION: Congestive heart failure    < end of copied text >

## 2022-02-11 NOTE — DISCHARGE NOTE NURSING/CASE MANAGEMENT/SOCIAL WORK - PATIENT PORTAL LINK FT
You can access the FollowMyHealth Patient Portal offered by [facility] by registering at the following website: [email or web address]. By joining [facility]’s FollowMyHealth portal, you will also be able to view your health information using other applications (apps) compatible with our system.

## 2022-02-11 NOTE — CONSULT NOTE ADULT - SUBJECTIVE AND OBJECTIVE BOX
Patient is a 86y old  Male who presents with a chief complaint of SOB (11 Feb 2022 13:20)      HPI:  86 year old male with pmhx of ESRD on HD MWF via AVF, oliguric, (Dr Eastman),  obstructive CAD s/p attempted unsuccessful PCI 2018 while in [place] HFpEF 50% (prior 20%), severe pulmonary HTN, with hx of bleeding (due to LE hematoma post cath due to 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 States that his shortness of breath became worse and "2L doesn't cut it anymore". He also admits to occasional cough but denies chest pain, n/v/d/c. He states he completed his HD yesterday without any complications.   Daughter expressed to ED doctor that he is having trouble walking and would like to go to [facility].  (10 Feb 2022 13:28)  Was just in hospital with SOB treated for COPD execrabationa nd fluid overload   He is oliguric on HD 3 times per week , he had chronic AFIB on eliquis   He denies CP  LAst echo showed severe pulm HTN with EF 50%   on midodrin for hypotension on HD   PAST MEDICAL & SURGICAL HISTORY:  CAD (coronary artery disease)    Cardiomyopathy    HTN (hypertension)    HLD (hyperlipidemia)    No significant past surgical history                                      MEDICATIONS  (STANDING):  apixaban 2.5 milliGRAM(s) Oral two times a day  budesonide 160 MICROgram(s)/formoterol 4.5 MICROgram(s) Inhaler 2 Puff(s) Inhalation two times a day  carvedilol Oral Tab/Cap - Peds 6.25 milliGRAM(s) Oral two times a day  epoetin brianna-epbx (RETACRIT) Injectable 36106 Unit(s) IV Push <User Schedule>  levothyroxine 88 MICROGram(s) Oral daily  melatonin 3 milliGRAM(s) Oral at bedtime  midodrine. 5 milliGRAM(s) Oral <User Schedule>  montelukast 10 milliGRAM(s) Oral at bedtime    MEDICATIONS  (PRN):  ALBUTerol    90 MICROgram(s) HFA Inhaler 2 Puff(s) Inhalation every 6 hours PRN Bronchospasm      FAMILY HISTORY:  No pertinent family history in first degree relatives        SOCIAL HISTORY: nonsnoker    CIGARETTES:        Vital Signs Last 24 Hrs  T(C): 36.2 (11 Feb 2022 15:24), Max: 36.6 (11 Feb 2022 08:00)  T(F): 97.2 (11 Feb 2022 15:24), Max: 97.8 (11 Feb 2022 08:00)  HR: 83 (11 Feb 2022 15:24) (74 - 91)  BP: 107/53 (11 Feb 2022 15:24) (95/57 - 143/60)  BP(mean): 70 (11 Feb 2022 15:24) (70 - 85)  RR: 18 (11 Feb 2022 15:24) (16 - 22)  SpO2: 95% (11 Feb 2022 15:24) (84% - 100%)            INTERPRETATION OF TELEMETRY:    ECG:    I&O's Detail    10 Feb 2022 07:01  -  11 Feb 2022 07:00  --------------------------------------------------------  IN:  Total IN: 0 mL    OUT:    Other (mL): 2500 mL  Total OUT: 2500 mL    Total NET: -2500 mL      11 Feb 2022 07:01  -  11 Feb 2022 17:08  --------------------------------------------------------  IN:    Oral Fluid: 360 mL  Total IN: 360 mL    OUT:  Total OUT: 0 mL    Total NET: 360 mL          LABS:                        11.0   7.01  )-----------( 142      ( 11 Feb 2022 07:49 )             33.8     02-11    133<L>  |  98  |  49<H>  ----------------------------<  90  4.3   |  25  |  6.67<H>    Ca    9.6      11 Feb 2022 07:49  Phos  5.7     02-11  Mg     2.6     02-11    TPro  7.0  /  Alb  2.9<L>  /  TBili  1.0  /  DBili  x   /  AST  42<H>  /  ALT  30  /  AlkPhos  127<H>  02-11        PT/INR - ( 10 Feb 2022 08:08 )   PT: 20.7 sec;   INR: 1.82 ratio         PTT - ( 10 Feb 2022 08:08 )  PTT:39.1 sec    I&O's Summary    10 Feb 2022 07:01  -  11 Feb 2022 07:00  --------------------------------------------------------  IN: 0 mL / OUT: 2500 mL / NET: -2500 mL    11 Feb 2022 07:01  -  11 Feb 2022 17:08  --------------------------------------------------------  IN: 360 mL / OUT: 0 mL / NET: 360 mL      BNP  RADIOLOGY & ADDITIONAL STUDIES:

## 2022-02-11 NOTE — PROGRESS NOTE ADULT - ASSESSMENT
85 yo male with hx of ESRD on HD MWF via AVF at [facility]uin D[facility]az moved from [place] and now was recenlty DC'd from  due to rapid afib and sob   returned next day w increased SOB and hypoxia    SOB/Hypoxia  - Multifactorial    Pulm HTN/COPD/ Fluid Overload    will attempt further fluid removal at HD today    need to adjust his EDW after this admission pt may have lost weight since moving from [place]   limit fluid intake advised    can also try puf first hour of HD      ESRD on HD MWF    HD aim for 3.5 liters removal as BP allows   Mido assist   additional HD Saturday        Anemia   MARIS at HD    COPD on home 02    per Pulm     * pt seen earlier , note now    Dr Perez covering weekend

## 2022-02-11 NOTE — PROGRESS NOTE ADULT - SUBJECTIVE AND OBJECTIVE BOX
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 States that his shortness of breath became worse and "2L doesn't cut it anymore". He also admits to occasional cough but denies chest pain, n/v/d/c. He states he completed his HD yesterday without any complications. Daughter expressed to ED doctor that he is having trouble walking and would like to go to [facility].    Patient feels better post diuresis. Denies any HA, CP. SOB much improved post HD. Care discussed with patient's family -  as patient has multitude of complex medical problems -  family requests palliative care eval.     REVIEW OF SYSTEMS:  General: NAD, hemodynamically stable, + weakness  HEENT:  Eyes:  No visual loss, blurred vision, double vision or yellow sclerae. Ears, Nose, Throat:  No hearing loss, sneezing, congestion, runny nose or sore throat.  SKIN:  No rash or itching.  CARDIOVASCULAR:  No chest pain, chest pressure or chest discomfort. No palpitations or edema.  RESPIRATORY: + SOB  GASTROINTESTINAL:  No anorexia, nausea, vomiting or diarrhea. No abdominal pain or blood.  NEUROLOGICAL:  No headache, dizziness, syncope, paralysis, ataxia, numbness or tingling in the extremities. No change in bowel or bladder control.  MUSCULOSKELETAL:  No muscle, back pain, joint pain or stiffness.  HEMATOLOGIC:  No anemia, bleeding or bruising.  LYMPHATICS:  No enlarged nodes. No history of splenectomy.  ENDOCRINOLOGIC:  No reports of sweating, cold or heat intolerance. No polyuria or polydipsia.  ALLERGIES:  No history of asthma, hives, eczema or rhinitis.    Physical Exam:   GENERAL APPEARANCE:  NAD, hemodynamically stable  T(C): 36.1 (02-11-22 @ 08:24), Max: 37.1 (02-10-22 @ 13:36)  HR: 88 (02-11-22 @ 08:24) (74 - 91)  BP: 106/65 (02-11-22 @ 08:24) (95/57 - 126/67)  RR: 20 (02-11-22 @ 08:00) (17 - 22)  SpO2: 92% (02-11-22 @ 08:00) (84% - 100%)  HEENT:  Head is normocephalic    Skin:  Warm and dry without any rash   NECK:  Supple without lymphadenopathy.   HEART:  Regular rate and rhythm. normal S1 and S2, No M/R/G  LUNGS:  Good ins/exp effort, no W/R/R/C  ABDOMEN:  Soft, nontender, nondistended with good bowel sounds heard  EXTREMITIES:  Without cyanosis, clubbing or edema.   NEUROLOGICAL:  Gross nonfocal     Labs:   CBC Full  -  ( 10 Feb 2022 08:08 )  WBC Count : 5.89 K/uL  RBC Count : 2.96 M/uL  Hemoglobin : 10.5 g/dL  Hematocrit : 32.7 %  Platelet Count - Automated : 126 K/uL    PT/INR - ( 10 Feb 2022 08:08 )   PT: 20.7 sec;   INR: 1.82 ratio       PTT - ( 10 Feb 2022 08:08 )  PTT:39.1 sec    133<L>  |  98  |  30<H>  ----------------------------<  88  3.8   |  29  |  4.94<H>    Ca    9.2      10 Feb 2022 08:08  Phos  5.3     02-09    TPro  6.7  /  Alb  2.8<L>  /  TBili  0.9  /  DBili  x   /  AST  37  /  ALT  28  /  AlkPhos  114  02-10      Pt is a 86 year old male with pmhx of ESRD on HD MWF via AVF, oliguric, (Dr Eastman), obstructive CAD s/p attempted unsuccessful PCI 2018, HFpEF 50% (prior 20%), severe pulmonary HTN, with hx of bleeding (due to LE hematoma post cath due to 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 Admitted for [facility] placement and HD    # End Stage Renal Disease on Dialysis, Heart Failure with preserved Ejection Fraction, Severe Pulmonary Hypertension, Dyspnea, Hypoxic respiratory failure  - Admit to medicine   - Doubt ACS  - Nephrology consulted- will do HD session today   - PT and Case management consulted for [facility] placement   - Continue all home medications with hold parameters   - Currently requiring 4-5L of O2 sating 92%; was desating to 70s on 2L at home    # Emphysema, COPD  - Will consult pulm   - Continue O2 supplementation; caution not to over oxygenate (goal 88-92%)  - pending pulmonary eval    # Atrial fibrillation  - on AC  - pending cardiology eval    # Hypertension, Hyperlipidemia, CAD, Hypothyroidism   - Losartan held for hypotension  - Coreg continued with hold parameters  - DVT ppx with renally dosed Eliquis     # moderate calorie malnutrition

## 2022-02-11 NOTE — DISCHARGE NOTE NURSING/CASE MANAGEMENT/SOCIAL WORK - NSDCPEPTCAREGIVEDUMATLIST _GEN_ALL_CORE
Heart Failure/Influenza Vaccination Heart Failure/Influenza Vaccination/Apixaban/Eliquis/Coronavirus/COVID19

## 2022-02-12 DIAGNOSIS — J40 BRONCHITIS, NOT SPECIFIED AS ACUTE OR CHRONIC: ICD-10-CM

## 2022-02-12 DIAGNOSIS — Z79.82 LONG TERM (CURRENT) USE OF ASPIRIN: ICD-10-CM

## 2022-02-12 DIAGNOSIS — F03.90 UNSPECIFIED DEMENTIA, UNSPECIFIED SEVERITY, WITHOUT BEHAVIORAL DISTURBANCE, PSYCHOTIC DISTURBANCE, MOOD DISTURBANCE, AND ANXIETY: ICD-10-CM

## 2022-02-12 DIAGNOSIS — N18.6 END STAGE RENAL DISEASE: ICD-10-CM

## 2022-02-12 DIAGNOSIS — J96.10 CHRONIC RESPIRATORY FAILURE, UNSPECIFIED WHETHER WITH HYPOXIA OR HYPERCAPNIA: ICD-10-CM

## 2022-02-12 DIAGNOSIS — I42.9 CARDIOMYOPATHY, UNSPECIFIED: ICD-10-CM

## 2022-02-12 DIAGNOSIS — I13.2 HYPERTENSIVE HEART AND CHRONIC KIDNEY DISEASE WITH HEART FAILURE AND WITH STAGE 5 CHRONIC KIDNEY DISEASE, OR END STAGE RENAL DISEASE: ICD-10-CM

## 2022-02-12 DIAGNOSIS — E78.5 HYPERLIPIDEMIA, UNSPECIFIED: ICD-10-CM

## 2022-02-12 DIAGNOSIS — J43.9 EMPHYSEMA, UNSPECIFIED: ICD-10-CM

## 2022-02-12 DIAGNOSIS — I48.91 UNSPECIFIED ATRIAL FIBRILLATION: ICD-10-CM

## 2022-02-12 DIAGNOSIS — Z79.899 OTHER LONG TERM (CURRENT) DRUG THERAPY: ICD-10-CM

## 2022-02-12 DIAGNOSIS — Z79.890 HORMONE REPLACEMENT THERAPY: ICD-10-CM

## 2022-02-12 DIAGNOSIS — Z99.81 DEPENDENCE ON SUPPLEMENTAL OXYGEN: ICD-10-CM

## 2022-02-12 DIAGNOSIS — I25.10 ATHEROSCLEROTIC HEART DISEASE OF NATIVE CORONARY ARTERY WITHOUT ANGINA PECTORIS: ICD-10-CM

## 2022-02-12 DIAGNOSIS — E03.9 HYPOTHYROIDISM, UNSPECIFIED: ICD-10-CM

## 2022-02-12 DIAGNOSIS — I50.33 ACUTE ON CHRONIC DIASTOLIC (CONGESTIVE) HEART FAILURE: ICD-10-CM

## 2022-02-12 DIAGNOSIS — Z20.822 CONTACT WITH AND (SUSPECTED) EXPOSURE TO COVID-19: ICD-10-CM

## 2022-02-12 LAB
ALBUMIN SERPL ELPH-MCNC: 2.9 G/DL — LOW (ref 3.3–5)
ALP SERPL-CCNC: 121 U/L — HIGH (ref 40–120)
ALT FLD-CCNC: 34 U/L — SIGNIFICANT CHANGE UP (ref 12–78)
ANION GAP SERPL CALC-SCNC: 10 MMOL/L — SIGNIFICANT CHANGE UP (ref 5–17)
AST SERPL-CCNC: 44 U/L — HIGH (ref 15–37)
BILIRUB SERPL-MCNC: 1.1 MG/DL — SIGNIFICANT CHANGE UP (ref 0.2–1.2)
BUN SERPL-MCNC: 41 MG/DL — HIGH (ref 7–23)
CALCIUM SERPL-MCNC: 9.4 MG/DL — SIGNIFICANT CHANGE UP (ref 8.5–10.1)
CHLORIDE SERPL-SCNC: 96 MMOL/L — SIGNIFICANT CHANGE UP (ref 96–108)
CO2 SERPL-SCNC: 29 MMOL/L — SIGNIFICANT CHANGE UP (ref 22–31)
CREAT SERPL-MCNC: 5.48 MG/DL — HIGH (ref 0.5–1.3)
GLUCOSE SERPL-MCNC: 106 MG/DL — HIGH (ref 70–99)
HCT VFR BLD CALC: 33.9 % — LOW (ref 39–50)
HGB BLD-MCNC: 11 G/DL — LOW (ref 13–17)
MCHC RBC-ENTMCNC: 32.4 GM/DL — SIGNIFICANT CHANGE UP (ref 32–36)
MCHC RBC-ENTMCNC: 35.6 PG — HIGH (ref 27–34)
MCV RBC AUTO: 109.7 FL — HIGH (ref 80–100)
PLATELET # BLD AUTO: 137 K/UL — LOW (ref 150–400)
POTASSIUM SERPL-MCNC: 3.7 MMOL/L — SIGNIFICANT CHANGE UP (ref 3.5–5.3)
POTASSIUM SERPL-SCNC: 3.7 MMOL/L — SIGNIFICANT CHANGE UP (ref 3.5–5.3)
PROT SERPL-MCNC: 7 GM/DL — SIGNIFICANT CHANGE UP (ref 6–8.3)
RBC # BLD: 3.09 M/UL — LOW (ref 4.2–5.8)
RBC # FLD: 15.8 % — HIGH (ref 10.3–14.5)
SODIUM SERPL-SCNC: 135 MMOL/L — SIGNIFICANT CHANGE UP (ref 135–145)
WBC # BLD: 7.03 K/UL — SIGNIFICANT CHANGE UP (ref 3.8–10.5)
WBC # FLD AUTO: 7.03 K/UL — SIGNIFICANT CHANGE UP (ref 3.8–10.5)

## 2022-02-12 PROCEDURE — 99233 SBSQ HOSP IP/OBS HIGH 50: CPT

## 2022-02-12 PROCEDURE — 71275 CT ANGIOGRAPHY CHEST: CPT | Mod: 26

## 2022-02-12 RX ADMIN — CARVEDILOL PHOSPHATE 6.25 MILLIGRAM(S): 80 CAPSULE, EXTENDED RELEASE ORAL at 21:22

## 2022-02-12 RX ADMIN — Medication 88 MICROGRAM(S): at 06:26

## 2022-02-12 RX ADMIN — APIXABAN 2.5 MILLIGRAM(S): 2.5 TABLET, FILM COATED ORAL at 21:23

## 2022-02-12 RX ADMIN — MONTELUKAST 10 MILLIGRAM(S): 4 TABLET, CHEWABLE ORAL at 21:23

## 2022-02-12 RX ADMIN — APIXABAN 2.5 MILLIGRAM(S): 2.5 TABLET, FILM COATED ORAL at 09:24

## 2022-02-12 RX ADMIN — Medication 3 MILLIGRAM(S): at 21:23

## 2022-02-12 NOTE — PROGRESS NOTE ADULT - SUBJECTIVE AND OBJECTIVE BOX
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 States that his shortness of breath became worse and "2L doesn't cut it anymore". He also admits to occasional cough but denies chest pain, n/v/d/c. He states he completed his HD yesterday without any complications. Daughter expressed to ED doctor that he is having trouble walking and would like to go to [facility].    Patient feels better post diuresis. Denies any HA, CP. SOB much improved post HD. Care discussed with patient's family -  as patient has multitude of complex medical problems -  family requests palliative care eval.     2/12: Patient is doing great this am. really feels and looks great. patient on 3 L of NC. CTA official read pending  -  but on my eval no PE    REVIEW OF SYSTEMS:  General: NAD, hemodynamically stable, + weakness  HEENT:  Eyes:  No visual loss, blurred vision, double vision or yellow sclerae. Ears, Nose, Throat:  No hearing loss, sneezing, congestion, runny nose or sore throat.  SKIN:  No rash or itching.  CARDIOVASCULAR:  No chest pain, chest pressure or chest discomfort. No palpitations or edema.  RESPIRATORY: + SOB  GASTROINTESTINAL:  No anorexia, nausea, vomiting or diarrhea. No abdominal pain or blood.  NEUROLOGICAL:  No headache, dizziness, syncope, paralysis, ataxia, numbness or tingling in the extremities. No change in bowel or bladder control.  MUSCULOSKELETAL:  No muscle, back pain, joint pain or stiffness.  HEMATOLOGIC:  No anemia, bleeding or bruising.  LYMPHATICS:  No enlarged nodes. No history of splenectomy.  ENDOCRINOLOGIC:  No reports of sweating, cold or heat intolerance. No polyuria or polydipsia.  ALLERGIES:  No history of asthma, hives, eczema or rhinitis.    Physical Exam:   GENERAL APPEARANCE:  NAD, hemodynamically stable  ICU Vital Signs Last 24 Hrs  T(C): 36.2 (12 Feb 2022 08:38), Max: 36.6 (11 Feb 2022 21:00)  T(F): 97.2 (12 Feb 2022 08:38), Max: 97.8 (11 Feb 2022 21:00)  HR: 70 (12 Feb 2022 08:38) (70 - 86)  BP: 106/64 (12 Feb 2022 08:38) (106/64 - 143/60)  BP(mean): 77 (12 Feb 2022 08:38) (70 - 77)  ABP: --  ABP(mean): --  RR: 19 (12 Feb 2022 08:38) (16 - 19)  SpO2: 97% (12 Feb 2022 08:38) (94% - 99%)    HEENT:  Head is normocephalic    Skin:  Warm and dry without any rash   NECK:  Supple without lymphadenopathy.   HEART:  Regular rate and rhythm. normal S1 and S2, No M/R/G  LUNGS:  Good ins/exp effort, no W/R/R/C  ABDOMEN:  Soft, nontender, nondistended with good bowel sounds heard  EXTREMITIES:  Without cyanosis, clubbing or edema.   NEUROLOGICAL:  Gross nonfocal     Labs:   CBC Full  -  ( 10 Feb 2022 08:08 )  WBC Count : 5.89 K/uL  RBC Count : 2.96 M/uL  Hemoglobin : 10.5 g/dL  Hematocrit : 32.7 %  Platelet Count - Automated : 126 K/uL    PT/INR - ( 10 Feb 2022 08:08 )   PT: 20.7 sec;   INR: 1.82 ratio       PTT - ( 10 Feb 2022 08:08 )  PTT:39.1 sec    133<L>  |  98  |  30<H>  ----------------------------<  88  3.8   |  29  |  4.94<H>    Ca    9.2      10 Feb 2022 08:08  Phos  5.3     02-09    TPro  6.7  /  Alb  2.8<L>  /  TBili  0.9  /  DBili  x   /  AST  37  /  ALT  28  /  AlkPhos  114  02-10      Pt is a 86 year old male with pmhx of ESRD on HD MWF via AVF, oliguric, (Dr Eastman), obstructive CAD s/p attempted unsuccessful PCI 2018, HFpEF 50% (prior 20%), severe pulmonary HTN, with hx of bleeding (due to LE hematoma post cath due to 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 Admitted for [facility] placement and HD    # End Stage Renal Disease on Dialysis, Heart Failure with preserved Ejection Fraction, Severe Pulmonary Hypertension, Dyspnea, Hypoxic respiratory failure  - HD today after the CTA  - Doubt ACS  - Nephrology consulted- will do HD session today   - PT and Case management consulted for [facility] placement   - Continue all home medications with hold parameters   - Currently requiring 4-5L of O2 sating 92%; was desating to 70s on 2L at home    # Emphysema, COPD  - care discussed with pulmonary  - Continue O2 supplementation; caution not to over oxygenate (goal 88-92%)  - pending pulmonary eval    # Atrial fibrillation  - on AC  - pending cardiology eval    # Hypertension, Hyperlipidemia, CAD, Hypothyroidism   - Losartan held for hypotension  - Coreg continued with hold parameters  - DVT ppx with renally dosed Eliquis     # moderate calorie malnutrition  - encourage po intake

## 2022-02-12 NOTE — PROGRESS NOTE ADULT - SUBJECTIVE AND OBJECTIVE BOX
NEPHROLOGY INTERVAL HPI/OVERNIGHT EVENTS:    Date of Service: 22 @ 18:24    Covering for Heather Mcgregor/ Sharon  --Post CTA. negative PE, improved CHF.  Post HD.  tolerated well.  Feeling better SOB much improved.    HPI:   86 year old male with pmhx of ESRD on HD MWF via AVF, oliguric, (Dr Eastman), obstructive CAD s/p attempted unsuccessful PCI , HFpEF 50% (prior 20%), severe pulmonary HTN, with hx of bleeding (due to LE hematoma post cath due to reported pseudoaneurysm) follows in [place] predominantly, hx of dementia, Hx of R side CEA () HTN, hypothyroidism, HLD and emphysema on home O2 2L presents with worsening SOB and and orthopnea.  States that his shortness of breath became worse and "2L doesn't cut it anymore".   D/w daughter that his 02 sats were dropping last night in 80 despite 3- 4 L of o2 . She also noted cough w phlegm.   Of note pt completed HD at hospital before discharge yest and removed 1.5 liters    today   seen on  HD PUF goal increased to 2.5 - 3 L   pt on VM   denies sob     MEDICATIONS  (STANDING):  apixaban 2.5 milliGRAM(s) Oral two times a day  budesonide 160 MICROgram(s)/formoterol 4.5 MICROgram(s) Inhaler 2 Puff(s) Inhalation two times a day  carvedilol Oral Tab/Cap - Peds 6.25 milliGRAM(s) Oral two times a day  epoetin brianna-epbx (RETACRIT) Injectable 34379 Unit(s) IV Push <User Schedule>  levothyroxine 88 MICROGram(s) Oral daily  melatonin 3 milliGRAM(s) Oral at bedtime  midodrine. 5 milliGRAM(s) Oral <User Schedule>  montelukast 10 milliGRAM(s) Oral at bedtime    MEDICATIONS  (PRN):  ALBUTerol    90 MICROgram(s) HFA Inhaler 2 Puff(s) Inhalation every 6 hours PRN Bronchospasm    Vital Signs Last 24 Hrs  T(C): 36.6 (2022 13:45), Max: 36.7 (2022 13:08)  T(F): 97.9 (2022 13:45), Max: 98 (2022 13:08)  HR: 82 (2022 13:45) (70 - 84)  BP: 127/77 (2022 13:45) (106/64 - 127/77)  BP(mean): 92 (2022 13:45) (77 - 92)  RR: 17 (2022 13:45) (17 - 19)  SpO2: 98% (2022 13:45) (94% - 100%)  Daily     Daily Weight in k.9 (2022 09:09)     @ 07:01  -  02-12 @ 07:00  --------------------------------------------------------  IN: 360 mL / OUT: 0 mL / NET: 360 mL    PHYSICAL EXAM:  GENERAL: comfortable.  CHEST/LUNG: bilateral coarse bs and rhonchi  HEART: S1S2 RRR  ABDOMEN: soft  EXTREMITIES: no edema  SKIN:     LABS:                        11.0   7.03  )-----------( 137      ( 2022 07:29 )             33.9     12    135  |  96  |  41<H>  ----------------------------<  106<H>  3.7   |  29  |  5.48<H>    Ca    9.4      2022 07:29  Phos  5.7     -11  Mg     2.6     11    TPro  7.0  /  Alb  2.9<L>  /  TBili  1.1  /  DBili  x   /  AST  44<H>  /  ALT  34  /  AlkPhos  121<H>  12                RADIOLOGY & ADDITIONAL TESTS:

## 2022-02-12 NOTE — PROGRESS NOTE ADULT - SUBJECTIVE AND OBJECTIVE BOX
Subjective:    pat better, sitting in bed, CT Angio done no PE.    Home Medications:  aspirin 81 mg oral tablet: 1 tab(s) orally once a day (06 Feb 2022 13:47)  Coreg 6.25 mg oral tablet: 1 tab(s) orally 2 times a day (06 Feb 2022 15:19)  Flonase 50 mcg/inh nasal spray: 1 spray(s) nasal once a day (06 Feb 2022 15:19)  Lipitor 20 mg oral tablet: 1 tab(s) orally once a day (06 Feb 2022 13:47)  loratadine 10 mg oral tablet: 1 tab(s) orally once a day (06 Feb 2022 15:19)  losartan 25 mg oral tablet: 1 tab(s) orally 2 times a day (06 Feb 2022 15:19)  Melatonin 3 mg oral tablet: 1 tab(s) orally once a day (at bedtime) (06 Feb 2022 15:19)  Metamucil 400 mg oral capsule: 1 cap(s) orally once a day (06 Feb 2022 15:19)  Mucinex 600 mg oral tablet, extended release: 1 tab(s) orally every 12 hours, As Needed (06 Feb 2022 15:19)  nitroglycerin 0.4 mg sublingual tablet: 1 tab(s) sublingual every 5 minutes, As Needed (06 Feb 2022 15:19)  Caroline-Juan Ramon oral tablet: 1 tab(s) orally once a day (06 Feb 2022 15:19)  Synthroid 88 mcg (0.088 mg) oral tablet: 1 tab(s) orally once a day (06 Feb 2022 13:47)  Velphoro 500 mg oral tablet, chewable: 2 tab(s) orally 3 times a day (with meals) (06 Feb 2022 15:19)  Vitamin B-12 1000 mcg oral tablet: 1 tab(s) orally once a day (06 Feb 2022 15:19)  Vitamin C 500 mg oral tablet: 1 tab(s) orally once a day (06 Feb 2022 15:19)    MEDICATIONS  (STANDING):  apixaban 2.5 milliGRAM(s) Oral two times a day  budesonide 160 MICROgram(s)/formoterol 4.5 MICROgram(s) Inhaler 2 Puff(s) Inhalation two times a day  carvedilol Oral Tab/Cap - Peds 6.25 milliGRAM(s) Oral two times a day  epoetin brianna-epbx (RETACRIT) Injectable 45443 Unit(s) IV Push <User Schedule>  levothyroxine 88 MICROGram(s) Oral daily  melatonin 3 milliGRAM(s) Oral at bedtime  midodrine. 5 milliGRAM(s) Oral <User Schedule>  montelukast 10 milliGRAM(s) Oral at bedtime    MEDICATIONS  (PRN):  ALBUTerol    90 MICROgram(s) HFA Inhaler 2 Puff(s) Inhalation every 6 hours PRN Bronchospasm      Allergies    No Known Allergies    Intolerances        Vital Signs Last 24 Hrs  T(C): 36.2 (12 Feb 2022 08:38), Max: 36.6 (11 Feb 2022 21:00)  T(F): 97.2 (12 Feb 2022 08:38), Max: 97.8 (11 Feb 2022 21:00)  HR: 70 (12 Feb 2022 08:38) (70 - 84)  BP: 106/64 (12 Feb 2022 08:38) (106/64 - 143/60)  BP(mean): 77 (12 Feb 2022 08:38) (70 - 77)  RR: 19 (12 Feb 2022 08:38) (16 - 19)  SpO2: 97% (12 Feb 2022 08:38) (94% - 99%)      PHYSICAL EXAMINATION:    NECK:  Supple. No lymphadenopathy. Jugular venous pressure not elevated. Carotids equal.   HEART:   The cardiac impulse has a normal quality. Reg., Nl S1 and S2.  There are no murmurs, rubs or gallops noted  CHEST:  Chest crackles to auscultation. Normal respiratory effort.  ABDOMEN:  Soft and nontender.   EXTREMITIES:  There is no edema.       LABS:                        11.0   7.03  )-----------( 137      ( 12 Feb 2022 07:29 )             33.9     02-12    135  |  96  |  41<H>  ----------------------------<  106<H>  3.7   |  29  |  5.48<H>    Ca    9.4      12 Feb 2022 07:29  Phos  5.7     02-11  Mg     2.6     02-11    TPro  7.0  /  Alb  2.9<L>  /  TBili  1.1  /  DBili  x   /  AST  44<H>  /  ALT  34  /  AlkPhos  121<H>  02-12

## 2022-02-12 NOTE — PROGRESS NOTE ADULT - ASSESSMENT
87 yo male with hx of ESRD on HD MWF via AVF at [facility] moved from [place] and now was recenlty DC'd from  due to rapid afib and sob   returned next day w increased SOB and hypoxia    SOB/Hypoxia  - Multifactorial    Pulm HTN/COPD/ Fluid Overload    will attempt further fluid removal at HD today    need to adjust his EDW after this admission pt may have lost weight since moving from [place]   limit fluid intake advised    can also try puf first hour of HD      ESRD on HD MWF    HD aim for 3.5 liters removal as BP allows   Mido assist   additional HD Saturday        Anemia   MARIS at HD    COPD on home 02    per Pulm     * pt seen earlier , note now    2/12 SY  --ESRD : continue MWF schedule. Post extra tx today --tolerated well.  --Pulm : improving CHF.  NO PE.  --Trend Phos level--not on binders currently.    Heather Mcgregor/ Sharon to resume care 2/14.

## 2022-02-12 NOTE — PROGRESS NOTE ADULT - ASSESSMENT
86 year-old man,  with a history of ESRD on HD, obstructive CAD s/p attempted unsuccessful PCI 2018, HFpEF 50% (prior 20%), severe pulmonary HTN, and emphysema on home O2 2L presents with worsening SOB and and orthopnea. CT chest done 2/7/221 reviewed he has significant emphysema also with bilateral effusion.     PROBLEMS:    Shortness of breath is multifactorial likely related to effusion and pulmonary edema-notes breathing improved after dialysis. There is likely a component of COPD, very mild exp. wheezing.    PLAN:    pulmonary better  CT Angio reviewed  Symbicort 2 puff twice daily.  Albuterol as needed  Dialysis as per HD  DVT prophylasix

## 2022-02-13 LAB
ANION GAP SERPL CALC-SCNC: 12 MMOL/L — SIGNIFICANT CHANGE UP (ref 5–17)
BUN SERPL-MCNC: 45 MG/DL — HIGH (ref 7–23)
CALCIUM SERPL-MCNC: 9.5 MG/DL — SIGNIFICANT CHANGE UP (ref 8.5–10.1)
CHLORIDE SERPL-SCNC: 95 MMOL/L — LOW (ref 96–108)
CO2 SERPL-SCNC: 26 MMOL/L — SIGNIFICANT CHANGE UP (ref 22–31)
CREAT SERPL-MCNC: 5.24 MG/DL — HIGH (ref 0.5–1.3)
GLUCOSE SERPL-MCNC: 88 MG/DL — SIGNIFICANT CHANGE UP (ref 70–99)
HCT VFR BLD CALC: 33.4 % — LOW (ref 39–50)
HGB BLD-MCNC: 11.1 G/DL — LOW (ref 13–17)
MCHC RBC-ENTMCNC: 33.2 GM/DL — SIGNIFICANT CHANGE UP (ref 32–36)
MCHC RBC-ENTMCNC: 36 PG — HIGH (ref 27–34)
MCV RBC AUTO: 108.4 FL — HIGH (ref 80–100)
PLATELET # BLD AUTO: 141 K/UL — LOW (ref 150–400)
POTASSIUM SERPL-MCNC: 3.9 MMOL/L — SIGNIFICANT CHANGE UP (ref 3.5–5.3)
POTASSIUM SERPL-SCNC: 3.9 MMOL/L — SIGNIFICANT CHANGE UP (ref 3.5–5.3)
RBC # BLD: 3.08 M/UL — LOW (ref 4.2–5.8)
RBC # FLD: 15.9 % — HIGH (ref 10.3–14.5)
SARS-COV-2 RNA SPEC QL NAA+PROBE: DETECTED
SODIUM SERPL-SCNC: 133 MMOL/L — LOW (ref 135–145)
WBC # BLD: 6.28 K/UL — SIGNIFICANT CHANGE UP (ref 3.8–10.5)
WBC # FLD AUTO: 6.28 K/UL — SIGNIFICANT CHANGE UP (ref 3.8–10.5)

## 2022-02-13 PROCEDURE — 99232 SBSQ HOSP IP/OBS MODERATE 35: CPT

## 2022-02-13 RX ADMIN — MONTELUKAST 10 MILLIGRAM(S): 4 TABLET, CHEWABLE ORAL at 22:29

## 2022-02-13 RX ADMIN — BUDESONIDE AND FORMOTEROL FUMARATE DIHYDRATE 2 PUFF(S): 160; 4.5 AEROSOL RESPIRATORY (INHALATION) at 20:48

## 2022-02-13 RX ADMIN — Medication 3 MILLIGRAM(S): at 22:28

## 2022-02-13 RX ADMIN — APIXABAN 2.5 MILLIGRAM(S): 2.5 TABLET, FILM COATED ORAL at 22:28

## 2022-02-13 RX ADMIN — BUDESONIDE AND FORMOTEROL FUMARATE DIHYDRATE 2 PUFF(S): 160; 4.5 AEROSOL RESPIRATORY (INHALATION) at 09:15

## 2022-02-13 RX ADMIN — ALBUTEROL 2 PUFF(S): 90 AEROSOL, METERED ORAL at 09:15

## 2022-02-13 RX ADMIN — CARVEDILOL PHOSPHATE 6.25 MILLIGRAM(S): 80 CAPSULE, EXTENDED RELEASE ORAL at 09:53

## 2022-02-13 RX ADMIN — APIXABAN 2.5 MILLIGRAM(S): 2.5 TABLET, FILM COATED ORAL at 09:52

## 2022-02-13 RX ADMIN — Medication 88 MICROGRAM(S): at 06:27

## 2022-02-13 NOTE — PHYSICAL THERAPY INITIAL EVALUATION ADULT - GENERAL OBSERVATIONS, REHAB EVAL
Pt was found lying in bed on 3 lits of o2, tele, dtr in room, Pt is pleasant and willing to participate in PT

## 2022-02-13 NOTE — PHYSICAL THERAPY INITIAL EVALUATION ADULT - LIVES WITH, PROFILE
Daughter, pt from [place]- now staying with dtr, pt has 4 steps to enter house with rails, no steps inside/children

## 2022-02-13 NOTE — PROGRESS NOTE ADULT - SUBJECTIVE AND OBJECTIVE BOX
Subjective:    pat better, lying in bed, no new complaint.    Home Medications:  aspirin 81 mg oral tablet: 1 tab(s) orally once a day (06 Feb 2022 13:47)  Coreg 6.25 mg oral tablet: 1 tab(s) orally 2 times a day (06 Feb 2022 15:19)  Flonase 50 mcg/inh nasal spray: 1 spray(s) nasal once a day (06 Feb 2022 15:19)  Lipitor 20 mg oral tablet: 1 tab(s) orally once a day (06 Feb 2022 13:47)  loratadine 10 mg oral tablet: 1 tab(s) orally once a day (06 Feb 2022 15:19)  losartan 25 mg oral tablet: 1 tab(s) orally 2 times a day (06 Feb 2022 15:19)  Melatonin 3 mg oral tablet: 1 tab(s) orally once a day (at bedtime) (06 Feb 2022 15:19)  Metamucil 400 mg oral capsule: 1 cap(s) orally once a day (06 Feb 2022 15:19)  Mucinex 600 mg oral tablet, extended release: 1 tab(s) orally every 12 hours, As Needed (06 Feb 2022 15:19)  nitroglycerin 0.4 mg sublingual tablet: 1 tab(s) sublingual every 5 minutes, As Needed (06 Feb 2022 15:19)  Caroline-Juan Ramon oral tablet: 1 tab(s) orally once a day (06 Feb 2022 15:19)  Synthroid 88 mcg (0.088 mg) oral tablet: 1 tab(s) orally once a day (06 Feb 2022 13:47)  Velphoro 500 mg oral tablet, chewable: 2 tab(s) orally 3 times a day (with meals) (06 Feb 2022 15:19)  Vitamin B-12 1000 mcg oral tablet: 1 tab(s) orally once a day (06 Feb 2022 15:19)  Vitamin C 500 mg oral tablet: 1 tab(s) orally once a day (06 Feb 2022 15:19)    MEDICATIONS  (STANDING):  apixaban 2.5 milliGRAM(s) Oral two times a day  budesonide 160 MICROgram(s)/formoterol 4.5 MICROgram(s) Inhaler 2 Puff(s) Inhalation two times a day  epoetin brianna-epbx (RETACRIT) Injectable 26449 Unit(s) IV Push <User Schedule>  levothyroxine 88 MICROGram(s) Oral daily  melatonin 3 milliGRAM(s) Oral at bedtime  midodrine. 5 milliGRAM(s) Oral <User Schedule>  montelukast 10 milliGRAM(s) Oral at bedtime    MEDICATIONS  (PRN):  ALBUTerol    90 MICROgram(s) HFA Inhaler 2 Puff(s) Inhalation every 6 hours PRN Bronchospasm      Allergies    No Known Allergies    Intolerances        Vital Signs Last 24 Hrs  T(C): 36.3 (13 Feb 2022 09:35), Max: 36.7 (12 Feb 2022 13:08)  T(F): 97.3 (13 Feb 2022 09:35), Max: 98 (12 Feb 2022 13:08)  HR: 87 (13 Feb 2022 09:50) (76 - 87)  BP: 105/62 (13 Feb 2022 09:50) (88/56 - 127/77)  BP(mean): 92 (12 Feb 2022 13:45) (92 - 92)  RR: 18 (13 Feb 2022 09:50) (17 - 18)  SpO2: 100% (13 Feb 2022 09:50) (95% - 100%)      PHYSICAL EXAMINATION:    NECK:  Supple. No lymphadenopathy. Jugular venous pressure not elevated. Carotids equal.   HEART:   The cardiac impulse has a normal quality. Reg., Nl S1 and S2.  There are no murmurs, rubs or gallops noted  CHEST:  Chest crackles to auscultation. Normal respiratory effort.  ABDOMEN:  Soft and nontender.   EXTREMITIES:  There is no edema.       LABS:                        11.1   6.28  )-----------( 141      ( 13 Feb 2022 06:37 )             33.4     02-13    133<L>  |  95<L>  |  45<H>  ----------------------------<  88  3.9   |  26  |  5.24<H>    Ca    9.5      13 Feb 2022 06:37    TPro  7.0  /  Alb  2.9<L>  /  TBili  1.1  /  DBili  x   /  AST  44<H>  /  ALT  34  /  AlkPhos  121<H>  02-12

## 2022-02-13 NOTE — PHYSICAL THERAPY INITIAL EVALUATION ADULT - PRECAUTIONS/LIMITATIONS, REHAB EVAL
Tele/cardiac precautions
3 lits of o2/cardiac precautions/fall precautions/oxygen therapy device and L/min

## 2022-02-13 NOTE — PHYSICAL THERAPY INITIAL EVALUATION ADULT - MD/RN NOTIFIED
· Med Name & Dose: celecoxib 200mg cap  · Last refill was 2-25-19 Number of Refills sent: 0  · Quantity of medication given 90 day supply  · Last visit for that condition 2-8-18  Complete physical examination  Left knee arthritis  Allergic conjunctivitis versus bacterial conjunctivitis  Psoriasis  Anxiety and depression  Tremor     Plan:     The patient will decrease bupropion to 150 milligrams daily. She'll use tobramycin with steroids eyedrops. Betamethasone cream will be prescribed for the psoriasis patch. She'll be referred to physical therapy. Multiple blood tests will be done today. She may take more tramadol as long as she does not run into side effects. She will continue to see Dr. Miller from orthopedics. Return in 1 year for Medicare wellness exam.  ·   · Date of next appt: 4/16/2019  · Date of any Lab results pertaining to medication:   ·   Component      Latest Ref Rng & Units 2/8/2018   Fasting Status      hrs 3   Sodium      135 - 145 mmol/L 143   Potassium      3.4 - 5.1 mmol/L 3.5   Chloride      98 - 107 mmol/L 101   CO2      21 - 32 mmol/L 32   ANION GAP      10 - 20 mmol/L 14   Glucose      65 - 99 mg/dL 96   BUN      6 - 20 mg/dL 13   Creatinine      0.51 - 0.95 mg/dL 0.71   GFR Estimate,        >90   GFR Estimate, Non African American       83   BUN/CREATININE RATIO      7 - 25 18   CALCIUM      8.4 - 10.2 mg/dL 9.1   TOTAL BILIRUBIN      0.2 - 1.0 mg/dL 0.2   AST/SGOT      <38 Units/L 14   ALT/SGPT      <79 Units/L 17   ALK PHOSPHATASE      45 - 117 Units/L 91   TOTAL PROTEIN      6.4 - 8.2 g/dL 6.7   Albumin      3.6 - 5.1 g/dL 3.6   GLOBULIN      2.0 - 4.0 g/dL 3.1   A/G Ratio, Serum      1.0 - 2.4 1.2     Called pharmacy. Patient has refills on file at pharmacy. Will decline refill at this time.              yes

## 2022-02-13 NOTE — PROGRESS NOTE ADULT - SUBJECTIVE AND OBJECTIVE BOX
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 States that his shortness of breath became worse and "2L doesn't cut it anymore". He also admits to occasional cough but denies chest pain, n/v/d/c. He states he completed his HD yesterday without any complications. Daughter expressed to ED doctor that he is having trouble walking and would like to go to [facility].    Patient feels better post diuresis. Denies any HA, CP. SOB much improved post HD. Care discussed with patient's family -  as patient has multitude of complex medical problems -  family requests palliative care eval.     2/13: Denies any HA, CP, SOB. Patient is anticipated to be discharged to [facility] tomorrow post HD    REVIEW OF SYSTEMS:  General: NAD, hemodynamically stable, + weakness  HEENT:  Eyes:  No visual loss, blurred vision, double vision or yellow sclerae. Ears, Nose, Throat:  No hearing loss, sneezing, congestion, runny nose or sore throat.  SKIN:  No rash or itching.  CARDIOVASCULAR:  No chest pain, chest pressure or chest discomfort. No palpitations or edema.  RESPIRATORY: + SOB  GASTROINTESTINAL:  No anorexia, nausea, vomiting or diarrhea. No abdominal pain or blood.  NEUROLOGICAL:  No headache, dizziness, syncope, paralysis, ataxia, numbness or tingling in the extremities. No change in bowel or bladder control.  MUSCULOSKELETAL:  No muscle, back pain, joint pain or stiffness.  HEMATOLOGIC:  No anemia, bleeding or bruising.  LYMPHATICS:  No enlarged nodes. No history of splenectomy.  ENDOCRINOLOGIC:  No reports of sweating, cold or heat intolerance. No polyuria or polydipsia.  ALLERGIES:  No history of asthma, hives, eczema or rhinitis.    Physical Exam:   GENERAL APPEARANCE:  NAD, hemodynamically stable  T(C): 36.3 (13 Feb 2022 09:35), Max: 36.7 (12 Feb 2022 13:08)  T(F): 97.3 (13 Feb 2022 09:35), Max: 98 (12 Feb 2022 13:08)  HR: 87 (13 Feb 2022 09:50) (76 - 87)  BP: 105/62 (13 Feb 2022 09:50) (88/56 - 127/77)  BP(mean): 92 (12 Feb 2022 13:45) (92 - 92)  RR: 18 (13 Feb 2022 09:50) (17 - 18)  SpO2: 100% (13 Feb 2022 09:50) (95% - 100%)  HEENT:  Head is normocephalic    Skin:  Warm and dry without any rash   NECK:  Supple without lymphadenopathy.   HEART:  Regular rate and rhythm. normal S1 and S2, No M/R/G  LUNGS:  Good ins/exp effort, no W/R/R/C  ABDOMEN:  Soft, nontender, nondistended with good bowel sounds heard  EXTREMITIES:  Without cyanosis, clubbing or edema.   NEUROLOGICAL:  Gross nonfocal     Labs:       CBC Full  -  ( 13 Feb 2022 06:37 )  WBC Count : 6.28 K/uL  RBC Count : 3.08 M/uL  Hemoglobin : 11.1 g/dL  Hematocrit : 33.4 %  Platelet Count - Automated : 141 K/uL  Mean Cell Volume : 108.4 fl  Mean Cell Hemoglobin : 36.0 pg  Mean Cell Hemoglobin Concentration : 33.2 gm/dL  Auto Neutrophil # : x  Auto Lymphocyte # : x  Auto Monocyte # : x  Auto Eosinophil # : x  Auto Basophil # : x  Auto Neutrophil % : x  Auto Lymphocyte % : x  Auto Monocyte % : x  Auto Eosinophil % : x  Auto Basophil % : x      133<L>  |  95<L>  |  45<H>  ----------------------------<  88  3.9   |  26  |  5.24<H>    Ca    9.5      13 Feb 2022 06:37    TPro  7.0  /  Alb  2.9<L>  /  TBili  1.1  /  DBili  x   /  AST  44<H>  /  ALT  34  /  AlkPhos  121<H>  02-12    Pt is a 86 year old male with pmhx of ESRD on HD MWF via AVF, oliguric, (Dr Eastman), obstructive CAD s/p attempted unsuccessful PCI 2018, HFpEF 50% (prior 20%), severe pulmonary HTN, with hx of bleeding (due to LE hematoma post cath due to 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 Admitted for [facility] placement and HD    # End Stage Renal Disease on Dialysis, Heart Failure with preserved Ejection Fraction, Severe Pulmonary Hypertension, Dyspnea, Hypoxic respiratory failure  - HD today after the CTA  - Doubt ACS  - Nephrology consulted- will do HD session today   - PT and Case management consulted for [facility] placement   - Continue all home medications with hold parameters   - Currently requiring 4-5L of O2 sating 92%; was desating to 70s on 2L at home    # Emphysema / COPD  - care discussed with pulmonary  - Continue O2 supplementation; caution not to over oxygenate (goal 88-92%)  - pending pulmonary eval    # Atrial fibrillation  - on AC  - pending cardiology eval    # Hypertension, Hyperlipidemia, CAD, Hypothyroidism   - Losartan held for hypotension  - Coreg continued with hold parameters  - DVT ppx with renally dosed Eliquis     # moderate calorie malnutrition  - encourage po intake           Reason for Admission: SOB  History of Present Illness:   86 year old male with pmhx of ESRD on HD MWF via AVF, oliguric, (Dr Eastman), obstructive CAD s/p attempted unsuccessful PCI 2018, HFpEF 50% (prior 20%), severe pulmonary HTN, with hx of bleeding (due to LE hematoma post cath due to 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 States that his shortness of breath became worse and "2L doesn't cut it anymore". He also admits to occasional cough but denies chest pain, n/v/d/c. He states he completed his HD yesterday without any complications. Daughter expressed to ED doctor that he is having trouble walking and would like to go to [facility].    Patient feels better post diuresis. Denies any HA, CP. SOB much improved post HD. Care discussed with patient's family -  as patient has multitude of complex medical problems -  family requests palliative care eval.     2/13: Denies any HA, CP, SOB. Patient is anticipated to be discharged to [facility] tomorrow post HD. Patient is currently on 3L on NC.      REVIEW OF SYSTEMS:  General: NAD, hemodynamically stable, + weakness  HEENT:  Eyes:  No visual loss, blurred vision, double vision or yellow sclerae. Ears, Nose, Throat:  No hearing loss, sneezing, congestion, runny nose or sore throat.  SKIN:  No rash or itching.  CARDIOVASCULAR:  No chest pain, chest pressure or chest discomfort. No palpitations or edema.  RESPIRATORY: + SOB  GASTROINTESTINAL:  No anorexia, nausea, vomiting or diarrhea. No abdominal pain or blood.  NEUROLOGICAL:  No headache, dizziness, syncope, paralysis, ataxia, numbness or tingling in the extremities. No change in bowel or bladder control.  MUSCULOSKELETAL:  No muscle, back pain, joint pain or stiffness.  HEMATOLOGIC:  No anemia, bleeding or bruising.  LYMPHATICS:  No enlarged nodes. No history of splenectomy.  ENDOCRINOLOGIC:  No reports of sweating, cold or heat intolerance. No polyuria or polydipsia.  ALLERGIES:  No history of asthma, hives, eczema or rhinitis.    Physical Exam:   GENERAL APPEARANCE:  NAD, hemodynamically stable  T(C): 36.3 (13 Feb 2022 09:35), Max: 36.7 (12 Feb 2022 13:08)  T(F): 97.3 (13 Feb 2022 09:35), Max: 98 (12 Feb 2022 13:08)  HR: 87 (13 Feb 2022 09:50) (76 - 87)  BP: 105/62 (13 Feb 2022 09:50) (88/56 - 127/77)  BP(mean): 92 (12 Feb 2022 13:45) (92 - 92)  RR: 18 (13 Feb 2022 09:50) (17 - 18)  SpO2: 100% (13 Feb 2022 09:50) (95% - 100%)  HEENT:  atraumatic   Skin:  Warm and dry without any rash   NECK:  Supple without lymphadenopathy.   HEART:  Regular rate and rhythm. normal S1 and S2, No M/R/G  LUNGS:  Good ins/exp effort, no W/R/R/C  ABDOMEN:  Soft, nontender, nondistended with good bowel sounds heard  EXTREMITIES:  Without cyanosis, clubbing or edema.   NEUROLOGICAL:  Gross nonfocal     Labs:     CBC Full  -  ( 13 Feb 2022 06:37 )  WBC Count : 6.28 K/uL  RBC Count : 3.08 M/uL  Hemoglobin : 11.1 g/dL  Hematocrit : 33.4 %  Platelet Count - Automated : 141 K/uL  Mean Cell Volume : 108.4 fl  Mean Cell Hemoglobin : 36.0 pg  Mean Cell Hemoglobin Concentration : 33.2 gm/dL  Auto Neutrophil # : x  Auto Lymphocyte # : x  Auto Monocyte # : x  Auto Eosinophil # : x  Auto Basophil # : x  Auto Neutrophil % : x  Auto Lymphocyte % : x  Auto Monocyte % : x  Auto Eosinophil % : x  Auto Basophil % : x        02-13    133<L>  |  95<L>  |  45<H>  ----------------------------<  88  3.9   |  26  |  5.24<H>    Ca    9.5      13 Feb 2022 06:37    TPro  7.0  /  Alb  2.9<L>  /  TBili  1.1  /  DBili  x   /  AST  44<H>  /  ALT  34  /  AlkPhos  121<H>  02-12      Pt is a 86 year old male with pmhx of ESRD on HD MWF via AVF, oliguric, (Dr Eastman), obstructive CAD s/p attempted unsuccessful PCI 2018, HFpEF 50% (prior 20%), severe pulmonary HTN, with hx of bleeding (due to LE hematoma post cath due to 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 Admitted for [facility] placement and HD    # End Stage Renal Disease on Dialysis, Heart Failure with preserved Ejection Fraction, Severe Pulmonary Hypertension, Dyspnea, Hypoxic respiratory failure  - HD today after the CTA  - Doubt ACS  - Nephrology consulted- will do HD session today   - PT and Case management consulted for [facility] placement   - Continue all home medications with hold parameters   - Currently requiring 3L NC in 90s.   - palliative care eval    # Emphysema / COPD  - care discussed with pulmonary  - Continue O2 supplementation; caution not to over oxygenate (goal 88-92%)  - pending pulmonary eval    # Atrial fibrillation  - on AC  - pending cardiology eval    # Hypertension, Hyperlipidemia, CAD, Hypothyroidism   - Losartan held for hypotension  - Coreg continued with hold parameters  - DVT ppx with renally dosed Eliquis     # moderate calorie malnutrition  - encourage po intake

## 2022-02-13 NOTE — PROGRESS NOTE ADULT - ASSESSMENT
86 year-old man,  with a history of ESRD on HD, obstructive CAD s/p attempted unsuccessful PCI 2018, HFpEF 50% (prior 20%), severe pulmonary HTN, and emphysema on home O2 2L presents with worsening SOB and and orthopnea. CT chest done 2/7/221 reviewed he has significant emphysema also with bilateral effusion.     PROBLEMS:    Shortness of breath is multifactorial likely related to effusion and pulmonary edema-notes breathing improved after dialysis. There is likely a component of COPD, very mild exp. wheezing.    PLAN:    pulmonary stable  CT Angio reviewed  Symbicort 2 puff twice daily.  Albuterol as needed  Dialysis as per HD  DVT prophylasix

## 2022-02-13 NOTE — PHYSICAL THERAPY INITIAL EVALUATION ADULT - DIAGNOSIS, PT EVAL
SOB, ESRD, HF, severe pulmonary HTN, dyspnea, HRF
End Stage Renal Disease on Dialysis, Heart Failure with preserved Ejection Fraction, SOB-on 3 lits of o2, worsening SOB and and orthopnea., on HD

## 2022-02-13 NOTE — PHYSICAL THERAPY INITIAL EVALUATION ADULT - PERTINENT HX OF CURRENT PROBLEM, REHAB EVAL
Pt admitted to  secondary to SOB.
Pt presents with worsening SOB and and orthopnea. States that his shortness of breath became worse and "2L doesn't cut it anymore". He also admits to occasional cough but denies chest pain, n/v/d/c. He states he completed his HD yesterday without any complications. Daughter expressed to ED doctor that he is having trouble walking and would like to go to [facility].

## 2022-02-14 LAB
ALBUMIN SERPL ELPH-MCNC: 2.8 G/DL — LOW (ref 3.3–5)
ALP SERPL-CCNC: 174 U/L — HIGH (ref 40–120)
ALT FLD-CCNC: 43 U/L — SIGNIFICANT CHANGE UP (ref 12–78)
ANION GAP SERPL CALC-SCNC: 12 MMOL/L — SIGNIFICANT CHANGE UP (ref 5–17)
AST SERPL-CCNC: 57 U/L — HIGH (ref 15–37)
BILIRUB SERPL-MCNC: 0.8 MG/DL — SIGNIFICANT CHANGE UP (ref 0.2–1.2)
BUN SERPL-MCNC: 67 MG/DL — HIGH (ref 7–23)
CALCIUM SERPL-MCNC: 9.5 MG/DL — SIGNIFICANT CHANGE UP (ref 8.5–10.1)
CHLORIDE SERPL-SCNC: 96 MMOL/L — SIGNIFICANT CHANGE UP (ref 96–108)
CO2 SERPL-SCNC: 25 MMOL/L — SIGNIFICANT CHANGE UP (ref 22–31)
CREAT SERPL-MCNC: 7 MG/DL — HIGH (ref 0.5–1.3)
GLUCOSE SERPL-MCNC: 88 MG/DL — SIGNIFICANT CHANGE UP (ref 70–99)
HCT VFR BLD CALC: 34 % — LOW (ref 39–50)
HGB BLD-MCNC: 11.4 G/DL — LOW (ref 13–17)
MCHC RBC-ENTMCNC: 33.5 GM/DL — SIGNIFICANT CHANGE UP (ref 32–36)
MCHC RBC-ENTMCNC: 36.2 PG — HIGH (ref 27–34)
MCV RBC AUTO: 107.9 FL — HIGH (ref 80–100)
PLATELET # BLD AUTO: 146 K/UL — LOW (ref 150–400)
POTASSIUM SERPL-MCNC: 4.8 MMOL/L — SIGNIFICANT CHANGE UP (ref 3.5–5.3)
POTASSIUM SERPL-SCNC: 4.8 MMOL/L — SIGNIFICANT CHANGE UP (ref 3.5–5.3)
PROT SERPL-MCNC: 6.9 GM/DL — SIGNIFICANT CHANGE UP (ref 6–8.3)
RBC # BLD: 3.15 M/UL — LOW (ref 4.2–5.8)
RBC # FLD: 15.6 % — HIGH (ref 10.3–14.5)
SODIUM SERPL-SCNC: 133 MMOL/L — LOW (ref 135–145)
WBC # BLD: 6.68 K/UL — SIGNIFICANT CHANGE UP (ref 3.8–10.5)
WBC # FLD AUTO: 6.68 K/UL — SIGNIFICANT CHANGE UP (ref 3.8–10.5)

## 2022-02-14 PROCEDURE — 99232 SBSQ HOSP IP/OBS MODERATE 35: CPT

## 2022-02-14 PROCEDURE — 99223 1ST HOSP IP/OBS HIGH 75: CPT

## 2022-02-14 RX ORDER — DEXAMETHASONE 0.5 MG/5ML
6 ELIXIR ORAL DAILY
Refills: 0 | Status: DISCONTINUED | OUTPATIENT
Start: 2022-02-14 | End: 2022-02-18

## 2022-02-14 RX ORDER — ERYTHROPOIETIN 10000 [IU]/ML
10000 INJECTION, SOLUTION INTRAVENOUS; SUBCUTANEOUS
Refills: 0 | Status: DISCONTINUED | OUTPATIENT
Start: 2022-02-14 | End: 2022-02-18

## 2022-02-14 RX ADMIN — APIXABAN 2.5 MILLIGRAM(S): 2.5 TABLET, FILM COATED ORAL at 21:29

## 2022-02-14 RX ADMIN — MONTELUKAST 10 MILLIGRAM(S): 4 TABLET, CHEWABLE ORAL at 21:30

## 2022-02-14 RX ADMIN — APIXABAN 2.5 MILLIGRAM(S): 2.5 TABLET, FILM COATED ORAL at 09:05

## 2022-02-14 RX ADMIN — BUDESONIDE AND FORMOTEROL FUMARATE DIHYDRATE 2 PUFF(S): 160; 4.5 AEROSOL RESPIRATORY (INHALATION) at 11:27

## 2022-02-14 RX ADMIN — Medication 6 MILLIGRAM(S): at 18:02

## 2022-02-14 RX ADMIN — BUDESONIDE AND FORMOTEROL FUMARATE DIHYDRATE 2 PUFF(S): 160; 4.5 AEROSOL RESPIRATORY (INHALATION) at 21:47

## 2022-02-14 RX ADMIN — Medication 3 MILLIGRAM(S): at 21:29

## 2022-02-14 RX ADMIN — MIDODRINE HYDROCHLORIDE 5 MILLIGRAM(S): 2.5 TABLET ORAL at 06:59

## 2022-02-14 RX ADMIN — Medication 88 MICROGRAM(S): at 06:49

## 2022-02-14 NOTE — SWALLOW BEDSIDE ASSESSMENT ADULT - COMMENTS
Pt admitted to  with SOB. Hospital course is notable for COVID and hypoalbuminemia. This profile is superimposed upon a history of Dementia, HTN, ESRD on HD, CAD, cardiomyopathy, A-Fib, pulmonary hypertension, HLD, COPD, emphysema, hypothyroidism, and carotid stenosis s/p prior right CEA.

## 2022-02-14 NOTE — SWALLOW BEDSIDE ASSESSMENT ADULT - SLP GENERAL OBSERVATIONS
Pt is alert and interactive. He was able to verbalize during communicative probes via intelligible fluent, linguistically intact utterances. No primary speech-language pathology evident. At suspected communicative baseline.

## 2022-02-14 NOTE — PROGRESS NOTE ADULT - SUBJECTIVE AND OBJECTIVE BOX
SUBJECTIVE:  Seen and examined today.  Feeling breathing is better today.   No cough or sputum production.  No wheezing.  No fevers.  Eating well.  Ambulating to the bathroom.   On NC 4L    Review of systems otherwise negative      PHYSICAL EXAMINATION:  NECK:  Supple. No lymphadenopathy. Jugular venous pressure not elevated. Carotids equal.   HEART:   The cardiac impulse has a normal quality. Reg., Nl S1 and S2.  There are no murmurs, rubs or gallops noted  CHEST:  Chest crackles to auscultation. Normal respiratory effort.  ABDOMEN:  Soft and nontender.   EXTREMITIES:  There is no edema.         Vital Signs Last 24 Hrs  T(C): 36.5 (14 Feb 2022 08:43), Max: 36.6 (13 Feb 2022 20:34)  T(F): 97.7 (14 Feb 2022 08:43), Max: 97.8 (13 Feb 2022 20:34)  HR: 91 (14 Feb 2022 08:43) (91 - 94)  BP: 130/68 (14 Feb 2022 08:43) (110/65 - 130/68)  BP(mean): 84 (13 Feb 2022 20:34) (77 - 84)  RR: 18 (14 Feb 2022 08:43) (18 - 18)  SpO2: 98% (14 Feb 2022 08:43) (94% - 99%)    MEDICATIONS  (STANDING):  apixaban 2.5 milliGRAM(s) Oral two times a day  budesonide 160 MICROgram(s)/formoterol 4.5 MICROgram(s) Inhaler 2 Puff(s) Inhalation two times a day  epoetin brianna-epbx (RETACRIT) Injectable 03702 Unit(s) IV Push <User Schedule>  levothyroxine 88 MICROGram(s) Oral daily  melatonin 3 milliGRAM(s) Oral at bedtime  midodrine. 5 milliGRAM(s) Oral <User Schedule>  montelukast 10 milliGRAM(s) Oral at bedtime    MEDICATIONS  (PRN):  ALBUTerol    90 MICROgram(s) HFA Inhaler 2 Puff(s) Inhalation every 6 hours PRN Bronchospasm      Allergies    No Known Allergies    Intolerances          LABS:                        11.4   6.68  )-----------( 146      ( 14 Feb 2022 08:09 )             34.0     02-14    133<L>  |  96  |  67<H>  ----------------------------<  88  4.8   |  25  |  7.00<H>    Ca    9.5      14 Feb 2022 08:09    TPro  6.9  /  Alb  2.8<L>  /  TBili  0.8  /  DBili  x   /  AST  57<H>  /  ALT  43  /  AlkPhos  174<H>  02-14    LIVER FUNCTIONS - ( 14 Feb 2022 08:09 )  Alb: 2.8 g/dL / Pro: 6.9 gm/dL / ALK PHOS: 174 U/L / ALT: 43 U/L / AST: 57 U/L / GGT: x                SUBJECTIVE:  Seen and examined today.  Feeling breathing is better today.   No cough or sputum production.  No wheezing.  No fevers.  Eating well.  Ambulating to the bathroom.   On NC 4L    Review of systems otherwise negative      PHYSICAL EXAMINATION:  NECK:  Supple. No lymphadenopathy. Jugular venous pressure not elevated. Carotids equal.   HEART:   The cardiac impulse has a normal quality. Reg., Nl S1 and S2.  There are no murmurs, rubs or gallops noted  CHEST:  Good breath sounds, minimal crackles at bases. No wheezing  ABDOMEN:  Soft and nontender.   EXTREMITIES:  There is no edema.         Vital Signs Last 24 Hrs  T(C): 36.5 (14 Feb 2022 08:43), Max: 36.6 (13 Feb 2022 20:34)  T(F): 97.7 (14 Feb 2022 08:43), Max: 97.8 (13 Feb 2022 20:34)  HR: 91 (14 Feb 2022 08:43) (91 - 94)  BP: 130/68 (14 Feb 2022 08:43) (110/65 - 130/68)  BP(mean): 84 (13 Feb 2022 20:34) (77 - 84)  RR: 18 (14 Feb 2022 08:43) (18 - 18)  SpO2: 98% (14 Feb 2022 08:43) (94% - 99%)    MEDICATIONS  (STANDING):  apixaban 2.5 milliGRAM(s) Oral two times a day  budesonide 160 MICROgram(s)/formoterol 4.5 MICROgram(s) Inhaler 2 Puff(s) Inhalation two times a day  epoetin brianna-epbx (RETACRIT) Injectable 12811 Unit(s) IV Push <User Schedule>  levothyroxine 88 MICROGram(s) Oral daily  melatonin 3 milliGRAM(s) Oral at bedtime  midodrine. 5 milliGRAM(s) Oral <User Schedule>  montelukast 10 milliGRAM(s) Oral at bedtime    MEDICATIONS  (PRN):  ALBUTerol    90 MICROgram(s) HFA Inhaler 2 Puff(s) Inhalation every 6 hours PRN Bronchospasm      Allergies    No Known Allergies    Intolerances          LABS:                        11.4   6.68  )-----------( 146      ( 14 Feb 2022 08:09 )             34.0     02-14    133<L>  |  96  |  67<H>  ----------------------------<  88  4.8   |  25  |  7.00<H>    Ca    9.5      14 Feb 2022 08:09    TPro  6.9  /  Alb  2.8<L>  /  TBili  0.8  /  DBili  x   /  AST  57<H>  /  ALT  43  /  AlkPhos  174<H>  02-14    LIVER FUNCTIONS - ( 14 Feb 2022 08:09 )  Alb: 2.8 g/dL / Pro: 6.9 gm/dL / ALK PHOS: 174 U/L / ALT: 43 U/L / AST: 57 U/L / GGT: x

## 2022-02-14 NOTE — PROGRESS NOTE ADULT - SUBJECTIVE AND OBJECTIVE BOX
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 States that his shortness of breath became worse and "2L doesn't cut it anymore". He also admits to occasional cough but denies chest pain, n/v/d/c. He states he completed his HD yesterday without any complications. Daughter expressed to ED doctor that he is having trouble walking and would like to go to [facility].    Patient feels better post diuresis. Denies any HA, CP. SOB much improved post HD. Care discussed with patient's family -  as patient has multitude of complex medical problems -  family requests palliative care eval.     2/13: Denies any HA, CP, SOB. Patient is anticipated to be discharged to [facility] tomorrow post HD. Patient is currently on 3L on NC.      REVIEW OF SYSTEMS:  General: NAD, hemodynamically stable, + weakness  HEENT:  Eyes:  No visual loss, blurred vision, double vision or yellow sclerae. Ears, Nose, Throat:  No hearing loss, sneezing, congestion, runny nose or sore throat.  SKIN:  No rash or itching.  CARDIOVASCULAR:  No chest pain, chest pressure or chest discomfort. No palpitations or edema.  RESPIRATORY: + SOB  GASTROINTESTINAL:  No anorexia, nausea, vomiting or diarrhea. No abdominal pain or blood.  NEUROLOGICAL:  No headache, dizziness, syncope, paralysis, ataxia, numbness or tingling in the extremities. No change in bowel or bladder control.  MUSCULOSKELETAL:  No muscle, back pain, joint pain or stiffness.  HEMATOLOGIC:  No anemia, bleeding or bruising.  LYMPHATICS:  No enlarged nodes. No history of splenectomy.  ENDOCRINOLOGIC:  No reports of sweating, cold or heat intolerance. No polyuria or polydipsia.  ALLERGIES:  No history of asthma, hives, eczema or rhinitis.    Physical Exam:   GENERAL APPEARANCE:  NAD, hemodynamically stable  T(C): 36.3 (13 Feb 2022 09:35), Max: 36.7 (12 Feb 2022 13:08)  T(F): 97.3 (13 Feb 2022 09:35), Max: 98 (12 Feb 2022 13:08)  HR: 87 (13 Feb 2022 09:50) (76 - 87)  BP: 105/62 (13 Feb 2022 09:50) (88/56 - 127/77)  BP(mean): 92 (12 Feb 2022 13:45) (92 - 92)  RR: 18 (13 Feb 2022 09:50) (17 - 18)  SpO2: 100% (13 Feb 2022 09:50) (95% - 100%)  HEENT:  atraumatic   Skin:  Warm and dry without any rash   NECK:  Supple without lymphadenopathy.   HEART:  Regular rate and rhythm. normal S1 and S2, No M/R/G  LUNGS:  Good ins/exp effort, no W/R/R/C  ABDOMEN:  Soft, nontender, nondistended with good bowel sounds heard  EXTREMITIES:  Without cyanosis, clubbing or edema.   NEUROLOGICAL:  Gross nonfocal     Labs:     CBC Full  -  ( 13 Feb 2022 06:37 )  WBC Count : 6.28 K/uL  RBC Count : 3.08 M/uL  Hemoglobin : 11.1 g/dL  Hematocrit : 33.4 %  Platelet Count - Automated : 141 K/uL  Mean Cell Volume : 108.4 fl  Mean Cell Hemoglobin : 36.0 pg  Mean Cell Hemoglobin Concentration : 33.2 gm/dL  Auto Neutrophil # : x  Auto Lymphocyte # : x  Auto Monocyte # : x  Auto Eosinophil # : x  Auto Basophil # : x  Auto Neutrophil % : x  Auto Lymphocyte % : x  Auto Monocyte % : x  Auto Eosinophil % : x  Auto Basophil % : x        02-13    133<L>  |  95<L>  |  45<H>  ----------------------------<  88  3.9   |  26  |  5.24<H>    Ca    9.5      13 Feb 2022 06:37    TPro  7.0  /  Alb  2.9<L>  /  TBili  1.1  /  DBili  x   /  AST  44<H>  /  ALT  34  /  AlkPhos  121<H>  02-12      Pt is a 86 year old male with pmhx of ESRD on HD MWF via AVF, oliguric, (Dr Eastman), obstructive CAD s/p attempted unsuccessful PCI 2018, HFpEF 50% (prior 20%), severe pulmonary HTN, with hx of bleeding (due to LE hematoma post cath due to 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 Admitted for [facility] placement and HD    # End Stage Renal Disease on Dialysis, Heart Failure with preserved Ejection Fraction, Severe Pulmonary Hypertension, Dyspnea, Hypoxic respiratory failure  - HD today after the CTA  - Doubt ACS  - Nephrology consulted- will do HD session today   - PT and Case management consulted for [facility] placement   - Continue all home medications with hold parameters   - Currently requiring 3L NC in 90s.   - palliative care eval    # Emphysema / COPD  - care discussed with pulmonary  - Continue O2 supplementation; caution not to over oxygenate (goal 88-92%)  - pending pulmonary eval    # Atrial fibrillation  - on AC  - pending cardiology eval    # Hypertension, Hyperlipidemia, CAD, Hypothyroidism   - Losartan held for hypotension  - Coreg continued with hold parameters  - DVT ppx with renally dosed Eliquis     # moderate calorie malnutrition  - encourage po intake

## 2022-02-14 NOTE — PROGRESS NOTE ADULT - ASSESSMENT
86 year-old man,  with a history of ESRD on HD, obstructive CAD s/p attempted unsuccessful PCI 2018, HFpEF 50% (prior 20%), severe pulmonary HTN, and emphysema on home O2 2L presents with worsening SOB and and orthopnea. CT chest done 2/7/22 reviewed he has significant emphysema also with bilateral effusion.   --Shortness of breath is multifactorial likely related to effusion, pulmonary edema, COPD.   --Now tested COVID19 positive 2/13/22    PLAN:  --resp. status appears improved since last seen   --Continue with Symbicort 2 puff twice daily and Albuterol as needed  ---Dialysis as per HD  --Wean oxygen as tolerated.   --Consider decadron/remdesivir if resp.  86 year-old man,  with a history of ESRD on HD, obstructive CAD s/p attempted unsuccessful PCI 2018, HFpEF 50% (prior 20%), severe pulmonary HTN, and emphysema on home O2 2L presents with worsening SOB and and orthopnea. CT chest done 2/7/22 reviewed he has significant emphysema also with bilateral effusion.   --Shortness of breath is multifactorial likely related to effusion, pulmonary edema, COPD.   --Now tested COVID19 positive 2/13/22 - found on routine testing.     PLAN:  --resp. status and oxygenation improved since last seen   --Continue with Symbicort 2 puff twice daily and Albuterol as needed  --Dialysis as per HD  --Wean oxygen as tolerated.   --Clinically he is improved and almost back to baseline,  will consider decadron/remdesivir if resp statis deteriorates or oxygenation worsens.

## 2022-02-14 NOTE — SWALLOW BEDSIDE ASSESSMENT ADULT - SWALLOW EVAL: RECOMMENDED DIET
SUGGEST A REGULAR TEXTURE DIET WITH THIN LIQUID CONSISTENCIES AS THE PATIENT APPEARED CLINICALLY TOLERANT OF THESE FOOD TEXTURES FROM AN OROPHARYNGEAL SWALLOWING STANCE ON CLINICAL EXAM.

## 2022-02-14 NOTE — CONSULT NOTE ADULT - SUBJECTIVE AND OBJECTIVE BOX
HPI: Pt is a 86y old Male with hx of       PAIN: ( )Yes   ( )No  Level:  Location:  Intensity:    /10  Quality:  Aggravating Factors:  Alleviating Factors:  Radiation:  Duration/Timing:  Impact on ADLs:    DYSPNEA: ( ) Yes  ( ) No  Level:    PAST MEDICAL & SURGICAL HISTORY:  CAD (coronary artery disease)  Cardiomyopathy  HTN (hypertension)  HLD (hyperlipidemia)  No significant past surgical history    SOCIAL HX: lives at home     Hx opiate tolerance ( )YES  (x )NO    Baseline ADLs  (Prior to Admission)  ( ) Independent   ( )Dependent    FAMILY HISTORY:  No pertinent family history in first degree relatives    Review of Systems:    Anxiety-  Depression-  Physical Discomfort-  Dyspnea-  Constipation-  Diarrhea-  Nausea-  Vomiting-  Anorexia-  Weight Loss-   Cough-  Secretions-  Fatigue-  Weakness-  Delirium-    All other systems reviewed and negative  Unable to obtain/Limited due to:    PHYSICAL EXAM:    Vital Signs Last 24 Hrs  T(C): 36.5 (14 Feb 2022 08:43), Max: 36.6 (13 Feb 2022 20:34)  T(F): 97.7 (14 Feb 2022 08:43), Max: 97.8 (13 Feb 2022 20:34)  HR: 91 (14 Feb 2022 08:43) (91 - 94)  BP: 130/68 (14 Feb 2022 08:43) (110/65 - 130/68)  BP(mean): 84 (13 Feb 2022 20:34) (77 - 84)  RR: 18 (14 Feb 2022 08:43) (18 - 18)  SpO2: 98% (14 Feb 2022 08:43) (94% - 99%)     PPSV2:   %  FAST:    General:  Mental Status:  HEENT:  Lungs:  Cardiac:  GI:  :  Ext:  Neuro:    LABS:                        11.4   6.68  )-----------( 146      ( 14 Feb 2022 08:09 )             34.0     02-14    133<L>  |  96  |  67<H>  ----------------------------<  88  4.8   |  25  |  7.00<H>    Ca    9.5      14 Feb 2022 08:09    TPro  6.9  /  Alb  2.8<L>  /  TBili  0.8  /  DBili  x   /  AST  57<H>  /  ALT  43  /  AlkPhos  174<H>  02-14    Albumin: Albumin, Serum: 2.8 g/dL (02-14 @ 08:09)    Allergies- No Known Allergies    MEDICATIONS  (STANDING):  apixaban 2.5 milliGRAM(s) Oral two times a day  budesonide 160 MICROgram(s)/formoterol 4.5 MICROgram(s) Inhaler 2 Puff(s) Inhalation two times a day  epoetin brianna-epbx (RETACRIT) Injectable 67269 Unit(s) IV Push <User Schedule>  levothyroxine 88 MICROGram(s) Oral daily  melatonin 3 milliGRAM(s) Oral at bedtime  midodrine. 5 milliGRAM(s) Oral <User Schedule>  montelukast 10 milliGRAM(s) Oral at bedtime    MEDICATIONS  (PRN):  ALBUTerol    90 MICROgram(s) HFA Inhaler 2 Puff(s) Inhalation every 6 hours PRN Bronchospasm      RADIOLOGY/ADDITIONAL STUDIES:    ACC: [id number] [facility]                        PROCEDURE DATE:  02/12/2022    INTERPRETATION:  CLINICAL INDICATION: Rule out pulmonary embolism  TECHNIQUE: A volumetric acquisition of the chest was obtained fromthe   thoracic inlet to the upper abdomen during dynamic administration of 90cc   Omnipaque 350 intravenous contrast according to the PE protocol. 3D MIP   images were provided.  COMPARISON: Chest CT 2/7/2022; abdomen CT 12/20/2018  FINDINGS:  CTA: The study is technically adequate with a good contrast bolus to the   pulmonary arteries. There are no filling defects in the pulmonary artery   or its branches. Cardiomegaly with biatrial enlargement qualitatively.   There is delayed filling of contrast within the left atrial appendage.   There is severe coronary arterial calcification as well as mild aortic   valvular and mitral annular calcification. There is no pericardial   effusion. The pulmonary artery measures 3 cm. Adjacent mid ascending   aorta measures 4.1 cm.  Lungs/Airways/Pleura: Stable small to moderate left and decreased small   right pleural effusion since 2/7/2022. There is improved partial lower   lobe compressive atelectasis. Decreased interlobular septal thickening   suggests improving interstitial pulmonary edema. Severe emphysema is   present. Unchanged 6 mm nodule along the right minor fissure on series 2   image 63 which may represent an intrapulmonary lymph node.  Mediastinum/Lymph nodes: Mediastinal and hilar lymphadenopathy measuring   up to 1.4 cm short axis are unchanged since recent prior study.  Upper Abdomen: Left hepatic cyst. Decreased, trace perihepatic ascites.  Bones and Soft Tissues: Bones are diffusely demineralized with severe L1   compression deformity, unchanged since recent prior chest CT and new from   December 2018 abdominal CT.    IMPRESSION:  No evidence of pulmonary embolism.  Improved fluid overload since 2/7/2022 in the setting of cardiomegaly   with decreased interstitial pulmonary edema, pleural effusions and   ascites. Improved bibasilar atelectasis.  Unchanged mediastinal and hilar lymphadenopathy measuring up to 1.4 cm   short axis.  Age-indeterminate severe L1 compression deformity.      ACC: [id number] EXAM:  XR CHEST PORTABLE URGENT 1V                        PROCEDURE DATE:  02/10/2022    INTERPRETATION:  INDICATION: Chest pain  COMPARISON: CT chest February 07, 2022, chest x-ray February 06, 2022  FINDINGS:  The heart is enlarged. There are small bilateral pleural effusions. There   are hazy airspace opacities throughout both lungs. There is no   pneumothorax. There is no acute bony abnormality.    IMPRESSION: Congestive heart failure       HPI: Pt is an 86y old Male with hx of ESRD on HD MWF via AVF, oliguric, (Dr. 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 States that his shortness of breath became worse and "2L doesn't cut it anymore". He also admits to occasional cough but denies chest pain, n/v/d/c. He states he completed his HD yesterday without any complications. The daughter expressed to the ED doctor that he has trouble walking and would like to go to [facility]. Palliative medicine consulted to help establish GOC and advance care planning.     2/14/2022 Patient seen and examined with no family at the bedside; patient denies any pain, is concerned because he thought they forgot to do HD on him today, explained now that Covid negative would most likely be in the patient's room, patient states that his daughter helps him make the medical decisions agreeable to calling her to schedule GOC meeting. Patient states that he has been on HD now for 5 months. Patient denies any complaints at this time.          PAIN: ( )Yes   (x )No  DYSPNEA: ( ) Yes  (x ) No      PAST MEDICAL & SURGICAL HISTORY:  CAD (coronary artery disease)  Cardiomyopathy  HTN (hypertension)  HLD (hyperlipidemia)  No significant past surgical history    SOCIAL HX: lives at home     Hx opiate tolerance ( )YES  (x )NO    Baseline ADLs  (Prior to Admission)  (x ) Independent   ( )Dependent  with assistance     FAMILY HISTORY:  No pertinent family history in first degree relatives    Review of Systems:    Anxiety-   Depression-  Physical Discomfort-  Dyspnea-  Constipation-  Diarrhea-  Nausea-  Vomiting-  Anorexia-  Weight Loss-   Cough-  Secretions-  Fatigue-  Weakness-  Delirium-    All other systems reviewed and negative  Unable to obtain/Limited due to:    PHYSICAL EXAM:    Vital Signs Last 24 Hrs  T(C): 36.5 (14 Feb 2022 08:43), Max: 36.6 (13 Feb 2022 20:34)  T(F): 97.7 (14 Feb 2022 08:43), Max: 97.8 (13 Feb 2022 20:34)  HR: 91 (14 Feb 2022 08:43) (91 - 94)  BP: 130/68 (14 Feb 2022 08:43) (110/65 - 130/68)  BP(mean): 84 (13 Feb 2022 20:34) (77 - 84)  RR: 18 (14 Feb 2022 08:43) (18 - 18)  SpO2: 98% (14 Feb 2022 08:43) (94% - 99%)     PPSV2:   %  FAST:    General:  Mental Status:  HEENT:  Lungs:  Cardiac:  GI:  :  Ext:  Neuro:    LABS:                        11.4   6.68  )-----------( 146      ( 14 Feb 2022 08:09 )             34.0     02-14    133<L>  |  96  |  67<H>  ----------------------------<  88  4.8   |  25  |  7.00<H>    Ca    9.5      14 Feb 2022 08:09    TPro  6.9  /  Alb  2.8<L>  /  TBili  0.8  /  DBili  x   /  AST  57<H>  /  ALT  43  /  AlkPhos  174<H>  02-14    Albumin: Albumin, Serum: 2.8 g/dL (02-14 @ 08:09)    Allergies- No Known Allergies    MEDICATIONS  (STANDING):  apixaban 2.5 milliGRAM(s) Oral two times a day  budesonide 160 MICROgram(s)/formoterol 4.5 MICROgram(s) Inhaler 2 Puff(s) Inhalation two times a day  epoetin brianna-epbx (RETACRIT) Injectable 41822 Unit(s) IV Push <User Schedule>  levothyroxine 88 MICROGram(s) Oral daily  melatonin 3 milliGRAM(s) Oral at bedtime  midodrine. 5 milliGRAM(s) Oral <User Schedule>  montelukast 10 milliGRAM(s) Oral at bedtime    MEDICATIONS  (PRN):  ALBUTerol    90 MICROgram(s) HFA Inhaler 2 Puff(s) Inhalation every 6 hours PRN Bronchospasm      RADIOLOGY/ADDITIONAL STUDIES:    ACC: [id number] EXAM:  CT ANGIO CHEST PULM ART [facility]                        PROCEDURE DATE:  02/12/2022    INTERPRETATION:  CLINICAL INDICATION: Rule out pulmonary embolism  TECHNIQUE: A volumetric acquisition of the chest was obtained fromthe   thoracic inlet to the upper abdomen during dynamic administration of 90cc   Omnipaque 350 intravenous contrast according to the PE protocol. 3D MIP   images were provided.  COMPARISON: Chest CT 2/7/2022; abdomen CT 12/20/2018  FINDINGS:  CTA: The study is technically adequate with a good contrast bolus to the   pulmonary arteries. There are no filling defects in the pulmonary artery   or its branches. Cardiomegaly with biatrial enlargement qualitatively.   There is delayed filling of contrast within the left atrial appendage.   There is severe coronary arterial calcification as well as mild aortic   valvular and mitral annular calcification. There is no pericardial   effusion. The pulmonary artery measures 3 cm. Adjacent mid ascending   aorta measures 4.1 cm.  Lungs/Airways/Pleura: Stable small to moderate left and decreased small   right pleural effusion since 2/7/2022. There is improved partial lower   lobe compressive atelectasis. Decreased interlobular septal thickening   suggests improving interstitial pulmonary edema. Severe emphysema is   present. Unchanged 6 mm nodule along the right minor fissure on series 2   image 63 which may represent an intrapulmonary lymph node.  Mediastinum/Lymph nodes: Mediastinal and hilar lymphadenopathy measuring   up to 1.4 cm short axis are unchanged since recent prior study.  Upper Abdomen: Left hepatic cyst. Decreased, trace perihepatic ascites.  Bones and Soft Tissues: Bones are diffusely demineralized with severe L1   compression deformity, unchanged since recent prior chest CT and new from   December 2018 abdominal CT.    IMPRESSION:  No evidence of pulmonary embolism.  Improved fluid overload since 2/7/2022 in the setting of cardiomegaly   with decreased interstitial pulmonary edema, pleural effusions and   ascites. Improved bibasilar atelectasis.  Unchanged mediastinal and hilar lymphadenopathy measuring up to 1.4 cm   short axis.  Age-indeterminate severe L1 compression deformity.      ACC: [id number] EXAM:  XR CHEST PORTABLE URGENT 1V                        PROCEDURE DATE:  02/10/2022    INTERPRETATION:  INDICATION: Chest pain  COMPARISON: CT chest February 07, 2022, chest x-ray February 06, 2022  FINDINGS:  The heart is enlarged. There are small bilateral pleural effusions. There   are hazy airspace opacities throughout both lungs. There is no   pneumothorax. There is no acute bony abnormality.    IMPRESSION: Congestive heart failure       HPI: Pt is an 86y old Male with hx of ESRD on HD MWF via AVF, oliguric, (Dr. 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 States that his shortness of breath became worse and "2L doesn't cut it anymore". He also admits to occasional cough but denies chest pain, n/v/d/c. He states he completed his HD yesterday without any complications. The daughter expressed to the ED doctor that he has trouble walking and would like to go to [facility]. Palliative medicine consulted to help establish GOC and advance care planning.     2/14/2022 Patient seen and examined with no family at the bedside; patient denies any pain, is concerned because he thought they forgot to do HD on him today, explained now that Covid negative would most likely be in the patient's room, patient states that his daughter helps him make the medical decisions agreeable to calling her to schedule GOC meeting. Patient states that he has been on HD now for 5 months. Patient denies any complaints at this time.      PAIN: ( )Yes   (x )No  DYSPNEA: ( ) Yes  (x ) No      PAST MEDICAL & SURGICAL HISTORY:  CAD (coronary artery disease)  Cardiomyopathy  HTN (hypertension)  HLD (hyperlipidemia)  No significant past surgical history    SOCIAL HX: lives at home     Hx opiate tolerance ( )YES  (x )NO    Baseline ADLs  (Prior to Admission)  (x ) Independent   ( )Dependent  with assistance     FAMILY HISTORY:  No pertinent family history in first degree relatives    Review of Systems:    All other systems reviewed and negative    PHYSICAL EXAM:    Vital Signs Last 24 Hrs  T(C): 36.5 (14 Feb 2022 08:43), Max: 36.6 (13 Feb 2022 20:34)  T(F): 97.7 (14 Feb 2022 08:43), Max: 97.8 (13 Feb 2022 20:34)  HR: 91 (14 Feb 2022 08:43) (91 - 94)  BP: 130/68 (14 Feb 2022 08:43) (110/65 - 130/68)  BP(mean): 84 (13 Feb 2022 20:34) (77 - 84)  RR: 18 (14 Feb 2022 08:43) (18 - 18)  SpO2: 98% (14 Feb 2022 08:43) (94% - 99%)     PPSV2: 30-40  %  FAST: unsure of baseline     General: elderly gentleman in chair, NAD   Mental Status: alert and oriented, defers to daughter   HEENT: mmm   Lungs: diminished breath sounds   Cardiac: S1S2 +   GI: non tender, non distended, +BS   : non tender   Ext: mild edema   Neuro: periods of forgetfulness     LABS:                        11.4   6.68  )-----------( 146      ( 14 Feb 2022 08:09 )             34.0     02-14    133<L>  |  96  |  67<H>  ----------------------------<  88  4.8   |  25  |  7.00<H>    Ca    9.5      14 Feb 2022 08:09    TPro  6.9  /  Alb  2.8<L>  /  TBili  0.8  /  DBili  x   /  AST  57<H>  /  ALT  43  /  AlkPhos  174<H>  02-14    Albumin: Albumin, Serum: 2.8 g/dL (02-14 @ 08:09)    Allergies- No Known Allergies    MEDICATIONS  (STANDING):  apixaban 2.5 milliGRAM(s) Oral two times a day  budesonide 160 MICROgram(s)/formoterol 4.5 MICROgram(s) Inhaler 2 Puff(s) Inhalation two times a day  epoetin brianna-epbx (RETACRIT) Injectable 49850 Unit(s) IV Push <User Schedule>  levothyroxine 88 MICROGram(s) Oral daily  melatonin 3 milliGRAM(s) Oral at bedtime  midodrine. 5 milliGRAM(s) Oral <User Schedule>  montelukast 10 milliGRAM(s) Oral at bedtime    MEDICATIONS  (PRN):  ALBUTerol    90 MICROgram(s) HFA Inhaler 2 Puff(s) Inhalation every 6 hours PRN Bronchospasm    RADIOLOGY/ADDITIONAL STUDIES:    ACC: [id number] EXAM:  CT ANGIO CHEST PULM ART [facility]                        PROCEDURE DATE:  02/12/2022    INTERPRETATION:  CLINICAL INDICATION: Rule out pulmonary embolism  TECHNIQUE: A volumetric acquisition of the chest was obtained fromthe   thoracic inlet to the upper abdomen during dynamic administration of 90cc   Omnipaque 350 intravenous contrast according to the PE protocol. 3D MIP   images were provided.  COMPARISON: Chest CT 2/7/2022; abdomen CT 12/20/2018  FINDINGS:  CTA: The study is technically adequate with a good contrast bolus to the   pulmonary arteries. There are no filling defects in the pulmonary artery   or its branches. Cardiomegaly with biatrial enlargement qualitatively.   There is delayed filling of contrast within the left atrial appendage.   There is severe coronary arterial calcification as well as mild aortic   valvular and mitral annular calcification. There is no pericardial   effusion. The pulmonary artery measures 3 cm. Adjacent mid ascending   aorta measures 4.1 cm.  Lungs/Airways/Pleura: Stable small to moderate left and decreased small   right pleural effusion since 2/7/2022. There is improved partial lower   lobe compressive atelectasis. Decreased interlobular septal thickening   suggests improving interstitial pulmonary edema. Severe emphysema is   present. Unchanged 6 mm nodule along the right minor fissure on series 2   image 63 which may represent an intrapulmonary lymph node.  Mediastinum/Lymph nodes: Mediastinal and hilar lymphadenopathy measuring   up to 1.4 cm short axis are unchanged since recent prior study.  Upper Abdomen: Left hepatic cyst. Decreased, trace perihepatic ascites.  Bones and Soft Tissues: Bones are diffusely demineralized with severe L1   compression deformity, unchanged since recent prior chest CT and new from   December 2018 abdominal CT.    IMPRESSION:  No evidence of pulmonary embolism.  Improved fluid overload since 2/7/2022 in the setting of cardiomegaly   with decreased interstitial pulmonary edema, pleural effusions and   ascites. Improved bibasilar atelectasis.  Unchanged mediastinal and hilar lymphadenopathy measuring up to 1.4 cm   short axis.  Age-indeterminate severe L1 compression deformity.      ACC: [id number] EXAM:  XR CHEST PORTABLE URGENT 1V                        PROCEDURE DATE:  02/10/2022    INTERPRETATION:  INDICATION: Chest pain  COMPARISON: CT chest February 07, 2022, chest x-ray February 06, 2022  FINDINGS:  The heart is enlarged. There are small bilateral pleural effusions. There   are hazy airspace opacities throughout both lungs. There is no   pneumothorax. There is no acute bony abnormality.    IMPRESSION: Congestive heart failure

## 2022-02-14 NOTE — SWALLOW BEDSIDE ASSESSMENT ADULT - SWALLOW EVAL: CRITERIA FOR SKILLED INTERVENTION MET
DO NOT FEEL THAT ACUTE SPEECH PATHOLOGY FOLLOW UP WOULD CHANGE CLINICAL MANAGEMENT/OUTCOME WHILE IN ACUTE HOSPITAL SETTING. PT'S OROPHARYNGEAL SWALLOWING ABILITIES APPEAR TO BE FUNCTIONAL FOR AGE AND NO PRIMARY SPEECH-LANGUAGE PATHOLOGY EVIDENT ON EXAM. AT SUSPECTED COMMUNICATIVE/SWALLOWING BASELINE. GIVEN ABOVE, THIS SERVICE WILL NOT ACTIVELY FOLLOW. RECONSULT PRN SHOULD STATUS CHANGE AND CONDITION WARRANT.

## 2022-02-14 NOTE — PROGRESS NOTE ADULT - SUBJECTIVE AND OBJECTIVE BOX
2/12--Post CTA. negative PE, improved CHF.  HD 3 liters removed on last HD     tolerated well.  Feeling better SOB much improved.    today 2/14 - pt seen on HD    uf goal 1.5 liters    now COVID +    on 4 L NC          86 year old male with pmhx of ESRD on HD MWF via AVF, oliguric, (Dr Eastman), obstructive CAD s/p attempted unsuccessful PCI 2018, HFpEF 50% (prior 20%), severe pulmonary HTN, with hx of bleeding (due to LE hematoma post cath due to 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  States that his shortness of breath became worse and "2L doesn't cut it anymore".   D/w daughter that his 02 sats were dropping last night in 80 despite 3- 4 L of o2 . She also noted cough w phlegm.   Of note pt completed HD at hospital before discharge yest and removed 1.5 liters       MEDICATIONS  (STANDING):  apixaban 2.5 milliGRAM(s) Oral two times a day  budesonide 160 MICROgram(s)/formoterol 4.5 MICROgram(s) Inhaler 2 Puff(s) Inhalation two times a day  dexAMETHasone     Tablet 6 milliGRAM(s) Oral daily  epoetin brianna-epbx (RETACRIT) Injectable 71095 Unit(s) IV Push <User Schedule>  levothyroxine 88 MICROGram(s) Oral daily  melatonin 3 milliGRAM(s) Oral at bedtime  midodrine. 5 milliGRAM(s) Oral <User Schedule>  montelukast 10 milliGRAM(s) Oral at bedtime      Vital Signs Last 24 Hrs  T(C): 36.3 (14 Feb 2022 20:54), Max: 36.9 (14 Feb 2022 18:19)  T(F): 97.4 (14 Feb 2022 20:54), Max: 98.5 (14 Feb 2022 18:19)  HR: 77 (14 Feb 2022 20:54) (64 - 93)  BP: 115/66 (14 Feb 2022 20:54) (105/62 - 134/68)  BP(mean): --  RR: 18 (14 Feb 2022 20:54) (16 - 18)  SpO2: 96% (14 Feb 2022 20:54) (96% - 99%)      I&O's Detail    14 Feb 2022 07:01  -  14 Feb 2022 23:00  --------------------------------------------------------  IN:  Total IN: 0 mL    OUT:    Other (mL): 1500 mL  Total OUT: 1500 mL    Total NET: -1500 mL      PHYSICAL EXAM:  GENERAL: comfortable.  CHEST/LUNG: bilateral coarse bs and rhonchi  HEART: S1S2 RRR  ABDOMEN: soft  EXTREMITIES: no edema  SKIN:     LABS:                                   11.4   6.68  )-----------( 146      ( 14 Feb 2022 08:09 )             34.0                         11.1   6.28  )-----------( 141      ( 13 Feb 2022 06:37 )             33.4         133    |  96     |  67     ----------------------------<  88        14 Feb 2022 08:09  4.8     |  25     |  7.00     133    |  95     |  45     ----------------------------<  88        13 Feb 2022 06:37  3.9     |  26     |  5.24     135    |  96     |  41     ----------------------------<  106       12 Feb 2022 07:29  3.7     |  29     |  5.48     Ca    9.5        14 Feb 2022 08:09  Ca    9.5        13 Feb 2022 06:37    Phos  5.7       11 Feb 2022 07:49    Mg     2.6       11 Feb 2022 07:49    TPro  6.9    /  Alb  2.8    /  TBili  0.8    /        14 Feb 2022 08:09  DBili  x      /  AST  57     /  ALT  43     /  AlkPhos  174      TPro  7.0    /  Alb  2.9    /  TBili  1.1    /        12 Feb 2022 07:29  DBili  x      /  AST  44     /  ALT  34     /  AlkPhos  121                    RADIOLOGY & ADDITIONAL TESTS:

## 2022-02-14 NOTE — SWALLOW BEDSIDE ASSESSMENT ADULT - SWALLOW EVAL: DIAGNOSIS
1) Pt exhibits Oropharyngeal Swallowing abilities which subjectively appears to be within functional parameters for age. NO behavioral aspiration signs exhibited. Odynophagia denied.    2) Pt is alert and interactive. He was able to verbalize during communicative probes via intelligible fluent, linguistically intact utterances. No primary speech-language pathology evident. At suspected communicative baseline.

## 2022-02-14 NOTE — PROGRESS NOTE ADULT - ASSESSMENT
85 yo male with hx of ESRD on [place] [facility] [place] and now was recenlty DC'd from  due to rapid afib and sob   returned next day w increased SOB and hypoxia    SOB/Hypoxia  - Multifactorial    Pulm HTN/COPD/ Fluid Overload    COVID + now    fluid removal at HD today to maintain euvolemia    need to adjust his EDW after this admission pt may have lost weight since moving from [place]   limit fluid intake advised    if hyptension - can  also try puf first hour of HD      ESRD on HD MWF    Mido assist        Anemia   MARIS at HD    COPD on home 02    per Pulm     COVID    per ID Decadron     Hyperphos - monitor for now     not on binders     * pt seen earlier on HD , note now

## 2022-02-14 NOTE — CONSULT NOTE ADULT - ASSESSMENT
85 yo male with hx of ESRD on HD MWF via AVF at [facility] eduin Díaz moved from [place] and now was recenlty DC'd from  due to rapid afib and sob   returned next day w increased SOB and hypoxia    SOB - Fluid overload vs COPD exacerbation w hypoxia    will attempt further fluid removal at HD today w PUF   likely need to adjust his EDW after this admission pt may have lost weight      ESRD on HD MWF     PUF today - aim for 2.5 liters     full HD session tomorrow regular HD day    Anemia   MARIS at HD    COPD on home 02    daughter requesting pulm eval     d/w daughter   d.w Dr Schuster     Thank you for the courtesy of this consult. We will follow this patient with you.   Management is subject to change if new information becomes available or patient condition changes.        
86 year-old man,  with a history of ESRD on HD, obstructive CAD s/p attempted unsuccessful PCI 2018, HFpEF 50% (prior 20%), severe pulmonary HTN, and emphysema on home O2 2L presents with worsening SOB and and orthopnea.   --CT chest done 2/7/221 reviewed he has significant emphysema also with bilateral effusion.   Shortness of breath is multifactorial likely related to effusion and pulmonary edema - notes breathign improved after dialysis. There is likely a component of COPD, very mild exp. wheezing.    Recommendations  --c/w inhaler regimen - change to high dose Symbicort 2 puff twice daily.  --C/w albuterol as needed  --c/w dialysis as per HD    will follow       
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 States that his shortness of breath became worse and "2L doesn't cut it anymore". He also admits to occasional cough but denies chest pain, n/v/d/c. He states he completed his HD yesterday without any complications.   Daughter expressed to ED doctor that he is having trouble walking and would like to go to [facility].  (10 Feb 2022 13:28)  Was just in hospital with SOB treated for COPD execrabationa nd fluid overload   He is oliguric on HD 3 times per week , he had chronic AFIB on eliquis   He denies CP  LAst echo showed severe pulm HTN with EF 50%   on midodrin for hypotension on HD   SOB likely multifactorial secondary to pulm dz and seevre pulm HTN , likely combination of severe diastolic dysfunction and systolic dysfunction , obviously also secondary to oliguric  ESRD , trop negative doubt active CAD or ischemia   1) unfortunately the only way to effectively remove fluid is with HD , he had HD today and will have again tomorrow cont midodrin to combat hypotension during HD   2) cont coreg only if BP allows , can hold while he gets fluid removed with HD   3) can also use preload reduction with nitrates to decrease SOB if BP allows   overall prognosis seems poor   please call us back for any further questions , Thank you
Pt is an 86y old Male with hx of ESRD on HD MWF via AVF, oliguric, (Dr. 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 States that his shortness of breath became worse and "2L doesn't cut it anymore". He also admits to occasional cough but denies chest pain, n/v/d/c. He states he completed his HD yesterday without any complications. The daughter expressed to the ED doctor that he has trouble walking and would like to go to [facility]. Palliative medicine consulted to help establish GOC and advance care planning.     1) End Stage Renal Disease   - on Dialysis (MWF)   - Nephrology consulted appreciated   - monitor labs     2) Hypoxic respiratory failure  - multifactorial: Heart Failure with preserved Ejection Fraction, Severe Pulmonary Hypertension, Emphysema / COPD  - pulmonology consult appreciated   - Continue O2 supplementation  - 2/13/2022 patient tested positive for COVID - no symptoms continue to monitor     3) history of Hypertension, Hyperlipidemia, CAD,  Atrial fibrillation  - c/w Losartan, Coreg while monitoring blood work   - c/w with renally dosed Eliquis   - cardiology consult appreciated     4) Debility   - moderate calorie malnutrition  - encourage po intake  - registered dietician consult appreciated   - PT evaluation   - ?possible history of dementia - will discuss with family     5) advance care planning   - patient defers to his daughter   - Full code - will address in meeting   - will reach out to patients daughter to schedule GOC meeting

## 2022-02-15 LAB
CULTURE RESULTS: SIGNIFICANT CHANGE UP
SPECIMEN SOURCE: SIGNIFICANT CHANGE UP

## 2022-02-15 PROCEDURE — 99232 SBSQ HOSP IP/OBS MODERATE 35: CPT

## 2022-02-15 PROCEDURE — 99233 SBSQ HOSP IP/OBS HIGH 50: CPT

## 2022-02-15 RX ADMIN — Medication 3 MILLIGRAM(S): at 22:06

## 2022-02-15 RX ADMIN — BUDESONIDE AND FORMOTEROL FUMARATE DIHYDRATE 2 PUFF(S): 160; 4.5 AEROSOL RESPIRATORY (INHALATION) at 08:48

## 2022-02-15 RX ADMIN — APIXABAN 2.5 MILLIGRAM(S): 2.5 TABLET, FILM COATED ORAL at 09:45

## 2022-02-15 RX ADMIN — APIXABAN 2.5 MILLIGRAM(S): 2.5 TABLET, FILM COATED ORAL at 22:06

## 2022-02-15 RX ADMIN — MONTELUKAST 10 MILLIGRAM(S): 4 TABLET, CHEWABLE ORAL at 22:07

## 2022-02-15 RX ADMIN — Medication 88 MICROGRAM(S): at 06:01

## 2022-02-15 RX ADMIN — Medication 6 MILLIGRAM(S): at 09:45

## 2022-02-15 RX ADMIN — BUDESONIDE AND FORMOTEROL FUMARATE DIHYDRATE 2 PUFF(S): 160; 4.5 AEROSOL RESPIRATORY (INHALATION) at 20:10

## 2022-02-15 NOTE — PROGRESS NOTE ADULT - ASSESSMENT
Pt is an 86y old Male with hx of ESRD on HD MWF via AVF, oliguric, (Dr. 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 States that his shortness of breath became worse and "2L doesn't cut it anymore". He also admits to occasional cough but denies chest pain, n/v/d/c. He states he completed his HD yesterday without any complications. The daughter expressed to the ED doctor that he has trouble walking and would like to go to [facility]. Palliative medicine consulted to help establish GOC and advance care planning.     1) End Stage Renal Disease   - on Dialysis (MWF)   - Nephrology consulted appreciated   - monitor labs     2) Hypoxic respiratory failure  - multifactorial: Heart Failure with preserved Ejection Fraction, Severe Pulmonary Hypertension, Emphysema / COPD  - pulmonology consult appreciated   - Continue O2 supplementation  - 2/13/2022 patient tested positive for COVID - no symptoms continue to monitor     3) history of Hypertension, Hyperlipidemia, CAD,  Atrial fibrillation  - c/w Losartan, Coreg while monitoring blood work   - c/w with renally dosed Eliquis   - cardiology consult appreciated     4) Debility   - moderate calorie malnutrition  - encourage po intake  - registered dietician consult appreciated   - PT evaluation   - ?possible history of dementia - will discuss with family     5) advance care planning   - patient defers to his daughter   - Full code - will address in meeting   - will reach out to patients daughter to schedule GOC meeting

## 2022-02-15 NOTE — PROGRESS NOTE ADULT - SUBJECTIVE AND OBJECTIVE BOX
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 States that his shortness of breath became worse and "2L doesn't cut it anymore". He also admits to occasional cough but denies chest pain, n/v/d/c. He states he completed his HD yesterday without any complications. Daughter expressed to ED doctor that he is having trouble walking and would like to go to [facility].    Patient feels better post diuresis. Denies any HA, CP. SOB much improved post HD. Care discussed with patient's family -  as patient has multitude of complex medical problems -  family requests palliative care eval.     3/15: Patient clinically looks much better. Denies any HA, CP, SOB. Very comfortable.     REVIEW OF SYSTEMS:  General: NAD, hemodynamically stable, + weakness  HEENT:  Eyes:  No visual loss, blurred vision, double vision or yellow sclerae. Ears, Nose, Throat:  No hearing loss, sneezing, congestion, runny nose or sore throat.  SKIN:  No rash or itching.  CARDIOVASCULAR:  No chest pain, chest pressure or chest discomfort. No palpitations or edema.  RESPIRATORY: + SOB  GASTROINTESTINAL:  No anorexia, nausea, vomiting or diarrhea. No abdominal pain or blood.  NEUROLOGICAL:  No headache, dizziness, syncope, paralysis, ataxia, numbness or tingling in the extremities. No change in bowel or bladder control.  MUSCULOSKELETAL:  No muscle, back pain, joint pain or stiffness.  HEMATOLOGIC:  No anemia, bleeding or bruising.  LYMPHATICS:  No enlarged nodes. No history of splenectomy.  ENDOCRINOLOGIC:  No reports of sweating, cold or heat intolerance. No polyuria or polydipsia.  ALLERGIES:  No history of asthma, hives, eczema or rhinitis.    Physical Exam:   GENERAL APPEARANCE:  comfortable  T(C): 36.3 (13 Feb 2022 09:35), Max: 36.7 (12 Feb 2022 13:08)  T(F): 97.3 (13 Feb 2022 09:35), Max: 98 (12 Feb 2022 13:08)  HR: 87 (13 Feb 2022 09:50) (76 - 87)  BP: 105/62 (13 Feb 2022 09:50) (88/56 - 127/77)  BP(mean): 92 (12 Feb 2022 13:45) (92 - 92)  RR: 18 (13 Feb 2022 09:50) (17 - 18)  SpO2: 100% (13 Feb 2022 09:50) (95% - 100%)  HEENT:  atraumatic   Skin:  Warm and dry without any rash   NECK:  Supple without lymphadenopathy.   HEART:  Regular rate and rhythm. normal S1 and S2, No M/R/G  LUNGS:  on 3 L of NC  ABDOMEN:  Soft, nontender, nondistended with good bowel sounds heard  EXTREMITIES:  Without cyanosis, clubbing or edema.   NEUROLOGICAL:  Gross nonfocal     Labs:     CBC Full  -  ( 13 Feb 2022 06:37 )  WBC Count : 6.28 K/uL  RBC Count : 3.08 M/uL  Hemoglobin : 11.1 g/dL  Hematocrit : 33.4 %  Platelet Count - Automated : 141 K/uL  Mean Cell Volume : 108.4 fl  Mean Cell Hemoglobin : 36.0 pg  Mean Cell Hemoglobin Concentration : 33.2 gm/dL  Auto Neutrophil # : x  Auto Lymphocyte # : x  Auto Monocyte # : x  Auto Eosinophil # : x  Auto Basophil # : x  Auto Neutrophil % : x  Auto Lymphocyte % : x  Auto Monocyte % : x  Auto Eosinophil % : x  Auto Basophil % : x        02-13    133<L>  |  95<L>  |  45<H>  ----------------------------<  88  3.9   |  26  |  5.24<H>    Ca    9.5      13 Feb 2022 06:37    TPro  7.0  /  Alb  2.9<L>  /  TBili  1.1  /  DBili  x   /  AST  44<H>  /  ALT  34  /  AlkPhos  121<H>  02-12      Pt is a 86 year old male with pmhx of ESRD on HD MWF via AVF, oliguric, (Dr Eastman), obstructive CAD s/p attempted unsuccessful PCI 2018, HFpEF 50% (prior 20%), severe pulmonary HTN, with hx of bleeding (due to LE hematoma post cath due to 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 Admitted for [facility] placement and HD    # End Stage Renal Disease on Dialysis, Heart Failure with preserved Ejection Fraction, Severe Pulmonary Hypertension, Dyspnea, Hypoxic respiratory failure  - HD today after the CTA  - Doubt ACS  - Nephrology consulted- will do HD session today   - PT and Case management consulted for [facility] placement   - Continue all home medications with hold parameters   - Currently requiring 3L NC in 90s.   - palliative care eval    # Emphysema / COPD  - care discussed with pulmonary  - Continue O2 supplementation; caution not to over oxygenate (goal 88-92%)  - pending pulmonary eval    # Atrial fibrillation  - on AC  - pending cardiology eval    # Hypertension, Hyperlipidemia, CAD, Hypothyroidism   - Losartan held for hypotension  - Coreg continued with hold parameters  - DVT ppx with renally dosed Eliquis     # moderate calorie malnutrition  - encourage po intake    # Transaminitis  - this could be secondary to recent covid infection, this being said, differential includes congestive hepatopathy  - monitor liver function           Reason for Admission: SOB  History of Present Illness:   86 year old male with pmhx of ESRD on HD MWF via AVF, oliguric, (Dr Eastman), obstructive CAD s/p attempted unsuccessful PCI 2018, HFpEF 50% (prior 20%), severe pulmonary HTN, with hx of bleeding (due to LE hematoma post cath due to 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 States that his shortness of breath became worse and "2L doesn't cut it anymore". He also admits to occasional cough but denies chest pain, n/v/d/c. He states he completed his HD yesterday without any complications. Daughter expressed to ED doctor that he is having trouble walking and would like to go to [facility].    Patient feels better post diuresis. Denies any HA, CP. SOB much improved post HD. Care discussed with patient's family -  as patient has multitude of complex medical problems -  family requests palliative care eval.     3/15: Patient clinically looks much better. Denies any HA, CP, SOB. Very comfortable.     REVIEW OF SYSTEMS:  General: NAD, hemodynamically stable, + weakness  HEENT:  Eyes:  No visual loss, blurred vision, double vision or yellow sclerae. Ears, Nose, Throat:  No hearing loss, sneezing, congestion, runny nose or sore throat.  SKIN:  No rash or itching.  CARDIOVASCULAR:  No chest pain, chest pressure or chest discomfort. No palpitations or edema.  RESPIRATORY: + SOB  GASTROINTESTINAL:  No anorexia, nausea, vomiting or diarrhea. No abdominal pain or blood.  NEUROLOGICAL:  No headache, dizziness, syncope, paralysis, ataxia, numbness or tingling in the extremities. No change in bowel or bladder control.  MUSCULOSKELETAL:  No muscle, back pain, joint pain or stiffness.  HEMATOLOGIC:  No anemia, bleeding or bruising.  LYMPHATICS:  No enlarged nodes. No history of splenectomy.  ENDOCRINOLOGIC:  No reports of sweating, cold or heat intolerance. No polyuria or polydipsia.  ALLERGIES:  No history of asthma, hives, eczema or rhinitis.    Physical Exam:   GENERAL APPEARANCE:  comfortable  ICU Vital Signs Last 24 Hrs  T(C): 36.7 (15 Feb 2022 08:28), Max: 36.9 (14 Feb 2022 18:19)  T(F): 98.1 (15 Feb 2022 08:28), Max: 98.5 (14 Feb 2022 18:19)  HR: 66 (15 Feb 2022 08:48) (64 - 92)  BP: 132/74 (15 Feb 2022 08:28) (105/62 - 134/68)  RR: 18 (15 Feb 2022 08:28) (16 - 18)  SpO2: 98% (15 Feb 2022 08:48) (96% - 100%)  HEENT:  atraumatic   Skin:  Warm and dry without any rash   NECK:  Supple without lymphadenopathy.   HEART:  Regular rate and rhythm. normal S1 and S2, No M/R/G  LUNGS:  on 3 L of NC  ABDOMEN:  Soft, nontender, nondistended with good bowel sounds heard  EXTREMITIES:  Without cyanosis, clubbing or edema.   NEUROLOGICAL:  Gross nonfocal     Labs:     CBC Full  -  ( 14 Feb 2022 08:09 )  WBC Count : 6.68 K/uL  RBC Count : 3.15 M/uL  Hemoglobin : 11.4 g/dL  Hematocrit : 34.0 %  Platelet Count - Automated : 146 K/uL  Mean Cell Volume : 107.9 fl  Mean Cell Hemoglobin : 36.2 pg  Mean Cell Hemoglobin Concentration : 33.5 gm/dL  Auto Neutrophil # : x  Auto Lymphocyte # : x  Auto Monocyte # : x  Auto Eosinophil # : x  Auto Basophil # : x  Auto Neutrophil % : x  Auto Lymphocyte % : x  Auto Monocyte % : x  Auto Eosinophil % : x  Auto Basophil % : x        02-14    133<L>  |  96  |  67<H>  ----------------------------<  88  4.8   |  25  |  7.00<H>    Ca    9.5      14 Feb 2022 08:09    TPro  6.9  /  Alb  2.8<L>  /  TBili  0.8  /  DBili  x   /  AST  57<H>  /  ALT  43  /  AlkPhos  174<H>  02-14        Pt is a 86 year old male with pmhx of ESRD on HD MWF via AVF, oliguric, (Dr Eastman), obstructive CAD s/p attempted unsuccessful PCI 2018, HFpEF 50% (prior 20%), severe pulmonary HTN, with hx of bleeding (due to LE hematoma post cath due to 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 Admitted for [facility] placement and HD    # End Stage Renal Disease on Dialysis, Heart Failure with preserved Ejection Fraction, Severe Pulmonary Hypertension, Dyspnea, Hypoxic respiratory failure  - HD today after the CTA  - Doubt ACS  - Nephrology consulted- will do HD session today   - PT and Case management consulted for [facility] placement   - Continue all home medications with hold parameters   - Currently requiring 3L NC in 90s.   - palliative care eval    # Emphysema / COPD home O2 dependancy 2L  - care discussed with pulmonary  - Continue O2 supplementation; caution not to over oxygenate (goal 88-92%)    # Atrial fibrillation  - on AC    # Hypertension, Hyperlipidemia, CAD, Hypothyroidism   - Losartan held for hypotension  - Coreg continued with hold parameters  - DVT ppx with renally dosed Eliquis     # moderate calorie malnutrition  - encourage po intake    # Transaminitis  - this could be secondary to recent covid infection, this being said, differential includes congestive hepatopathy  - monitor liver function  - RUQ US    COVID +

## 2022-02-15 NOTE — PROGRESS NOTE ADULT - SUBJECTIVE AND OBJECTIVE BOX
HPI: patient seen and examined with no family at the bedside, patient resting comfortably, was in bed eating lunch.  Defers to his daughter who is an RN, will reach out to schedule [facility] meeting. The patient denies any complaints at this time.     PAIN: ( )Yes   (x )No  DYSPNEA: ( ) Yes  (x ) No    Review of Systems:    All other systems reviewed and negative    PHYSICAL EXAM:    Vital Signs Last 24 Hrs  T(C): 36.2 (15 Feb 2022 16:10), Max: 36.9 (2022 18:19)  T(F): 97.1 (15 Feb 2022 16:10), Max: 98.5 (2022 18:19)  HR: 105 (15 Feb 2022 16:10) (66 - 105)  BP: 114/81 (15 Feb 2022 16:10) (114/81 - 132/77)  BP(mean): 91 (15 Feb 2022 16:10) (91 - 91)  RR: 18 (15 Feb 2022 16:10) (18 - 18)  SpO2: 93% (15 Feb 2022 16:10) (93% - 100%)    Daily Weight in k.7 (15 Feb 2022 05:22)    PPSV2: 30-40  %  FAST: unsure of baseline     General: elderly gentleman in chair, NAD   Mental Status: alert and oriented, defers to daughter   HEENT: mmm   Lungs: diminished breath sounds   Cardiac: S1S2 +   GI: non tender, non distended, +BS   : non tender   Ext: mild edema   Neuro: periods of forgetfulness     LABS:                        11.4   6.68  )-----------( 146      ( 2022 08:09 )             34.0     -14    133<L>  |  96  |  67<H>  ----------------------------<  88  4.8   |  25  |  7.00<H>    Ca    9.5      2022 08:09    TPro  6.9  /  Alb  2.8<L>  /  TBili  0.8  /  DBili  x   /  AST  57<H>  /  ALT  43  /  AlkPhos  174<H>  -14      Albumin: Albumin, Serum: 2.8 g/dL ( @ 08:09)    Allergies    No Known Allergies    Intolerances      MEDICATIONS  (STANDING):  apixaban 2.5 milliGRAM(s) Oral two times a day  budesonide 160 MICROgram(s)/formoterol 4.5 MICROgram(s) Inhaler 2 Puff(s) Inhalation two times a day  dexAMETHasone     Tablet 6 milliGRAM(s) Oral daily  epoetin brianna-epbx (RETACRIT) Injectable 66958 Unit(s) IV Push <User Schedule>  levothyroxine 88 MICROGram(s) Oral daily  melatonin 3 milliGRAM(s) Oral at bedtime  midodrine. 5 milliGRAM(s) Oral <User Schedule>  montelukast 10 milliGRAM(s) Oral at bedtime    MEDICATIONS  (PRN):  ALBUTerol    90 MICROgram(s) HFA Inhaler 2 Puff(s) Inhalation every 6 hours PRN Bronchospasm

## 2022-02-16 DIAGNOSIS — N18.6 END STAGE RENAL DISEASE: ICD-10-CM

## 2022-02-16 LAB
ALBUMIN SERPL ELPH-MCNC: 3.1 G/DL — LOW (ref 3.3–5)
ALP SERPL-CCNC: 134 U/L — HIGH (ref 40–120)
ALT FLD-CCNC: 96 U/L — HIGH (ref 12–78)
ANION GAP SERPL CALC-SCNC: 16 MMOL/L — SIGNIFICANT CHANGE UP (ref 5–17)
AST SERPL-CCNC: 124 U/L — HIGH (ref 15–37)
BILIRUB SERPL-MCNC: 0.8 MG/DL — SIGNIFICANT CHANGE UP (ref 0.2–1.2)
BUN SERPL-MCNC: 94 MG/DL — HIGH (ref 7–23)
CALCIUM SERPL-MCNC: 9.2 MG/DL — SIGNIFICANT CHANGE UP (ref 8.5–10.1)
CHLORIDE SERPL-SCNC: 95 MMOL/L — LOW (ref 96–108)
CO2 SERPL-SCNC: 20 MMOL/L — LOW (ref 22–31)
CREAT SERPL-MCNC: 7.06 MG/DL — HIGH (ref 0.5–1.3)
GLUCOSE SERPL-MCNC: 175 MG/DL — HIGH (ref 70–99)
HCT VFR BLD CALC: 31.4 % — LOW (ref 39–50)
HGB BLD-MCNC: 10.5 G/DL — LOW (ref 13–17)
MCHC RBC-ENTMCNC: 33.4 GM/DL — SIGNIFICANT CHANGE UP (ref 32–36)
MCHC RBC-ENTMCNC: 35.5 PG — HIGH (ref 27–34)
MCV RBC AUTO: 106.1 FL — HIGH (ref 80–100)
PLATELET # BLD AUTO: 150 K/UL — SIGNIFICANT CHANGE UP (ref 150–400)
POTASSIUM SERPL-MCNC: 4.9 MMOL/L — SIGNIFICANT CHANGE UP (ref 3.5–5.3)
POTASSIUM SERPL-SCNC: 4.9 MMOL/L — SIGNIFICANT CHANGE UP (ref 3.5–5.3)
PROT SERPL-MCNC: 7.2 GM/DL — SIGNIFICANT CHANGE UP (ref 6–8.3)
RBC # BLD: 2.96 M/UL — LOW (ref 4.2–5.8)
RBC # FLD: 15.6 % — HIGH (ref 10.3–14.5)
SODIUM SERPL-SCNC: 131 MMOL/L — LOW (ref 135–145)
WBC # BLD: 8.43 K/UL — SIGNIFICANT CHANGE UP (ref 3.8–10.5)
WBC # FLD AUTO: 8.43 K/UL — SIGNIFICANT CHANGE UP (ref 3.8–10.5)

## 2022-02-16 PROCEDURE — 99233 SBSQ HOSP IP/OBS HIGH 50: CPT

## 2022-02-16 PROCEDURE — 99497 ADVNCD CARE PLAN 30 MIN: CPT | Mod: 25

## 2022-02-16 PROCEDURE — 99232 SBSQ HOSP IP/OBS MODERATE 35: CPT

## 2022-02-16 PROCEDURE — 76700 US EXAM ABDOM COMPLETE: CPT | Mod: 26

## 2022-02-16 RX ADMIN — Medication 88 MICROGRAM(S): at 05:20

## 2022-02-16 RX ADMIN — ERYTHROPOIETIN 10000 UNIT(S): 10000 INJECTION, SOLUTION INTRAVENOUS; SUBCUTANEOUS at 11:11

## 2022-02-16 RX ADMIN — MIDODRINE HYDROCHLORIDE 5 MILLIGRAM(S): 2.5 TABLET ORAL at 11:11

## 2022-02-16 RX ADMIN — BUDESONIDE AND FORMOTEROL FUMARATE DIHYDRATE 2 PUFF(S): 160; 4.5 AEROSOL RESPIRATORY (INHALATION) at 20:57

## 2022-02-16 RX ADMIN — Medication 6 MILLIGRAM(S): at 12:47

## 2022-02-16 RX ADMIN — APIXABAN 2.5 MILLIGRAM(S): 2.5 TABLET, FILM COATED ORAL at 22:01

## 2022-02-16 RX ADMIN — MONTELUKAST 10 MILLIGRAM(S): 4 TABLET, CHEWABLE ORAL at 22:01

## 2022-02-16 RX ADMIN — APIXABAN 2.5 MILLIGRAM(S): 2.5 TABLET, FILM COATED ORAL at 12:47

## 2022-02-16 RX ADMIN — Medication 3 MILLIGRAM(S): at 22:01

## 2022-02-16 NOTE — PROGRESS NOTE ADULT - CONVERSATION DETAILS
gg Spoke with pts children via phone, reviewed the role of palliative medicine, discussed goals of care, assisted with planning, and provided supportive counseling. Pt. does not have the insight to make complex decisions, and pts family understands and agrees with this. The patient most recently lived in [place] until a few weeks ago; the patient's family stated that he was doing well and taking himself to HD. Then in September patient started to have cognitive issues and was not eating and forgetting to take medications. The patient's daughter, an RN, moved him up to [place] to live in her home.   The family discussed how he has declined and that the current goal is to rehab. At this point, he wishes to continue with Dialysis and is not ready for a comfort focus.   We reviewed pts wishes regarding resuscitation and intubation, as pt. was a Full Code. Pts children report that they know pt. I would not want to be on a ventilator. The team explained that part of resuscitation is intubation; therefore, he should have DNR and DNI in place if he did not want to be intubated. Pts family expressed understanding and agreed that they do not feel pt. Would like to be resuscitated or intubated. Pts children consented to DNR/DNI. MOLST was reviewed and completed stating DNR/DNI, Limited Medical, Send to hospital, Trial of IV fluids, Use antibiotics. The feeding tube portion was not completed as the family would like to think over further.   The plan is for [facility] upon d/c and to continue with Dialysis. Emotional support was provided. Our team will sign off as patient goals are clear and the patient is not exhibiting any symptoms.  Spoke with Dr. Tan please reconsult if needed

## 2022-02-16 NOTE — DIETITIAN INITIAL EVALUATION ADULT. - OTHER INFO
86 year old male with pmhx of ESRD on HD MWF via AVF, oliguric, (Dr Eastman), obstructive CAD s/p attempted unsuccessful PCI 2018, HFpEF 50% (prior 20%), severe pulmonary HTN, with hx of bleeding (due to LE hematoma post cath due to 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 States that his shortness of breath became worse and "2L doesn't cut it anymore". He also admits to occasional cough but denies chest pain, n/v/d/c. He states he completed his HD yesterday without any complications.   Daughter expressed to ED doctor that he is having trouble walking and would like to go to [facility].     Pt seen for assessment of malnutrition. Pt presents thin with ESRD on HD. Pt currently eating, but shows clear signs of muscle and fat wasting suggesting history of weight loss. no noted c/s difficulty. No noted n/v/d/c. no noted food allergies. given degree of muscle and fat wasting recommend Nepro BID to help meet needs.

## 2022-02-16 NOTE — DIETITIAN INITIAL EVALUATION ADULT. - PERTINENT MEDS FT
MEDICATIONS  (STANDING):  apixaban 2.5 milliGRAM(s) Oral two times a day  budesonide 160 MICROgram(s)/formoterol 4.5 MICROgram(s) Inhaler 2 Puff(s) Inhalation two times a day  dexAMETHasone     Tablet 6 milliGRAM(s) Oral daily  epoetin brianna-epbx (RETACRIT) Injectable 50622 Unit(s) IV Push <User Schedule>  levothyroxine 88 MICROGram(s) Oral daily  melatonin 3 milliGRAM(s) Oral at bedtime  midodrine. 5 milliGRAM(s) Oral <User Schedule>  montelukast 10 milliGRAM(s) Oral at bedtime    MEDICATIONS  (PRN):  ALBUTerol    90 MICROgram(s) HFA Inhaler 2 Puff(s) Inhalation every 6 hours PRN Bronchospasm

## 2022-02-16 NOTE — DIETITIAN INITIAL EVALUATION ADULT. - PERTINENT LABORATORY DATA
02-16    131<L>  |  95<L>  |  94<H>  ----------------------------<  175<H>  4.9   |  20<L>  |  7.06<H>    Ca    9.2      16 Feb 2022 08:00    TPro  7.2  /  Alb  3.1<L>  /  TBili  0.8  /  DBili  x   /  AST  124<H>  /  ALT  96<H>  /  AlkPhos  134<H>  02-16

## 2022-02-16 NOTE — DIETITIAN NUTRITION RISK NOTIFICATION - TREATMENT: THE FOLLOWING DIET HAS BEEN RECOMMENDED
Diet, Regular:   Low Sodium     Special Instructions for Nursing:  Low Sodium (02-10-22 @ 13:14) [Active]

## 2022-02-16 NOTE — DIETITIAN INITIAL EVALUATION ADULT. - ORAL INTAKE PTA/DIET HISTORY
Pt seen eating lunch and appears to have a good appetite. Pt states he got done with dialysis and is very hungry. Pt does present thin with very clear signs of muscle and fat wasting. Pt likely has history of weight loss. Discussed supplement and pt is agreeable.

## 2022-02-16 NOTE — PROGRESS NOTE ADULT - SUBJECTIVE AND OBJECTIVE BOX
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       MEDICATIONS  (STANDING):  apixaban 2.5 milliGRAM(s) Oral two times a day  budesonide 160 MICROgram(s)/formoterol 4.5 MICROgram(s) Inhaler 2 Puff(s) Inhalation two times a day  dexAMETHasone     Tablet 6 milliGRAM(s) Oral daily  epoetin brianna-epbx (RETACRIT) Injectable 39155 Unit(s) IV Push <User Schedule>  levothyroxine 88 MICROGram(s) Oral daily  melatonin 3 milliGRAM(s) Oral at bedtime  midodrine. 5 milliGRAM(s) Oral <User Schedule>  montelukast 10 milliGRAM(s) Oral at bedtime      Vital Signs Last 24 Hrs  T(C): 36.3 (16 Feb 2022 15:34), Max: 36.6 (16 Feb 2022 05:39)  T(F): 97.4 (16 Feb 2022 15:34), Max: 97.9 (16 Feb 2022 05:39)  HR: 83 (16 Feb 2022 15:34) (82 - 101)  BP: 125/77 (16 Feb 2022 15:34) (116/71 - 135/79)  BP(mean): 93 (16 Feb 2022 15:34) (93 - 93)  RR: 18 (16 Feb 2022 15:34) (18 - 95)  SpO2: 96% (16 Feb 2022 15:34) (96% - 96%)      I&O's Detail    16 Feb 2022 07:01  -  16 Feb 2022 21:06  --------------------------------------------------------  IN:    Other (mL): 500 mL  Total IN: 500 mL    OUT:    Other (mL): 3000 mL  Total OUT: 3000 mL    Total NET: -2500 mL      PHYSICAL EXAM:  GENERAL: comfortable.  CHEST/LUNG: bilateral coarse bs and rhonchi  HEART: S1S2 RRR  ABDOMEN: soft  EXTREMITIES: no edema  AVF +     LABS:                          10.5   8.43  )-----------( 150      ( 16 Feb 2022 08:00 )             31.4         131    |  95     |  94     ----------------------------<  175       16 Feb 2022 08:00  4.9     |  20     |  7.06     133    |  96     |  67     ----------------------------<  88        14 Feb 2022 08:09  4.8     |  25     |  7.00     133    |  95     |  45     ----------------------------<  88        13 Feb 2022 06:37  3.9     |  26     |  5.24     Ca    9.2        16 Feb 2022 08:00  Ca    9.5        14 Feb 2022 08:09          RADIOLOGY & ADDITIONAL TESTS:

## 2022-02-16 NOTE — PROGRESS NOTE ADULT - ASSESSMENT
87 yo male with hx of ESRD on HD MWF via AVF at [facility] eduin D[facility]az moved from [place] and now was recenlty DC'd from  due to rapid afib and sob   returned next day w increased SOB and hypoxia    SOB/Hypoxia  - Multifactorial    Pulm HTN/COPD/ Fluid Overload    COVID + now    fluid removal at HD today to maintain euvolemia    need to adjust his EDW after this admission pt may have lost weight since moving from [place]   - 69 kg    limit fluid intake advised       ESRD on HD MWF    Mido assist         Anemia   MARIS at HD    COPD on home 02    per Pulm     COVID    per ID Decadron     Hyperphos - monitor for now     not on binders       * pt seen earlier on HD , note now    Dispo _ pt going to [facility] rehab on TTS Covid shift - change to TTS here - short HD Thursday tomorrow to reflect this     HD RN aware

## 2022-02-16 NOTE — PROGRESS NOTE ADULT - SUBJECTIVE AND OBJECTIVE BOX
HPI:      PAIN: ( )Yes   ( )No  Level:  Location:  Intensity:    /10  Quality:  Aggravating Factors:  Alleviating Factors:  Radiation:  Duration/Timing:  Impact on ADLs:    DYSPNEA: ( ) Yes  ( ) No  Level:        Review of Systems:    Anxiety-  Depression-  Physical Discomfort-  Dyspnea-  Constipation-  Diarrhea-  Nausea-  Vomiting-  Anorexia-  Weight Loss-   Cough-  Secretions-  Fatigue-  Weakness-  Delirium-    All other systems reviewed and negative  Unable to obtain/Limited due to:        PHYSICAL EXAM:    Vital Signs Last 24 Hrs  T(C): 36.4 (2022 11:53), Max: 36.6 (2022 05:39)  T(F): 97.6 (2022 11:53), Max: 97.9 (2022 05:39)  HR: 90 (2022 11:53) (82 - 105)  BP: 130/83 (2022 11:53) (114/81 - 135/79)  BP(mean): 91 (15 Feb 2022 16:10) (91 - 91)  RR: 18 (2022 11:53) (18 - 95)  SpO2: 93% (15 Feb 2022 16:10) (93% - 93%)  Daily     Daily Weight in k.8 (2022 06:35)    PPSV2:   %  FAST:    General:  HEENT:  Lungs:  Cardiac:  GI:  :  Ext:  Neuro:      LABS:                        10.5   8.43  )-----------( 150      ( 2022 08:00 )             31.4         131<L>  |  95<L>  |  94<H>  ----------------------------<  175<H>  4.9   |  20<L>  |  7.06<H>    Ca    9.2      2022 08:00    TPro  7.2  /  Alb  3.1<L>  /  TBili  0.8  /  DBili  x   /  AST  124<H>  /  ALT  96<H>  /  AlkPhos  134<H>        Albumin: Albumin, Serum: 3.1 g/dL ( @ 08:00)      Allergies    No Known Allergies    Intolerances      MEDICATIONS  (STANDING):  apixaban 2.5 milliGRAM(s) Oral two times a day  budesonide 160 MICROgram(s)/formoterol 4.5 MICROgram(s) Inhaler 2 Puff(s) Inhalation two times a day  dexAMETHasone     Tablet 6 milliGRAM(s) Oral daily  epoetin brianna-epbx (RETACRIT) Injectable 76650 Unit(s) IV Push <User Schedule>  levothyroxine 88 MICROGram(s) Oral daily  melatonin 3 milliGRAM(s) Oral at bedtime  midodrine. 5 milliGRAM(s) Oral <User Schedule>  montelukast 10 milliGRAM(s) Oral at bedtime    MEDICATIONS  (PRN):  ALBUTerol    90 MICROgram(s) HFA Inhaler 2 Puff(s) Inhalation every 6 hours PRN Bronchospasm      RADIOLOGY: HPI:      PAIN: ( )Yes   (x )No  DYSPNEA: ( ) Yes  (x ) No    Review of Systems:    All other systems reviewed and negative    PHYSICAL EXAM:    Vital Signs Last 24 Hrs  T(C): 36.4 (2022 11:53), Max: 36.6 (2022 05:39)  T(F): 97.6 (2022 11:53), Max: 97.9 (2022 05:39)  HR: 90 (2022 11:53) (82 - 105)  BP: 130/83 (2022 11:53) (114/81 - 135/79)  BP(mean): 91 (15 Feb 2022 16:10) (91 - 91)  RR: 18 (2022 11:53) (18 - 95)  SpO2: 93% (15 Feb 2022 16:10) (93% - 93%)    Daily Weight in k.8 (2022 06:35)    PPSV2: 30-40  %  FAST: unsure of baseline     General: elderly gentleman in chair, NAD   Mental Status: alert and oriented, defers to daughter   HEENT: mmm   Lungs: diminished breath sounds   Cardiac: S1S2 +   GI: non tender, non distended, +BS   : non tender   Ext: mild edema   Neuro: periods of forgetfulness    LABS:                        10.5   8.43  )-----------( 150      ( 2022 08:00 )             31.4         131<L>  |  95<L>  |  94<H>  ----------------------------<  175<H>  4.9   |  20<L>  |  7.06<H>    Ca    9.2      2022 08:00    TPro  7.2  /  Alb  3.1<L>  /  TBili  0.8  /  DBili  x   /  AST  124<H>  /  ALT  96<H>  /  AlkPhos  134<H>        Albumin: Albumin, Serum: 3.1 g/dL ( @ 08:00)    Allergies    No Known Allergies    Intolerances      MEDICATIONS  (STANDING):  apixaban 2.5 milliGRAM(s) Oral two times a day  budesonide 160 MICROgram(s)/formoterol 4.5 MICROgram(s) Inhaler 2 Puff(s) Inhalation two times a day  dexAMETHasone     Tablet 6 milliGRAM(s) Oral daily  epoetin brianna-epbx (RETACRIT) Injectable 01378 Unit(s) IV Push <User Schedule>  levothyroxine 88 MICROGram(s) Oral daily  melatonin 3 milliGRAM(s) Oral at bedtime  midodrine. 5 milliGRAM(s) Oral <User Schedule>  montelukast 10 milliGRAM(s) Oral at bedtime    MEDICATIONS  (PRN):  ALBUTerol    90 MICROgram(s) HFA Inhaler 2 Puff(s) Inhalation every 6 hours PRN Bronchospasm      RADIOLOGY:     HPI: patient seen and examined with no family at bedside, patient denies any complaints at this time, [facility] meeting held with patients family today please see note       PAIN: ( )Yes   (x )No  DYSPNEA: ( ) Yes  (x ) No    Review of Systems:    All other systems reviewed and negative    PHYSICAL EXAM:    Vital Signs Last 24 Hrs  T(C): 36.4 (2022 11:53), Max: 36.6 (2022 05:39)  T(F): 97.6 (2022 11:53), Max: 97.9 (2022 05:39)  HR: 90 (2022 11:53) (82 - 105)  BP: 130/83 (2022 11:53) (114/81 - 135/79)  BP(mean): 91 (15 Feb 2022 16:10) (91 - 91)  RR: 18 (2022 11:53) (18 - 95)  SpO2: 93% (15 Feb 2022 16:10) (93% - 93%)    Daily Weight in k.8 (2022 06:35)    PPSV2: 30-40  %  FAST: unsure of baseline     General: elderly gentleman in chair, NAD   Mental Status: alert and oriented, defers to daughter   HEENT: mmm   Lungs: diminished breath sounds   Cardiac: S1S2 +   GI: non tender, non distended, +BS   : non tender   Ext: mild edema   Neuro: periods of forgetfulness    LABS:                        10.5   8.43  )-----------( 150      ( 2022 08:00 )             31.4         131<L>  |  95<L>  |  94<H>  ----------------------------<  175<H>  4.9   |  20<L>  |  7.06<H>    Ca    9.2      2022 08:00    TPro  7.2  /  Alb  3.1<L>  /  TBili  0.8  /  DBili  x   /  AST  124<H>  /  ALT  96<H>  /  AlkPhos  134<H>        Albumin: Albumin, Serum: 3.1 g/dL (02-16 @ 08:00)    Allergies    No Known Allergies    Intolerances      MEDICATIONS  (STANDING):  apixaban 2.5 milliGRAM(s) Oral two times a day  budesonide 160 MICROgram(s)/formoterol 4.5 MICROgram(s) Inhaler 2 Puff(s) Inhalation two times a day  dexAMETHasone     Tablet 6 milliGRAM(s) Oral daily  epoetin brianna-epbx (RETACRIT) Injectable 09637 Unit(s) IV Push <User Schedule>  levothyroxine 88 MICROGram(s) Oral daily  melatonin 3 milliGRAM(s) Oral at bedtime  midodrine. 5 milliGRAM(s) Oral <User Schedule>  montelukast 10 milliGRAM(s) Oral at bedtime    MEDICATIONS  (PRN):  ALBUTerol    90 MICROgram(s) HFA Inhaler 2 Puff(s) Inhalation every 6 hours PRN Bronchospasm      RADIOLOGY:

## 2022-02-16 NOTE — PROGRESS NOTE ADULT - SUBJECTIVE AND OBJECTIVE BOX
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 States that his shortness of breath became worse and "2L doesn't cut it anymore". He also admits to occasional cough but denies chest pain, n/v/d/c. He states he completed his HD yesterday without any complications. Daughter expressed to ED doctor that he is having trouble walking and would like to go to [facility].    Patient feels better post diuresis. Denies any HA, CP. SOB much improved post HD. Care discussed with patient's family -  as patient has multitude of complex medical problems -  family requests palliative care eval.     Medical progress: Denies any HA, CP, SOB. No fevers, chills or shakes. Labs and vitals reviewed. Patient clinically looks great  Complaints: no new complaints  State of mind: normal conversation  O2 - 3 L of NC / palliative care meeting appreciated /  family updated. patient is anticipated to be discharged on saterday as patient is COVID +    REVIEW OF SYSTEMS:  General: NAD, hemodynamically stable, + weakness  HEENT:  Eyes:  No visual loss, blurred vision, double vision or yellow sclerae. Ears, Nose, Throat:  No hearing loss, sneezing, congestion, runny nose or sore throat.  SKIN:  No rash or itching.  CARDIOVASCULAR:  No chest pain, chest pressure or chest discomfort. No palpitations or edema.  RESPIRATORY: + SOB  GASTROINTESTINAL:  No anorexia, nausea, vomiting or diarrhea. No abdominal pain or blood.  NEUROLOGICAL:  No headache, dizziness, syncope, paralysis, ataxia, numbness or tingling in the extremities. No change in bowel or bladder control.  MUSCULOSKELETAL:  No muscle, back pain, joint pain or stiffness.  HEMATOLOGIC:  No anemia, bleeding or bruising.  LYMPHATICS:  No enlarged nodes. No history of splenectomy.  ENDOCRINOLOGIC:  No reports of sweating, cold or heat intolerance. No polyuria or polydipsia.  ALLERGIES:  No history of asthma, hives, eczema or rhinitis.    Physical Exam:   GENERAL APPEARANCE:  elderly, frail, deconditioend  ICU Vital Signs Last 24 Hrs  T(C): 36.4 (16 Feb 2022 11:53), Max: 36.6 (16 Feb 2022 05:39)  T(F): 97.6 (16 Feb 2022 11:53), Max: 97.9 (16 Feb 2022 05:39)  HR: 90 (16 Feb 2022 11:53) (82 - 105)  BP: 130/83 (16 Feb 2022 11:53) (114/81 - 135/79)  BP(mean): 91 (15 Feb 2022 16:10) (91 - 91)  RR: 18 (16 Feb 2022 11:53) (18 - 95)  SpO2: 93% (15 Feb 2022 16:10) (93% - 93%)  HEENT:  atraumatic   Skin:  very thin /  dry  NECK:  Supple without lymphadenopathy.   HEART:  Regular rate and rhythm. normal S1 and S2, No M/R/G  LUNGS:  on 3 L of NC  ABDOMEN:  Soft, nontender, nondistended with good bowel sounds heard  EXTREMITIES:  Without cyanosis, clubbing or edema.   NEUROLOGICAL:  Gross nonfocal     Labs:       CBC Full  -  ( 16 Feb 2022 08:00 )  WBC Count : 8.43 K/uL  RBC Count : 2.96 M/uL  Hemoglobin : 10.5 g/dL  Hematocrit : 31.4 %  Platelet Count - Automated : 150 K/uL  Mean Cell Volume : 106.1 fl  Mean Cell Hemoglobin : 35.5 pg  Mean Cell Hemoglobin Concentration : 33.4 gm/dL  Auto Neutrophil # : x  Auto Lymphocyte # : x  Auto Monocyte # : x  Auto Eosinophil # : x  Auto Basophil # : x  Auto Neutrophil % : x  Auto Lymphocyte % : x  Auto Monocyte % : x  Auto Eosinophil % : x  Auto Basophil % : x        02-16    131<L>  |  95<L>  |  94<H>  ----------------------------<  175<H>  4.9   |  20<L>  |  7.06<H>    Ca    9.2      16 Feb 2022 08:00    TPro  7.2  /  Alb  3.1<L>  /  TBili  0.8  /  DBili  x   /  AST  124<H>  /  ALT  96<H>  /  AlkPhos  134<H>  02-16      Pt is a 86 year old male with pmhx of ESRD on HD MWF via AVF, oliguric, (Dr Eastman), obstructive CAD s/p attempted unsuccessful PCI 2018, HFpEF 50% (prior 20%), severe pulmonary HTN, with hx of bleeding (due to LE hematoma post cath due to 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 Admitted for [facility] placement and HD    # Acute hypoxic respiratory failure due to combination:   # Cardiorenal syndrome  # End Stage Renal Disease on HD  # Heart Failure with preserved Ejection Fraction  # Severe Pulmonary Hypertension   - continue with HD  - PT and Case management consulted for [facility] placement   - Continue all home medications with hold parameters   - Currently requiring 3L NC in 90s  - Care discussed with cardiology Dr. Pugh -  as patient is fully HD dependent /  no med optimization at this time  - Care discussed with pulmonary - signed off, reconsult if needed  - palliative care eval    # Emphysema / COPD home O2 dependancy 2L  - in the hospital on 3 L of NC  - Continue O2 supplementation; caution not to over oxygenate (goal 88-92%)  - care discussed with Dr. Jeter    # Atrial fibrillation  - on AC -  eliquis    # Hypertension, Hyperlipidemia, CAD, Hypothyroidism   - Losartan held for hypotension  - Coreg continued with hold parameters  - DVT ppx with renally dosed Eliquis     # moderate calorie malnutrition  - encourage po intake    # Transaminitis  - this could be secondary to recent covid infection, this being said, differential includes congestive hepatopathy  - monitor liver function  - RUQ US pending    # COVID + on discharge PCR   - patient on decadrone day # 2  - patient started on decadrone because of increased shortness of breath and hypoxia on re-admission  - not a candidate for remdesevir  - on anticoagulation

## 2022-02-16 NOTE — CHART NOTE - NSCHARTNOTEFT_GEN_A_CORE
HPI:  86 year old male with pmhx of ESRD on HD MWF via AVF, oliguric, (Dr Eastman), obstructive CAD s/p attempted unsuccessful PCI 2018, HFpEF 50% (prior 20%), severe pulmonary HTN, with hx of bleeding (due to LE hematoma post cath due to 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  (10 Feb 2022 13:28)      PERTINENT PMH REVIEWED:  [ X ] YES [ ] NO           Primary Contact:    Pts daughter and son surrogate decision maker          HCP [  ] Surrogate [  X ] Guardian [   ]    Mental Status: Pt. lacks insight for complex decision making   Concerns of Depression [  ]-not identified   Anxiety [   ]- not identified   Baseline ADLs (prior to admission):  Independent [ ] moderately [ ] fully   Dependent   [ X ] moderately [ ]fully    Family Meeting attendees: Sylvie Garcia NP, Bipin Jernigan Palliative NP, pts son and daughter via phone    Anticipated Grief: Patient [  ] Family [ X ]    Caregiver [place] Assessed: Yes [ X ] No [  ]    Druze: [facility]     Spiritual Concerns: [facility]    Goals of Care: Comfort [  ] Rehabilitation [ X ] Curative [  ] Life Prolonging [  ]    ADVANCE DIRECTIVES:  MOLST Completed stating DNR/DNI, Limited Medical, Send to hospital, Trial of IV fluids, Use antibiotics (feeding tube portion was not completed as this is something family would like to think over further)     Anticipated D/C Plan: [facility]                     Summary:    This SW and Palliative NP Sylvie Garcia spoke with pts children via phone to discuss goals of care, assist with planning and provide supportive counseling. Pt. does not have insight to make complex decisions and pts family understands this. Pt. is a Dialysis pt. They discussed how he has declined and that the current goal is to get him to rehab. At this point this wish to continue with Dialysis and are not ready for a comfort focus.     We reviewed pts wishes regarding resuscitation and intubation, as pt. was a Full Code. Pts children report that they know pt. would not want to be on a ventilator. Team explained that part of resuscitation is intubation therefore, if he did not want to be intubated then he should have DNR and DNI in place. Pts family expressed understanding and agreed that they do not feel pt. would want to be resuscitated or intubated. Pts children consented to DNR/DNI. MOLST was reviewed and completed stating DNR/DNI, Limited Medical, Send to hospital, Trial of IV fluids, Use antibiotics. Feeding tube portion was not completed as this is something family would like to think over further.     Plan at this time is for [facility] upon d/c and to continue with Dialysis. Emotional support proivded. Our team will continue to follow.

## 2022-02-16 NOTE — PROGRESS NOTE ADULT - ASSESSMENT
Pt is an 86y old Male with hx of ESRD on HD MWF via AVF, oliguric, (Dr. 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 States that his shortness of breath became worse and "2L doesn't cut it anymore". He also admits to occasional cough but denies chest pain, n/v/d/c. He states he completed his HD yesterday without any complications. The daughter expressed to the ED doctor that he has trouble walking and would like to go to [facility]. Palliative medicine consulted to help establish GOC and advance care planning.     1) End Stage Renal Disease   - on Dialysis (MWF)   - Nephrology consulted appreciated   - monitor labs     2) Hypoxic respiratory failure  - multifactorial: Heart Failure with preserved Ejection Fraction, Severe Pulmonary Hypertension, Emphysema / COPD  - pulmonology consult appreciated   - Continue O2 supplementation  - 2/13/2022 patient tested positive for COVID - no symptoms continue to monitor     3) history of Hypertension, Hyperlipidemia, CAD,  Atrial fibrillation  - c/w Losartan, Coreg while monitoring blood work   - c/w with renally dosed Eliquis   - cardiology consult appreciated     4) Debility   - moderate calorie malnutrition  - encourage po intake  - registered dietician consult appreciated   - PT evaluation   - ?possible history of dementia - will discuss with family     5) advance care planning \  - with cognitive decline patient appears to lack insight at times in to complex medical decision making   - patient defers to his daughter   - MOLST: DNR/I, limited medical interventions, IV fluids, use antibiotics    - Emotional support was provided. Our team will sign off as patient goals are clear and the patient is not exhibiting any symptoms.  Spoke with Dr. Tan please reconsult if needed

## 2022-02-17 LAB
ALBUMIN SERPL ELPH-MCNC: 3.1 G/DL — LOW (ref 3.3–5)
ALP SERPL-CCNC: 136 U/L — HIGH (ref 40–120)
ALT FLD-CCNC: 101 U/L — HIGH (ref 12–78)
ANION GAP SERPL CALC-SCNC: 12 MMOL/L — SIGNIFICANT CHANGE UP (ref 5–17)
AST SERPL-CCNC: 95 U/L — HIGH (ref 15–37)
BILIRUB SERPL-MCNC: 0.8 MG/DL — SIGNIFICANT CHANGE UP (ref 0.2–1.2)
BUN SERPL-MCNC: 72 MG/DL — HIGH (ref 7–23)
CALCIUM SERPL-MCNC: 9.4 MG/DL — SIGNIFICANT CHANGE UP (ref 8.5–10.1)
CHLORIDE SERPL-SCNC: 95 MMOL/L — LOW (ref 96–108)
CO2 SERPL-SCNC: 26 MMOL/L — SIGNIFICANT CHANGE UP (ref 22–31)
CREAT SERPL-MCNC: 5.72 MG/DL — HIGH (ref 0.5–1.3)
GLUCOSE SERPL-MCNC: 99 MG/DL — SIGNIFICANT CHANGE UP (ref 70–99)
HCT VFR BLD CALC: 32.7 % — LOW (ref 39–50)
HGB BLD-MCNC: 10.9 G/DL — LOW (ref 13–17)
MCHC RBC-ENTMCNC: 33.3 GM/DL — SIGNIFICANT CHANGE UP (ref 32–36)
MCHC RBC-ENTMCNC: 35.7 PG — HIGH (ref 27–34)
MCV RBC AUTO: 107.2 FL — HIGH (ref 80–100)
PLATELET # BLD AUTO: 147 K/UL — LOW (ref 150–400)
POTASSIUM SERPL-MCNC: 4.1 MMOL/L — SIGNIFICANT CHANGE UP (ref 3.5–5.3)
POTASSIUM SERPL-SCNC: 4.1 MMOL/L — SIGNIFICANT CHANGE UP (ref 3.5–5.3)
PROT SERPL-MCNC: 7.2 GM/DL — SIGNIFICANT CHANGE UP (ref 6–8.3)
RBC # BLD: 3.05 M/UL — LOW (ref 4.2–5.8)
RBC # FLD: 15.8 % — HIGH (ref 10.3–14.5)
SODIUM SERPL-SCNC: 133 MMOL/L — LOW (ref 135–145)
WBC # BLD: 7.52 K/UL — SIGNIFICANT CHANGE UP (ref 3.8–10.5)
WBC # FLD AUTO: 7.52 K/UL — SIGNIFICANT CHANGE UP (ref 3.8–10.5)

## 2022-02-17 PROCEDURE — 99232 SBSQ HOSP IP/OBS MODERATE 35: CPT

## 2022-02-17 RX ADMIN — APIXABAN 2.5 MILLIGRAM(S): 2.5 TABLET, FILM COATED ORAL at 09:09

## 2022-02-17 RX ADMIN — Medication 3 MILLIGRAM(S): at 22:09

## 2022-02-17 RX ADMIN — Medication 88 MICROGRAM(S): at 06:22

## 2022-02-17 RX ADMIN — ALBUTEROL 2 PUFF(S): 90 AEROSOL, METERED ORAL at 09:14

## 2022-02-17 RX ADMIN — APIXABAN 2.5 MILLIGRAM(S): 2.5 TABLET, FILM COATED ORAL at 22:09

## 2022-02-17 RX ADMIN — MONTELUKAST 10 MILLIGRAM(S): 4 TABLET, CHEWABLE ORAL at 22:09

## 2022-02-17 RX ADMIN — BUDESONIDE AND FORMOTEROL FUMARATE DIHYDRATE 2 PUFF(S): 160; 4.5 AEROSOL RESPIRATORY (INHALATION) at 09:14

## 2022-02-17 RX ADMIN — Medication 6 MILLIGRAM(S): at 09:09

## 2022-02-17 RX ADMIN — BUDESONIDE AND FORMOTEROL FUMARATE DIHYDRATE 2 PUFF(S): 160; 4.5 AEROSOL RESPIRATORY (INHALATION) at 20:34

## 2022-02-17 NOTE — PROGRESS NOTE ADULT - SUBJECTIVE AND OBJECTIVE BOX
seen on HD   UF goal 2.5 L    86 year old male with pmhx of ESRD on HD MWF via AVF, oliguric, (Dr Eastman), obstructive CAD s/p attempted unsuccessful PCI 2018, HFpEF 50% (prior 20%), severe pulmonary HTN, with hx of bleeding (due to LE hematoma post cath due to 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      MEDICATIONS  (STANDING):  apixaban 2.5 milliGRAM(s) Oral two times a day  budesonide 160 MICROgram(s)/formoterol 4.5 MICROgram(s) Inhaler 2 Puff(s) Inhalation two times a day  dexAMETHasone     Tablet 6 milliGRAM(s) Oral daily  epoetin brianna-epbx (RETACRIT) Injectable 33919 Unit(s) IV Push <User Schedule>  levothyroxine 88 MICROGram(s) Oral daily  melatonin 3 milliGRAM(s) Oral at bedtime  midodrine. 5 milliGRAM(s) Oral <User Schedule>  montelukast 10 milliGRAM(s) Oral at bedtime    Vital Signs Last 24 Hrs  T(C): 36.2 (17 Feb 2022 16:23), Max: 36.4 (16 Feb 2022 21:57)  T(F): 97.2 (17 Feb 2022 16:23), Max: 97.5 (16 Feb 2022 21:57)  HR: 82 (17 Feb 2022 16:23) (79 - 96)  BP: 135/69 (17 Feb 2022 16:23) (104/65 - 135/69)  BP(mean): 89 (17 Feb 2022 16:23) (89 - 89)  RR: 17 (17 Feb 2022 16:23) (16 - 19)  SpO2: 97% (17 Feb 2022 16:23) (93% - 98%)      I&O's Detail    16 Feb 2022 07:01  -  17 Feb 2022 07:00  --------------------------------------------------------  IN:    Other (mL): 500 mL  Total IN: 500 mL    OUT:    Other (mL): 3000 mL  Total OUT: 3000 mL    Total NET: -2500 mL      17 Feb 2022 07:01  -  17 Feb 2022 18:29  --------------------------------------------------------  IN:    Other (mL): 500 mL  Total IN: 500 mL    OUT:    Other (mL): 2500 mL  Total OUT: 2500 mL    Total NET: -2000 mL          PHYSICAL EXAM:  GENERAL: comfortable.  CHEST/LUNG: bilateral coarse bs and rhonchi  HEART: S1S2 RRR  ABDOMEN: soft  EXTREMITIES: no edema  AVF +     LABS:                          10.9   7.52  )-----------( 147      ( 17 Feb 2022 07:40 )             32.7                         10.5   8.43  )-----------( 150      ( 16 Feb 2022 08:00 )             31.4         133    |  95     |  72     ----------------------------<  99        17 Feb 2022 07:40  4.1     |  26     |  5.72     131    |  95     |  94     ----------------------------<  175       16 Feb 2022 08:00  4.9     |  20     |  7.06     133    |  96     |  67     ----------------------------<  88        14 Feb 2022 08:09  4.8     |  25     |  7.00     Ca    9.4        17 Feb 2022 07:40  Ca    9.2        16 Feb 2022 08:00        TPro  7.2    /  Alb  3.1    /  TBili  0.8    /        17 Feb 2022 07:40  DBili  x      /  AST  95     /  ALT  101    /  AlkPhos  136      TPro  7.2    /  Alb  3.1    /  TBili  0.8    /        16 Feb 2022 08:00  DBili  x      /  AST  124    /  ALT  96     /  AlkPhos  134                RADIOLOGY & ADDITIONAL TESTS:

## 2022-02-17 NOTE — PROGRESS NOTE ADULT - SUBJECTIVE AND OBJECTIVE BOX
CC: SOB (16 Feb 2022 19:03)    HPI:  86 year old male with pmhx of ESRD on HD MWF via AVF, oliguric, (Dr Eastman), obstructive CAD s/p attempted unsuccessful PCI 2018, HFpEF 50% (prior 20%), severe pulmonary HTN, with hx of bleeding (due to LE hematoma post cath due to 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 States that his shortness of breath became worse and "2L doesn't cut it anymore". He also admits to occasional cough but denies chest pain, n/v/d/c. He states he completed his HD yesterday without any complications.   Daughter expressed to ED doctor that he is having trouble walking and would like to go to [facility].  (10 Feb 2022 13:28)    INTERVAL HPI/ OVERNIGHT EVENTS: chart reviewed,  Pt was seen and examined, reports no cough, SOB much improved since admission, was ambulating  with PT and thinks that is doing better. POC discussed     Vital Signs Last 24 Hrs  T(C): 36.2 (17 Feb 2022 16:23), Max: 36.4 (16 Feb 2022 21:57)  T(F): 97.2 (17 Feb 2022 16:23), Max: 97.5 (16 Feb 2022 21:57)  HR: 82 (17 Feb 2022 16:23) (79 - 96)  BP: 135/69 (17 Feb 2022 16:23) (104/65 - 135/69)  BP(mean): 89 (17 Feb 2022 16:23) (89 - 89)  RR: 17 (17 Feb 2022 16:23) (16 - 19)  SpO2: 97% (17 Feb 2022 16:23) (93% - 98%)        REVIEW OF SYSTEMS:   All other review of systems is negative unless indicated above.      PHYSICAL EXAM:  General: Well developed;  in no acute distress on NC   Eyes: EOMI; conjunctiva and sclera clear  Head: Normocephalic; atraumatic  ENMT: No nasal discharge; airway clear  Neck: Supple; non tender; no masses  Respiratory:  Mild crackles at bases. No wheezes  Cardiovascular: Irregular rate and rhythm. S1 and S2 Normal;  Gastrointestinal: Soft non-tender non-distended; Normal bowel sounds  Genitourinary: No  suprapubic  tenderness  Extremities: Normal range of motion, No edema  Vascular: Peripheral pulses palpable 2+ bilaterally  Neurological: Alert and oriented x3, non focal   Skin: Warm and dry. No acute rash  Musculoskeletal: Normal muscle tone, without deformities  Psychiatric: Cooperative and appropriate        LABS:                         10.9   7.52  )-----------( 147      ( 17 Feb 2022 07:40 )             32.7     17 Feb 2022 07:40    133    |  95     |  72     ----------------------------<  99     4.1     |  26     |  5.72     Ca    9.4        17 Feb 2022 07:40    TPro  7.2    /  Alb  3.1    /  TBili  0.8    /  DBili  x      /  AST  95     /  ALT  101    /  AlkPhos  136    17 Feb 2022 07:40    LIVER FUNCTIONS - ( 17 Feb 2022 07:40 )  Alb: 3.1 g/dL / Pro: 7.2 gm/dL / ALK PHOS: 136 U/L / ALT: 101 U/L / AST: 95 U/L / GGT: x               MEDICATIONS  (STANDING):  apixaban 2.5 milliGRAM(s) Oral two times a day  budesonide 160 MICROgram(s)/formoterol 4.5 MICROgram(s) Inhaler 2 Puff(s) Inhalation two times a day  dexAMETHasone     Tablet 6 milliGRAM(s) Oral daily  epoetin brianna-epbx (RETACRIT) Injectable 62461 Unit(s) IV Push <User Schedule>  levothyroxine 88 MICROGram(s) Oral daily  melatonin 3 milliGRAM(s) Oral at bedtime  midodrine. 5 milliGRAM(s) Oral <User Schedule>  montelukast 10 milliGRAM(s) Oral at bedtime    MEDICATIONS  (PRN):  ALBUTerol    90 MICROgram(s) HFA Inhaler 2 Puff(s) Inhalation every 6 hours PRN Bronchospasm      RADIOLOGY & ADDITIONAL TESTS:  < from: US Abdomen Complete (US Abdomen Complete .) (02.16.22 @ 16:08) >    ACC: [id number] EXAM:  US ABDOMEN COMPLETE                          PROCEDURE DATE:  02/16/2022          INTERPRETATION:  CLINICAL INFORMATION: 86 years  Male with Hepatitis.    COMPARISON: CT abdomen and pelvis 12/20/2018    TECHNIQUE: Sonography ofthe abdomen.    FINDINGS:    Liver: Right lobe 18.0 cm. Left lobe cyst 3.1 x 3.2 x 3.4 cm with fine   septation.  Bile ducts: Normal caliber. Common bile duct measures 4 mm.  Gallbladder: Contracted with stones. Focal fundal wall thickening   secondary to adenomyomatosis. No pericholecystic fluid.  Pancreas: Obscured by bowel gas.  Spleen: 6.5 cm. Within normal limits.  Right kidney: 8.9 cm. No hydronephrosis. Increased echotexture. Cortical   thinning.  Left kidney: 8.0 cm.  No hydronephrosis. 6x 9 x 7 mm upper pole cyst.   Increased echotexture. Cortical thinning.  Ascites: None.  Aorta and IVC: Visualized portions are within normal limits.    IMPRESSION:    Cholelithiasis. No biliary distention.    Mild hepatomegaly. Septated left lobe cyst.    Atrophic kidneys.    < from: CT Angio Chest PE Protocol w/ IV Cont (02.12.22 @ 09:02) >    ACC: [id number] EXAM:  CT ANGIO CHEST PULM [facility]                          PROCEDURE DATE:  02/12/2022          INTERPRETATION:  CLINICAL INDICATION: Rule out pulmonary embolism    TECHNIQUE: A volumetric acquisition of the chest was obtained fromthe   thoracic inlet to the upper abdomen during dynamic administration of 90cc   Omnipaque 350 intravenous contrast according to the PE protocol. 3D MIP   images were provided.    COMPARISON: Chest CT 2/7/2022; abdomen CT 12/20/2018    FINDINGS:  CTA: The study is technically adequate with a good contrast bolus to the   pulmonary arteries. There are no filling defects in the pulmonary artery   or its branches. Cardiomegaly with biatrial enlargement qualitatively.   There is delayed filling of contrast within the left atrial appendage.   There is severe coronary arterial calcification as well as mild aortic   valvular and mitral annular calcification. There is no pericardial   effusion. The pulmonary artery measures 3 cm. Adjacent mid ascending   aorta measures 4.1 cm.    Lungs/Airways/Pleura: Stable small to moderate left and decreased small   right pleural effusion since 2/7/2022. There is improved partial lower   lobe compressive atelectasis. Decreased interlobular septal thickening   suggests improving interstitial pulmonary edema. Severe emphysema is   present. Unchanged 6 mm nodule along the right minor fissure on series 2   image 63 which may represent an intrapulmonary lymph node.    Mediastinum/Lymph nodes: Mediastinal and hilar lymphadenopathy measuring   up to 1.4 cm short axis are unchanged since recent prior study.    Upper Abdomen: Left hepatic cyst. Decreased, trace perihepatic ascites.    Bones and Soft Tissues: Bones are diffusely demineralized with severe L1   compression deformity, unchanged since recent prior chest CT and new from   December 2018 abdominal CT.    IMPRESSION:  No evidence of pulmonary embolism.    Improved fluid overload since 2/7/2022 in the setting of cardiomegaly   with decreased interstitial pulmonary edema, pleural effusions and   ascites. Improved bibasilar atelectasis.    Unchanged mediastinal and hilar lymphadenopathy measuring up to 1.4 cm   short axis.    Age-indeterminate severe L1 compression deformity.

## 2022-02-17 NOTE — PROGRESS NOTE ADULT - ASSESSMENT
Pt is a 86 year old male with pmhx of ESRD on HD MWF via AVF, oliguric, (Dr Eastman), obstructive CAD s/p attempted unsuccessful PCI 2018, HFpEF 50% (prior 20%), severe pulmonary HTN, with hx of bleeding (due to LE hematoma post cath due to reported pseudoaneurysm),  dementia, R side CEA (2012) HTN, hypothyroidism, HLD and emphysema on home O2 2L admitted for:     # Acute on chronic hypoxic respiratory failure due to combination:   #  Acute Heart Failure with preserved Ejection Fraction  # Cardiorenal syndrome  # End Stage Renal Disease on HD  # Severe Pulmonary Hypertension   - continue volume management  with HD  -C/w NC, O2 sats high  90s        # Emphysema / COPD home O2 dependancy 2L, stable   -on 3 L of NC, titrate as tolerated   - C/w Inhalers, Montelukast   - care discussed with Dr. Jeter    # Atrial fibrillation  - on AC -  eliquis  - Not on rate control meds     # Hypertension, Hyperlipidemia, CAD, Hypothyroidism   - Losartan, Coreg  held for hypotension  - C/w Midodrine         # moderate calorie malnutrition  - encourage po intake    # Transaminitis  - this could be secondary to recent covid infection, this being said, differential includes congestive hepatopathy  - monitor liver function, trending down   - RUQ US reviewed    # COVID +   - patient on decadrone day # 4  - not a candidate for remdesevir due to ESRD  - on anticoagulation         # GOC: had family meeting, now DNR/DNI, plan for d/c to [facility] after 5 days

## 2022-02-17 NOTE — PROGRESS NOTE ADULT - ASSESSMENT
85 yo male with hx of ESRD on HD MWF via AVF at [facility]uin D[facility]az moved from [place] and now was recenlty DC'd from  due to rapid afib and sob   returned next day w increased SOB and hypoxia    SOB/Hypoxia  - Multifactorial    Pulm HTN/COPD/ Fluid Overload    COVID + now    fluid removal at HD today to maintain euvolemia    EDW lowered after this admission pt may have lost weight since moving from [place]   - 65kg    limit fluid intake advised       ESRD on HD   [facility] today and start rehab /HD this weekend          Anemia   MARIS at HD    COPD on home 02    per Pulm     COVID    per ID Decadron     Hyperphos - monitor for now     not on binders     * pt seen earlier on HD , note now

## 2022-02-17 NOTE — PROGRESS NOTE ADULT - PROVIDER SPECIALTY LIST ADULT
Hospitalist
Nephrology
Palliative Care
Pulmonology
Hospitalist
Hospitalist
Pulmonology
Nephrology
Nephrology
Palliative Care
Pulmonology
Hospitalist
Hospitalist

## 2022-02-17 NOTE — PROGRESS NOTE ADULT - NUTRITIONAL ASSESSMENT
This patient has been assessed with a concern for Malnutrition and has been determined to have a diagnosis/diagnoses of Severe protein-calorie malnutrition.    This patient is being managed with:   Diet Regular-  Low Sodium     Special Instructions for Nursing:  Low Sodium  Entered: Feb 10 2022  1:12PM

## 2022-02-18 ENCOUNTER — TRANSCRIPTION ENCOUNTER (OUTPATIENT)
Age: 87
End: 2022-02-18

## 2022-02-18 VITALS
OXYGEN SATURATION: 93 % | TEMPERATURE: 98 F | RESPIRATION RATE: 18 BRPM | HEART RATE: 89 BPM | DIASTOLIC BLOOD PRESSURE: 83 MMHG | SYSTOLIC BLOOD PRESSURE: 131 MMHG

## 2022-02-18 LAB
ANION GAP SERPL CALC-SCNC: 14 MMOL/L — SIGNIFICANT CHANGE UP (ref 5–17)
BUN SERPL-MCNC: 72 MG/DL — HIGH (ref 7–23)
CALCIUM SERPL-MCNC: 9.6 MG/DL — SIGNIFICANT CHANGE UP (ref 8.5–10.1)
CHLORIDE SERPL-SCNC: 98 MMOL/L — SIGNIFICANT CHANGE UP (ref 96–108)
CO2 SERPL-SCNC: 22 MMOL/L — SIGNIFICANT CHANGE UP (ref 22–31)
CREAT SERPL-MCNC: 5.85 MG/DL — HIGH (ref 0.5–1.3)
GLUCOSE SERPL-MCNC: 108 MG/DL — HIGH (ref 70–99)
HCT VFR BLD CALC: 34.2 % — LOW (ref 39–50)
HGB BLD-MCNC: 11.6 G/DL — LOW (ref 13–17)
MCHC RBC-ENTMCNC: 33.9 GM/DL — SIGNIFICANT CHANGE UP (ref 32–36)
MCHC RBC-ENTMCNC: 36.4 PG — HIGH (ref 27–34)
MCV RBC AUTO: 107.2 FL — HIGH (ref 80–100)
PLATELET # BLD AUTO: 149 K/UL — LOW (ref 150–400)
POTASSIUM SERPL-MCNC: 4.4 MMOL/L — SIGNIFICANT CHANGE UP (ref 3.5–5.3)
POTASSIUM SERPL-SCNC: 4.4 MMOL/L — SIGNIFICANT CHANGE UP (ref 3.5–5.3)
RBC # BLD: 3.19 M/UL — LOW (ref 4.2–5.8)
RBC # FLD: 15.9 % — HIGH (ref 10.3–14.5)
SARS-COV-2 RNA SPEC QL NAA+PROBE: DETECTED
SODIUM SERPL-SCNC: 134 MMOL/L — LOW (ref 135–145)
WBC # BLD: 9.36 K/UL — SIGNIFICANT CHANGE UP (ref 3.8–10.5)
WBC # FLD AUTO: 9.36 K/UL — SIGNIFICANT CHANGE UP (ref 3.8–10.5)

## 2022-02-18 PROCEDURE — 99239 HOSP IP/OBS DSCHRG MGMT >30: CPT

## 2022-02-18 RX ORDER — LOSARTAN POTASSIUM 100 MG/1
1 TABLET, FILM COATED ORAL
Qty: 0 | Refills: 0 | DISCHARGE

## 2022-02-18 RX ORDER — CARVEDILOL PHOSPHATE 80 MG/1
3.12 CAPSULE, EXTENDED RELEASE ORAL EVERY 12 HOURS
Refills: 0 | Status: DISCONTINUED | OUTPATIENT
Start: 2022-02-18 | End: 2022-02-18

## 2022-02-18 RX ORDER — CARVEDILOL PHOSPHATE 80 MG/1
1 CAPSULE, EXTENDED RELEASE ORAL
Qty: 0 | Refills: 0 | DISCHARGE

## 2022-02-18 RX ORDER — PREGABALIN 225 MG/1
1 CAPSULE ORAL
Qty: 0 | Refills: 0 | DISCHARGE

## 2022-02-18 RX ORDER — BUDESONIDE AND FORMOTEROL FUMARATE DIHYDRATE 160; 4.5 UG/1; UG/1
2 AEROSOL RESPIRATORY (INHALATION)
Qty: 0 | Refills: 0 | DISCHARGE
Start: 2022-02-18

## 2022-02-18 RX ORDER — ALBUTEROL 90 UG/1
2 AEROSOL, METERED ORAL
Qty: 0 | Refills: 0 | DISCHARGE
Start: 2022-02-18

## 2022-02-18 RX ORDER — LANOLIN ALCOHOL/MO/W.PET/CERES
1 CREAM (GRAM) TOPICAL
Qty: 0 | Refills: 0 | DISCHARGE

## 2022-02-18 RX ORDER — MIDODRINE HYDROCHLORIDE 2.5 MG/1
1 TABLET ORAL
Qty: 0 | Refills: 0 | DISCHARGE
Start: 2022-02-18

## 2022-02-18 RX ORDER — CARVEDILOL PHOSPHATE 80 MG/1
1 CAPSULE, EXTENDED RELEASE ORAL
Qty: 0 | Refills: 0 | DISCHARGE
Start: 2022-02-18

## 2022-02-18 RX ORDER — LORATADINE 10 MG/1
1 TABLET ORAL
Qty: 0 | Refills: 0 | DISCHARGE

## 2022-02-18 RX ORDER — MONTELUKAST 4 MG/1
1 TABLET, CHEWABLE ORAL
Qty: 0 | Refills: 0 | DISCHARGE
Start: 2022-02-18

## 2022-02-18 RX ORDER — ASCORBIC ACID 60 MG
1 TABLET,CHEWABLE ORAL
Qty: 0 | Refills: 0 | DISCHARGE

## 2022-02-18 RX ADMIN — APIXABAN 2.5 MILLIGRAM(S): 2.5 TABLET, FILM COATED ORAL at 09:20

## 2022-02-18 RX ADMIN — BUDESONIDE AND FORMOTEROL FUMARATE DIHYDRATE 2 PUFF(S): 160; 4.5 AEROSOL RESPIRATORY (INHALATION) at 08:42

## 2022-02-18 RX ADMIN — Medication 6 MILLIGRAM(S): at 09:21

## 2022-02-18 RX ADMIN — CARVEDILOL PHOSPHATE 3.12 MILLIGRAM(S): 80 CAPSULE, EXTENDED RELEASE ORAL at 13:20

## 2022-02-18 NOTE — DISCHARGE NOTE PROVIDER - NSDCMRMEDTOKEN_GEN_ALL_CORE_FT
albuterol 90 mcg/inh inhalation aerosol: 2 puff(s) inhaled every 6 hours, As needed, Bronchospasm  apixaban 2.5 mg oral tablet: 1 tab(s) orally 2 times a day  aspirin 81 mg oral tablet: 1 tab(s) orally once a day  budesonide-formoterol 160 mcg-4.5 mcg/inh inhalation aerosol: 2 puff(s) inhaled 2 times a day  carvedilol 3.125 mg oral tablet: 1 tab(s) orally every 12 hours  Flonase 50 mcg/inh nasal spray: 1 spray(s) nasal once a day  Lipitor 20 mg oral tablet: 1 tab(s) orally once a day  Melatonin 3 mg oral tablet: 1 tab(s) orally once a day (at bedtime), As Needed  Metamucil 400 mg oral capsule: 1 cap(s) orally once a day  midodrine 5 mg oral tablet: 1 tab(s) orally once a day on HD days   montelukast 10 mg oral tablet: 1 tab(s) orally once a day (at bedtime)  nitroglycerin 0.4 mg sublingual tablet: 1 tab(s) sublingual every 5 minutes, As Needed  Caroline-Juan Ramon oral tablet: 1 tab(s) orally once a day  Synthroid 88 mcg (0.088 mg) oral tablet: 1 tab(s) orally once a day  Velphoro 500 mg oral tablet, chewable: 2 tab(s) orally 3 times a day (with meals)

## 2022-02-18 NOTE — DISCHARGE NOTE PROVIDER - NSDCCPCAREPLAN_GEN_ALL_CORE_FT
PRINCIPAL DISCHARGE DIAGNOSIS  Diagnosis: Acute on chronic diastolic congestive heart failure  Assessment and Plan of Treatment: manage with HD  Low sodium diet   Monitor weight  F/u with Dr Pugh      SECONDARY DISCHARGE DIAGNOSES  Diagnosis: ESRD (end stage renal disease)  Assessment and Plan of Treatment: C/w HD on S/T/Th  Continue  Midodrin on HD days   F/u with renal    Diagnosis: Chronic atrial fibrillation  Assessment and Plan of Treatment: C/w Eliquis  Restarted on Coreg   F/u with cardio    Diagnosis: Chronic respiratory failure with hypoxia  Assessment and Plan of Treatment: with COPD/asthma, CHF, recent COVID  C/w NC 2L   C/w inhalers   F/u with Pulm

## 2022-02-18 NOTE — DISCHARGE NOTE PROVIDER - PROVIDER TOKENS
PROVIDER:[TOKEN:[[id number]],FOLLOWUP:[2 weeks]],PROVIDER:[TOKEN:[[id number]],FOLLOWUP:[1 month]]

## 2022-02-18 NOTE — DISCHARGE NOTE PROVIDER - CARE PROVIDER_API CALL
Martín Pugh)  Cardiovascular Disease; Nuclear Cardiology  [address] [place] [address]  Phone: [phone]  Fax: [phone]  Follow Up Time: 2 weeks    Jaimie Jeter)  Critical Care Medicine; Internal Medicine; Pulmonary Disease  [address] [place] [address]  Phone: [phone]  Fax: [phone]  Follow Up Time: 1 month

## 2022-02-18 NOTE — DISCHARGE NOTE PROVIDER - HOSPITAL COURSE
86 year old male with pmhx of ESRD on HD  via AVF,  HTN, HLD,  obstructive CAD s/p attempted unsuccessful PCI 2018, HFpEF 50% (prior 20%), severe pulmonary HTN reported  follows in  dementia,  R side CEA (2012), hypothyroidism and emphysema on home O2 2L presented to ED with worsening SOB and orthopnea. Pt reported that  shortness of breath became worse and "2L doesn't cut it anymore". He also  had occasional cough, no chest pain. He reported that had his HD Tx day prior  without any complications.   Pt was admitted for acute on chronic diastolic CHF exacerbation, volume managed via HD. Pt also was found to be COVID + , Tx with dexamethasone, no remdesivir due to ESRD. Pts respiratory status much improved, initially required 6L NC, now on 2L. Pt had CTA done on admission and was neg for PE.   Today Pt reports no complains,  mildly confused, denies SOB or cough. D/c planning discussed. Pt agrees on [facility]       Vital Signs Last 24 Hrs  T(C): 36.6 (18 Feb 2022 08:47), Max: 36.6 (18 Feb 2022 08:47)  T(F): 97.9 (18 Feb 2022 08:47), Max: 97.9 (18 Feb 2022 08:47)  HR: 89 (18 Feb 2022 08:47) (79 - 97)  BP: 131/83 (18 Feb 2022 08:47) (112/75 - 135/69)  BP(mean): 89 (17 Feb 2022 16:23) (89 - 89)  RR: 18 (18 Feb 2022 08:47) (16 - 18)  SpO2: 93% (18 Feb 2022 08:47) (93% - 97%)    PHYSICAL EXAM:  General: Well developed;  in no acute distress on NC   Eyes: EOMI; conjunctiva and sclera clear  Head: Normocephalic; atraumatic  ENMT: No nasal discharge; airway clear  Neck: Supple; non tender; no masses  Respiratory: Decreased BS  at bases. No wheezes  Cardiovascular: Irregular rate and rhythm. S1 and S2 Normal;  Gastrointestinal: Soft non-tender non-distended; Normal bowel sounds  Genitourinary: No  suprapubic  tenderness  Extremities: Normal range of motion, No edema  Vascular: Peripheral pulses palpable 2+ bilaterally  Neurological: Alert and oriented x2, non focal   Skin: Warm and dry. No acute rash  Musculoskeletal: Normal muscle tone, without deformities  Psychiatric: Cooperative and appropriate    Pt is a 86 year old male with pmhx of ESRD on HD MWF via AVF, oliguric, (Dr Eastman), obstructive CAD s/p attempted unsuccessful PCI 2018, HFpEF 50% (prior 20%), severe pulmonary HTN, with hx of bleeding (due to LE hematoma post cath due to reported pseudoaneurysm),  dementia, R side CEA (2012) HTN, hypothyroidism, HLD and emphysema on home O2 2L admitted for:     # Acute on chronic hypoxic respiratory failure due to combination:   #  Acute Heart Failure with preserved Ejection Fraction  # Cardiorenal syndrome  # End Stage Renal Disease on HD  # Severe Pulmonary Hypertension   - continue volume management  with HD  -C/w NC, O2 sats high  90s        # Emphysema / COPD home O2 dependancy 2L, stable   -on 3 L of NC, titrate as tolerated   - C/w Inhalers, Montelukast   - F/u with Pulm outPt     # Atrial fibrillation  - tele: AFIB with episodes of RVR   - on AC -  eliquis  - will resume on low dose carvedilol, titrate as BP tolerates  - F/u with Dr Pugh outPt     # Hypertension, Hyperlipidemia, CAD, Hypothyroidism   - Losartan, Coreg  held for hypotension  - will resume coreg now  - C/w Midodrine  on HD days         # moderate calorie malnutrition  - encourage po intake    # Transaminitis  - this could be secondary to recent covid infection, this being said, differential includes congestive hepatopathy  - monitor liver function, trending down   - RUQ US reviewed    # COVID +   - patient on decadrone day # 5, will complete   - not a candidate for remdesevir due to ESRD  - on anticoagulation   - respiratory status stable         # GOC: had family meeting, now DNR/DNI, plan for d/c to [facility]    Dispo; stable  for d/c to [facility]  Total time 50 min   Fax d/c summary to PCP

## 2022-02-19 ENCOUNTER — EMERGENCY (EMERGENCY)
Facility: HOSPITAL | Age: 87
LOS: 0 days | Discharge: HOME | End: 2022-02-20
Attending: EMERGENCY MEDICINE
Payer: MEDICARE

## 2022-02-19 VITALS
WEIGHT: 160.06 LBS | HEART RATE: 81 BPM | DIASTOLIC BLOOD PRESSURE: 85 MMHG | HEIGHT: 68 IN | RESPIRATION RATE: 17 BRPM | SYSTOLIC BLOOD PRESSURE: 123 MMHG | TEMPERATURE: 98 F | OXYGEN SATURATION: 98 %

## 2022-02-19 DIAGNOSIS — Z79.82 LONG TERM (CURRENT) USE OF ASPIRIN: ICD-10-CM

## 2022-02-19 DIAGNOSIS — I25.10 ATHEROSCLEROTIC HEART DISEASE OF NATIVE CORONARY ARTERY WITHOUT ANGINA PECTORIS: ICD-10-CM

## 2022-02-19 DIAGNOSIS — I12.0 HYPERTENSIVE CHRONIC KIDNEY DISEASE WITH STAGE 5 CHRONIC KIDNEY DISEASE OR END STAGE RENAL DISEASE: ICD-10-CM

## 2022-02-19 DIAGNOSIS — E78.5 HYPERLIPIDEMIA, UNSPECIFIED: ICD-10-CM

## 2022-02-19 DIAGNOSIS — Z99.2 DEPENDENCE ON RENAL DIALYSIS: ICD-10-CM

## 2022-02-19 DIAGNOSIS — N18.6 END STAGE RENAL DISEASE: ICD-10-CM

## 2022-02-19 LAB
ANION GAP SERPL CALC-SCNC: 16 MMOL/L — SIGNIFICANT CHANGE UP (ref 5–17)
BASOPHILS # BLD AUTO: 0.01 K/UL — SIGNIFICANT CHANGE UP (ref 0–0.2)
BASOPHILS NFR BLD AUTO: 0.1 % — SIGNIFICANT CHANGE UP (ref 0–2)
BUN SERPL-MCNC: 116 MG/DL — HIGH (ref 7–23)
CALCIUM SERPL-MCNC: 8.9 MG/DL — SIGNIFICANT CHANGE UP (ref 8.5–10.1)
CHLORIDE SERPL-SCNC: 94 MMOL/L — LOW (ref 96–108)
CO2 SERPL-SCNC: 20 MMOL/L — LOW (ref 22–31)
CREAT SERPL-MCNC: 8.29 MG/DL — HIGH (ref 0.5–1.3)
EOSINOPHIL # BLD AUTO: 0.06 K/UL — SIGNIFICANT CHANGE UP (ref 0–0.5)
EOSINOPHIL NFR BLD AUTO: 0.6 % — SIGNIFICANT CHANGE UP (ref 0–6)
GLUCOSE SERPL-MCNC: 111 MG/DL — HIGH (ref 70–99)
HCT VFR BLD CALC: 30.5 % — LOW (ref 39–50)
HGB BLD-MCNC: 10.4 G/DL — LOW (ref 13–17)
IMM GRANULOCYTES NFR BLD AUTO: 0.6 % — SIGNIFICANT CHANGE UP (ref 0–1.5)
LYMPHOCYTES # BLD AUTO: 0.75 K/UL — LOW (ref 1–3.3)
LYMPHOCYTES # BLD AUTO: 7.6 % — LOW (ref 13–44)
MACROCYTES BLD QL: SLIGHT — SIGNIFICANT CHANGE UP
MANUAL SMEAR VERIFICATION: SIGNIFICANT CHANGE UP
MCHC RBC-ENTMCNC: 34.1 GM/DL — SIGNIFICANT CHANGE UP (ref 32–36)
MCHC RBC-ENTMCNC: 36.2 PG — HIGH (ref 27–34)
MCV RBC AUTO: 106.3 FL — HIGH (ref 80–100)
MONOCYTES # BLD AUTO: 1.11 K/UL — HIGH (ref 0–0.9)
MONOCYTES NFR BLD AUTO: 11.3 % — SIGNIFICANT CHANGE UP (ref 2–14)
NEUTROPHILS # BLD AUTO: 7.87 K/UL — HIGH (ref 1.8–7.4)
NEUTROPHILS NFR BLD AUTO: 79.8 % — HIGH (ref 43–77)
PLAT MORPH BLD: NORMAL — SIGNIFICANT CHANGE UP
PLATELET # BLD AUTO: 153 K/UL — SIGNIFICANT CHANGE UP (ref 150–400)
POLYCHROMASIA BLD QL SMEAR: SLIGHT — SIGNIFICANT CHANGE UP
POTASSIUM SERPL-MCNC: 5 MMOL/L — SIGNIFICANT CHANGE UP (ref 3.5–5.3)
POTASSIUM SERPL-SCNC: 5 MMOL/L — SIGNIFICANT CHANGE UP (ref 3.5–5.3)
RBC # BLD: 2.87 M/UL — LOW (ref 4.2–5.8)
RBC # FLD: 15.3 % — HIGH (ref 10.3–14.5)
RBC BLD AUTO: SIGNIFICANT CHANGE UP
SODIUM SERPL-SCNC: 130 MMOL/L — LOW (ref 135–145)
WBC # BLD: 9.86 K/UL — SIGNIFICANT CHANGE UP (ref 3.8–10.5)
WBC # FLD AUTO: 9.86 K/UL — SIGNIFICANT CHANGE UP (ref 3.8–10.5)

## 2022-02-19 PROCEDURE — 36415 COLL VENOUS BLD VENIPUNCTURE: CPT

## 2022-02-19 PROCEDURE — 96374 THER/PROPH/DIAG INJ IV PUSH: CPT

## 2022-02-19 PROCEDURE — 90999 UNLISTED DIALYSIS PROCEDURE: CPT

## 2022-02-19 PROCEDURE — 80048 BASIC METABOLIC PNL TOTAL CA: CPT

## 2022-02-19 PROCEDURE — 80074 ACUTE HEPATITIS PANEL: CPT

## 2022-02-19 PROCEDURE — 85025 COMPLETE CBC W/AUTO DIFF WBC: CPT

## 2022-02-19 PROCEDURE — 99284 EMERGENCY DEPT VISIT MOD MDM: CPT | Mod: 25

## 2022-02-19 PROCEDURE — 99284 EMERGENCY DEPT VISIT MOD MDM: CPT

## 2022-02-19 RX ORDER — ERYTHROPOIETIN 10000 [IU]/ML
10000 INJECTION, SOLUTION INTRAVENOUS; SUBCUTANEOUS ONCE
Refills: 0 | Status: COMPLETED | OUTPATIENT
Start: 2022-02-19 | End: 2022-02-19

## 2022-02-19 RX ORDER — MIDODRINE HYDROCHLORIDE 2.5 MG/1
5 TABLET ORAL
Refills: 0 | Status: DISCONTINUED | OUTPATIENT
Start: 2022-02-19 | End: 2022-02-20

## 2022-02-19 RX ADMIN — MIDODRINE HYDROCHLORIDE 5 MILLIGRAM(S): 2.5 TABLET ORAL at 20:39

## 2022-02-19 RX ADMIN — ERYTHROPOIETIN 10000 UNIT(S): 10000 INJECTION, SOLUTION INTRAVENOUS; SUBCUTANEOUS at 22:10

## 2022-02-19 NOTE — ED PROVIDER NOTE - NSFOLLOWUPINSTRUCTIONS_ED_ALL_ED_FT
End-Stage Kidney Disease       End-stage kidney disease occurs when the kidneys are so damaged that they cannot function and cannot get better. This condition may also be referred to as end-stage renal disease or ESRD. The kidneys are two organs that do many important jobs in the body, including:  •Removing waste and extra fluid from the blood to make urine.      •Making hormones that maintain the amount of fluid in tissues and blood vessels.      •Maintaining the right amount of fluids and chemicals in the body.      Without functioning kidneys, toxins build up in the blood, and life-threatening complications can occur.      What are the causes?    This condition usually occurs when a long-term (chronic) kidney disease gets worse and results in permanent damage to the kidneys. It may also be caused by sudden damage to the kidneys (acute kidney injury).      What increases the risk?    The following factors may make you more likely to develop this condition:  •Having a family history of chronic kidney disease (CKD).    •Chronic medical conditions that affect the kidneys, such as:  •Cardiovascular disease, including high blood pressure.      •Diabetes.      •Certain diseases that affect the body's disease-fighting system (immunesystem).        •Smoking or a history of smoking.      •Overuse of over-the-counter pain medicines.      •Being around or being in contact with poisonous (toxic) substances.        What are the signs or symptoms?    Symptoms of this condition include:  •Swelling of the face, legs, ankles, or feet.      •Numbness, tingling, or loss of feeling in the hands or feet.      •Tiredness (lethargy).      •Nausea or vomiting.      •Confusion, trouble concentrating, or loss of consciousness.      •Chest pain.      •Shortness of breath.      •Passing little or no urine.      •Muscle twitches and cramps, especially in the legs.    •Skin changes, such as:  •Dry, itchy skin.      •Pale skin due to anemia, including the skin and tissue around the eye (conjunctiva).      •Abnormally dark or light skin.        •Loss of appetite.      •Headaches.      •Decrease in muscle size (muscle wasting).      •Easy bruising.      •Stopping of menstrual periods, in women.      •Jerky movements (seizures).        How is this diagnosed?    This condition may be diagnosed based on:  •A physical exam, including blood pressure measurements.      •Urine tests.      •Blood tests.      •Imaging tests.      •A test in which a sample of tissue is removed from the kidneys to be examined under a microscope (kidney biopsy).        How is this treated?    This condition may be treated with:•Dialysis, which is a procedure that removes toxic wastes from the body. There are two types of dialysis:  •Dialysis that is done through your abdomen. This is called peritoneal dialysis and may be done several times a day.      •Dialysis that is done by a machine. This is called hemodialysis and may be done several times a week.        •Kidney transplant. This is surgery to receive a new kidney.      In addition to having dialysis or a kidney transplant, you may need to take medicines:  •To control high blood pressure (hypertension).      •To control high cholesterol.      •To treat diabetes.      •To maintain healthy levels of minerals in the blood (electrolytes).      You may also be given a specific meal plan to follow that includes requirements or limits for:  •Salt (sodium).      •Protein.      •Phosphorus.      •Potassium.      •Calcium.        Follow these instructions at home:    Medicines     •Take over-the-counter and prescription medicines only as told by your health care provider.      • Do not take any new medicines, vitamins, or mineral supplements unless approved by your health care provider. Many medicines and supplements can worsen kidney damage.      •Follow instructions from your health care provider about adjusting the doses of any medicines you take.      Lifestyle     • Do not use any products that contain nicotine or tobacco, such as cigarettes, e-cigarettes, and chewing tobacco. If you need help quitting, ask your health care provider.      •Achieve and maintain a healthy weight. If you need help with this, ask your health care provider.      •Start or continue an exercise plan. Exercise at least 30 minutes a day, 5 days a week.      •Follow your prescribed meal plan.      General instructions     •Stay current with your shots (immunizations) as told by your health care provider.      •Keep track of your blood pressure. Report changes in your blood pressure as told by your health care provider.      •If you are being treated for diabetes, monitor and track your blood sugar (blood glucose) levels as told by your health care provider.      •Keep all follow-up visits. This is important.        Where to find more information    •American Association of Kidney Patients: www.aakp.org      •National Kidney Foundation: www.kidney.org      •American Kid[employer]: www.akfinc.org      •[facility]: www.lifeoptions.org      •[employer]: www.kidneyschool.org        Contact a health care provider if:    •Your symptoms get worse.      •You develop new symptoms.        Get help right away if:    •You have weakness in an arm or leg on one side of your body.      •You have difficulty speaking or you are slurring your speech.      •You have a sudden change in your vision.      •You have a sudden, severe headache.      •You have a sudden weight increase.      •You have difficulty breathing.      •Your symptoms suddenly get worse.      •You have chest pain.        Summary    •End-stage kidney disease occurs when the kidneys are so damaged that they cannot function and cannot get better.      •Without functioning kidneys, toxins build up in the blood and life-threatening complications can occur.      •Treatment may include dialysis or a kidney transplant along with medicines and lifestyle changes.      This information is not intended to replace advice given to you by your health care provider. Make sure you discuss any questions you have with your health care provider.

## 2022-02-19 NOTE — ED ADULT TRIAGE NOTE - CHIEF COMPLAINT QUOTE
patient bibems from [facility] for "emergent dialysis" as per ems. ROLANDO fistula. patient a/o4

## 2022-02-19 NOTE — ED PROVIDER NOTE - OBJECTIVE STATEMENT
85 yo male w/PMH of ESRD (on dialysis), HLD, HTN, CAD presents to the ED BIBEMS  for dialysis. Pt is here for dialysis has no complaints at thi time. Denies cp, sob. Last dialysis was 2-3 days ago.

## 2022-02-19 NOTE — ED PROVIDER NOTE - CLINICAL SUMMARY MEDICAL DECISION MAKING FREE TEXT BOX
Pt without any complaints, missed dialysis at rehab facility, has no sx. Just needs dialysis and discharge. Pt without any complaints, missed dialysis at rehab facility, has no sx.  Expected to come to ED for dialysis as next available day is tuesday and patient can not wait until that time. Just here for dialysis.  Confirmed this with Dio.  Anticipate d/c home.

## 2022-02-19 NOTE — ED PROVIDER NOTE - PROGRESS NOTE DETAILS
Tolerated dialysis without issue.  Pt well appearing.  no complaints.  D/c home with strict return precautions and prompt outpatient f/u.

## 2022-02-19 NOTE — ED ADULT NURSE NOTE - CHIEF COMPLAINT QUOTE
patient bibems from [facility] for "emergent dialysis" as per ems. ROLANDO fistula. patient a/o4

## 2022-02-19 NOTE — ED CLERICAL - NS ED CLERK NOTE PRE-ARRIVAL INFORMATION; ADDITIONAL PRE-ARRIVAL INFORMATION
This patient is enrolled in the readmission program and has active care navigation. This patient can be followed up by the care navigation team within 24 hours. To arrange close follow-up or to obtain additional clinical information about this patient, please call the contact number above.   Please call the hospitalist as needed to collaborate on further medical management ([phone])

## 2022-02-19 NOTE — ED PROVIDER NOTE - PATIENT PORTAL LINK FT
You can access the FollowMyHealth Patient Portal offered by [facility] by registering at the following website: [email or web address]. By joining [facility]’s FollowMyHealth portal, you will also be able to view your health information using other applications (apps) compatible with our system.

## 2022-02-20 VITALS
TEMPERATURE: 97 F | DIASTOLIC BLOOD PRESSURE: 73 MMHG | RESPIRATION RATE: 20 BRPM | SYSTOLIC BLOOD PRESSURE: 128 MMHG | HEART RATE: 80 BPM

## 2022-02-20 LAB
HAV IGM SER-ACNC: SIGNIFICANT CHANGE UP
HBV CORE IGM SER-ACNC: SIGNIFICANT CHANGE UP
HBV SURFACE AG SER-ACNC: SIGNIFICANT CHANGE UP
HCV AB S/CO SERPL IA: 0.1 S/CO — SIGNIFICANT CHANGE UP (ref 0–0.99)
HCV AB SERPL-IMP: SIGNIFICANT CHANGE UP

## 2022-02-20 NOTE — ED ADULT NURSE REASSESSMENT NOTE - NS ED NURSE REASSESS COMMENT FT1
left arm hematoma showed to DR jane, no active bleeding noted, pressure dressing place. DR Jane comfortable to discharge pt back to NH. pt s/p HD, Left arm fistula, after HD PT developed hematoma. no active bleeding noted. left arm hematoma showed to DR jane, pressure dressing place. DR Jane comfortable to discharge pt back to NH.

## 2022-02-23 ENCOUNTER — EMERGENCY (EMERGENCY)
Facility: HOSPITAL | Age: 87
LOS: 0 days | Discharge: HOME | End: 2022-02-24
Attending: EMERGENCY MEDICINE
Payer: MEDICARE

## 2022-02-23 VITALS
HEART RATE: 78 BPM | RESPIRATION RATE: 20 BRPM | HEIGHT: 68 IN | SYSTOLIC BLOOD PRESSURE: 129 MMHG | DIASTOLIC BLOOD PRESSURE: 68 MMHG | WEIGHT: 149.91 LBS | OXYGEN SATURATION: 93 % | TEMPERATURE: 98 F

## 2022-02-23 DIAGNOSIS — Z20.822 CONTACT WITH AND (SUSPECTED) EXPOSURE TO COVID-19: ICD-10-CM

## 2022-02-23 DIAGNOSIS — E78.5 HYPERLIPIDEMIA, UNSPECIFIED: ICD-10-CM

## 2022-02-23 DIAGNOSIS — R04.0 EPISTAXIS: ICD-10-CM

## 2022-02-23 DIAGNOSIS — Z79.01 LONG TERM (CURRENT) USE OF ANTICOAGULANTS: ICD-10-CM

## 2022-02-23 DIAGNOSIS — J96.90 RESPIRATORY FAILURE, UNSPECIFIED, UNSPECIFIED WHETHER WITH HYPOXIA OR HYPERCAPNIA: ICD-10-CM

## 2022-02-23 DIAGNOSIS — Z86.16 PERSONAL HISTORY OF COVID-19: ICD-10-CM

## 2022-02-23 DIAGNOSIS — N18.6 END STAGE RENAL DISEASE: ICD-10-CM

## 2022-02-23 DIAGNOSIS — I12.0 HYPERTENSIVE CHRONIC KIDNEY DISEASE WITH STAGE 5 CHRONIC KIDNEY DISEASE OR END STAGE RENAL DISEASE: ICD-10-CM

## 2022-02-23 DIAGNOSIS — I25.10 ATHEROSCLEROTIC HEART DISEASE OF NATIVE CORONARY ARTERY WITHOUT ANGINA PECTORIS: ICD-10-CM

## 2022-02-23 DIAGNOSIS — Z99.2 DEPENDENCE ON RENAL DIALYSIS: ICD-10-CM

## 2022-02-23 DIAGNOSIS — Z79.82 LONG TERM (CURRENT) USE OF ASPIRIN: ICD-10-CM

## 2022-02-23 LAB
ALBUMIN SERPL ELPH-MCNC: 3.3 G/DL — SIGNIFICANT CHANGE UP (ref 3.3–5)
ALP SERPL-CCNC: 115 U/L — SIGNIFICANT CHANGE UP (ref 40–120)
ALT FLD-CCNC: 71 U/L — SIGNIFICANT CHANGE UP (ref 12–78)
ANION GAP SERPL CALC-SCNC: 12 MMOL/L — SIGNIFICANT CHANGE UP (ref 5–17)
APTT BLD: 40.4 SEC — HIGH (ref 27.5–35.5)
AST SERPL-CCNC: 37 U/L — SIGNIFICANT CHANGE UP (ref 15–37)
BASOPHILS # BLD AUTO: 0.02 K/UL — SIGNIFICANT CHANGE UP (ref 0–0.2)
BASOPHILS NFR BLD AUTO: 0.2 % — SIGNIFICANT CHANGE UP (ref 0–2)
BILIRUB SERPL-MCNC: 0.9 MG/DL — SIGNIFICANT CHANGE UP (ref 0.2–1.2)
BUN SERPL-MCNC: 88 MG/DL — HIGH (ref 7–23)
CALCIUM SERPL-MCNC: 8.8 MG/DL — SIGNIFICANT CHANGE UP (ref 8.5–10.1)
CHLORIDE SERPL-SCNC: 95 MMOL/L — LOW (ref 96–108)
CO2 SERPL-SCNC: 25 MMOL/L — SIGNIFICANT CHANGE UP (ref 22–31)
CREAT SERPL-MCNC: 6.34 MG/DL — HIGH (ref 0.5–1.3)
EOSINOPHIL # BLD AUTO: 0.4 K/UL — SIGNIFICANT CHANGE UP (ref 0–0.5)
EOSINOPHIL NFR BLD AUTO: 4.8 % — SIGNIFICANT CHANGE UP (ref 0–6)
GLUCOSE SERPL-MCNC: 90 MG/DL — SIGNIFICANT CHANGE UP (ref 70–99)
HCT VFR BLD CALC: 32.6 % — LOW (ref 39–50)
HGB BLD-MCNC: 10.9 G/DL — LOW (ref 13–17)
IMM GRANULOCYTES NFR BLD AUTO: 0.7 % — SIGNIFICANT CHANGE UP (ref 0–1.5)
INR BLD: 1.68 RATIO — HIGH (ref 0.88–1.16)
LYMPHOCYTES # BLD AUTO: 0.77 K/UL — LOW (ref 1–3.3)
LYMPHOCYTES # BLD AUTO: 9.2 % — LOW (ref 13–44)
MCHC RBC-ENTMCNC: 33.4 GM/DL — SIGNIFICANT CHANGE UP (ref 32–36)
MCHC RBC-ENTMCNC: 36.2 PG — HIGH (ref 27–34)
MCV RBC AUTO: 108.3 FL — HIGH (ref 80–100)
MONOCYTES # BLD AUTO: 1.12 K/UL — HIGH (ref 0–0.9)
MONOCYTES NFR BLD AUTO: 13.4 % — SIGNIFICANT CHANGE UP (ref 2–14)
NEUTROPHILS # BLD AUTO: 5.96 K/UL — SIGNIFICANT CHANGE UP (ref 1.8–7.4)
NEUTROPHILS NFR BLD AUTO: 71.7 % — SIGNIFICANT CHANGE UP (ref 43–77)
PLATELET # BLD AUTO: 136 K/UL — LOW (ref 150–400)
POTASSIUM SERPL-MCNC: 4.7 MMOL/L — SIGNIFICANT CHANGE UP (ref 3.5–5.3)
POTASSIUM SERPL-SCNC: 4.7 MMOL/L — SIGNIFICANT CHANGE UP (ref 3.5–5.3)
PROT SERPL-MCNC: 6.9 GM/DL — SIGNIFICANT CHANGE UP (ref 6–8.3)
PROTHROM AB SERPL-ACNC: 19.6 SEC — HIGH (ref 10.5–13.4)
RBC # BLD: 3.01 M/UL — LOW (ref 4.2–5.8)
RBC # FLD: 15.9 % — HIGH (ref 10.3–14.5)
SODIUM SERPL-SCNC: 132 MMOL/L — LOW (ref 135–145)
WBC # BLD: 8.33 K/UL — SIGNIFICANT CHANGE UP (ref 3.8–10.5)
WBC # FLD AUTO: 8.33 K/UL — SIGNIFICANT CHANGE UP (ref 3.8–10.5)

## 2022-02-23 PROCEDURE — 99284 EMERGENCY DEPT VISIT MOD MDM: CPT

## 2022-02-23 PROCEDURE — 86870 RBC ANTIBODY IDENTIFICATION: CPT

## 2022-02-23 PROCEDURE — 80053 COMPREHEN METABOLIC PANEL: CPT

## 2022-02-23 PROCEDURE — 86850 RBC ANTIBODY SCREEN: CPT

## 2022-02-23 PROCEDURE — U0005: CPT

## 2022-02-23 PROCEDURE — 86901 BLOOD TYPING SEROLOGIC RH(D): CPT

## 2022-02-23 PROCEDURE — 85730 THROMBOPLASTIN TIME PARTIAL: CPT

## 2022-02-23 PROCEDURE — 71045 X-RAY EXAM CHEST 1 VIEW: CPT

## 2022-02-23 PROCEDURE — 71045 X-RAY EXAM CHEST 1 VIEW: CPT | Mod: 26

## 2022-02-23 PROCEDURE — 93005 ELECTROCARDIOGRAM TRACING: CPT

## 2022-02-23 PROCEDURE — U0003: CPT

## 2022-02-23 PROCEDURE — 85025 COMPLETE CBC W/AUTO DIFF WBC: CPT

## 2022-02-23 PROCEDURE — 36415 COLL VENOUS BLD VENIPUNCTURE: CPT

## 2022-02-23 PROCEDURE — 85610 PROTHROMBIN TIME: CPT

## 2022-02-23 PROCEDURE — 86880 COOMBS TEST DIRECT: CPT

## 2022-02-23 PROCEDURE — 86900 BLOOD TYPING SEROLOGIC ABO: CPT

## 2022-02-23 PROCEDURE — 99285 EMERGENCY DEPT VISIT HI MDM: CPT | Mod: 25

## 2022-02-23 NOTE — ED PROVIDER NOTE - PATIENT PORTAL LINK FT
You can access the FollowMyHealth Patient Portal offered by [facility] by registering at the following website: [email or web address]. By joining [facility]’s FollowMyHealth portal, you will also be able to view your health information using other applications (apps) compatible with our system.

## 2022-02-23 NOTE — ED ADULT NURSE NOTE - OBJECTIVE STATEMENT
Pt. is A&Ox3, presenting to ER c/o nosebleed X 3days. Pt. states nosebleed became worse today and was unable to control the bleeding. Denies SOB, CP, N/V/D, dizziness or lightheadedness. Pt. reports last dialysis treatment was yesterday 2/22/2022, AV fistula noted in the right upper arm. Pt. is A&Ox3, presenting to ER c/o nosebleed X 3days. Pt. states nosebleed became worse today and was unable to control the bleeding. Denies SOB, CP, N/V/D, dizziness or lightheadedness. Pt. reports last dialysis treatment was yesterday 2/22/2022, AV fistula noted in the right upper arm. Bleeding noted from the AV fistula site, dressing applied by MD.

## 2022-02-23 NOTE — ED PROVIDER NOTE - PROGRESS NOTE DETAILS
DW daughter and pt, they feel that patient should go back to nursing home. patient has not had any bleeding here although he is on nasal oxygen, fistula is not bleeding, daughter who is RN, pt and I agree to hold eliquis and they will consult cardiology, pt is still on aspirin. risks/benefits discussed, precautions reviewed. Pt has dialysis later today.

## 2022-02-23 NOTE — ED PROVIDER NOTE - NSICDXPASTMEDICALHX_GEN_ALL_CORE_FT
PAST MEDICAL HISTORY:  CAD (coronary artery disease)     Cardiomyopathy     HLD (hyperlipidemia)     HTN (hypertension)       ESRD on dialysis

## 2022-02-23 NOTE — ED PROVIDER NOTE - OBJECTIVE STATEMENT
87 yo male with a PMHx of ESRD (on dialysis), HLD, HTN, CAD, respiratory failure presents to the ED BIBEMS c/o epistaxis x3  days. Pt is on Eliquis. No recent trauma. Pt has no other complaints at this time.

## 2022-02-23 NOTE — ED CLERICAL - NS ED CLERK NOTE PRE-ARRIVAL INFORMATION; ADDITIONAL PRE-ARRIVAL INFORMATION
This patient is enrolled in the comprehensive joint replacement (CJR) program and has active care navigation.  This patient can be followed up by the care navigation team within 24 hours. To arrange close follow-up or to obtain additional clinical information about this patient, please call the contact number above.   Please call the hospitalist for medical consultation ([phone]) PRIOR to admission or observation.  The hospitalist is part of the care team and can assist in acute medical management.   Please call the orthopedic team () for ALL patients who require admission.

## 2022-02-23 NOTE — ED ADULT TRIAGE NOTE - CHIEF COMPLAINT QUOTE
patient BIBEMS from [facility] c/o nosebleed x72 hours. bleeding controlled with gauze in triage. on eliquis. AV fistula noted to right upper arm. as per EMS, blood was leaking out of AV fistula PTA. fistula wrapped in gauze. patient on 3L NC at baseline, hx respiratory failure.

## 2022-02-24 VITALS
SYSTOLIC BLOOD PRESSURE: 137 MMHG | DIASTOLIC BLOOD PRESSURE: 74 MMHG | TEMPERATURE: 98 F | RESPIRATION RATE: 18 BRPM | HEART RATE: 71 BPM | OXYGEN SATURATION: 98 %

## 2022-02-24 LAB — SARS-COV-2 RNA SPEC QL NAA+PROBE: SIGNIFICANT CHANGE UP

## 2022-02-24 PROCEDURE — 86077 PHYS BLOOD BANK SERV XMATCH: CPT

## 2022-02-24 PROCEDURE — 93010 ELECTROCARDIOGRAM REPORT: CPT

## 2022-02-25 DIAGNOSIS — I50.33 ACUTE ON CHRONIC DIASTOLIC (CONGESTIVE) HEART FAILURE: ICD-10-CM

## 2022-02-25 DIAGNOSIS — I42.9 CARDIOMYOPATHY, UNSPECIFIED: ICD-10-CM

## 2022-02-25 DIAGNOSIS — E43 UNSPECIFIED SEVERE PROTEIN-CALORIE MALNUTRITION: ICD-10-CM

## 2022-02-25 DIAGNOSIS — E03.9 HYPOTHYROIDISM, UNSPECIFIED: ICD-10-CM

## 2022-02-25 DIAGNOSIS — Z99.2 DEPENDENCE ON RENAL DIALYSIS: ICD-10-CM

## 2022-02-25 DIAGNOSIS — E83.39 OTHER DISORDERS OF PHOSPHORUS METABOLISM: ICD-10-CM

## 2022-02-25 DIAGNOSIS — F03.90 UNSPECIFIED DEMENTIA WITHOUT BEHAVIORAL DISTURBANCE: ICD-10-CM

## 2022-02-25 DIAGNOSIS — J43.9 EMPHYSEMA, UNSPECIFIED: ICD-10-CM

## 2022-02-25 DIAGNOSIS — I13.2 HYPERTENSIVE HEART AND CHRONIC KIDNEY DISEASE WITH HEART FAILURE AND WITH STAGE 5 CHRONIC KIDNEY DISEASE, OR END STAGE RENAL DISEASE: ICD-10-CM

## 2022-02-25 DIAGNOSIS — I25.10 ATHEROSCLEROTIC HEART DISEASE OF NATIVE CORONARY ARTERY WITHOUT ANGINA PECTORIS: ICD-10-CM

## 2022-02-25 DIAGNOSIS — Z66 DO NOT RESUSCITATE: ICD-10-CM

## 2022-02-25 DIAGNOSIS — I27.20 PULMONARY HYPERTENSION, UNSPECIFIED: ICD-10-CM

## 2022-02-25 DIAGNOSIS — E78.5 HYPERLIPIDEMIA, UNSPECIFIED: ICD-10-CM

## 2022-02-25 DIAGNOSIS — Z87.891 PERSONAL HISTORY OF NICOTINE DEPENDENCE: ICD-10-CM

## 2022-02-25 DIAGNOSIS — I48.20 CHRONIC ATRIAL FIBRILLATION, UNSPECIFIED: ICD-10-CM

## 2022-02-25 DIAGNOSIS — D63.1 ANEMIA IN CHRONIC KIDNEY DISEASE: ICD-10-CM

## 2022-02-25 DIAGNOSIS — U07.1 COVID-19: ICD-10-CM

## 2022-02-25 DIAGNOSIS — J96.21 ACUTE AND CHRONIC RESPIRATORY FAILURE WITH HYPOXIA: ICD-10-CM

## 2022-02-25 DIAGNOSIS — Z99.81 DEPENDENCE ON SUPPLEMENTAL OXYGEN: ICD-10-CM

## 2022-02-25 DIAGNOSIS — N18.6 END STAGE RENAL DISEASE: ICD-10-CM

## 2022-03-01 ENCOUNTER — EMERGENCY (EMERGENCY)
Facility: HOSPITAL | Age: 87
LOS: 0 days | Discharge: HOME | End: 2022-03-01
Attending: EMERGENCY MEDICINE
Payer: MEDICARE

## 2022-03-01 VITALS
HEIGHT: 68 IN | RESPIRATION RATE: 18 BRPM | WEIGHT: 149.91 LBS | SYSTOLIC BLOOD PRESSURE: 132 MMHG | TEMPERATURE: 98 F | OXYGEN SATURATION: 94 % | DIASTOLIC BLOOD PRESSURE: 71 MMHG | HEART RATE: 73 BPM

## 2022-03-01 VITALS
DIASTOLIC BLOOD PRESSURE: 67 MMHG | TEMPERATURE: 98 F | RESPIRATION RATE: 18 BRPM | OXYGEN SATURATION: 96 % | HEART RATE: 83 BPM | SYSTOLIC BLOOD PRESSURE: 126 MMHG

## 2022-03-01 DIAGNOSIS — R04.0 EPISTAXIS: ICD-10-CM

## 2022-03-01 DIAGNOSIS — I25.10 ATHEROSCLEROTIC HEART DISEASE OF NATIVE CORONARY ARTERY WITHOUT ANGINA PECTORIS: ICD-10-CM

## 2022-03-01 DIAGNOSIS — Z99.2 DEPENDENCE ON RENAL DIALYSIS: ICD-10-CM

## 2022-03-01 DIAGNOSIS — E78.5 HYPERLIPIDEMIA, UNSPECIFIED: ICD-10-CM

## 2022-03-01 DIAGNOSIS — I12.0 HYPERTENSIVE CHRONIC KIDNEY DISEASE WITH STAGE 5 CHRONIC KIDNEY DISEASE OR END STAGE RENAL DISEASE: ICD-10-CM

## 2022-03-01 DIAGNOSIS — N18.6 END STAGE RENAL DISEASE: ICD-10-CM

## 2022-03-01 DIAGNOSIS — Z79.82 LONG TERM (CURRENT) USE OF ASPIRIN: ICD-10-CM

## 2022-03-01 PROCEDURE — 99283 EMERGENCY DEPT VISIT LOW MDM: CPT

## 2022-03-01 NOTE — ED ADULT NURSE NOTE - OBJECTIVE STATEMENT
Patient reports nose bleed starting around 3am. No active bleeding upon arrival. +blood thinners. Airway patent and intact. Pt denies trauma to the area.

## 2022-03-01 NOTE — ED ADULT TRIAGE NOTE - CHIEF COMPLAINT QUOTE
[facility] c/o intermittent L nare nose bleed x10 hours. On aspirin. Found to be hypoxic to 84% on RA by EMS. Placed on 3L NC with improvement. Denies chest pain, SOB, HA, dizziness, fevers, and recent/falls trauma.

## 2022-03-01 NOTE — ED PROVIDER NOTE - OBJECTIVE STATEMENT
85 y/o male in ED c/o epistaxis today intermittent .   h/o similar episodes.   pt denies any fever, HA, cp, sob, n/v/d.   pt denies any trauma.

## 2022-03-01 NOTE — ED ADULT NURSE NOTE - NSIMPLEMENTINTERV_GEN_ALL_ED
Implemented All Fall with Harm Risk Interventions:  [place] to call system. Call bell, personal items and telephone within reach. Instruct patient to call for assistance. Room bathroom lighting operational. Non-slip footwear when patient is off stretcher. Physically safe environment: no spills, clutter or unnecessary equipment. Stretcher in lowest position, wheels locked, appropriate side rails in place. Provide visual cue, wrist band, yellow gown, etc. Monitor gait and stability. Monitor for mental status changes and reorient to person, place, and time. Review medications for side effects contributing to fall risk. Reinforce activity limits and safety measures with patient and family. Provide visual clues: red socks.

## 2022-03-01 NOTE — ED PROVIDER NOTE - NSICDXPASTMEDICALHX_GEN_ALL_CORE_FT
PAST MEDICAL HISTORY:  CAD (coronary artery disease)     Cardiomyopathy     ESRD on dialysis     HLD (hyperlipidemia)     HTN (hypertension)

## 2022-03-01 NOTE — ED PROVIDER NOTE - PROGRESS NOTE DETAILS
no further epistaxis noted in ED.   pt desat when he attempts to breathe through his congested nose.  O2 on RA when he breaths through mouth is %.  will d/c back to NH and recommend f/u with ENT.

## 2022-03-01 NOTE — ED PROVIDER NOTE - NSFOLLOWUPINSTRUCTIONS_ED_ALL_ED_FT
please follow up with your doctor in 1-2 days for ENT referral.   do not blow your nose.  breathe through mouth into you follow up with your doctor.   return to ED for any concerns

## 2022-03-01 NOTE — ED PROVIDER NOTE - PATIENT PORTAL LINK FT
You can access the FollowMyHealth Patient Portal offered by [facility] by registering at the following website: [email or web address]. By joining [facility]’s FollowMyHealth portal, you will also be able to view your health information using other applications (apps) compatible with our system.

## 2022-03-01 NOTE — ED ADULT NURSE NOTE - CHIEF COMPLAINT QUOTE
[facility] c/o intermittent L nare nose bleed x10 hours. On aspirin. Found to be hypoxic to 84% on RA by EMS. Placed on 3L NC with improvement. Denies chest pain, SOB, HA, dizziness, fevers, and recent/falls trauma.

## 2022-04-12 NOTE — ED ADULT NURSE NOTE - CAS ELECT INFOMATION PROVIDED
[FreeTextEntry1] : 04/12/2022\par Patient's blood work results noted, all normal. \par Cardiology  pre-op clearance pending  with  4/14/2022 ( pH # [phone]) due to coronary calcification.\par If patient is cleared by the Cardiologist, may proceed with the proposed surgery.  DC instructions

## 2022-05-30 NOTE — ED PROVIDER NOTE - CONSTITUTIONAL NEGATIVE STATEMENT, MLM
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 The patient reports that on day of presentation she suddenly began have large volume bloody emesis 2-3 times and several episodes of black stool. She reported worsening of chronic LUQ abdominal pain, moderate severity, vague in character, no reported radiation, not worsened with eating, no clear intervention to improve. The patient denies history of stomach ulcer or NSAID use. Chronic abdominal pain reported for approximately 2mo but not as severe. The patient at [facility] received pantoprazole 80mg IVx1 and was transported via ambulance which during that time received 1uPRBC. The patient reports using aspirin and clopidogrel for unclear reason but per chart appears to be for chronic SVC syndrome.    Patient cannot recall all medications and therefore medrec per EMR outpatient data  - appears transplant medication titrated to envarsus XR 4mg x2, envarsus XR 1mg x2, Leflunomide 20mg x2 d, prednisone 5mg d
no fever and no chills.

## 2022-06-29 NOTE — ED ADULT TRIAGE NOTE - HEART RATE (BEATS/MIN)
Airway       Patient location during procedure: OR       Procedure Start/Stop Times: 6/29/2022 7:51 AM  Staff -        CRNA: Leandra Thrasher APRN CRNA       Performed By: CRNA  Consent for Airway        Urgency: elective  Indications and Patient Condition       Indications for airway management: sia-procedural       Induction type:intravenous       Mask difficulty assessment: 1 - vent by mask    Final Airway Details       Final airway type: endotracheal airway       Successful airway: ETT - single  Endotracheal Airway Details        ETT size (mm): 7.0       Cuffed: yes       Successful intubation technique: video laryngoscopy       VL Blade Size: Paulino 4       Grade View of Cords: 1       Adjucts: stylet       Position: Right       Measured from: gums/teeth       Secured at (cm): 22       Bite block used: None    Post intubation assessment        Placement verified by: capnometry, equal breath sounds and chest rise        Number of attempts at approach: 1       Number of other approaches attempted: 0       Secured with: silk tape       Ease of procedure: easy       Dentition: Intact and Unchanged    Medication(s) Administered   Medication Administration Time: 6/29/2022 7:51 AM      
78

## 2022-07-18 NOTE — ED ADULT NURSE NOTE - NS PRO AD PATIENT TYPE
Medical Orders for Life-Sustaining Treatment (MOLST) Plastic Surgeon Procedure Text (D): After obtaining clear surgical margins the patient was sent to plastics for surgical repair.  The patient understands they will receive post-surgical care and follow-up from the referring physician's office.

## 2022-08-22 NOTE — ED STATDOCS - NSFOLLOWUPINSTRUCTIONS_ED_ALL_ED_FT
FOLLOW UP WITH YOUR PRIMARY DOCTOR IN 1-2 DAYS. RETURN TO THE ER FOR ANY WORSENING SYMPTOMS OR NEW CONCERNS.       Skin Tear    WHAT YOU NEED TO KNOW:    What do I need to know about a skin tear? A skin tear occurs when the layers of weakened skin split open from an injury. It is important to treat and prevent skin tears to prevent infection.    What increases my risk for a skin tear?   •Oran and elderly ages      •Chronic or critical illness      •Long-term use of steroids      How is a skin tear treated? Treatment may include any of the following:   •Medicines may be given to decrease pain or treat a bacterial infection.      •Bandages such as moist gauze pads or wraps may be placed on your wound. Bandages will help protect your wound from more injury, and allow your wound to heal. Do not use adhesive bandages. These could stick to your wound and make your skin tear worse.      •Stitches or medical glue may be used to close the wound so it can heal.      •Debridement is removal of dead, damaged, or infected skin to help your wound heal correctly.      How can I help prevent a skin tear?   •Clean, moisturize, and protect your skin. Baths, hot showers, and soap can dry your skin and increase your risk for skin tears. Take lukewarm showers, use mild soap as directed, and gently pat your skin dry. Use lotion to keep your skin moist after you shower. Wear long sleeves, pants, and protective footwear.      •Move carefully. Ask for help if you cannot lift yourself. Do not drag your skin when you move.      •Keep your home safe. Cover sharp corners, keep your pathways clear, and turn on lights so you can see clearly. Ask for more information if you have questions about home safety.      •Drink liquids as directed. Ask your provider how much liquid to drink each day and which liquids are best for you. Liquids will help keep your skin moist and protected from another skin tear.      •Eat high-protein foods to help with wound healing. Examples are lean meats, fish, low-fat dairy products, and beans.       When should I call my doctor?   •You have a fever or chills.      •Blood soaks through your bandage.      •You have redness, swelling, pus, or a bad odor coming from your wound.      •You have severe pain.      •Your wound tears open again.      •You have questions or concerns about your condition or care.      CARE AGREEMENT:    You have the right to help plan your care. Learn about your health condition and how it may be treated. Discuss treatment options with your healthcare providers to decide what care you want to receive. You always have the right to refuse treatment.        ©  [employer]

## 2022-08-22 NOTE — ED STATDOCS - NS ED ATTENDING STATEMENT MOD
This was a shared visit with the NUZHAT. I reviewed and verified the documentation and independently performed the documented:

## 2022-08-22 NOTE — ED STATDOCS - NS ED ROS FT
Constitutional: No fever or chills  Eyes: No visual changes  HEENT: No throat pain  CV: No chest pain  Resp: No SOB no cough  GI: No abd pain, nausea or vomiting  : No dysuria  MSK: No musculoskeletal pain  Skin: No rash +skin tear to left ankle  Neuro: No headache

## 2022-08-22 NOTE — ED STATDOCS - PHYSICAL EXAMINATION
Constitutional: NAD AOx3  Eyes: PERRL EOMI  Head: Normocephalic atraumatic  Mouth: MMM  Cardiac: regular rate and rhythm  Resp: Lungs CTAB  GI: Abd s/nd/nt  Neuro: CN2-12 grossly intact, KOVACS x 4  Skin: Left shin w/ 2 open skin tears, venous bleeding

## 2022-08-22 NOTE — ED STATDOCS - PROGRESS NOTE DETAILS
signed Gail Healy PA-C Pt seen initially in intake by Dr Ruiz.   86M BIB daughter for eval of bleeding wounds. Pt had tripped and fallen on 8/20, scraping his left shin and knee, right shin, and right upper arm. A neighbor who is an NP helped him clean and dress his wounds. Pt went for dialysis today and the left shin wound was bleeding through the bandage. Pt alert, NAD, denies fever. 2 skin tears on left anterior shin cleansed in ED, soaked with TXA and dressed with quikclot and magdaleno. Other wounds cleansed and dressed with quickclot or xeroform. Will start abx ppx. Pt has f/u appt with PMD Juliocesar on 8/24. Advise pt rest, elevate leg and leave bandages in place until appt. return precautions given. Pt feeling well, pt and family agree with DC and plan of care.

## 2022-08-22 NOTE — ED STATDOCS - PATIENT PORTAL LINK FT
You can access the FollowMyHealth Patient Portal offered by [facility] by registering at the following website: [email or web address]. By joining [facility]’s FollowMyHealth portal, you will also be able to view your health information using other applications (apps) compatible with our system.

## 2022-08-22 NOTE — ED ADULT TRIAGE NOTE - CHIEF COMPLAINT QUOTE
Pt reports tripping up the steps with abrasions noted on Saturday night. Pt reports left leg wound continues to bleed through bandages. Pt denies LOC or head injury. Takes baby asa.  Some seeping sanguinous drainage noted around dressing placed in dialysis. Reinforcement placed. Pt denies dizziness.

## 2022-08-22 NOTE — ED STATDOCS - OBJECTIVE STATEMENT
87 y/o male with a PMHx of ESRD (on dialysis), HLD, HTN, CAD, and respiratory failure presents to the ED c/o abrasion to left ankle s/p fall. Pt reports falling x2 days ago while going up steps and cutting his left ankle, denies head strike or LOC. Pt reports bleeding stopped on its own but today while he was at dialysis bleeding started again. Pt on baby ASA. Pt denies any Sx at this time.

## 2022-09-04 NOTE — ED ADULT NURSE NOTE - HISTORY OF COVID-19 VACCINATION
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 Taking?   mometasone (ELOCON) 0 1 % cream   No No   Sig: Apply topically daily      Facility-Administered Medications: None       Past Medical History:   Diagnosis Date    G6P deficiency (glucose-6-phosphatase deficiency) ([facility] [address] )     Jaundice 2017       History reviewed  No pertinent surgical history  Family History   Problem Relation Age of Onset    Asthma Mother         Copied from mother's history at birth     I have reviewed and agree with the history as documented  E-Cigarette/Vaping     E-Cigarette/Vaping Substances     Social History     Tobacco Use    Smoking status: Passive Smoke Exposure - Never Smoker    Smokeless tobacco: Never Used   Substance Use Topics    Alcohol use: Not on file    Drug use: Not on file       Review of Systems   Unable to perform ROS: Age   Constitutional: Positive for fever  Physical Exam  Physical Exam   Constitutional: He appears well-developed and well-nourished  No distress  Appears lethargic, but responsive to examiner   HENT:   Head: No signs of injury     Right Ear: Tympanic membrane normal    Left Ear: Tympanic membrane normal    Nose: Nose normal  No nasal discharge  Mouth/Throat: Mucous membranes are moist  No tonsillar exudate  Oropharynx is clear  Eyes: Pupils are equal, round, and reactive to light  Conjunctivae and EOM are normal  Right eye exhibits no discharge  Left eye exhibits no discharge  Neck: Normal range of motion  Neck supple  Cardiovascular: S1 normal and S2 normal  Tachycardia present  Pulses are strong  Pulmonary/Chest: Effort normal and breath sounds normal  No stridor  No respiratory distress  He has no wheezes  He has no rhonchi  He has no rales  He exhibits no retraction  Abdominal: Soft  Bowel sounds are normal  He exhibits no distension  There is no tenderness  There is no rebound  Musculoskeletal: Normal range of motion  He exhibits no tenderness or deformity  Neurological: He is alert  He has normal strength  Skin: Skin is warm and dry  Capillary refill takes less than 2 seconds  No petechiae, no purpura and no rash noted  He is not diaphoretic  No cyanosis  No jaundice or pallor  Nursing note and vitals reviewed        Vital Signs  ED Triage Vitals [03/01/20 1606]   Temperature Pulse Respirations Blood Pressure SpO2   (!) 103 2 °F (39 6 °C) (!) 180 20 (!) 115/62 98 %      Temp src Heart Rate Source Patient Position - Orthostatic VS BP Location FiO2 (%)   Oral Monitor Sitting Left arm --      Pain Score       --           Vitals:    03/01/20 1606   BP: (!) 115/62   Pulse: (!) 180   Patient Position - Orthostatic VS: Sitting         Visual Acuity      ED Medications  Medications   ondansetron (ZOFRAN-ODT) dispersible tablet 2 mg (has no administration in time range)   acetaminophen (TYLENOL) oral suspension 214 4 mg (214 4 mg Oral Given 3/1/20 1728)   ondansetron (ZOFRAN) oral solution 1 432 mg (1 432 mg Oral Given 3/1/20 1639)   oseltamivir (TAMIFLU) oral suspension 30 mg (30 mg Oral Given 3/1/20 1951)   ibuprofen (MOTRIN) oral suspension 142 mg (142 mg Oral Given 3/1/20 1951)       Diagnostic Studies  Results Reviewed Procedure Component Value Units Date/Time    Influenza A/B and RSV PCR [[id number]]  (Abnormal) Collected:  03/01/20 1639    Lab Status:  Final result Specimen:  Nares from Nose Updated:  03/01/20 1912     INFLUENZA A PCR Detected     INFLUENZA B PCR None Detected     RSV PCR None Detected    Strep A PCR [[id number]]  (Normal) Collected:  03/01/20 1729    Lab Status:  Final result Specimen:  Throat Updated:  03/01/20 1834     STREP A PCR None Detected                 No orders to display              Procedures  Procedures         ED Course          5year-old male with history of G6PD presenting to the emergency department with fever, vomiting  A vital signs upon arrival were notable for 0103 2 degree fever and tachycardia 180  Differential diagnosis includes influenza, strep, other viral illness, pneumonia, UTI, sepsis  Patient was given p o  Fluids, Zofran, Tylenol while in the emergency depart min  Upon reassessment, patient felt improved and was able to keep down his Jell-O and water  Lab work came back remarkable for positive influenza A  Patient was given it Tamiflu for the flu and Motrin for sore throat  Upon reassessment, patient felt well, was acting at baseline like his usual self  Vital signs normalized  No fever, heart rate within normal   Strict return precautions given  Recommended that patient follow-up with pediatrician within 1 week  Parents verbalized understanding and will follow-up as an outpatient and return to the emergency department as needed  Patient remained hemodynamically and clinically stable in the emergency department for 4 hours prior evidence of impending cardiopulmonary instability  Upon discharge, patient was fully alert, acting appropriately for age, ambulatory                        MDM      Disposition  Final diagnoses:   Influenza     Time reflects when diagnosis was documented in both MDM as applicable and the Disposition within this note     Time User Action Codes Description Comment    3/1/2020  7:24 PM Ilda Jj Add [J11 1] Influenza       ED Disposition     ED Disposition Condition Date/Time Comment    Discharge Stable Sun Mar 1, 2020  7:24 PM Rasta Staley discharge to home/self care  Follow-up Information     Follow up With Specialties Details Why Contact Info    Noemi Eli, [address] [facility], Internal Medicine, Nurse Practitioner Schedule an appointment as soon as possible for a visit in 1 week  [address] [place] [address]  [phone]            Patient's Medications   Discharge Prescriptions    ONDANSETRON (ZOFRAN-ODT) 4 MG DISINTEGRATING TABLET    Take 0 5 tablets (2 mg total) by mouth every 6 (six) hours as needed for nausea or vomiting       Start Date: 3/1/2020  End Date: --       Order Dose: 2 mg       Quantity: 20 tablet    Refills: 0    OSELTAMIVIR (TAMIFLU) 6 MG/ML SUSPENSION    Take 5 mL (30 mg total) by mouth every 12 (twelve) hours for 5 days       Start Date: 3/1/2020  End Date: 3/6/2020       Order Dose: 30 mg       Quantity: 50 mL    Refills: 0     No discharge procedures on file      PDMP Review     None          ED Provider  Electronically Signed by           Gabby Pena MD  03/01/20 2007 Vaccine status unknown

## 2022-09-04 NOTE — ED ADULT NURSE NOTE - OBJECTIVE STATEMENT
Pt brought to ED from home by ambulance with complaints of back pain. Pt received resting in stretcher on connor stretcher from EMS. Pt's daughter at bedside. Pt states he went down for a nap, when he awoke he experienced back pain and could not move due to increased pain. Pt endorses that he has been experiencing back pain for the past two days. Pt states he took tylenol at home. Pt's daughter states that Pt usually ambulates with walker/rollator but experienced fall two weeks ago while moving. Pt's daughter states that this episode of pain was brought on when Pt was trying to move two Pt brought to ED from home by ambulance with complaints of back pain. Pt received resting in stretcher on connor stretcher from EMS. Pt's daughter at bedside. Pt states he went down for a nap, when he awoke he experienced back pain and could not move due to increased pain. Pt's daughter states that this episode of pain was brought on when Pt was trying to move heavy object. Pt endorses that he has been experiencing back pain for the past two days. Pt states he took tylenol at home. Pt's daughter states that Pt usually ambulates with walker/rollator but experienced fall two weeks ago while moving. A&O x4. Airway is patent, breathing is even and unlabored. Pt denies difficulty breathing and shortness of breath. Skin is warm and dry. PMS x4 extremities. 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 Pt denies numbness and tingling to arms and legs. Pt denies chest pain. Pt denies abdominal discomfort and n/v/d. Pt on fluid restriction diet, unable to produce urine. No additional requests or complaints. Patient safety maintained. Will continue to monitor.

## 2022-09-04 NOTE — ED PROVIDER NOTE - PHYSICAL EXAMINATION
Constitutional: NAD, well appearing  HEENT: no rhinorrhea, PERRL, no oropharyngeal erythema or exudates, midline uvula.  TMs clear.  CVS:  RRR, no m/r/g  Resp:  CTAB  GI: soft, ntnd  MSK:  no restriction to rom, full ROM to all extremities, TTP left paraspinal lumbar region  Neuro:  A&Ox3, 5/5 strength to all extremities,  SILT to all extremities  Skin: no rash  psych: clear thought content  Heme/lymph:  No LAD

## 2022-09-04 NOTE — ED ADULT NURSE REASSESSMENT NOTE - NS ED NURSE REASSESS COMMENT FT1
Pt resting comfortably in stretcher, denies pain at this time. Pt requesting to eat, provided with Pt.

## 2022-09-04 NOTE — ED PROVIDER NOTE - CLINICAL SUMMARY MEDICAL DECISION MAKING FREE TEXT BOX
No red flags for dangerous etiology, CT abdomen/pelvis vs possible lumbar vertebral fracture. meds, labs No red flags for dangerous etiology of back pain.  NO LE neuro symptoms.  NO bowel or bladder incontinence or retention.  No ivdu.  NO f/c.  WIll, CT abdomen/pelvis for possible lumbar vertebral fracture. meds, labs

## 2022-09-04 NOTE — ED ADULT TRIAGE NOTE - CHIEF COMPLAINT QUOTE
Pt BIBEMS from home a/ox3, reports "back pain x2 days, relieved with tylenol, woke up from nap today and unable to move or ambulate due to pain". hx of sciatica and ESRD. denies trauma/fall/injury.

## 2022-09-04 NOTE — PATIENT PROFILE ADULT - FALL HARM RISK - HARM RISK INTERVENTIONS

## 2022-09-04 NOTE — ED PROVIDER NOTE - NS ED ROS FT
Constitutional: nad, well appearing  HEENT:  no nasal congestion, eye drainage or ear pain.    CVS:  no cp  Resp:  No sob, no cough  GI:  no abdominal pain, no nausea or vomiting  :  no dysuria  MSK: left lower back pain  Skin: no rash  Neuro: no change in mental status or level of consciousness  Heme/lymph: no bleeding

## 2022-09-04 NOTE — ED PROVIDER NOTE - OBJECTIVE STATEMENT
86 year old male with PMHx of CAD, cardiomyopathy, HTN, HLD, and ESRD on dialysis M,W,F presents to the ED complaining of lower left back pain x2 days after pulling it yesterday by lifting garage door. Daughter gave Tylenol and pt noted pain was relieved. Today pt woke up from nap today and was unable to move or ambulate due to pain. Pt denies trauma/fall/injury. Pt fell 2 weeks ago with abrasions on left leg. No hx of aneurisms.

## 2022-09-04 NOTE — ED PROVIDER NOTE - PROGRESS NOTE DETAILS
Noted to have L1 compression fx.  Still unable to walk sufficiently despite pain meds.  Will likely need rehab.  Will admit for further w/u and management.

## 2022-09-05 NOTE — CONSULT NOTE ADULT - SUBJECTIVE AND OBJECTIVE BOX
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 Pt states that earlier in the week his back was sore. He laid down today around 1 pm and was unable to get up. He took Tylenol with little improvement. He reports having no strength. No other complaints. Denies fevers, chills, chest pain, SOB, abdominal pain, N/V, diarrhea/constipation, shooting pain down the lower extremities, numbness/tingling of the lower extremities.     Prior admission:  - 2/18/22: SOB -> acute on chronic CHF excerbation, COVID +    ER course: VSS. Labs: Hb 9.5, .9, Na 133, Cr 7.65, albumin 3.2, alkaline phosphatase 133. COVID, influenza A/B, RSV negative.     EKG: pending, f/u results     Imaging:   - CT abd/pelvis: Compared to prior study 2018 interval development of a compression deformity of L1 to vertebra plana. Osteopenia/osteoporosis. Concern for disc protrusion at the level of L4-5 with relative narrowing of the spinal canal, MRI of the lumbar spine can be considered for further evaluation. No acute abdominopelvic pathology. Chronic findings include noncomplicated diverticulosis, stable right adrenal nodule, renal atrophy, cardiomegaly. Cholethiasis.     Pt was given Morphine. He is being admitted to med/surg for further evaluation.  (05 Sep 2022 01:22)    Patient is a 86y old  Male who presents with a chief complaint of Low back pain (05 Sep 2022 10:24)  -----------------------  pt seen earlier today   LBP with L1 compression fracture for MRI   due for HD today   pain better this am       PAST MEDICAL & SURGICAL HISTORY:  - esrd [facility]   - CM ef 50%  - htn   - hld   - avf        FAMILY HISTORY:  FH: emphysema (Father)        MEDICATIONS  (STANDING):  aspirin  chewable 81 milliGRAM(s) Oral daily  atorvastatin 20 milliGRAM(s) Oral at bedtime  budesonide 160 MICROgram(s)/formoterol 4.5 MICROgram(s) Inhaler 2 Puff(s) Inhalation two times a day  carvedilol 3.125 milliGRAM(s) Oral every 12 hours  heparin   Injectable 5000 Unit(s) SubCutaneous every 12 hours  influenza  Vaccine (HIGH DOSE) 0.7 milliLiter(s) IntraMuscular once  levothyroxine 88 MICROGram(s) Oral daily  lidocaine   4% Patch 1 Patch Transdermal daily  lidocaine/prilocaine Cream 1 Application(s) Topical <User Schedule>  montelukast 10 milliGRAM(s) Oral at bedtime  naloxone Injectable 0.4 milliGRAM(s) IV Push once  Nephro-juan ramon 1 Tablet(s) Oral daily  psyllium Powder 1 Packet(s) Oral daily  Velphoro 1000mg ([id number]) 1000 milliGRAM(s) 2 Tablet(s) Oral three times a day with meals    MEDICATIONS  (PRN):  acetaminophen     Tablet .. 650 milliGRAM(s) Oral every 6 hours PRN Temp greater or equal to 38C (100.4F), Mild Pain (1 - 3)  albumin human 25% IVPB 50 milliLiter(s) IV Intermittent every 1 hour PRN intradialytic hypotension or SBP<110mmHg  aluminum hydroxide/magnesium hydroxide/simethicone Suspension 30 milliLiter(s) Oral every 4 hours PRN Dyspepsia  cyclobenzaprine 5 milliGRAM(s) Oral every 8 hours PRN Spasm  melatonin 3 milliGRAM(s) Oral at bedtime PRN Insomnia  melatonin 3 milliGRAM(s) Oral at bedtime PRN Insomnia  midodrine. 5 milliGRAM(s) Oral <User Schedule> PRN SBP less then 120  morphine  - Injectable 2 milliGRAM(s) IV Push every 4 hours PRN Moderate Pain (4 - 6)  morphine  - Injectable 4 milliGRAM(s) IV Push every 4 hours PRN Severe Pain (7 - 10)  ondansetron Injectable 4 milliGRAM(s) IV Push every 8 hours PRN Nausea and/or Vomiting  oxycodone    5 mG/acetaminophen 325 mG 1 Tablet(s) Oral every 4 hours PRN Severe Pain (7 - 10)  polyethylene glycol 3350 17 Gram(s) Oral daily PRN Constipation      Allergies    No Known Allergies    Intolerances        I&O's Summary      Home Medications:  acetaminophen 325 mg oral tablet: 2 tab(s) orally 2 times a day, As Needed for pain (05 Sep 2022 01:05)  aspirin 81 mg oral tablet: 1 tab(s) orally once a day (05 Sep 2022 01:05)  budesonide-formoterol 160 mcg-4.5 mcg/inh inhalation aerosol: 2 puff(s) inhaled 2 times a day (05 Sep 2022 01:05)  carvedilol 3.125 mg oral tablet: 1 tab(s) orally every 12 hours (05 Sep 2022 01:05)  doxycycline hyclate 100 mg oral tablet: 1 tab(s) orally 2 times a day x 7 days  ***Course Complete*** (05 Sep 2022 01:05)  Lipitor 20 mg oral tablet: 1 tab(s) orally once a day (05 Sep 2022 01:05)  Melatonin 3 mg oral tablet: 1 tab(s) orally once a day (at bedtime), As Needed (05 Sep 2022 01:05)  Metamucil 400 mg oral capsule: 1 cap(s) orally once a day (05 Sep 2022 01:05)  montelukast 10 mg oral tablet: 1 tab(s) orally once a day (at bedtime) (05 Sep 2022 01:05)  Nephro-Juan Ramon oral tablet: 1 tab(s) orally once a day (05 Sep 2022 01:05)  Synthroid 88 mcg (0.088 mg) oral tablet: 1 tab(s) orally once a day (05 Sep 2022 01:05)  Velphoro 500 mg oral tablet, chewable: 2 tab(s) orally 3 times a day (with meals)  ***pt has own*** (05 Sep 2022 01:05)      Patient was seen and evaluated on dialysis.   Patient is tolerating the procedure well.   Continue dialysis:   Dialyzer:   revaclear 300 k protcol uf goal of 3 kg   midodrine spa for bp suuport  avf bfr 350-400      Vital Signs Last 24 Hrs  T(C): 36.6 (05 Sep 2022 12:50), Max: 37.1 (04 Sep 2022 17:30)  T(F): 97.8 (05 Sep 2022 12:50), Max: 98.8 (04 Sep 2022 17:30)  HR: 77 (05 Sep 2022 14:10) (70 - 87)  BP: 125/71 (05 Sep 2022 14:10) (99/64 - 134/67)  BP(mean): 82 (04 Sep 2022 21:23) (82 - 82)  RR: 17 (05 Sep 2022 14:10) (16 - 18)  SpO2: 94% (05 Sep 2022 08:25) (94% - 99%)    Parameters below as of 05 Sep 2022 12:50  Patient On (Oxygen Delivery Method): room air      Daily Height in cm: 172.72 (04 Sep 2022 17:30)    Daily   I&O's Summary      PHYSICAL EXAM:  GEN: alert awake O X 3  HEENT: MMM  NECK supple no jvd  CV: RRR s1s2  LUNGS: b/l CTA  ABD: + soft,   EXT: no edema    LABS:                        9.8    6.41  )-----------( 172      ( 05 Sep 2022 07:48 )             29.3     09-05    128<L>  |  95<L>  |  53<H>  ----------------------------<  91  4.1   |  26  |  8.71<H>    Ca    9.3      05 Sep 2022 07:48  Phos  4.3     09-05    TPro  6.6  /  Alb  3.2<L>  /  TBili  0.5  /  DBili  x   /  AST  36  /  ALT  28  /  AlkPhos  133<H>  09-04        Phosphorus Level, Serum: 4.3 mg/dL (09-05 @ 07:48)

## 2022-09-05 NOTE — PHYSICAL THERAPY INITIAL EVALUATION ADULT - MODALITIES TREATMENT COMMENTS
Pt left OOB in chair in NAD with CBIR. Pt instructed to not get up alone. Chair alarm activated. ERIC vogel

## 2022-09-05 NOTE — PHYSICAL THERAPY INITIAL EVALUATION ADULT - DIAGNOSIS, PT EVAL
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

## 2022-09-05 NOTE — CHART NOTE - NSCHARTNOTEFT_GEN_A_CORE
Patient is seen and examined at bedside, chart reviewed. C/o some back pain, but was up and ambulated with PT.   MRI done, report pending  Spine seen  Pain meds prn  Continue to mobilize with PT

## 2022-09-05 NOTE — CONSULT NOTE ADULT - SUBJECTIVE AND OBJECTIVE BOX
[facility]                                                           Orthopedic Spine Progress Note        SUBJECTIVE: Patient seen and examined, 86 year old male c/o LBP and difficulty ambulating that developed over last week after raising garage door. Extensive PMHx of ESRD on HD, obstructive CAD, severe pulmonary HTN, CHF, cardiomegaly, right CEA, emphysema on home O2, hypothyroidism, dementia. States pain was elevated yesterday but improved somewhat today. OOB in chair currently in NAD. Denies LE pain, numbness, or paresthesias. Chronic mild weakness LLE. Lumbar MRI already ordered.    Pain (0-10):   Current Pain Management:  [ ] PCA   [x] Po Analgesics [ ] IM /IV Analgesics     Vital Signs Last 24 Hrs  T(C): 36.8 (05 Sep 2022 08:23), Max: 37.1 (04 Sep 2022 17:30)  T(F): 98.2 (05 Sep 2022 08:23), Max: 98.8 (04 Sep 2022 17:30)  HR: 78 (05 Sep 2022 08:25) (72 - 87)  BP: 126/69 (05 Sep 2022 08:23) (106/63 - 134/67)  BP(mean): 82 (04 Sep 2022 21:23) (82 - 82)  RR: 18 (05 Sep 2022 08:23) (17 - 18)  SpO2: 94% (05 Sep 2022 08:25) (94% - 99%)    Parameters below as of 05 Sep 2022 08:25  Patient On (Oxygen Delivery Method): room air      I&O's Detail      OBJECTIVE:   Lumbar: no tenderness on palpation of lumbar spinous processes or paraspinal muscles  Neurological: A/O x 3              Sensation: [x] intact to light touch  [ ] decreased:          Motor exam: [x]               [x] Lower ext.    Hip Flx    Quad   Hamstrg         TA        EHL        GS                              R        5/5        5/5        5/5             5/5        5/5        5/5                                    L        5-/5       5-/5      5-/5             5/5        5/5        5/5                                                               [x] Vascular: intact           Tension Signs: none          Long Tract Findings: none     RADIOLOGY & ADDITIONAL STUDIES:            ACC: [id number] EXAM:  CT ABDOMEN AND PELVIS                          PROCEDURE DATE:  09/04/2022          INTERPRETATION:  CLINICAL INFORMATION: 86-year-old male patient with back   pain    COMPARISON: CT abdomen pelvis 12/20/2022.    CONTRAST/COMPLICATIONS:  IV Contrast: NONE  Oral Contrast: NONE  Complications: None reported at time of study completion    PROCEDURE:  CT of the Abdomen and Pelvis was performed.  Sagittal and coronal reformats were performed.    FINDINGS:  LOWER CHEST: Posterior dependent atelectatic changes. Mild subpleural   reticular opacities unchanged from prior study likely from chronic   congestion. Cardiomegaly. Coronary artery disease..    LIVER: Stable left hepatic cyst at 3.9 cm. No additional focal liver   lesions. Prominent hepatic veins.  BILE DUCTS: Normal caliber.  GALLBLADDER: Cholelithiasis.  SPLEEN: Within normal limits.  PANCREAS: Within normal limits.  ADRENALS: 1.6 cm right adrenal nodule, previously 1.3cm, HU 31 .  KIDNEYS/URETERS: Bilateral renal atrophy with cortical renal thinning,   the right kidney measures 8 cm, the left kidney 7.9 cm. No   hydroureteronephrosis or nephroureterolithiasis.    BLADDER: Decompressed and within normal limits.  REPRODUCTIVE ORGANS: Prostate within normal limits.    BOWEL: Normal stomach and duodenum. Small bowel loops are not dilated, no   obstruction. No abnormal bowel wall thickening. The appendix is not   visualized. Normal appearance of the colon. Scattered diverticula without   inflammation.  PERITONEUM: No ascites. No pneumoperitoneum or loculated collections.  VESSELS: Severe atherosclerotic changes  RETROPERITONEUM/LYMPH NODES: No lymphadenopathy. No retroperitoneal   hematoma.  ABDOMINAL WALL: Within normal limits.  BONES: Severe discogenic degenerative changes. Interval development of a   compression deformity to vertebra plana of L1 since prior study dated   2018. Mild posterior protrusion of the superior endplate by approximately   5 mm. Disc protrusion at L4-5 with relative narrowing of the spinal canal.   Low bone mineral density concerning for osteopenia-osteoporosis.    IMPRESSION:  Compared to prior study 2018  interval development of a compression   deformity of L1  to vertebra plana. Osteopenia/osteoporosis.    Concern for disc protrusion at the level of L4-5 with relative narrowing   of the spinal canal, MRI of the lumbar spine can be considered for   further evaluation.    No acute abdominopelvic pathology.  Chronic findings include noncomplicated diverticulosis, stable right   adrenal nodule, renal atrophy, cardiomegaly.      --- End of Report ---        OSCAR PINEDA MD; Attending Radiologist  This document has been electronically signed. Sep  4 2022  7:54PM                                           LABS:                        9.8    6.41  )-----------( 172      ( 05 Sep 2022 07:48 )             29.3     09-05    128<L>  |  95<L>  |  53<H>  ----------------------------<  91  4.1   |  26  |  8.71<H>    Ca    9.3      05 Sep 2022 07:48  Phos  4.3     09-05    TPro  6.6  /  Alb  3.2<L>  /  TBili  0.5  /  DBili  x   /  AST  36  /  ALT  28  /  AlkPhos  133<H>  09-04

## 2022-09-05 NOTE — H&P ADULT - HISTORY OF PRESENT ILLNESS
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 Pt states that earlier in the week his back was sore. He laid down today around 1 pm and was unable to get up. He took Tylenol with little improvement. He reports having no strength. No other complaints. Denies fevers, chills, chest pain, SOB, abdominal pain, N/V, diarrhea/constipation, shooting pain down the lower extremities, numbness/tingling of the lower extremities.     Prior admission:  - 2/18/22: SOB -> acute on chronic CHF excerbation, COVID +    ER course: VSS. Labs: Hb 9.5, .9, Na 133, Cr 7.65, albumin 3.2, alkaline phosphatase 133. COVID, influenza A/B, RSV negative.     EKG: pending, f/u results     Imaging:   - CT abd/pelvis: Compared to prior study 2018 interval development of a compression deformity of L1 to vertebra plana. Osteopenia/osteoporosis. Concern for disc protrusion at the level of L4-5 with relative narrowing of the spinal canal, MRI of the lumbar spine can be considered for further evaluation. No acute abdominopelvic pathology. Chronic findings include noncomplicated diverticulosis, stable right adrenal nodule, renal atrophy, cardiomegaly. Cholethiasis.     Pt was given Morphine. He is being admitted to med/surg for further evaluation.

## 2022-09-05 NOTE — H&P ADULT - ASSESSMENT
87 y/o M presents with low back pain     1. Low back pain likely secondary to worsening compression fracture of L1 to vertebra plana, disc protrusion L4-L5 with narrowing of spinal canal   - Admit to med/surg   - Ordered MRI LS spine   - Pain management: Tylenol, Percocet and Morphine PRN    - Flexeril for muscle spasm night   - Lidocaine patch   - PT evaluation   - Osteoporosis/osteopenia -> f/u with DEXA outpt   - Orthopedics consult - Dr. Rosado     2. Macrocytic anemia   - Hb 9.5, .9, monitor closely   - Ordered B12, folate    3. Elevated alkaline phosphatase   - Alkaline phosphatase 133, monitor     4. Hypoalbuminemia   - Albumin 3.2   - Nutrition consult     5. 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   HCP/daughter: Diane Herzog [phone]

## 2022-09-05 NOTE — H&P ADULT - NSHPREVIEWOFSYSTEMS_GEN_ALL_CORE
Constitutional: negative for fatigue, negative for fever, negative for chills, negative for decreased appetite.  Skin: negative for rashes, negative for open wounds, negative for jaundice.   Eyes: negative for blurry vision, negative for double vision.   Ears, nose, throat: negative for ear pain, negative for nasal congestion, negative for sore throat, negative for lymph node swelling.   Cardiovascular: negative for chest pain, negative for palpitations, negative for lower extremity swelling.   Respiratory: negative for shortness of breath, negative for wheezing, negative for cough.   Gastrointestinal: negative for abdominal pain, negative for nausea, negative for vomiting, negative for diarrhea, negative for constipation, negative for blood in the stool, negative for black tarry stools.   Genitourinary: negative for burning on urination, negative for urinary urgency or frequency, negative for blood in the urine.   Endocrine: negative for cold intolerance, negative for heat intolerance, negative for increased thirst.   Hematologic: negative for easy bruising or bleeding.   Musculoskeletal: negative for muscle/joint pain, positive for decreased range of motion/difficulty ambulating/low back pain   Neurological: negative for dizziness, negative for headaches, negative for loss of consciousness, negative for motor weakness, negative for sensory deficits.   Psychiatric: negative for depression, negative for anxiety.

## 2022-09-05 NOTE — H&P ADULT - NSHPPHYSICALEXAM_GEN_ALL_CORE
ICU Vital Signs Last 24 Hrs  T(C): 36.3 (04 Sep 2022 22:40), Max: 37.1 (04 Sep 2022 17:30)  T(F): 97.4 (04 Sep 2022 22:40), Max: 98.8 (04 Sep 2022 17:30)  HR: 87 (04 Sep 2022 22:40) (72 - 87)  BP: 117/59 (04 Sep 2022 22:40) (117/59 - 134/67)  BP(mean): 82 (04 Sep 2022 21:23) (82 - 82)  RR: 17 (04 Sep 2022 22:40) (17 - 18)  SpO2: 99% (04 Sep 2022 22:40) (96% - 99%)    O2 Parameters below as of 04 Sep 2022 22:40  Patient On (Oxygen Delivery Method): room air    General: Awake and alert, cooperative with exam. No acute distress.   Skin: Warm, dry, and pink.   Eyes: Pupils equal and reactive to light. Extraocular eye movements intact. No conjunctival injection, discharge, or scleral icterus.   HEENT: Atraumatic, normocephalic. Moist mucus membranes.   Cardiology: Normal S1, S2. No murmurs, rubs, or gallops. Regular rate and rhythm.   Respiratory: Lungs clear to ascultation bilaterally. Good air exchange. No wheezes, rales, or rhonchi. Normal chest expansion.   Gastrointestinal: Positive bowel sounds. Soft, non-tender, non-distended. No guarding, rigidity, or rebound tenderness. No hepatosplenomegaly.   Musculoskeletal: 5/5 motor strength in all extremities. Normal range of motion.   Back: no point tenderness or paraspinal tenderness. Negative straight leg raise bilaterally.   Extremities: No peripheral edema bilaterally. Dorsalis pedis pulses 2+ bilaterally. AV fistula RUE with palpable thrill, also heard on ascultation.   Neurological: A+Ox3 (person, place, and time). Cranial nerves 2-12 intact. Normal speech. No facial droop. No focal neurological deficits.   Psychiatric: Normal affect. Normal mood.

## 2022-09-05 NOTE — CONSULT NOTE ADULT - ASSESSMENT
L1 compression fracture  d/w Dr Walls:  --PT/WBAT  --pain control prn  --await lumbar MRI for further recommendations

## 2022-09-05 NOTE — CONSULT NOTE ADULT - ASSESSMENT
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  admitted with L1 compression fracture    REC  - tolerating hd and will inc uf goal with hd   - fu mri   - anemia fu trend of h/h and restart gali with next hd   - MBD  matthew pickering

## 2022-09-06 NOTE — CONSULT NOTE ADULT - SUBJECTIVE AND OBJECTIVE BOX
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 Pt states that earlier in the week his back was sore. He laid down today around 1 pm and was unable to get up. He took Tylenol with little improvement. He reports having no strength. No other complaints. Denies fevers, chills, chest pain, SOB, abdominal pain, N/V, diarrhea/constipation, shooting pain down the lower extremities, numbness/tingling of the lower extremities. Podiatry was consulted for pain in the metatarsal area LT foot.     REVIEW OF SYSTEMS: All other review of systems is negative unless indicated above    PMH:   CAD (coronary artery disease)  Cardiomyopathy  HTN (hypertension)  HLD (hyperlipidemia)  ESRD on dialysis    PSH:  No significant past surgical history  AV fistula  H/O CHF  H/O pulmonary hypertension    Allergies: No Known Allergies    MEDICATIONS  (STANDING):  aspirin  chewable 81 milliGRAM(s) Oral daily  atorvastatin 20 milliGRAM(s) Oral at bedtime  budesonide 160 MICROgram(s)/formoterol 4.5 MICROgram(s) Inhaler 2 Puff(s) Inhalation two times a day  carvedilol 3.125 milliGRAM(s) Oral every 12 hours  heparin   Injectable 5000 Unit(s) SubCutaneous every 12 hours  influenza  Vaccine (HIGH DOSE) 0.7 milliLiter(s) IntraMuscular once  levothyroxine 88 MICROGram(s) Oral daily  lidocaine   4% Patch 1 Patch Transdermal daily  lidocaine/prilocaine Cream 1 Application(s) Topical <User Schedule>  montelukast 10 milliGRAM(s) Oral at bedtime  naloxone Injectable 0.4 milliGRAM(s) IV Push once  Nephro-leighann 1 Tablet(s) Oral daily  psyllium Powder 1 Packet(s) Oral daily  Velphoro 1000mg ([id number]) 1000 milliGRAM(s) 2 Tablet(s) Oral three times a day with meals    MEDICATIONS  (PRN):  acetaminophen     Tablet .. 650 milliGRAM(s) Oral every 6 hours PRN Temp greater or equal to 38C (100.4F), Mild Pain (1 - 3)  albumin human 25% IVPB 50 milliLiter(s) IV Intermittent every 1 hour PRN intradialytic hypotension or SBP<110mmHg  aluminum hydroxide/magnesium hydroxide/simethicone Suspension 30 milliLiter(s) Oral every 4 hours PRN Dyspepsia  cyclobenzaprine 5 milliGRAM(s) Oral every 8 hours PRN Spasm  melatonin 3 milliGRAM(s) Oral at bedtime PRN Insomnia  melatonin 3 milliGRAM(s) Oral at bedtime PRN Insomnia  midodrine. 5 milliGRAM(s) Oral <User Schedule> PRN SBP less then 120  morphine  - Injectable 2 milliGRAM(s) IV Push every 4 hours PRN Moderate Pain (4 - 6)  morphine  - Injectable 4 milliGRAM(s) IV Push every 4 hours PRN Severe Pain (7 - 10)  ondansetron Injectable 4 milliGRAM(s) IV Push every 8 hours PRN Nausea and/or Vomiting  oxycodone    5 mG/acetaminophen 325 mG 1 Tablet(s) Oral every 4 hours PRN Severe Pain (7 - 10)  polyethylene glycol 3350 17 Gram(s) Oral daily PRN Constipation        Vitals   T(F): 98.7 (09-06-22 @ 08:11), Max: 99.4 (09-05-22 @ 20:56)  HR: 73 (09-06-22 @ 09:16) (73 - 95)  BP: 121/71 (09-06-22 @ 08:11) (101/53 - 121/71)  RR: 16 (09-06-22 @ 08:11) (16 - 16)  SpO2: 98% (09-06-22 @ 09:16) (92% - 98%)  Wt(kg): --      Physical Exam:   Constitutiona: NAD, alert;  Derm:  Skin warm, dry and supple bilateral.  Ulceration to the _ measuring approx _, +/-hyperkeratotic border, wound base Granular/fibrotic/necrotic, +/- purulence,+/- fluctuance, +/- tracking/tunneling,  (+/-) probe to bone,+/- sero/sanguinous drainage, +/-  malodor, +/- periowound erythema, +/-streaking lymphangitis noted, +/-Increased skin pigmentation noted to b/l lower legs   Vascular: Dorsalis Pedis and Posterior Tibial pulses /4.  Capillary re-fill time less then 3 seconds digits 1-5 bilateral. +/- Pitting edema noted to b/l lower legs  Neuro: Protective sensation intact/diminished to the level of the digits bilateral.  MSK: Muscle strength 4/5 in all major muscle groups of Right lower extremity. 5/5 in all muscle groups of LLE;  .        Labs:                          10.7   8.12  )-----------( 181      ( 06 Sep 2022 09:35 )             33.1     WBC Trend  8.12 Date (09-06 @ 09:35)  6.41 Date (09-05 @ 07:48)  8.21 Date (09-04 @ 18:31)      Chem  09-06    132<L>  |  97  |  38<H>  ----------------------------<  95  4.4   |  25  |  6.39<H>    Ca    9.7      06 Sep 2022 09:35  Phos  4.3     09-05    TPro  6.6  /  Alb  3.2<L>  /  TBili  0.5  /  DBili  x   /  AST  36  /  ALT  28  /  AlkPhos  133<H>  09-04    Rad/EKG        Date of Consult: 9/6/22   Chief Complaint : 85 y/o M with PMH ESRD on HD MWF via AVF, oliguric, (Dr Eastman), obstructive CAD s/p attempted unsuccessful PCI 2018, HFpEF 50% (prior 20%), severe pulmonary HTN, with hx of bleeding (due to LE hematoma post cath due to reported pseudoaneurysm) follows in [place] predominantly, hx of dementia, Hx of R side CEA (2012) HTN, hypothyroidism, HLD and emphysema on home O2 2L PRN presents with difficulty ambulating. Pt states that earlier in the week his back was sore. He laid down today around 1 pm and was unable to get up. He took Tylenol with little improvement. He reports having no strength. No other complaints. Denies fevers, chills, chest pain, SOB, abdominal pain, N/V, diarrhea/constipation, shooting pain down the lower extremities, numbness/tingling of the lower extremities. Podiatry was consulted for pain in the metatarsal area LT foot.     REVIEW OF SYSTEMS: All other review of systems is negative unless indicated above    PMH:   CAD (coronary artery disease)  Cardiomyopathy  HTN (hypertension)  HLD (hyperlipidemia)  ESRD on dialysis    PSH:  No significant past surgical history  AV fistula  H/O CHF  H/O pulmonary hypertension    Allergies: No Known Allergies    MEDICATIONS  (STANDING):  aspirin  chewable 81 milliGRAM(s) Oral daily  atorvastatin 20 milliGRAM(s) Oral at bedtime  budesonide 160 MICROgram(s)/formoterol 4.5 MICROgram(s) Inhaler 2 Puff(s) Inhalation two times a day  carvedilol 3.125 milliGRAM(s) Oral every 12 hours  heparin   Injectable 5000 Unit(s) SubCutaneous every 12 hours  influenza  Vaccine (HIGH DOSE) 0.7 milliLiter(s) IntraMuscular once  levothyroxine 88 MICROGram(s) Oral daily  lidocaine   4% Patch 1 Patch Transdermal daily  lidocaine/prilocaine Cream 1 Application(s) Topical <User Schedule>  montelukast 10 milliGRAM(s) Oral at bedtime  naloxone Injectable 0.4 milliGRAM(s) IV Push once  Nephro-leighann 1 Tablet(s) Oral daily  psyllium Powder 1 Packet(s) Oral daily  Velphoro 1000mg ([id number]) 1000 milliGRAM(s) 2 Tablet(s) Oral three times a day with meals    MEDICATIONS  (PRN):  acetaminophen     Tablet .. 650 milliGRAM(s) Oral every 6 hours PRN Temp greater or equal to 38C (100.4F), Mild Pain (1 - 3)  albumin human 25% IVPB 50 milliLiter(s) IV Intermittent every 1 hour PRN intradialytic hypotension or SBP<110mmHg  aluminum hydroxide/magnesium hydroxide/simethicone Suspension 30 milliLiter(s) Oral every 4 hours PRN Dyspepsia  cyclobenzaprine 5 milliGRAM(s) Oral every 8 hours PRN Spasm  melatonin 3 milliGRAM(s) Oral at bedtime PRN Insomnia  melatonin 3 milliGRAM(s) Oral at bedtime PRN Insomnia  midodrine. 5 milliGRAM(s) Oral <User Schedule> PRN SBP less then 120  morphine  - Injectable 2 milliGRAM(s) IV Push every 4 hours PRN Moderate Pain (4 - 6)  morphine  - Injectable 4 milliGRAM(s) IV Push every 4 hours PRN Severe Pain (7 - 10)  ondansetron Injectable 4 milliGRAM(s) IV Push every 8 hours PRN Nausea and/or Vomiting  oxycodone    5 mG/acetaminophen 325 mG 1 Tablet(s) Oral every 4 hours PRN Severe Pain (7 - 10)  polyethylene glycol 3350 17 Gram(s) Oral daily PRN Constipation        Vitals   T(F): 98.7 (09-06-22 @ 08:11), Max: 99.4 (09-05-22 @ 20:56)  HR: 73 (09-06-22 @ 09:16) (73 - 95)  BP: 121/71 (09-06-22 @ 08:11) (101/53 - 121/71)  RR: 16 (09-06-22 @ 08:11) (16 - 16)  SpO2: 98% (09-06-22 @ 09:16) (92% - 98%)  Wt(kg): --      Physical Exam:   Constitutiona: NAD, alert;  Derm: Left leg wound, dressings clean dry and intact. No ecchymosis.   Vascular: Dorsalis Pedis and Posterior Tibial pulses 2/4 bilaterally.  Capillary re-fill time less then 3 seconds digits 1-5 bilateral.    Neuro: Protective sensation intact to the level of the digits bilateral.  MSK: Muscle strength 4/5 in all major muscle groups of Right lower extremity. 5/5 in all muscle groups of LLE;  .  Pain on 1st Left interspace plantarly, negative forefoot squeeze test, pain on Dorsiflexion of left hallux, no pain on plantarflexion of left hallux.       Labs:                          10.7   8.12  )-----------( 181      ( 06 Sep 2022 09:35 )             33.1     WBC Trend  8.12 Date (09-06 @ 09:35)  6.41 Date (09-05 @ 07:48)  8.21 Date (09-04 @ 18:31)      Chem  09-06    132<L>  |  97  |  38<H>  ----------------------------<  95  4.4   |  25  |  6.39<H>    Ca    9.7      06 Sep 2022 09:35  Phos  4.3     09-05    TPro  6.6  /  Alb  3.2<L>  /  TBili  0.5  /  DBili  x   /  AST  36  /  ALT  28  /  AlkPhos  133<H>  09-04    Rad/EKG    Awaiting x-rays 3 standard views

## 2022-09-06 NOTE — PROVIDER CONTACT NOTE (OTHER) - SITUATION
Called in non-urgent consult to  [facility], consulting Dr Rosado. Pt with L1 compression fracture.
new consult, message left with service
Reviewing patient chart, sodium from 9/5 am labs was 128. No supplementation given during day.

## 2022-09-06 NOTE — PROGRESS NOTE ADULT - SUBJECTIVE AND OBJECTIVE BOX
Lumbar MRI images viewed remotely by Dr. Wang  --L4-5 spondylolisthesis, severe stenosis w/L4 acute/subacute fracture, L1 acute on chronic fracture    d/w Dr. Wang:  --given multiple comorbidities and adequate pain control currently recommended continued conservative care to include PT/WBAT  --pain control as needed  --no current indication for surgical intervention  --clear from spine standpoint for discharge to rehab when medically appropriate

## 2022-09-06 NOTE — PROGRESS NOTE ADULT - ASSESSMENT
85 y/o M presents with low back pain     1. Low back pain likely secondary to worsening compression fracture of L1 to vertebra plana, disc protrusion L4-L5 with narrowing of spinal canal   - Admit to med/surg   - Ordered MRI LS spine   - Pain management: Tylenol, Percocet and Morphine PRN    - Flexeril for muscle spasm night   - Lidocaine patch   - PT evaluation   - Osteoporosis/osteopenia -> f/u with DEXA outpt   - Orthopedics consult - Dr. Rosado     2. Macrocytic anemia   - Hb 9.5, .9, monitor closely   - Ordered B12, folate    3. Elevated alkaline phosphatase   - Alkaline phosphatase 133, monitor     4. Hypoalbuminemia   - Albumin 3.2   - Nutrition consult     5. 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   HCP/daughter: Diane Herzog [phone]

## 2022-09-06 NOTE — PROGRESS NOTE ADULT - SUBJECTIVE AND OBJECTIVE BOX
NEPHROLOGY INTERVAL HPI/OVERNIGHT EVENTS:    Date of Service: 09-06-22 @ 13:51    9/6--    HPI:  85 y/o M with PMH ESRD on HD MWF via AVF ([facility]), obstructive CAD s/p attempted unsuccessful PCI 2018, HFpEF 50% (prior 20%), severe pulmonary HTN, with hx of bleeding (due to LE hematoma post cath due to reported pseudoaneurysm) follows in [place] predominantly, hx of dementia, Hx of R side CEA (2012) HTN, hypothyroidism, HLD and emphysema on home O2 2L PRN presents with difficulty ambulating. Pt states that earlier in the week his back was sore. He laid down today around 1 pm and was unable to get up. He took Tylenol with little improvement. He reports having no strength. No other complaints. Denies fevers, chills, chest pain, SOB, abdominal pain, N/V, diarrhea/constipation, shooting pain down the lower extremities, numbness/tingling of the lower extremities.     Prior admission:  - 2/18/22: SOB -> acute on chronic CHF excerbation, COVID +    ER course: VSS. Labs: Hb 9.5, .9, Na 133, Cr 7.65, albumin 3.2, alkaline phosphatase 133. COVID, influenza A/B, RSV negative.     EKG: pending, f/u results     Imaging:   - CT abd/pelvis: Compared to prior study 2018 interval development of a compression deformity of L1 to vertebra plana. Osteopenia/osteoporosis. Concern for disc protrusion at the level of L4-5 with relative narrowing of the spinal canal, MRI of the lumbar spine can be considered for further evaluation. No acute abdominopelvic pathology. Chronic findings include noncomplicated diverticulosis, stable right adrenal nodule, renal atrophy, cardiomegaly. Cholethiasis.     Pt was given Morphine. He is being admitted to med/surg for further evaluation.  (05 Sep 2022 01:22)    Patient is a 86y old  Male who presents with a chief complaint of Low back pain (05 Sep 2022 10:24)  -----------------------  pt seen earlier today   LBP with L1 compression fracture for MRI   due for HD today   pain better this am       PAST MEDICAL & SURGICAL HISTORY:  - esrd mwf/[facility]   - CM ef 50%  - htn   - hld   - avf            MEDICATIONS  (STANDING):  aspirin  chewable 81 milliGRAM(s) Oral daily  atorvastatin 20 milliGRAM(s) Oral at bedtime  budesonide 160 MICROgram(s)/formoterol 4.5 MICROgram(s) Inhaler 2 Puff(s) Inhalation two times a day  carvedilol 3.125 milliGRAM(s) Oral every 12 hours  heparin   Injectable 5000 Unit(s) SubCutaneous every 12 hours  influenza  Vaccine (HIGH DOSE) 0.7 milliLiter(s) IntraMuscular once  levothyroxine 88 MICROGram(s) Oral daily  lidocaine   4% Patch 1 Patch Transdermal daily  lidocaine/prilocaine Cream 1 Application(s) Topical <User Schedule>  montelukast 10 milliGRAM(s) Oral at bedtime  naloxone Injectable 0.4 milliGRAM(s) IV Push once  Nephro-leighann 1 Tablet(s) Oral daily  psyllium Powder 1 Packet(s) Oral daily  Velphoro 1000mg ([id number]) 1000 milliGRAM(s) 2 Tablet(s) Oral three times a day with meals    MEDICATIONS  (PRN):  acetaminophen     Tablet .. 650 milliGRAM(s) Oral every 6 hours PRN Temp greater or equal to 38C (100.4F), Mild Pain (1 - 3)  albumin human 25% IVPB 50 milliLiter(s) IV Intermittent every 1 hour PRN intradialytic hypotension or SBP<110mmHg  aluminum hydroxide/magnesium hydroxide/simethicone Suspension 30 milliLiter(s) Oral every 4 hours PRN Dyspepsia  cyclobenzaprine 5 milliGRAM(s) Oral every 8 hours PRN Spasm  melatonin 3 milliGRAM(s) Oral at bedtime PRN Insomnia  melatonin 3 milliGRAM(s) Oral at bedtime PRN Insomnia  midodrine. 5 milliGRAM(s) Oral <User Schedule> PRN SBP less then 120  morphine  - Injectable 2 milliGRAM(s) IV Push every 4 hours PRN Moderate Pain (4 - 6)  morphine  - Injectable 4 milliGRAM(s) IV Push every 4 hours PRN Severe Pain (7 - 10)  ondansetron Injectable 4 milliGRAM(s) IV Push every 8 hours PRN Nausea and/or Vomiting  oxycodone    5 mG/acetaminophen 325 mG 1 Tablet(s) Oral every 4 hours PRN Severe Pain (7 - 10)  polyethylene glycol 3350 17 Gram(s) Oral daily PRN Constipation      Vital Signs Last 24 Hrs  T(C): 37.1 (06 Sep 2022 08:11), Max: 37.4 (05 Sep 2022 20:56)  T(F): 98.7 (06 Sep 2022 08:11), Max: 99.4 (05 Sep 2022 20:56)  HR: 73 (06 Sep 2022 09:16) (73 - 95)  BP: 121/71 (06 Sep 2022 08:11) (101/53 - 148/87)  BP(mean): --  RR: 16 (06 Sep 2022 08:11) (16 - 17)  SpO2: 98% (06 Sep 2022 09:16) (92% - 98%)    Parameters below as of 06 Sep 2022 09:16  Patient On (Oxygen Delivery Method): room air      PHYSICAL EXAM:  GENERAL:   CHEST/LUNG:   HEART:   ABDOMEN:   EXTREMITIES:   SKIN:     LABS:                        10.7   8.12  )-----------( 181      ( 06 Sep 2022 09:35 )             33.1     09-06    132<L>  |  97  |  38<H>  ----------------------------<  95  4.4   |  25  |  6.39<H>    Ca    9.7      06 Sep 2022 09:35  Phos  4.3     09-05    TPro  6.6  /  Alb  3.2<L>  /  TBili  0.5  /  DBili  x   /  AST  36  /  ALT  28  /  AlkPhos  133<H>  09-04                RADIOLOGY & ADDITIONAL TESTS:   NEPHROLOGY INTERVAL HPI/OVERNIGHT EVENTS:    Date of Service: 09-06-22 @ 13:51    9/6--Feeling much improved.  Denies SOB or weakness.    HPI:  87 y/o M with PMH ESRD on HD MWF via [facility]F ([facility]), obstructive CAD s/p attempted unsuccessful PCI 2018, HFpEF 50% (prior 20%), severe pulmonary HTN, with hx of bleeding (due to LE hematoma post cath due to reported pseudoaneurysm) follows in [place] predominantly, hx of dementia, Hx of R side CEA (2012) HTN, hypothyroidism, HLD and emphysema on home O2 2L PRN presents with difficulty ambulating. Pt states that earlier in the week his back was sore. He laid down today around 1 pm and was unable to get up. He took Tylenol with little improvement. He reports having no strength. No other complaints. Denies fevers, chills, chest pain, SOB, abdominal pain, N/V, diarrhea/constipation, shooting pain down the lower extremities, numbness/tingling of the lower extremities.     Prior admission:  - 2/18/22: SOB -> acute on chronic CHF excerbation, COVID +    ER course: VSS. Labs: Hb 9.5, .9, Na 133, Cr 7.65, albumin 3.2, alkaline phosphatase 133. COVID, influenza A/B, RSV negative.     EKG: pending, f/u results     Imaging:   - CT abd/pelvis: Compared to prior study 2018 interval development of a compression deformity of L1 to vertebra plana. Osteopenia/osteoporosis. Concern for disc protrusion at the level of L4-5 with relative narrowing of the spinal canal, MRI of the lumbar spine can be considered for further evaluation. No acute abdominopelvic pathology. Chronic findings include noncomplicated diverticulosis, stable right adrenal nodule, renal atrophy, cardiomegaly. Cholethiasis.     Pt was given Morphine. He is being admitted to med/surg for further evaluation.  (05 Sep 2022 01:22)    Patient is a 86y old  Male who presents with a chief complaint of Low back pain (05 Sep 2022 10:24)  -----------------------  pt seen earlier today   LBP with L1 compression fracture for MRI   due for HD today   pain better this am       PAST MEDICAL & SURGICAL HISTORY:  - esrd m[facility]   - CM ef 50%  - htn   - hld   - avf    MEDICATIONS  (STANDING):  aspirin  chewable 81 milliGRAM(s) Oral daily  atorvastatin 20 milliGRAM(s) Oral at bedtime  budesonide 160 MICROgram(s)/formoterol 4.5 MICROgram(s) Inhaler 2 Puff(s) Inhalation two times a day  carvedilol 3.125 milliGRAM(s) Oral every 12 hours  heparin   Injectable 5000 Unit(s) SubCutaneous every 12 hours  influenza  Vaccine (HIGH DOSE) 0.7 milliLiter(s) IntraMuscular once  levothyroxine 88 MICROGram(s) Oral daily  lidocaine   4% Patch 1 Patch Transdermal daily  lidocaine/prilocaine Cream 1 Application(s) Topical <User Schedule>  montelukast 10 milliGRAM(s) Oral at bedtime  naloxone Injectable 0.4 milliGRAM(s) IV Push once  Nephro-leighann 1 Tablet(s) Oral daily  psyllium Powder 1 Packet(s) Oral daily  Velphoro 1000mg ([id number]) 1000 milliGRAM(s) 2 Tablet(s) Oral three times a day with meals    MEDICATIONS  (PRN):  acetaminophen     Tablet .. 650 milliGRAM(s) Oral every 6 hours PRN Temp greater or equal to 38C (100.4F), Mild Pain (1 - 3)  albumin human 25% IVPB 50 milliLiter(s) IV Intermittent every 1 hour PRN intradialytic hypotension or SBP<110mmHg  aluminum hydroxide/magnesium hydroxide/simethicone Suspension 30 milliLiter(s) Oral every 4 hours PRN Dyspepsia  cyclobenzaprine 5 milliGRAM(s) Oral every 8 hours PRN Spasm  melatonin 3 milliGRAM(s) Oral at bedtime PRN Insomnia  melatonin 3 milliGRAM(s) Oral at bedtime PRN Insomnia  midodrine. 5 milliGRAM(s) Oral <User Schedule> PRN SBP less then 120  morphine  - Injectable 2 milliGRAM(s) IV Push every 4 hours PRN Moderate Pain (4 - 6)  morphine  - Injectable 4 milliGRAM(s) IV Push every 4 hours PRN Severe Pain (7 - 10)  ondansetron Injectable 4 milliGRAM(s) IV Push every 8 hours PRN Nausea and/or Vomiting  oxycodone    5 mG/acetaminophen 325 mG 1 Tablet(s) Oral every 4 hours PRN Severe Pain (7 - 10)  polyethylene glycol 3350 17 Gram(s) Oral daily PRN Constipation      Vital Signs Last 24 Hrs  T(C): 37.1 (06 Sep 2022 08:11), Max: 37.4 (05 Sep 2022 20:56)  T(F): 98.7 (06 Sep 2022 08:11), Max: 99.4 (05 Sep 2022 20:56)  HR: 73 (06 Sep 2022 09:16) (73 - 95)  BP: 121/71 (06 Sep 2022 08:11) (101/53 - 148/87)  BP(mean): --  RR: 16 (06 Sep 2022 08:11) (16 - 17)  SpO2: 98% (06 Sep 2022 09:16) (92% - 98%)    Parameters below as of 06 Sep 2022 09:16  Patient On (Oxygen Delivery Method): room air      PHYSICAL EXAM:  GENERAL: No distress.  CHEST/LUNG: clear to aus  HEART: S1S2 RRR  ABDOMEN: soft  EXTREMITIES: no edema  SKIN:     LABS:                        10.7   8.12  )-----------( 181      ( 06 Sep 2022 09:35 )             33.1     09-06    132<L>  |  97  |  38<H>  ----------------------------<  95  4.4   |  25  |  6.39<H>    Ca    9.7      06 Sep 2022 09:35  Phos  4.3     09-05    TPro  6.6  /  Alb  3.2<L>  /  TBili  0.5  /  DBili  x   /  AST  36  /  ALT  28  /  AlkPhos  133<H>  09-04                RADIOLOGY & ADDITIONAL TESTS:

## 2022-09-06 NOTE — PROGRESS NOTE ADULT - ASSESSMENT
L1 acute on chronic fracture, L4 mild compression fracture, L4-5 severe stenosis  films to be reviewed remotely by surgeon  --continue PT/WBAT  --pain control prn

## 2022-09-06 NOTE — PROVIDER CONTACT NOTE (OTHER) - ACTION/TREATMENT ORDERED:
Repeat labs ordered for the am. Will discuss with day team if further orders are indicated based on results. Will continue to monitor.

## 2022-09-06 NOTE — PROGRESS NOTE ADULT - ASSESSMENT
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  admitted with L1 compression fracture    REC  - tolerating hd and will inc uf goal with hd   - fu mri   - anemia fu trend of h/h and restart gali with next hd   - MBD  matthew pickering    85 y/o M with PMH ESRD on HD MWF via [facility], oliguric, ([facility]), obstructive CAD s/p attempted unsuccessful PCI 2018, HFpEF 50% (prior 20%), severe pulmonary HTN, with hx of bleeding (due to LE hematoma post cath due to reported pseudoaneurysm) follows in [place] predominantly, hx of dementia, Hx of R side CEA (2012) HTN, hypothyroidism, HLD and emphysema on home O2 2L PRN presents with difficulty ambulating.  admitted with L1 compression fracture    REC  - tolerating hd and will inc uf goal with hd   - fu mri   - anemia fu trend of h/h and restart gali with next hd   - MBD  cw [facility]       9/6 SY  --ESRD : continue MWF schedule.  --MRI to be reviewed by surgery --L1 acute on chronic fx with L4 compression fx and L4-5 severe stenosis :continue PT.  --Anemia : trend HGB.  --Fluid status stable.

## 2022-09-06 NOTE — PROGRESS NOTE ADULT - SUBJECTIVE AND OBJECTIVE BOX
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 Pt states that earlier in the week his back was sore. He laid down today around 1 pm and was unable to get up. He took Tylenol with little improvement. He reports having no strength. No other complaints. Denies fevers, chills, chest pain, SOB, abdominal pain, N/V, diarrhea/constipation, shooting pain down the lower extremities, numbness/tingling of the lower extremities.     Prior admission:  - 2/18/22: SOB -> acute on chronic CHF excerbation, COVID +  ER course: VSS. Labs: Hb 9.5, .9, Na 133, Cr 7.65, albumin 3.2, alkaline phosphatase 133. COVID, influenza A/B, RSV negative.   EKG: pending, f/u results   Imaging:   - CT abd/pelvis: Compared to prior study 2018 interval development of a compression deformity of L1 to vertebra plana. Osteopenia/osteoporosis. Concern for disc protrusion at the level of L4-5 with relative narrowing of the spinal canal, MRI of the lumbar spine can be considered for further evaluation. No acute abdominopelvic pathology. Chronic findings include noncomplicated diverticulosis, stable right adrenal nodule, renal atrophy, cardiomegaly. Cholethiasis.   Pt was given Morphine. He is being admitted to med/surg for further evaluation.  (05 Sep 2022 01:22)      9/6/22- c/o LT foot pain. Still with some back pain but able to ambulate    Review of system- All 10 systems reviewed and is as per HPI otherwise negative.     T(C): 37.1 (09-06-22 @ 08:11), Max: 37.4 (09-05-22 @ 20:56)  HR: 73 (09-06-22 @ 09:16) (73 - 95)  BP: 121/71 (09-06-22 @ 08:11) (101/53 - 121/71)  RR: 16 (09-06-22 @ 08:11) (16 - 16)  SpO2: 98% (09-06-22 @ 09:16) (92% - 98%)  Wt(kg): --    LABS:                        10.7   8.12  )-----------( 181      ( 06 Sep 2022 09:35 )             33.1     132<L>  |  97  |  38<H>  ----------------------------<  95  4.4   |  25  |  6.39<H>    Ca    9.7      06 Sep 2022 09:35  Phos  4.3     09-05    TPro  6.6  /  Alb  3.2<L>  /  TBili  0.5  /  DBili  x   /  AST  36  /  ALT  28  /  AlkPhos  133<H>  09-04    RADIOLOGY & ADDITIONAL TESTS:  MR SPINE LUMBAR                        PROCEDURE DATE:  09/05/2022      INTERPRETATION:  CLINICAL INDICATION: Low back pain.    TECHNIQUE: Multiplanar multisequence MRI of the lumbar spine was   performed without the administration of intravenous contrast, according   to standard protocol.    COMPARISON: CT abdomen and pelvis 9/4/2022.    FINDINGS:    ALIGNMENT:  The alignment is normal.    VERTEBRAE: Severe compression deformity of L1 also seen on 2/12/2022 CT   chest, but there is some associated marrow edema. There is bony   retropulsion contributing to moderate spinal canal stenosis.    Mild compression deformity of L4 with marrow edema. There is mild bony   retropulsion contributing to moderate spinal canal stenosis at mid L4   level.    Multilevel degenerative changes.    DISCS: Multilevel disc dessication and loss of height.    CONUS MEDULLARIS AND CAUDA EQUINA: The conus medullaris terminates at   T12-L1. There is normal appearance of the conus medullaris and cauda   equina.    EVALUATION OF INDIVIDUAL LEVELS:  L1-2: Disc bulge. No spinal canal or neuroforaminal stenosis.    L2-3: Disc bulge. No spinal canal stenosis. Mild bilateral neuroforaminal   stenosis.    L3-4: Disc bulge. Mild spinal canal stenosis. Mild bilateral   neuroforaminal stenosis.    L4-5: Disc bulge and bilateral facet joint arthrosis with ligamentum   flavum thickening. There is severe spinal canal stenosis with complete   CSF space effacement. Cannot rule out cauda equina impingement. Mild   bilateral neuroforaminal stenosis.    L5-S1: Disc bulge. No spinal canal or neuroforaminal stenosis.    LIMITED EVALUATION OF UPPER SACRUM AND SACROILIAC JOINTS: Partial   ankylosis of the sacroiliac joints.    PARAVERTEBRAL SOFT TISSUES AND VISUALIZED RETROPERITONEUM: Paraspinal   musculature is unremarkable. 1.4 cm right adrenal nodule, grossly stable   since 12/20/2018 CT abdomen and pelvis.    IMPRESSION:    -Possible acute on chronic severe L1 and acute to subacute mild L4   compression fractures. Bony retropulsion at these levels contribute to   moderate spinal canal stenosis at superior L1 and mid L4 levels.    -L4-L5 degenerative changes contribute to severe spinal canal stenosis   and possible cauda equina impingement. Correlate with clinical   symptomatology.      PHYSICAL EXAM:  GENERAL: NAD, well-groomed, well-developed  HEAD:  Atraumatic, Normocephalic  EYES: EOMI, PERRLA, conjunctiva and sclera clear  HEENT: Moist mucous membranes  NECK: Supple, No JVD  NERVOUS SYSTEM:  Alert & Oriented X3, Motor Strength 5/5 B/L upper and lower extremities; DTRs 2+ intact and symmetric  CHEST/LUNG: Clear to auscultation bilaterally; No rales, rhonchi, wheezing, or rubs  HEART: Regular rate and rhythm; No murmurs, rubs, or gallops  ABDOMEN: Soft, Nontender, Nondistended; Bowel sounds present  GENITOURINARY- Voiding, no palpable bladder  EXTREMITIES:  No edema. LT foot- no swelling, tender on the top of the foot in the metatarsal area  MUSCULOSKELTAL- No muscle tenderness, Muscle tone normal, No joint tenderness, no Joint swelling, Joint range of motion-normal  SKIN-no rash, no lesion  CNS- alert, oriented X3, non focal     MEDICATIONS  (STANDING):  aspirin  chewable 81 milliGRAM(s) Oral daily  atorvastatin 20 milliGRAM(s) Oral at bedtime  budesonide 160 MICROgram(s)/formoterol 4.5 MICROgram(s) Inhaler 2 Puff(s) Inhalation two times a day  carvedilol 3.125 milliGRAM(s) Oral every 12 hours  heparin   Injectable 5000 Unit(s) SubCutaneous every 12 hours  influenza  Vaccine (HIGH DOSE) 0.7 milliLiter(s) IntraMuscular once  levothyroxine 88 MICROGram(s) Oral daily  lidocaine   4% Patch 1 Patch Transdermal daily  lidocaine/prilocaine Cream 1 Application(s) Topical <User Schedule>  montelukast 10 milliGRAM(s) Oral at bedtime  naloxone Injectable 0.4 milliGRAM(s) IV Push once  Nephro-leighann 1 Tablet(s) Oral daily  psyllium Powder 1 Packet(s) Oral daily  Velphoro 1000mg ([id number]) 1000 milliGRAM(s) 2 Tablet(s) Oral three times a day with meals    MEDICATIONS  (PRN):  acetaminophen     Tablet .. 650 milliGRAM(s) Oral every 6 hours PRN Temp greater or equal to 38C (100.4F), Mild Pain (1 - 3)  albumin human 25% IVPB 50 milliLiter(s) IV Intermittent every 1 hour PRN intradialytic hypotension or SBP<110mmHg  aluminum hydroxide/magnesium hydroxide/simethicone Suspension 30 milliLiter(s) Oral every 4 hours PRN Dyspepsia  cyclobenzaprine 5 milliGRAM(s) Oral every 8 hours PRN Spasm  melatonin 3 milliGRAM(s) Oral at bedtime PRN Insomnia  melatonin 3 milliGRAM(s) Oral at bedtime PRN Insomnia  midodrine. 5 milliGRAM(s) Oral <User Schedule> PRN SBP less then 120  morphine  - Injectable 2 milliGRAM(s) IV Push every 4 hours PRN Moderate Pain (4 - 6)  morphine  - Injectable 4 milliGRAM(s) IV Push every 4 hours PRN Severe Pain (7 - 10)  ondansetron Injectable 4 milliGRAM(s) IV Push every 8 hours PRN Nausea and/or Vomiting  oxycodone    5 mG/acetaminophen 325 mG 1 Tablet(s) Oral every 4 hours PRN Severe Pain (7 - 10)  polyethylene glycol 3350 17 Gram(s) Oral daily PRN Constipation    Assessment/Plan  87 y/o M presents with low back pain     #Acute on chronic severe L1 compression fracture  #Subacute L4 compression fracture  #L4-5 severe spinal canal stenosis  Spine following  Pain meds prn  Ambulating  Non-surgical  ?brace    #ESRD on HD  #Anemia chronic disease    5. 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 Pt states that earlier in the week his back was sore. He laid down today around 1 pm and was unable to get up. He took Tylenol with little improvement. He reports having no strength. No other complaints. Denies fevers, chills, chest pain, SOB, abdominal pain, N/V, diarrhea/constipation, shooting pain down the lower extremities, numbness/tingling of the lower extremities.     Prior admission:  - 2/18/22: SOB -> acute on chronic CHF excerbation, COVID +  ER course: VSS. Labs: Hb 9.5, .9, Na 133, Cr 7.65, albumin 3.2, alkaline phosphatase 133. COVID, influenza A/B, RSV negative.   EKG: pending, f/u results   Imaging:   - CT abd/pelvis: Compared to prior study 2018 interval development of a compression deformity of L1 to vertebra plana. Osteopenia/osteoporosis. Concern for disc protrusion at the level of L4-5 with relative narrowing of the spinal canal, MRI of the lumbar spine can be considered for further evaluation. No acute abdominopelvic pathology. Chronic findings include noncomplicated diverticulosis, stable right adrenal nodule, renal atrophy, cardiomegaly. Cholethiasis.   Pt was given Morphine. He is being admitted to med/surg for further evaluation.  (05 Sep 2022 01:22)      9/6/22- c/o LT foot pain. Still with some back pain but able to ambulate    Review of system- All 10 systems reviewed and is as per HPI otherwise negative.     T(C): 37.1 (09-06-22 @ 08:11), Max: 37.4 (09-05-22 @ 20:56)  HR: 73 (09-06-22 @ 09:16) (73 - 95)  BP: 121/71 (09-06-22 @ 08:11) (101/53 - 121/71)  RR: 16 (09-06-22 @ 08:11) (16 - 16)  SpO2: 98% (09-06-22 @ 09:16) (92% - 98%)  Wt(kg): --    LABS:                        10.7   8.12  )-----------( 181      ( 06 Sep 2022 09:35 )             33.1     132<L>  |  97  |  38<H>  ----------------------------<  95  4.4   |  25  |  6.39<H>    Ca    9.7      06 Sep 2022 09:35  Phos  4.3     09-05    TPro  6.6  /  Alb  3.2<L>  /  TBili  0.5  /  DBili  x   /  AST  36  /  ALT  28  /  AlkPhos  133<H>  09-04    RADIOLOGY & ADDITIONAL TESTS:  MR SPINE LUMBAR                        PROCEDURE DATE:  09/05/2022      INTERPRETATION:  CLINICAL INDICATION: Low back pain.    TECHNIQUE: Multiplanar multisequence MRI of the lumbar spine was   performed without the administration of intravenous contrast, according   to standard protocol.    COMPARISON: CT abdomen and pelvis 9/4/2022.    FINDINGS:    ALIGNMENT:  The alignment is normal.    VERTEBRAE: Severe compression deformity of L1 also seen on 2/12/2022 CT   chest, but there is some associated marrow edema. There is bony   retropulsion contributing to moderate spinal canal stenosis.    Mild compression deformity of L4 with marrow edema. There is mild bony   retropulsion contributing to moderate spinal canal stenosis at mid L4   level.    Multilevel degenerative changes.    DISCS: Multilevel disc dessication and loss of height.    CONUS MEDULLARIS AND CAUDA EQUINA: The conus medullaris terminates at   T12-L1. There is normal appearance of the conus medullaris and cauda   equina.    EVALUATION OF INDIVIDUAL LEVELS:  L1-2: Disc bulge. No spinal canal or neuroforaminal stenosis.    L2-3: Disc bulge. No spinal canal stenosis. Mild bilateral neuroforaminal   stenosis.    L3-4: Disc bulge. Mild spinal canal stenosis. Mild bilateral   neuroforaminal stenosis.    L4-5: Disc bulge and bilateral facet joint arthrosis with ligamentum   flavum thickening. There is severe spinal canal stenosis with complete   CSF space effacement. Cannot rule out cauda equina impingement. Mild   bilateral neuroforaminal stenosis.    L5-S1: Disc bulge. No spinal canal or neuroforaminal stenosis.    LIMITED EVALUATION OF UPPER SACRUM AND SACROILIAC JOINTS: Partial   ankylosis of the sacroiliac joints.    PARAVERTEBRAL SOFT TISSUES AND VISUALIZED RETROPERITONEUM: Paraspinal   musculature is unremarkable. 1.4 cm right adrenal nodule, grossly stable   since 12/20/2018 CT abdomen and pelvis.    IMPRESSION:    -Possible acute on chronic severe L1 and acute to subacute mild L4   compression fractures. Bony retropulsion at these levels contribute to   moderate spinal canal stenosis at superior L1 and mid L4 levels.    -L4-L5 degenerative changes contribute to severe spinal canal stenosis   and possible cauda equina impingement. Correlate with clinical   symptomatology.      PHYSICAL EXAM:  GENERAL: NAD, well-groomed, well-developed  HEAD:  Atraumatic, Normocephalic  EYES: EOMI, PERRLA, conjunctiva and sclera clear  HEENT: Moist mucous membranes  NECK: Supple, No JVD  NERVOUS SYSTEM:  Alert & Oriented X3, Motor Strength 5/5 B/L upper and lower extremities; DTRs 2+ intact and symmetric  CHEST/LUNG: Clear to auscultation bilaterally; No rales, rhonchi, wheezing, or rubs  HEART: Regular rate and rhythm; No murmurs, rubs, or gallops  ABDOMEN: Soft, Nontender, Nondistended; Bowel sounds present  GENITOURINARY- Voiding, no palpable bladder  EXTREMITIES:  No edema. LT foot- no swelling, tender on the top of the foot in the metatarsal area  MUSCULOSKELTAL- No muscle tenderness, Muscle tone normal, No joint tenderness, no Joint swelling, Joint range of motion-normal  SKIN-no rash, no lesion  CNS- alert, oriented X3, non focal     MEDICATIONS  (STANDING):  aspirin  chewable 81 milliGRAM(s) Oral daily  atorvastatin 20 milliGRAM(s) Oral at bedtime  budesonide 160 MICROgram(s)/formoterol 4.5 MICROgram(s) Inhaler 2 Puff(s) Inhalation two times a day  carvedilol 3.125 milliGRAM(s) Oral every 12 hours  heparin   Injectable 5000 Unit(s) SubCutaneous every 12 hours  influenza  Vaccine (HIGH DOSE) 0.7 milliLiter(s) IntraMuscular once  levothyroxine 88 MICROGram(s) Oral daily  lidocaine   4% Patch 1 Patch Transdermal daily  lidocaine/prilocaine Cream 1 Application(s) Topical <User Schedule>  montelukast 10 milliGRAM(s) Oral at bedtime  naloxone Injectable 0.4 milliGRAM(s) IV Push once  Nephro-leighann 1 Tablet(s) Oral daily  psyllium Powder 1 Packet(s) Oral daily  Velphoro 1000mg ([id number]) 1000 milliGRAM(s) 2 Tablet(s) Oral three times a day with meals    MEDICATIONS  (PRN):  acetaminophen     Tablet .. 650 milliGRAM(s) Oral every 6 hours PRN Temp greater or equal to 38C (100.4F), Mild Pain (1 - 3)  albumin human 25% IVPB 50 milliLiter(s) IV Intermittent every 1 hour PRN intradialytic hypotension or SBP<110mmHg  aluminum hydroxide/magnesium hydroxide/simethicone Suspension 30 milliLiter(s) Oral every 4 hours PRN Dyspepsia  cyclobenzaprine 5 milliGRAM(s) Oral every 8 hours PRN Spasm  melatonin 3 milliGRAM(s) Oral at bedtime PRN Insomnia  melatonin 3 milliGRAM(s) Oral at bedtime PRN Insomnia  midodrine. 5 milliGRAM(s) Oral <User Schedule> PRN SBP less then 120  morphine  - Injectable 2 milliGRAM(s) IV Push every 4 hours PRN Moderate Pain (4 - 6)  morphine  - Injectable 4 milliGRAM(s) IV Push every 4 hours PRN Severe Pain (7 - 10)  ondansetron Injectable 4 milliGRAM(s) IV Push every 8 hours PRN Nausea and/or Vomiting  oxycodone    5 mG/acetaminophen 325 mG 1 Tablet(s) Oral every 4 hours PRN Severe Pain (7 - 10)  polyethylene glycol 3350 17 Gram(s) Oral daily PRN Constipation    Assessment/Plan  85 y/o M presents with low back pain     #Acute on chronic severe L1 compression fracture  #Subacute L4 compression fracture  #L4-5 severe spinal canal stenosis  Spine following  Pain meds prn  Ambulating  Non-surgical  ?brace    #ESRD on HD  #Anemia chronic disease  Renal following  HD per renal    #Obstructive CAD s/p unsuccessful PCI 2018  On Aspirin, statin    #Chronic diastolic CHF  Compensated    #LT foot pain  ?neuroma  Will get foot x-rays and podiatry evaluation    #HTN/hyperlipidemia/ hypothyroidism  Cont home meds    #DVT proph- heparin SQ    #Dispo- ?brace then likely home with home PT tomorrow

## 2022-09-06 NOTE — DISCHARGE NOTE NURSING/CASE MANAGEMENT/SOCIAL WORK - PATIENT PORTAL LINK FT
You can access the FollowMyHealth Patient Portal offered by  by registering at the following website: [email or web address]. By joining [employer]’s FollowMyHealth portal, you will also be able to view your health information using other applications (apps) compatible with our system.

## 2022-09-06 NOTE — CONSULT NOTE ADULT - ASSESSMENT
Assesment: 87 y/o male see in the inpatient  for the following   -Left foot pain  -Diabetes Mellitus type 2  -Difficulty with ambulation      Plan:   Chart reviewed and Patient evaluated; discussed treatment and plan with Dr. James Aj DPM  Discussed diagnosis and treatment with patient  Wound flush with normal saline  Obtained wound culture to be sent to Pathology  Excisional debridement with 15 blade of ---- base down to subcutaneous tissue Right/ Left foot ulceration  Applied --- with dry sterile dressing  X-rays reviewed : Shows -------  Continue with IV antibiotics As Per ID  Ordered MAGDA  Weight bearing/Non-weight bearing  to ------------  Offloading to bilateral Heels.   Discussed importance of daily foot examinations and proper shoe gear and to importance of lower Fasting Blood Glucose levels.   Patient demonstrated verbal understanding of all interventions and tolerated interventions well without any complications.   Podiatry will follow while in house.   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 James Aj DPM  Discussed diagnosis and treatment with patient  Wound noted to left leg pt states that his daughter is a nurse and is taking care of these dressing changes and wants to continue care.  X-rays ordered 3 standard views of left foot: will follow.   Pt to follow w/ Dr. Aj 1 week after discharge for local conservative care of arhtritis of left hallux vs neuroma of left foot.   Discussed importance of daily foot examinations and proper shoe gear and to importance of lower Fasting Blood Glucose levels.   Patient demonstrated verbal understanding of all interventions and tolerated interventions well without any complications.   Podiatry will follow while in house.   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 James Aj DPM  Discussed diagnosis and treatment with patient  Wound noted to left leg pt states that his daughter is a nurse and is taking care of these dressing changes and wants to continue care.  X-rays ordered 3 standard views of left foot: will follow.   Pt to follow w/ Dr. Aj 1 week after discharge for local conservative care of arhtritis of left hallux vs neuroma of left foot.   Pt is asking for pain medication for foot pain, will defer to medicine for pain any pain management/medication   Discussed importance of daily foot examinations and proper shoe gear and to importance of lower Fasting Blood Glucose levels.   Patient demonstrated verbal understanding of all interventions and tolerated interventions well without any complications.   Podiatry will follow while in house.

## 2022-09-06 NOTE — PROGRESS NOTE ADULT - SUBJECTIVE AND OBJECTIVE BOX
[facility]                                                           Orthopedic Spine Progress Note      SUBJECTIVE: Patient seen and examined.     Pain (0-10):   Current Pain Management:  [ ] PCA   [ ] Po Analgesics [ ] IM /IV Anagesics     Vital Signs Last 24 Hrs  T(C): 37.1 (06 Sep 2022 08:11), Max: 37.4 (05 Sep 2022 20:56)  T(F): 98.7 (06 Sep 2022 08:11), Max: 99.4 (05 Sep 2022 20:56)  HR: 95 (06 Sep 2022 08:11) (70 - 95)  BP: 121/71 (06 Sep 2022 08:11) (99/64 - 148/87)  BP(mean): --  RR: 16 (06 Sep 2022 08:11) (16 - 17)  SpO2: 95% (06 Sep 2022 08:11) (92% - 98%)    Parameters below as of 06 Sep 2022 08:11  Patient On (Oxygen Delivery Method): room air      I&O's Detail      OBJECTIVE:       Lumbar: ROM :  Neurological: A/O x 3              Sensation: [x] intact to light touch  [ ] decreased:          Motor exam: [x]          [x] Lower ext.         Hip Flx    Quad   Hamstrg         TA         EHL       GS                              R        5/5        5/5        5/5             5/5        5/5        5/5                                     L         5/5        5/5        5/5             5/5        5/5        5/5                   Tension Signs: none          Long Tract Findings: none    RADIOLOGY & ADDITIONAL STUDIES:         ACC: [id number] EXAM:  MR SPINE LUMBAR                          PROCEDURE DATE:  09/05/2022          INTERPRETATION:  CLINICAL INDICATION: Low back pain.    TECHNIQUE: Multiplanar multisequence MRI of the lumbar spine was   performed without the administration of intravenous contrast, according   to standard protocol.    COMPARISON: CT abdomen and pelvis 9/4/2022.    FINDINGS:    ALIGNMENT:  The alignment is normal.    VERTEBRAE: Severe compression deformity of L1 also seen on 2/12/2022 CT   chest, but there is some associated marrow edema. There is bony   retropulsion contributing to moderate spinal canal stenosis.    Mild compression deformity of L4 with marrow edema. There is mild bony   retropulsion contributing to moderate spinal canal stenosis at mid L4   level.    Multilevel degenerative changes.    DISCS: Multilevel disc dessication and loss of height.    CONUS MEDULLARIS AND CAUDA EQUINA: The conus medullaris terminates at   T12-L1. There is normal appearance of the conus medullaris and cauda   equina.    EVALUATION OF INDIVIDUAL LEVELS:  L1-2: Disc bulge. No spinal canal or neuroforaminal stenosis.    L2-3: Disc bulge. No spinal canal stenosis. Mild bilateral neuroforaminal   stenosis.    L3-4: Disc bulge. Mild spinal canal stenosis. Mild bilateral   neuroforaminal stenosis.    L4-5: Disc bulge and bilateral facet joint arthrosis with ligamentum   flavum thickening. There is severe spinal canal stenosis with complete   CSF space effacement. Cannot rule out cauda equina impingement. Mild   bilateral neuroforaminal stenosis.    L5-S1: Disc bulge. No spinal canal or neuroforaminal stenosis.    LIMITED EVALUATION OF UPPER SACRUM AND SACROILIAC JOINTS: Partial   ankylosis of the sacroiliac joints.    PARAVERTEBRAL SOFT TISSUES AND VISUALIZED RETROPERITONEUM: Paraspinal   musculature is unremarkable. 1.4 cm right adrenal nodule, grossly stable   since 12/20/2018 CT abdomen and pelvis.    IMPRESSION:    -Possible acute on chronic severe L1 and acute to subacute mild L4   compression fractures. Bony retropulsion at these levels contribute to   moderate spinal canal stenosis at superior L1 and mid L4 levels.    -L4-L5 degenerative changes contribute to severe spinal canal stenosis   and possible cauda equina impingement. Correlate with clinical   symptomatology.    --- End of Report ---            ALLYN EUGENE   This document has been electronically signed. Sep  5 2022  1:25PM                                              LABS:                        9.8    6.41  )-----------( 172      ( 05 Sep 2022 07:48 )             29.3     09-05    128<L>  |  95<L>  |  53<H>  ----------------------------<  91  4.1   |  26  |  8.71<H>    Ca    9.3      05 Sep 2022 07:48  Phos  4.3     09-05    TPro  6.6  /  Alb  3.2<L>  /  TBili  0.5  /  DBili  x   /  AST  36  /  ALT  28  /  AlkPhos  133<H>  09-04                                                                           [facility]                                                           Orthopedic Spine Progress Note      SUBJECTIVE: Patient seen and examined, back pain at times but slowly improving, tolerating OOB in chair and ambulating w/PT, MRI completed       Current Pain Management:  [ ] PCA   [x] Po Analgesics [ ] IM /IV Analgesics     Vital Signs Last 24 Hrs  T(C): 37.1 (06 Sep 2022 08:11), Max: 37.4 (05 Sep 2022 20:56)  T(F): 98.7 (06 Sep 2022 08:11), Max: 99.4 (05 Sep 2022 20:56)  HR: 95 (06 Sep 2022 08:11) (70 - 95)  BP: 121/71 (06 Sep 2022 08:11) (99/64 - 148/87)  BP(mean): --  RR: 16 (06 Sep 2022 08:11) (16 - 17)  SpO2: 95% (06 Sep 2022 08:11) (92% - 98%)    Parameters below as of 06 Sep 2022 08:11  Patient On (Oxygen Delivery Method): room air      I&O's Detail      OBJECTIVE:       Lumbar ROM: not tested, no tenderness on palpation  Neurological: A/O x 3              Sensation: [x] intact to light touch  [ ] decreased:          Motor exam: [x]          [x] Lower ext.         Hip Flx    Quad   Hamstrg         TA         EHL       GS                              R        5/5        5/5        5/5             5/5        5/5        5/5                                     L        5-/5      5-/5        5/5             5/5        5/5        5/5                   Tension Signs: none          Long Tract Findings: none    RADIOLOGY & ADDITIONAL STUDIES:         ACC: [id number] EXAM:  MR SPINE LUMBAR                          PROCEDURE DATE:  09/05/2022          INTERPRETATION:  CLINICAL INDICATION: Low back pain.    TECHNIQUE: Multiplanar multisequence MRI of the lumbar spine was   performed without the administration of intravenous contrast, according   to standard protocol.    COMPARISON: CT abdomen and pelvis 9/4/2022.    FINDINGS:    ALIGNMENT:  The alignment is normal.    VERTEBRAE: Severe compression deformity of L1 also seen on 2/12/2022 CT   chest, but there is some associated marrow edema. There is bony   retropulsion contributing to moderate spinal canal stenosis.    Mild compression deformity of L4 with marrow edema. There is mild bony   retropulsion contributing to moderate spinal canal stenosis at mid L4   level.    Multilevel degenerative changes.    DISCS: Multilevel disc dessication and loss of height.    CONUS MEDULLARIS AND CAUDA EQUINA: The conus medullaris terminates at   T12-L1. There is normal appearance of the conus medullaris and cauda   equina.    EVALUATION OF INDIVIDUAL LEVELS:  L1-2: Disc bulge. No spinal canal or neuroforaminal stenosis.    L2-3: Disc bulge. No spinal canal stenosis. Mild bilateral neuroforaminal   stenosis.    L3-4: Disc bulge. Mild spinal canal stenosis. Mild bilateral   neuroforaminal stenosis.    L4-5: Disc bulge and bilateral facet joint arthrosis with ligamentum   flavum thickening. There is severe spinal canal stenosis with complete   CSF space effacement. Cannot rule out cauda equina impingement. Mild   bilateral neuroforaminal stenosis.    L5-S1: Disc bulge. No spinal canal or neuroforaminal stenosis.    LIMITED EVALUATION OF UPPER SACRUM AND SACROILIAC JOINTS: Partial   ankylosis of the sacroiliac joints.    PARAVERTEBRAL SOFT TISSUES AND VISUALIZED RETROPERITONEUM: Paraspinal   musculature is unremarkable. 1.4 cm right adrenal nodule, grossly stable   since 12/20/2018 CT abdomen and pelvis.    IMPRESSION:    -Possible acute on chronic severe L1 and acute to subacute mild L4   compression fractures. Bony retropulsion at these levels contribute to   moderate spinal canal stenosis at superior L1 and mid L4 levels.    -L4-L5 degenerative changes contribute to severe spinal canal stenosis   and possible cauda equina impingement. Correlate with clinical   symptomatology.    --- End of Report ---            ALLYN EUGENE   This document has been electronically signed. Sep  5 2022  1:25PM                                              LABS:                        9.8    6.41  )-----------( 172      ( 05 Sep 2022 07:48 )             29.3     09-05    128<L>  |  95<L>  |  53<H>  ----------------------------<  91  4.1   |  26  |  8.71<H>    Ca    9.3      05 Sep 2022 07:48  Phos  4.3     09-05    TPro  6.6  /  Alb  3.2<L>  /  TBili  0.5  /  DBili  x   /  AST  36  /  ALT  28  /  AlkPhos  133<H>  09-04

## 2022-09-07 NOTE — DISCHARGE NOTE PROVIDER - CARE PROVIDERS DIRECT ADDRESSES
,DirectAddress_Unknown,DirectAddress_Unknown,[email or web address],DirectAddress_Unknown

## 2022-09-07 NOTE — PROGRESS NOTE ADULT - SUBJECTIVE AND OBJECTIVE BOX
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 Pt states that earlier in the week his back was sore. He laid down today around 1 pm and was unable to get up. He took Tylenol with little improvement. He reports having no strength. No other complaints. Denies fevers, chills, chest pain, SOB, abdominal pain, N/V, diarrhea/constipation, shooting pain down the lower extremities, numbness/tingling of the lower extremities.     Prior admission:  - 2/18/22: SOB -> acute on chronic CHF excerbation, COVID +  ER course: VSS. Labs: Hb 9.5, .9, Na 133, Cr 7.65, albumin 3.2, alkaline phosphatase 133. COVID, influenza A/B, RSV negative.   EKG: pending, f/u results   Imaging:   - CT abd/pelvis: Compared to prior study 2018 interval development of a compression deformity of L1 to vertebra plana. Osteopenia/osteoporosis. Concern for disc protrusion at the level of L4-5 with relative narrowing of the spinal canal, MRI of the lumbar spine can be considered for further evaluation. No acute abdominopelvic pathology. Chronic findings include noncomplicated diverticulosis, stable right adrenal nodule, renal atrophy, cardiomegaly. Cholethiasis.   Pt was given Morphine. He is being admitted to med/surg for further evaluation.  (05 Sep 2022 01:22)      9/6/22- c/o LT foot pain. Still with some back pain but able to ambulate  9/7/22- foot pain is better    Review of system- All 10 systems reviewed and is as per HPI otherwise negative.     Vital Signs Last 24 Hrs  T(C): 36.2 (07 Sep 2022 12:37), Max: 36.9 (07 Sep 2022 00:20)  T(F): 97.2 (07 Sep 2022 12:37), Max: 98.5 (07 Sep 2022 00:20)  HR: 73 (07 Sep 2022 13:26) (73 - 99)  BP: 121/62 (07 Sep 2022 13:26) (98/50 - 125/69)  BP(mean): --  RR: 18 (07 Sep 2022 13:26) (16 - 19)  SpO2: 95% (07 Sep 2022 08:50) (92% - 97%)    Parameters below as of 07 Sep 2022 09:24  Patient On (Oxygen Delivery Method): room air        LABS:                                10.3   7.25  )-----------( 182      ( 07 Sep 2022 12:58 )             30.0     07 Sep 2022 12:58    130    |  96     |  61     ----------------------------<  122    4.0     |  22     |  8.85     Ca    9.5        07 Sep 2022 12:58  Phos  3.7       07 Sep 2022 12:58    TPro  x      /  Alb  3.2    /  TBili  x      /  DBili  x      /  AST  x      /  ALT  x      /  AlkPhos  x      07 Sep 2022 12:58    LIVER FUNCTIONS - ( 07 Sep 2022 12:58 )  Alb: 3.2 g/dL / Pro: x     / ALK PHOS: x     / ALT: x     / AST: x     / GGT: x           RADIOLOGY & ADDITIONAL TESTS:  MR SPINE LUMBAR                        PROCEDURE DATE:  09/05/2022      INTERPRETATION:  CLINICAL INDICATION: Low back pain.    TECHNIQUE: Multiplanar multisequence MRI of the lumbar spine was   performed without the administration of intravenous contrast, according   to standard protocol.    COMPARISON: CT abdomen and pelvis 9/4/2022.    FINDINGS:    ALIGNMENT:  The alignment is normal.    VERTEBRAE: Severe compression deformity of L1 also seen on 2/12/2022 CT   chest, but there is some associated marrow edema. There is bony   retropulsion contributing to moderate spinal canal stenosis.    Mild compression deformity of L4 with marrow edema. There is mild bony   retropulsion contributing to moderate spinal canal stenosis at mid L4   level.    Multilevel degenerative changes.    DISCS: Multilevel disc dessication and loss of height.    CONUS MEDULLARIS AND CAUDA EQUINA: The conus medullaris terminates at   T12-L1. There is normal appearance of the conus medullaris and cauda   equina.    EVALUATION OF INDIVIDUAL LEVELS:  L1-2: Disc bulge. No spinal canal or neuroforaminal stenosis.    L2-3: Disc bulge. No spinal canal stenosis. Mild bilateral neuroforaminal   stenosis.    L3-4: Disc bulge. Mild spinal canal stenosis. Mild bilateral   neuroforaminal stenosis.    L4-5: Disc bulge and bilateral facet joint arthrosis with ligamentum   flavum thickening. There is severe spinal canal stenosis with complete   CSF space effacement. Cannot rule out cauda equina impingement. Mild   bilateral neuroforaminal stenosis.    L5-S1: Disc bulge. No spinal canal or neuroforaminal stenosis.    LIMITED EVALUATION OF UPPER SACRUM AND SACROILIAC JOINTS: Partial   ankylosis of the sacroiliac joints.    PARAVERTEBRAL SOFT TISSUES AND VISUALIZED RETROPERITONEUM: Paraspinal   musculature is unremarkable. 1.4 cm right adrenal nodule, grossly stable   since 12/20/2018 CT abdomen and pelvis.    IMPRESSION:    -Possible acute on chronic severe L1 and acute to subacute mild L4   compression fractures. Bony retropulsion at these levels contribute to   moderate spinal canal stenosis at superior L1 and mid L4 levels.    -L4-L5 degenerative changes contribute to severe spinal canal stenosis   and possible cauda equina impingement. Correlate with clinical   symptomatology.      PHYSICAL EXAM:  GENERAL: NAD, well-groomed, well-developed  HEAD:  Atraumatic, Normocephalic  EYES: EOMI, PERRLA, conjunctiva and sclera clear  HEENT: Moist mucous membranes  NECK: Supple, No JVD  NERVOUS SYSTEM:  Alert & Oriented X3, Motor Strength 5/5 B/L upper and lower extremities; DTRs 2+ intact and symmetric  CHEST/LUNG: Clear to auscultation bilaterally; No rales, rhonchi, wheezing, or rubs  HEART: Regular rate and rhythm; No murmurs, rubs, or gallops  ABDOMEN: Soft, Nontender, Nondistended; Bowel sounds present  GENITOURINARY- Voiding, no palpable bladder  EXTREMITIES:  No edema. LT foot- no swelling, tender on the top of the foot in the metatarsal area  MUSCULOSKELTAL- No muscle tenderness, Muscle tone normal, No joint tenderness, no Joint swelling, Joint range of motion-normal  SKIN-no rash, no lesion  CNS- alert, oriented X3, non focal     MEDICATIONS  (STANDING):  aspirin  chewable 81 milliGRAM(s) Oral daily  atorvastatin 20 milliGRAM(s) Oral at bedtime  budesonide 160 MICROgram(s)/formoterol 4.5 MICROgram(s) Inhaler 2 Puff(s) Inhalation two times a day  carvedilol 3.125 milliGRAM(s) Oral every 12 hours  heparin   Injectable 5000 Unit(s) SubCutaneous every 12 hours  influenza  Vaccine (HIGH DOSE) 0.7 milliLiter(s) IntraMuscular once  levothyroxine 88 MICROGram(s) Oral daily  lidocaine   4% Patch 1 Patch Transdermal daily  lidocaine/prilocaine Cream 1 Application(s) Topical <User Schedule>  montelukast 10 milliGRAM(s) Oral at bedtime  naloxone Injectable 0.4 milliGRAM(s) IV Push once  Nephro-leighann 1 Tablet(s) Oral daily  psyllium Powder 1 Packet(s) Oral daily  Velphoro 1000mg ([id number]) 1000 milliGRAM(s) 2 Tablet(s) Oral three times a day with meals    MEDICATIONS  (PRN):  acetaminophen     Tablet .. 650 milliGRAM(s) Oral every 6 hours PRN Temp greater or equal to 38C (100.4F), Mild Pain (1 - 3)  albumin human 25% IVPB 50 milliLiter(s) IV Intermittent every 1 hour PRN intradialytic hypotension or SBP<110mmHg  aluminum hydroxide/magnesium hydroxide/simethicone Suspension 30 milliLiter(s) Oral every 4 hours PRN Dyspepsia  cyclobenzaprine 5 milliGRAM(s) Oral every 8 hours PRN Spasm  melatonin 3 milliGRAM(s) Oral at bedtime PRN Insomnia  melatonin 3 milliGRAM(s) Oral at bedtime PRN Insomnia  midodrine. 5 milliGRAM(s) Oral <User Schedule> PRN SBP less then 120  morphine  - Injectable 2 milliGRAM(s) IV Push every 4 hours PRN Moderate Pain (4 - 6)  morphine  - Injectable 4 milliGRAM(s) IV Push every 4 hours PRN Severe Pain (7 - 10)  ondansetron Injectable 4 milliGRAM(s) IV Push every 8 hours PRN Nausea and/or Vomiting  oxycodone    5 mG/acetaminophen 325 mG 1 Tablet(s) Oral every 4 hours PRN Severe Pain (7 - 10)  polyethylene glycol 3350 17 Gram(s) Oral daily PRN Constipation    Assessment/Plan  87 y/o M presents with low back pain     #Acute on chronic severe L1 compression fracture  #Subacute L4 compression fracture  #L4-5 severe spinal canal stenosis  Spine following  Pain meds prn  Ambulating  Non-surgical, no need for a brace per spine    #ESRD on HD  #Anemia chronic disease  Renal following  HD per renal    #Obstructive CAD s/p unsuccessful PCI 2018  On Aspirin, statin    #Chronic diastolic CHF  Compensated    #LT foot pain  likely neuroma  Podiatry seen, outpatient f/u    #HTN/hyperlipidemia/ hypothyroidism  Cont home meds    #No evidence of malnutrition    #DVT proph- heparin SQ    #Dispo- rehab today. DC time 45 mins

## 2022-09-07 NOTE — DISCHARGE NOTE PROVIDER - CARE PROVIDER_API CALL
Jt Wang  ORTHOPAEDIC SURGERY  [address], [address] [place] [address]  Phone: [phone]  Fax: [phone]  Follow Up Time: 1 month    Ezequiel Gandara)  Internal Medicine  [address], [address]  [place] [address]  Phone: [phone]  Fax: [phone]  Follow Up Time: 1 month    Kale Herrera)  Internal Medicine; Nephrology  [address] [place] [address]  Phone: [phone]  Fax: [phone]  Follow Up Time: Routine    James hernandez  Phone: (   )    -  Fax: (   )    -  Follow Up Time: 2 weeks

## 2022-09-07 NOTE — DIETITIAN INITIAL EVALUATION ADULT - PERTINENT LABORATORY DATA
09-06    132<L>  |  97  |  38<H>  ----------------------------<  95  4.4   |  25  |  6.39<H>    Ca    9.7      06 Sep 2022 09:35    A1C with Estimated Average Glucose Result: 5.7 % (02-07-22 @ 07:35)

## 2022-09-07 NOTE — PROGRESS NOTE ADULT - SUBJECTIVE AND OBJECTIVE BOX
Date of Consult: 9/7/22   Chief Complaint : 87 y/o M with PMH ESRD on HD MWF via AVF, oliguric, (Dr Eastman), obstructive CAD s/p attempted unsuccessful PCI 2018, HFpEF 50% (prior 20%), severe pulmonary HTN, with hx of bleeding (due to LE hematoma post cath due to reported pseudoaneurysm) follows in [place] predominantly, hx of dementia, Hx of R side CEA (2012) HTN, hypothyroidism, HLD and emphysema on home O2 2L PRN presents with difficulty ambulating. Pt states that earlier in the week his back was sore. He laid down today around 1 pm and was unable to get up. He took Tylenol with little improvement. He reports having no strength. No other complaints. Denies fevers, chills, chest pain, SOB, abdominal pain, N/V, diarrhea/constipation, shooting pain down the lower extremities, numbness/tingling of the lower extremities. Podiatry was consulted for pain in the metatarsal area LT foot.     9/7/22: Patient seen by podiatry team. Patient resting comfortably at chair. Patient states he is feeling better and reports minimal pain to his Left foot this morning. No other pedal complaints reported today.     REVIEW OF SYSTEMS: All other review of systems is negative unless indicated above    PMH:   CAD (coronary artery disease)  Cardiomyopathy  HTN (hypertension)  HLD (hyperlipidemia)  ESRD on dialysis    PSH:  No significant past surgical history  AV fistula  H/O CHF  H/O pulmonary hypertension    Allergies: No Known Allergies    MEDICATIONS  (STANDING):  aspirin  chewable 81 milliGRAM(s) Oral daily  atorvastatin 20 milliGRAM(s) Oral at bedtime  budesonide 160 MICROgram(s)/formoterol 4.5 MICROgram(s) Inhaler 2 Puff(s) Inhalation two times a day  carvedilol 3.125 milliGRAM(s) Oral every 12 hours  heparin   Injectable 5000 Unit(s) SubCutaneous every 12 hours  influenza  Vaccine (HIGH DOSE) 0.7 milliLiter(s) IntraMuscular once  levothyroxine 88 MICROGram(s) Oral daily  lidocaine   4% Patch 1 Patch Transdermal daily  lidocaine/prilocaine Cream 1 Application(s) Topical <User Schedule>  montelukast 10 milliGRAM(s) Oral at bedtime  naloxone Injectable 0.4 milliGRAM(s) IV Push once  Nephro-leighann 1 Tablet(s) Oral daily  psyllium Powder 1 Packet(s) Oral daily  Velphoro 1000mg ([id number]) 1000 milliGRAM(s) 2 Tablet(s) Oral three times a day with meals    MEDICATIONS  (PRN):  acetaminophen     Tablet .. 650 milliGRAM(s) Oral every 6 hours PRN Temp greater or equal to 38C (100.4F), Mild Pain (1 - 3)  albumin human 25% IVPB 50 milliLiter(s) IV Intermittent every 1 hour PRN intradialytic hypotension or SBP<110mmHg  aluminum hydroxide/magnesium hydroxide/simethicone Suspension 30 milliLiter(s) Oral every 4 hours PRN Dyspepsia  cyclobenzaprine 5 milliGRAM(s) Oral every 8 hours PRN Spasm  melatonin 3 milliGRAM(s) Oral at bedtime PRN Insomnia  melatonin 3 milliGRAM(s) Oral at bedtime PRN Insomnia  midodrine. 5 milliGRAM(s) Oral <User Schedule> PRN SBP less then 120  morphine  - Injectable 2 milliGRAM(s) IV Push every 4 hours PRN Moderate Pain (4 - 6)  morphine  - Injectable 4 milliGRAM(s) IV Push every 4 hours PRN Severe Pain (7 - 10)  ondansetron Injectable 4 milliGRAM(s) IV Push every 8 hours PRN Nausea and/or Vomiting  oxycodone    5 mG/acetaminophen 325 mG 1 Tablet(s) Oral every 4 hours PRN Severe Pain (7 - 10)  polyethylene glycol 3350 17 Gram(s) Oral daily PRN Constipation      Vital Signs Last 24 Hrs  T(C): 36.8 (07 Sep 2022 08:50), Max: 36.9 (07 Sep 2022 00:20)  T(F): 98.3 (07 Sep 2022 08:50), Max: 98.5 (07 Sep 2022 00:20)  HR: 84 (07 Sep 2022 08:50) (80 - 99)  BP: 124/64 (07 Sep 2022 08:50) (98/50 - 124/64)  BP(mean): --  RR: 17 (07 Sep 2022 08:50) (16 - 17)  SpO2: 95% (07 Sep 2022 08:50) (92% - 97%)    Parameters below as of 07 Sep 2022 08:50  Patient On (Oxygen Delivery Method): room air          Physical Exam:   Constitutiona: NAD, alert;  Derm: Left leg wound, dressings clean dry and intact. No ecchymosis.   Vascular: Dorsalis Pedis and Posterior Tibial pulses 2/4 bilaterally.  Capillary re-fill time less then 3 seconds digits 1-5 bilateral.    Neuro: Protective sensation intact to the level of the digits bilateral.  MSK: Muscle strength 4/5 in all major muscle groups of Right lower extremity. 5/5 in all muscle groups of LLE;  .  Pain on 1st Left interspace plantarly, negative forefoot squeeze test, pain on Dorsiflexion of left hallux, no pain on plantarflexion of left hallux.       Labs:                          10.7   8.12  )-----------( 181      ( 06 Sep 2022 09:35 )             33.1     09-06    132<L>  |  97  |  38<H>  ----------------------------<  95  4.4   |  25  |  6.39<H>    Ca    9.7      06 Sep 2022 09:35                     10.7   8.12  )-----------( 181      ( 06 Sep 2022 09:35 )             33.1    Date of Consult: 9/7/22   Chief Complaint : 85 y/o M with PMH ESRD on HD MWF via AVF, oliguric, (Dr Eastman), obstructive CAD s/p attempted unsuccessful PCI 2018, HFpEF 50% (prior 20%), severe pulmonary HTN, with hx of bleeding (due to LE hematoma post cath due to reported pseudoaneurysm) follows in [place] predominantly, hx of dementia, Hx of R side CEA (2012) HTN, hypothyroidism, HLD and emphysema on home O2 2L PRN presents with difficulty ambulating. Pt states that earlier in the week his back was sore. He laid down today around 1 pm and was unable to get up. He took Tylenol with little improvement. He reports having no strength. No other complaints. Denies fevers, chills, chest pain, SOB, abdominal pain, N/V, diarrhea/constipation, shooting pain down the lower extremities, numbness/tingling of the lower extremities. Podiatry was consulted for pain in the metatarsal area LT foot.     9/7/22: Patient seen by podiatry team. Patient resting comfortably at chair. Patient states he is feeling better and reports minimal pain to his Left foot this morning. No other pedal complaints reported today.     REVIEW OF SYSTEMS: All other review of systems is negative unless indicated above    PMH:   CAD (coronary artery disease)  Cardiomyopathy  HTN (hypertension)  HLD (hyperlipidemia)  ESRD on dialysis    PSH:  No significant past surgical history  AV fistula  H/O CHF  H/O pulmonary hypertension    Allergies: No Known Allergies    MEDICATIONS  (STANDING):  aspirin  chewable 81 milliGRAM(s) Oral daily  atorvastatin 20 milliGRAM(s) Oral at bedtime  budesonide 160 MICROgram(s)/formoterol 4.5 MICROgram(s) Inhaler 2 Puff(s) Inhalation two times a day  carvedilol 3.125 milliGRAM(s) Oral every 12 hours  heparin   Injectable 5000 Unit(s) SubCutaneous every 12 hours  influenza  Vaccine (HIGH DOSE) 0.7 milliLiter(s) IntraMuscular once  levothyroxine 88 MICROGram(s) Oral daily  lidocaine   4% Patch 1 Patch Transdermal daily  lidocaine/prilocaine Cream 1 Application(s) Topical <User Schedule>  montelukast 10 milliGRAM(s) Oral at bedtime  naloxone Injectable 0.4 milliGRAM(s) IV Push once  Nephro-leighann 1 Tablet(s) Oral daily  psyllium Powder 1 Packet(s) Oral daily  Velphoro 1000mg ([id number]) 1000 milliGRAM(s) 2 Tablet(s) Oral three times a day with meals    MEDICATIONS  (PRN):  acetaminophen     Tablet .. 650 milliGRAM(s) Oral every 6 hours PRN Temp greater or equal to 38C (100.4F), Mild Pain (1 - 3)  albumin human 25% IVPB 50 milliLiter(s) IV Intermittent every 1 hour PRN intradialytic hypotension or SBP<110mmHg  aluminum hydroxide/magnesium hydroxide/simethicone Suspension 30 milliLiter(s) Oral every 4 hours PRN Dyspepsia  cyclobenzaprine 5 milliGRAM(s) Oral every 8 hours PRN Spasm  melatonin 3 milliGRAM(s) Oral at bedtime PRN Insomnia  melatonin 3 milliGRAM(s) Oral at bedtime PRN Insomnia  midodrine. 5 milliGRAM(s) Oral <User Schedule> PRN SBP less then 120  morphine  - Injectable 2 milliGRAM(s) IV Push every 4 hours PRN Moderate Pain (4 - 6)  morphine  - Injectable 4 milliGRAM(s) IV Push every 4 hours PRN Severe Pain (7 - 10)  ondansetron Injectable 4 milliGRAM(s) IV Push every 8 hours PRN Nausea and/or Vomiting  oxycodone    5 mG/acetaminophen 325 mG 1 Tablet(s) Oral every 4 hours PRN Severe Pain (7 - 10)  polyethylene glycol 3350 17 Gram(s) Oral daily PRN Constipation      Vital Signs Last 24 Hrs  T(C): 36.8 (07 Sep 2022 08:50), Max: 36.9 (07 Sep 2022 00:20)  T(F): 98.3 (07 Sep 2022 08:50), Max: 98.5 (07 Sep 2022 00:20)  HR: 84 (07 Sep 2022 08:50) (80 - 99)  BP: 124/64 (07 Sep 2022 08:50) (98/50 - 124/64)  BP(mean): --  RR: 17 (07 Sep 2022 08:50) (16 - 17)  SpO2: 95% (07 Sep 2022 08:50) (92% - 97%)    Parameters below as of 07 Sep 2022 08:50  Patient On (Oxygen Delivery Method): room air          Physical Exam:   Constitutiona: NAD, alert;  Derm: Left leg wound, dressings clean dry and intact. No ecchymosis.   Vascular: Dorsalis Pedis and Posterior Tibial pulses 2/4 bilaterally.  Capillary re-fill time less then 3 seconds digits 1-5 bilateral.    Neuro: Protective sensation intact to the level of the digits bilateral.  MSK: Muscle strength 4/5 in all major muscle groups of Right lower extremity. 5/5 in all muscle groups of LLE;  .  Pain on 1st Left interspace plantarly, negative forefoot squeeze test, pain on Dorsiflexion of left hallux (improving), no pain on plantarflexion of left hallux.       Labs:                          10.7   8.12  )-----------( 181      ( 06 Sep 2022 09:35 )             33.1     09-06    132<L>  |  97  |  38<H>  ----------------------------<  95  4.4   |  25  |  6.39<H>    Ca    9.7      06 Sep 2022 09:35                     10.7   8.12  )-----------( 181      ( 06 Sep 2022 09:35 )             33.1

## 2022-09-07 NOTE — DIETITIAN INITIAL EVALUATION ADULT - PERTINENT MEDS FT
MEDICATIONS  (STANDING):  aspirin  chewable 81 milliGRAM(s) Oral daily  atorvastatin 20 milliGRAM(s) Oral at bedtime  budesonide 160 MICROgram(s)/formoterol 4.5 MICROgram(s) Inhaler 2 Puff(s) Inhalation two times a day  carvedilol 3.125 milliGRAM(s) Oral every 12 hours  heparin   Injectable 5000 Unit(s) SubCutaneous every 12 hours  influenza  Vaccine (HIGH DOSE) 0.7 milliLiter(s) IntraMuscular once  levothyroxine 88 MICROGram(s) Oral daily  lidocaine   4% Patch 1 Patch Transdermal daily  lidocaine/prilocaine Cream 1 Application(s) Topical <User Schedule>  montelukast 10 milliGRAM(s) Oral at bedtime  naloxone Injectable 0.4 milliGRAM(s) IV Push once  Nephro-leighann 1 Tablet(s) Oral daily  psyllium Powder 1 Packet(s) Oral daily  Velphoro 1000mg ([id number]) 1000 milliGRAM(s) 2 Tablet(s) Oral three times a day with meals    MEDICATIONS  (PRN):  acetaminophen     Tablet .. 650 milliGRAM(s) Oral every 6 hours PRN Temp greater or equal to 38C (100.4F), Mild Pain (1 - 3)  albumin human 25% IVPB 50 milliLiter(s) IV Intermittent every 1 hour PRN intradialytic hypotension or SBP<110mmHg  aluminum hydroxide/magnesium hydroxide/simethicone Suspension 30 milliLiter(s) Oral every 4 hours PRN Dyspepsia  cyclobenzaprine 5 milliGRAM(s) Oral every 8 hours PRN Spasm  melatonin 3 milliGRAM(s) Oral at bedtime PRN Insomnia  melatonin 3 milliGRAM(s) Oral at bedtime PRN Insomnia  midodrine. 5 milliGRAM(s) Oral <User Schedule> PRN SBP less then 120  morphine  - Injectable 2 milliGRAM(s) IV Push every 4 hours PRN Moderate Pain (4 - 6)  morphine  - Injectable 4 milliGRAM(s) IV Push every 4 hours PRN Severe Pain (7 - 10)  ondansetron Injectable 4 milliGRAM(s) IV Push every 8 hours PRN Nausea and/or Vomiting  oxycodone    5 mG/acetaminophen 325 mG 1 Tablet(s) Oral every 4 hours PRN Severe Pain (7 - 10)  polyethylene glycol 3350 17 Gram(s) Oral daily PRN Constipation

## 2022-09-07 NOTE — PROGRESS NOTE ADULT - ASSESSMENT
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 James Aj DPM  Discussed diagnosis and treatment with patient  Wound noted to left leg pt states that his daughter is a nurse and is taking care of these dressing changes and wants daughter to continue care.  X-rays reviewed for Lt foot: on wet read showing no joint narrowing or osteophytic changes to Left 1st MTP joint. No acute cortical irregularities.    Pain to Left foot is less likely an arthritic in nature and more possibly neuroma related. Pt advised to follow w/ Dr. Aj in 1 week after medically stable for discharge for local conservative care of possible neuroma of left foot.   Discussed importance of daily foot examinations and proper shoe gear and to importance of lower Fasting Blood Glucose levels.   Patient demonstrated verbal understanding of all interventions and tolerated interventions well without any complications.   Podiatry will follow while in house.  Case D/W Dr. 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 James Aj DPM  Discussed diagnosis and treatment with patient  Wound noted to left leg pt states that his daughter is a nurse and is taking care of these dressing changes and wants daughter to continue care.  X-rays reviewed for Lt foot: on wet read showing no joint narrowing or osteophytic changes to Left 1st MTP joint. No acute cortical irregularities.    Pain to Left foot is less likely arthritic in nature and more possibly neuroma related. Pt advised to follow w/ Dr. Aj in 1 week after medically stable for discharge for local conservative care of possible neuroma of left foot.   Discussed importance of daily foot examinations and proper shoe gear and to importance of lower Fasting Blood Glucose levels.   Patient demonstrated verbal understanding of all interventions and tolerated interventions well without any complications.   Podiatry will follow while in house.  Case D/W Dr. Aj

## 2022-09-07 NOTE — PROGRESS NOTE ADULT - SUBJECTIVE AND OBJECTIVE BOX
NEPHROLOGY INTERVAL HPI/OVERNIGHT EVENTS:  09-07-22 @ 14:17    9/7 seen at hd no c/o  9/6--Feeling much improved.  Denies SOB or weakness.    HPI:  85 y/o M with PMH ESRD on HD MWF via AVF ([facility]), obstructive CAD s/p attempted unsuccessful PCI 2018, HFpEF 50% (prior 20%), severe pulmonary HTN, with hx of bleeding (due to LE hematoma post cath due to reported pseudoaneurysm) follows in [place] predominantly, hx of dementia, Hx of R side CEA (2012) HTN, hypothyroidism, HLD and emphysema on home O2 2L PRN presents with difficulty ambulating. Pt states that earlier in the week his back was sore. He laid down today around 1 pm and was unable to get up. He took Tylenol with little improvement. He reports having no strength. No other complaints. Denies fevers, chills, chest pain, SOB, abdominal pain, N/V, diarrhea/constipation, shooting pain down the lower extremities, numbness/tingling of the lower extremities.     Prior admission:  - 2/18/22: SOB -> acute on chronic CHF excerbation, COVID +    ER course: VSS. Labs: Hb 9.5, .9, Na 133, Cr 7.65, albumin 3.2, alkaline phosphatase 133. COVID, influenza A/B, RSV negative.     EKG: pending, f/u results     Imaging:   - CT abd/pelvis: Compared to prior study 2018 interval development of a compression deformity of L1 to vertebra plana. Osteopenia/osteoporosis. Concern for disc protrusion at the level of L4-5 with relative narrowing of the spinal canal, MRI of the lumbar spine can be considered for further evaluation. No acute abdominopelvic pathology. Chronic findings include noncomplicated diverticulosis, stable right adrenal nodule, renal atrophy, cardiomegaly. Cholethiasis.     Pt was given Morphine. He is being admitted to med/surg for further evaluation.  (05 Sep 2022 01:22)    Patient is a 86y old  Male who presents with a chief complaint of Low back pain (05 Sep 2022 10:24)  -----------------------  pt seen earlier today   LBP with L1 compression fracture for MRI   due for HD today   pain better this am       PAST MEDICAL & SURGICAL HISTORY:  - esrd mwf/[facility]   - CM ef 50%  - htn   - hld   - avf      MEDICATIONS  (STANDING):  aspirin  chewable 81 milliGRAM(s) Oral daily  atorvastatin 20 milliGRAM(s) Oral at bedtime  budesonide 160 MICROgram(s)/formoterol 4.5 MICROgram(s) Inhaler 2 Puff(s) Inhalation two times a day  carvedilol 3.125 milliGRAM(s) Oral every 12 hours  heparin   Injectable 5000 Unit(s) SubCutaneous every 12 hours  influenza  Vaccine (HIGH DOSE) 0.7 milliLiter(s) IntraMuscular once  levothyroxine 88 MICROGram(s) Oral daily  lidocaine   4% Patch 1 Patch Transdermal daily  lidocaine/prilocaine Cream 1 Application(s) Topical <User Schedule>  montelukast 10 milliGRAM(s) Oral at bedtime  naloxone Injectable 0.4 milliGRAM(s) IV Push once  Nephro-leighann 1 Tablet(s) Oral daily  psyllium Powder 1 Packet(s) Oral daily  Velphoro 1000mg ([id number]) 1000 milliGRAM(s) 2 Tablet(s) Oral three times a day with meals    MEDICATIONS  (PRN):  acetaminophen     Tablet .. 650 milliGRAM(s) Oral every 6 hours PRN Temp greater or equal to 38C (100.4F), Mild Pain (1 - 3)  albumin human 25% IVPB 50 milliLiter(s) IV Intermittent every 1 hour PRN intradialytic hypotension or SBP<110mmHg  aluminum hydroxide/magnesium hydroxide/simethicone Suspension 30 milliLiter(s) Oral every 4 hours PRN Dyspepsia  cyclobenzaprine 5 milliGRAM(s) Oral every 8 hours PRN Spasm  melatonin 3 milliGRAM(s) Oral at bedtime PRN Insomnia  melatonin 3 milliGRAM(s) Oral at bedtime PRN Insomnia  midodrine. 5 milliGRAM(s) Oral <User Schedule> PRN SBP less then 120  morphine  - Injectable 2 milliGRAM(s) IV Push every 4 hours PRN Moderate Pain (4 - 6)  morphine  - Injectable 4 milliGRAM(s) IV Push every 4 hours PRN Severe Pain (7 - 10)  ondansetron Injectable 4 milliGRAM(s) IV Push every 8 hours PRN Nausea and/or Vomiting  oxycodone    5 mG/acetaminophen 325 mG 1 Tablet(s) Oral every 4 hours PRN Severe Pain (7 - 10)  polyethylene glycol 3350 17 Gram(s) Oral daily PRN Constipation      Allergies    No Known Allergies    Intolerances        I&O's Detail      .    Patient was seen and evaluated on dialysis.   Patient is tolerating the procedure well.   Continue dialysis:   Dialyzer:    revaclear 300 k protocol   uf goal 1 kg     Vital Signs Last 24 Hrs  T(C): 36.2 (07 Sep 2022 12:37), Max: 36.9 (07 Sep 2022 00:20)  T(F): 97.2 (07 Sep 2022 12:37), Max: 98.5 (07 Sep 2022 00:20)  HR: 74 (07 Sep 2022 14:11) (70 - 99)  BP: 106/61 (07 Sep 2022 14:11) (98/50 - 125/69)  BP(mean): --  RR: 18 (07 Sep 2022 14:11) (16 - 19)  SpO2: 95% (07 Sep 2022 08:50) (92% - 97%)    Parameters below as of 07 Sep 2022 09:24  Patient On (Oxygen Delivery Method): room air      Daily     Daily     PHYSICAL EXAM:  General: alert. awake NAD  HEENT: MMM  CV: s1s2 rrr  LUNGS: B/L CTA  EXT: no edema    LABS:                        10.3   7.25  )-----------( 182      ( 07 Sep 2022 12:58 )             30.0     09-07    130<L>  |  96  |  61<H>  ----------------------------<  122<H>  4.0   |  22  |  8.85<H>    Ca    9.5      07 Sep 2022 12:58  Phos  3.7     09-07    TPro  x   /  Alb  3.2<L>  /  TBili  x   /  DBili  x   /  AST  x   /  ALT  x   /  AlkPhos  x   09-07        Phosphorus Level, Serum: 3.7 mg/dL (09-07 @ 12:58)

## 2022-09-07 NOTE — DISCHARGE NOTE PROVIDER - NSDCCPCAREPLAN_GEN_ALL_CORE_FT
PRINCIPAL DISCHARGE DIAGNOSIS  Diagnosis: Compression fracture of L1 vertebra  Assessment and Plan of Treatment: WBAT, no brace needed  Pain meds prn  Outpatient f/u with DR Wang

## 2022-09-07 NOTE — DISCHARGE NOTE PROVIDER - PROVIDER TOKENS
PROVIDER:[TOKEN:[[id number]],FOLLOWUP:[1 month]],PROVIDER:[TOKEN:[[id number]],FOLLOWUP:[1 month]],PROVIDER:[TOKEN:[[id number]],FOLLOWUP:[Routine]],FREE:[LAST:[mary],FIRST:[James],PHONE:[(   )    -],FAX:[(   )    -],FOLLOWUP:[2 weeks]]

## 2022-09-07 NOTE — DIETITIAN INITIAL EVALUATION ADULT - ADD RECOMMEND
1) change diet to renal restricted 2/2 HD, 2) Add LPS BID to optimize nutrient intake (15g protein/ packet), 3) C/w nephrovite daily to meet 100% RDA, 4) Consider adding thiamine 100 mg daily 2/2 poor PO intake/ malnutrition, 5) Encourage protein-rich foods, maximize food preferences, 6) Monitor bowel movements, if no BM for >3 days, consider implementing bowel regimen, 7) Obtain vitamin D 25OH level to assess nutriture, 8) Confirm goals of care regarding nutrition support. RD will continue to monitor PO intake, labs, hydration, and wt prn.

## 2022-09-07 NOTE — PROGRESS NOTE ADULT - ASSESSMENT
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  admitted with L1 compression fracture    REC  - tolerating hd and will inc uf goal with hd   - fu mri   - anemia fu trend of h/h and restart gali with next hd   - MBD  cw velphora       9/6 SY  --ESRD : continue MWF schedule.  --MRI to be reviewed by surgery --L1 acute on chronic fx with L4 compression fx and L4-5 severe stenosis :continue PT.  --Anemia : trend HGB.    9/7 MK  - ESRD tolerating hd   - anemia gali as outpt  - LI compression fx: chronic  - left foot pain due to neuroma   - disp for dc to rehab

## 2022-09-07 NOTE — DISCHARGE NOTE PROVIDER - HOSPITAL COURSE
87 y/o M with PMH ESRD on HD MWF via AV[facility], oliguric, (Dr Herrera), obstructive CAD s/p attempted unsuccessful PCI 2018, HFpEF 50%, severe pulmonary HTN, with hx of bleeding (due to LE hematoma post cath due to reported pseudoaneurysm) follows in [place] predominantly, Hx of R side CEA (2012) HTN, hypothyroidism, HLD and emphysema on home O2 2L PRN presents with difficulty ambulating. Pt states that earlier in the week his back was sore. He laid down today around 1 pm and was unable to get up. He took Tylenol with little improvement. Patient found to have acute on chronic L1 compression fracture, seen by spine. Non-surgical, no brace recommended. Patient was able to ambulate with PT/ walker. Had pain in the LT foot which thought to be 22 neuroma, seen by podiatry- outpatient f/u. Patient is stable for discharge to rehab

## 2022-09-07 NOTE — DIETITIAN INITIAL EVALUATION ADULT - ORAL INTAKE PTA/DIET HISTORY
reports good PO intake/ appetite. On HD MWF - appetite has been decreasing x 3-4 days PTA but still eating ~50-75% ENN  Has dentures  Lives w/ DTR who cooks/ shops for him. Follows renal diet. DTR strictly enforces as per pt.

## 2022-09-07 NOTE — DIETITIAN INITIAL EVALUATION ADULT - OTHER INFO
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 87 y/o M with PMH ESRD on HD MWF via AVF, oliguric, (Dr Eastman), obstructive CAD s/p attempted unsuccessful PCI 2018, HFpEF 50% (prior 20%), severe pulmonary HTN, with hx of bleeding (due to LE hematoma post cath due to reported pseudoaneurysm) follows in [place] predominantly, hx of dementia, Hx of R side CEA (2012) HTN, hypothyroidism, HLD and emphysema on home O2 2L PRN presents with difficulty ambulating. Full code.    Pt appears frail, NFPE revealing moderate to severe muscle/ fat wasting; meets criteria for PCM. Has previously been seen by RD and dx w/ severe PCM in Feb 2022. Wt in Feb 2022 at 155#. Pt reports - 150#, states wt fluctuates 2/2 HD. Admit wt 180#, appears inaccurate but unable to confirm wt 2/2 sitting in chair. Pt on DASH/TLC diet, would rec'd to change to renal restricted. Will also trial LPS BID, pt receptive. See other recommendations below.

## 2022-09-07 NOTE — DISCHARGE NOTE PROVIDER - NSDCMRMEDTOKEN_GEN_ALL_CORE_FT
acetaminophen 325 mg oral tablet: 2 tab(s) orally 2 times a day, As Needed for pain  aspirin 81 mg oral tablet: 1 tab(s) orally once a day  budesonide-formoterol 160 mcg-4.5 mcg/inh inhalation aerosol: 2 puff(s) inhaled 2 times a day  carvedilol 3.125 mg oral tablet: 1 tab(s) orally every 12 hours  Lipitor 20 mg oral tablet: 1 tab(s) orally once a day  Melatonin 3 mg oral tablet: 1 tab(s) orally once a day (at bedtime), As Needed  Metamucil 400 mg oral capsule: 1 cap(s) orally once a day  midodrine 5 mg oral tablet: 1 tab(s) orally Monday, Wednesday, and Friday, As Needed for SBP&lt;110  montelukast 10 mg oral tablet: 1 tab(s) orally once a day (at bedtime)  Nephro-Juan Ramon oral tablet: 1 tab(s) orally once a day  oxycodone-acetaminophen 5 mg-325 mg oral tablet: 1 tab(s) orally every 6 hours, As Needed -for severe pain  polyethylene glycol 3350 oral powder for reconstitution: 17 gram(s) orally once a day, As needed, Constipation  Synthroid 88 mcg (0.088 mg) oral tablet: 1 tab(s) orally once a day  Velphoro 500 mg oral tablet, chewable: 2 tab(s) orally 3 times a day (with meals)  ***pt has own***

## 2022-10-04 PROBLEM — R09.89 OTH SYMPTOMS AND SIGNS INVOLVING THE CIRC AND RESP SYSTEMS: Status: ACTIVE | Noted: 2022-01-01

## 2022-10-04 PROBLEM — J43.9 EMPHYSEMA, UNSPECIFIED: Status: ACTIVE | Noted: 2022-01-01

## 2022-10-04 PROBLEM — C80.1 CANCER: Status: ACTIVE | Noted: 2022-01-01

## 2022-10-04 PROBLEM — Z87.891 FORMER CIGARETTE SMOKER: Status: ACTIVE | Noted: 2022-01-01

## 2022-10-04 PROBLEM — E78.00 HIGH CHOLESTEROL: Status: ACTIVE | Noted: 2022-01-01

## 2022-10-04 PROBLEM — N18.9 CKD (CHRONIC KIDNEY DISEASE): Status: ACTIVE | Noted: 2022-01-01

## 2022-10-04 PROBLEM — I10 HTN (HYPERTENSION): Status: ACTIVE | Noted: 2022-01-01

## 2022-10-04 PROBLEM — N18.6 END-STAGE RENAL DISEASE (ESRD): Status: ACTIVE | Noted: 2022-01-01

## 2022-10-04 PROBLEM — E07.9 THYROID DISEASE: Status: ACTIVE | Noted: 2022-01-01

## 2022-10-04 PROBLEM — D69.9 BLEEDING TENDENCY: Status: ACTIVE | Noted: 2022-01-01

## 2022-10-04 PROBLEM — I50.9 CHF (CONGESTIVE HEART FAILURE): Status: ACTIVE | Noted: 2022-01-01

## 2022-10-12 NOTE — ED ADULT TRIAGE NOTE - CHIEF COMPLAINT QUOTE
pt presents to ED due to complaints of right AV Fistula bleeding in the middle of the night still bleeding a small amount pt gets dialysis M/W/F and is due today at [facility] 1pm

## 2022-10-12 NOTE — ED PROVIDER NOTE - OBJECTIVE STATEMENT
85 y/o male w/ a PMHx of ESRD on dialysis, HTN, HLD, cardiomyopathy, and CAD presents to the ED c/o right AV fistula bleeding since early this morning. Pt reports pin hole bleeding at site of dialysis access x2 days, multiple bleeding sites on back and posterior ankle from scratching. Pt reports applying pressure w/ sensation of bleeding. No current bleeding. Pt due for dialysis today. Pt w/no anemia Sx but was concerned for blood loss and so presented to the ED for eval.

## 2022-10-12 NOTE — ED ADULT NURSE NOTE - NS ED NURSE LEVEL OF CONSCIOUSNESS ORIENTATION
Oriented - self; Oriented - place; Oriented - time You can access the FollowMyHealth Patient Portal offered by [facility] by registering at the following website: [email or web address]. By joining [employer]’s FollowMyHealth portal, you will also be able to view your health information using other applications (apps) compatible with our system.

## 2022-10-12 NOTE — ED PROVIDER NOTE - PROGRESS NOTE DETAILS
No active bleeding currently.  Fistula site appears patent and has no bleeding at all.  Nephrology would like to dialyze here.  No emergent indication for dialysis.  No e/o coagulopathy or significant anemia as compared to baseline.  Will d/c home after dialysis.

## 2022-10-12 NOTE — ED PROVIDER NOTE - NS ED ROS FT
Constitutional: nad, well appearing  HEENT:  no nasal congestion, eye drainage or ear pain.    CVS:  no cp  Resp:  No sob, no cough  GI:  no abdominal pain, no nausea or vomiting  :  no dysuria  MSK: no joint pain or limited ROM  Skin: +bleeding from fistula  Neuro: no change in mental status or level of consciousness  Heme/lymph: no bleeding

## 2022-10-12 NOTE — ED ADULT NURSE NOTE - CHIEF COMPLAINT QUOTE
pt presents to ED due to complaints of right AV Fistula bleeding in the middle of the night still bleeding a small amount pt gets dialysis M/W/F and is due today at [facility] 1pm

## 2022-10-12 NOTE — CONSULT NOTE ADULT - ASSESSMENT
87 yo with esrd mwf with bleeding from avf site, now resolved    PLAN   - hd today and will avoid area with bleeding.  - dw dr mcdonald, if with stable treatment will pursue outpt evaluation.    - kaitlin herring as outpt.

## 2022-10-12 NOTE — ED PROVIDER NOTE - PHYSICAL EXAMINATION
Constitutional: NAD, well appearing  HEENT: no rhinorrhea, PERRL, no oropharyngeal erythema or exudates, midline uvula.  TMs clear.  CVS:  RRR, no m/r/g  Resp:  CTAB  GI: soft, ntnd  MSK:  no restriction to rom, full ROM to all extremities  Neuro:  A&Ox3, 5/5 strength to all extremities,  SILT to all extremities  Skin: AV fistula to right AC, palpable thrill, no active bleeding. Skin tear to posterior RLE w/o active bleeding currently.   psych: clear thought content  Heme/lymph:  No LAD

## 2022-10-12 NOTE — CONSULT NOTE ADULT - SUBJECTIVE AND OBJECTIVE BOX
NEPHROLOGY INTERVAL HPI/OVERNIGHT EVENTS:  CHANTAL [id number]  HPI:  85 yo with esrd on hd/mwf/carillion/jose cruz presents with large intermittent bleeding from AVF needle site.  no elevated pressures at hd most recently.  only on asa 81 mg.  concern for large volume of blood along with other sites on arm and leg that are ooozing.   hx of being on eliquis for afib which was dc'd due to persistent nose bleeds.    bleeding stopped after pressure and wrapped in bandage.    no eschar visible   seen by heriberto mcdonald in july 2022, with no intervention recommended         PAST MEDICAL & SURGICAL HISTORY:  - esrd mwf/carillion   - cad  - CM   - HTN   - HLD   - avf created in [place], followed by dr mcdonald here             FAMILY HISTORY:  FH: emphysema (Father)          Allergies    No Known Allergies    Intolerances        I&O's Summary      Home Medications:  acetaminophen 325 mg oral tablet: 2 tab(s) orally 2 times a day, As Needed for pain (05 Sep 2022 01:05)  aspirin 81 mg oral tablet: 1 tab(s) orally once a day (05 Sep 2022 01:05)  budesonide-formoterol 160 mcg-4.5 mcg/inh inhalation aerosol: 2 puff(s) inhaled 2 times a day (05 Sep 2022 01:05)  carvedilol 3.125 mg oral tablet: 1 tab(s) orally every 12 hours (05 Sep 2022 01:05)  Lipitor 20 mg oral tablet: 1 tab(s) orally once a day (05 Sep 2022 01:05)  Melatonin 3 mg oral tablet: 1 tab(s) orally once a day (at bedtime), As Needed (05 Sep 2022 01:05)  Metamucil 400 mg oral capsule: 1 cap(s) orally once a day (05 Sep 2022 01:05)  midodrine 5 mg oral tablet: 1 tab(s) orally Monday, Wednesday, and Friday, As Needed for SBP&lt;110 (07 Sep 2022 13:54)  montelukast 10 mg oral tablet: 1 tab(s) orally once a day (at bedtime) (05 Sep 2022 01:05)  Nephro-Juan Ramon oral tablet: 1 tab(s) orally once a day (05 Sep 2022 01:05)  oxycodone-acetaminophen 5 mg-325 mg oral tablet: 1 tab(s) orally every 6 hours, As Needed -for severe pain (07 Sep 2022 13:54)  polyethylene glycol 3350 oral powder for reconstitution: 17 gram(s) orally once a day, As needed, Constipation (07 Sep 2022 13:54)  Synthroid 88 mcg (0.088 mg) oral tablet: 1 tab(s) orally once a day (05 Sep 2022 01:05)  Velphoro 500 mg oral tablet, chewable: 2 tab(s) orally 3 times a day (with meals)  ***pt has own*** (05 Sep 2022 01:05)      Patient was seen and evaluated on dialysis.   Patient is tolerating the procedure well.   Continue dialysis:   Dialyzer:          QB:        QD:   Goal UF ___ over ___ Hours       Vital Signs Last 24 Hrs  T(C): 36.6 (12 Oct 2022 08:33), Max: 36.6 (12 Oct 2022 08:33)  T(F): 97.9 (12 Oct 2022 08:33), Max: 97.9 (12 Oct 2022 08:33)  HR: 93 (12 Oct 2022 08:33) (93 - 93)  BP: 125/57 (12 Oct 2022 08:33) (125/57 - 125/57)  BP(mean): 73 (12 Oct 2022 08:33) (73 - 73)  RR: 16 (12 Oct 2022 08:33) (16 - 16)  SpO2: 98% (12 Oct 2022 08:33) (98% - 98%)    Parameters below as of 12 Oct 2022 08:33  Patient On (Oxygen Delivery Method): room air      Daily Height in cm: 172.72 (12 Oct 2022 08:23)    Daily   I&O's Summary      PHYSICAL EXAM:  GEN: alert awake NAD  HEENT: MMM  NECK supple no jvd  CV: RRR s1s2  LUNGS: b/l CTA  ABD: + soft,   EXT: no edema    LABS:                        9.8    5.77  )-----------( 111      ( 12 Oct 2022 10:56 )             30.0           PT/INR - ( 12 Oct 2022 10:56 )   PT: 15.8 sec;   INR: 1.36 ratio         PTT - ( 12 Oct 2022 10:56 )  PTT:37.1 sec

## 2022-10-12 NOTE — ED PROVIDER NOTE - PRO INTERPRETER NEED 2
Discussed with the patient on the phone today, rectal symptoms have resolved following use of Levsin, and have not recurred.  He has appointment to see gastroenterology within the week for colonoscopy.  No need to refill medication at this time we will await recurrence of symptoms.  Dr Gupta   English

## 2022-10-12 NOTE — ED PROVIDER NOTE - CLINICAL SUMMARY MEDICAL DECISION MAKING FREE TEXT BOX
Will eval for underlying coagulopathy although less suspicious, if not anemic and coagulation profile unremarkable, will likely d/c w/ outpt dialysis treatment

## 2022-10-12 NOTE — CHART NOTE - NSCHARTNOTEFT_GEN_A_CORE
pt seen at hd  tolerating tx   avf accessed without difficulity, no bleeding with no elevated pressures  revaclear 300 k protocol uf goal of 2kg   avf with bfr 400

## 2022-10-12 NOTE — ED ADULT NURSE NOTE - OBJECTIVE STATEMENT
pt a/ox3, BIBdaughter c/o "bleeding from fistula site in the middle of the night". pt reports hx of ESRD receives dialysis MWF. pt reports being due to dialysis today at [facility] at 1300pm. pt reports "bleeding from right foot wound, and back". pt has Right AV fistula.

## 2022-10-12 NOTE — ED ADULT NURSE NOTE - NSIMPLEMENTINTERV_GEN_ALL_ED
Implemented All Fall Risk Interventions:  Jay to call system. Call bell, personal items and telephone within reach. Instruct patient to call for assistance. Room bathroom lighting operational. Non-slip footwear when patient is off stretcher. Physically safe environment: no spills, clutter or unnecessary equipment. Stretcher in lowest position, wheels locked, appropriate side rails in place. Provide visual cue, wrist band, yellow gown, etc. Monitor gait and stability. Monitor for mental status changes and reorient to person, place, and time. Review medications for side effects contributing to fall risk. Reinforce activity limits and safety measures with patient and family.

## 2022-10-12 NOTE — ED PROVIDER NOTE - PATIENT PORTAL LINK FT
You can access the FollowMyHealth Patient Portal offered by [facility] by registering at the following website: [email or web address]. By joining [employer]’s FollowMyHealth portal, you will also be able to view your health information using other applications (apps) compatible with our system.

## 2022-12-10 NOTE — H&P ADULT - HISTORY OF PRESENT ILLNESS
Pt is an 86 yo male with a pmh/o afib no longer on ASA or AC due to bleeding (AVF bleeding, LE hematoma post cath due to pseudoaneurysm), s/p R CEA in 2012, pulmonary HTN, hypothyroidism, emphysema on home O2 2L, chronic back pain with compression fracture, ESRD on HD, HLD, CAD s/p unsuccessful PCI in 2018, Cardiomyopathy, HTN, Oligouria, who presents from home, BIBA after being found on floor. Pt is a poor historian, collateral obtained by daughter via telephone [phone]. Pt lives with daughter who is currently out of state. Pt was seen on camera monitor lying on floor (daughter set up "nanny cam"). Neighbors, EMT and RN, came to home and called EMS. Pt states he last remembers drinking two shots of whiskey and then awoke on floor with neighbors looking at him.     Pt states he currently "feels fine" and denies every having or currently having chest pain, palpitations, nausea, claudication, diaphoresis, left arm pain, vomiting, incontinence, tongue biting, headache, parasthesias, changes in vision/speech/gait/hearing/MSK, fever, chills, sob, cough, diarrhea/constipation, dysuria.     ED course: Pt arrived to ED AAOx3 with VSS. Noted to have elevated troponin with PVC on monitor and rate controlled atrial fibrillation. CT head and c/spine w/o acute findings. ASA ordered and pt admitted to medicine. On exam pt declined aspirin as states it causes him to bleed. Daughter also endorses that despite atrial fibrillation, pt has been taken off all AC and antiplt due to bleeding episodes requiring blood transfusions. ASA discontinued. Per daughter, cardio- Dr. Pugh, nephrology Dr. Cordon

## 2022-12-10 NOTE — CONSULT NOTE ADULT - SUBJECTIVE AND OBJECTIVE BOX
Patient is a 87y old  Male who presents with a chief complaint of Syncope and collapse, elevated troponin (10 Dec 2022 04:02)      HPI:  Pt is an 88 yo male with a pmh/o afib no longer on ASA or AC due to bleeding (AVF bleeding, LE hematoma post cath due to pseudoaneurysm), s/p R CEA in 2012, pulmonary HTN, hypothyroidism, emphysema on home O2 2L, chronic back pain with compression fracture, ESRD on HD, HLD, CAD s/p unsuccessful PCI in 2018, Cardiomyopathy, HTN, Oligouria, who presents from home, BIBA after being found on floor. Pt is a poor historian, collateral obtained by daughter via telephone [phone]. Pt lives with daughter who is currently out of state. Pt was seen on camera monitor lying on floor (daughter set up "nanny cam"). Neighbors, EMT and RN, came to home and called EMS. Pt states he last remembers drinking two shots of whiskey and then awoke on floor with neighbors looking at him.     Pt states he currently "feels fine" and denies every having or currently having chest pain, palpitations, nausea, claudication, diaphoresis, left arm pain, vomiting, incontinence, tongue biting, headache, parasthesias, changes in vision/speech/gait/hearing/MSK, fever, chills, sob, cough, diarrhea/constipation, dysuria.     ED course: Pt arrived to ED AAOx3 with VSS. Noted to have elevated troponin with PVC on monitor and rate controlled atrial fibrillation. CT head and c/spine w/o acute findings. ASA ordered and pt admitted to medicine. On exam pt declined aspirin as states it causes him to bleed. Daughter also endorses that despite atrial fibrillation, pt has been taken off all AC and antiplt due to bleeding episodes requiring blood transfusions. ASA discontinued  Not fully alert, pleasently demented, in no distress      PAST MEDICAL & SURGICAL HISTORY:  CAD (coronary artery disease)      Cardiomyopathy      HTN (hypertension)      HLD (hyperlipidemia)      ESRD on dialysis      AV fistula          HPI:                PREVIOUS DIAGNOSTIC TESTING:      ECHO  FINDINGS:    STRESS  FINDINGS:    CATHETERIZATION  FINDINGS:    MEDICATIONS  (STANDING):  atorvastatin 20 milliGRAM(s) Oral at bedtime  budesonide 160 MICROgram(s)/formoterol 4.5 MICROgram(s) Inhaler 2 Puff(s) Inhalation two times a day  calcium acetate 667 milliGRAM(s) Oral three times a day with meals  carvedilol 3.125 milliGRAM(s) Oral every 12 hours  levothyroxine 88 MICROGram(s) Oral daily  melatonin 3 milliGRAM(s) Oral at bedtime  montelukast 10 milliGRAM(s) Oral at bedtime  Nephro-leighann 1 Tablet(s) Oral daily    MEDICATIONS  (PRN):  acetaminophen     Tablet .. 650 milliGRAM(s) Oral every 6 hours PRN Temp greater or equal to 38C (100.4F), Mild Pain (1 - 3)      FAMILY HISTORY:  FH: emphysema (Father)        SOCIAL HISTORY:    CIGARETTES:    ALCOHOL:        Vital Signs Last 24 Hrs  T(C): 36.6 (10 Dec 2022 10:40), Max: 37 (10 Dec 2022 00:57)  T(F): 97.9 (10 Dec 2022 10:40), Max: 98.6 (10 Dec 2022 00:57)  HR: 98 (10 Dec 2022 10:40) (86 - 99)  BP: 112/57 (10 Dec 2022 10:40) (93/56 - 113/68)  BP(mean): 83 (10 Dec 2022 06:12) (78 - 83)  RR: 18 (10 Dec 2022 10:40) (16 - 18)  SpO2: 95% (10 Dec 2022 10:40) (86% - 99%)    Parameters below as of 10 Dec 2022 10:40  Patient On (Oxygen Delivery Method): nasal cannula  O2 Flow (L/min): 3      PHYSICAL EXAM-    Constitutional: The patient appears to be normal, well developed, well nourished and alert and oriented to time, place and person. The patient does not appear acutely ill. The patient is alert.     Head: Head is normocephalic and atraumatic.      Neck: The patient's neck is supple without enlargement, has no palpable thyromegaly nor thyroid nodules and has no jugular venous distention. No audible carotid bruits. There are strong carotid pulses bilaterally. No JVD.     Cardiovascular: Irregulary irregular with no murmurs      Respiratory:  The patient has no rales and no rhonchi. The patient has no wheezes.     Abdomen: Soft, nontender, nondistended with positive bowel sounds.      Extremity: No tenderness. There is no pitting edema, skin discoloration, clubbing and cyanosis.     INTERPRETATION OF TELEMETRY:    ECG:    I&O's Detail      LABS:                        10.1   6.54  )-----------( 125      ( 10 Dec 2022 07:14 )             31.3     12-10    133<L>  |  100  |  8   ----------------------------<  67<L>  4.0   |  24  |  3.29<H>    Ca    9.0      10 Dec 2022 07:14  Phos  2.9     12-10  Mg     2.1     12-10    TPro  6.5  /  Alb  2.8<L>  /  TBili  0.7  /  DBili  x   /  AST  36  /  ALT  23  /  AlkPhos  86  12-10        PT/INR - ( 10 Dec 2022 07:14 )   PT: 15.8 sec;   INR: 1.36 ratio         PTT - ( 10 Dec 2022 07:14 )  PTT:35.8 sec    I&O's Summary    BNP  RADIOLOGY & ADDITIONAL STUDIES:

## 2022-12-10 NOTE — CONSULT NOTE ADULT - SUBJECTIVE AND OBJECTIVE BOX
88 yo male with a pmh/o afib, s/p R CEA in 2012, pulmonary HTN, hypothyroidism, emphysema on home O2 2L, chronic back pain with compression fracture, ESRD on HD M/W/F, HLD, CAD s/p unsuccessful PCI in 2018, Cardiomyopathy, HTN BIBA after being found on floor. Pt is a poor historian, collateral obtained by chart. + etoh use        PAST MEDICAL & SURGICAL HISTORY:  CAD (coronary artery disease)      Cardiomyopathy      HTN (hypertension)      HLD (hyperlipidemia)      ESRD on dialysis      AV fistula          MEDICATIONS  (STANDING):  atorvastatin 20 milliGRAM(s) Oral at bedtime  budesonide 160 MICROgram(s)/formoterol 4.5 MICROgram(s) Inhaler 2 Puff(s) Inhalation two times a day  calcium acetate 667 milliGRAM(s) Oral three times a day with meals  carvedilol 3.125 milliGRAM(s) Oral every 12 hours  influenza  Vaccine (HIGH DOSE) 0.7 milliLiter(s) IntraMuscular once  levothyroxine 88 MICROGram(s) Oral daily  melatonin 3 milliGRAM(s) Oral at bedtime  montelukast 10 milliGRAM(s) Oral at bedtime  Nephro-leighann 1 Tablet(s) Oral daily    MEDICATIONS  (PRN):  acetaminophen     Tablet .. 650 milliGRAM(s) Oral every 6 hours PRN Temp greater or equal to 38C (100.4F), Mild Pain (1 - 3)      Allergies    No Known Allergies    Intolerances        SOCIAL HISTORY:  + et oph  FAMILY HISTORY:  FH: emphysema (Father)        REVIEW OF SYSTEMS:    CONSTITUTIONAL: stable weakness, fevers or chills  EYES/ENT: No visual changes;  No vertigo or throat pain   NECK: No pain or stiffness  RESPIRATORY: No cough, wheezing, hemoptysis; No shortness of breath  CARDIOVASCULAR: No chest pain or palpitations  GASTROINTESTINAL: No abdominal or epigastric pain. No nausea, vomiting, or hematemesis; No diarrhea or constipation. No melena or hematochezia.  GENITOURINARY: No dysuria, frequency or hematuria  NEUROLOGICAL: No numbness or weakness  SKIN: No itching, burning, rashes, or lesions   All other review of systems is negative unless indicated above.      T(C): , Max: 37 (12-10-22 @ 00:57)  T(F): , Max: 98.6 (12-10-22 @ 00:57)  HR: 98 (12-10-22 @ 10:40)  BP: 112/57 (12-10-22 @ 10:40)  BP(mean): 83 (12-10-22 @ 06:12)  RR: 18 (12-10-22 @ 10:40)  SpO2: 95% (12-10-22 @ 10:40)  Wt(kg): --    Height (cm): 172.7 (12-10 @ 00:57)  Weight (kg): 81.6 (12-10 @ 00:57)  BMI (kg/m2): 27.4 (12-10 @ 00:57)  BSA (m2): 1.95 (12-10 @ 00:57)    PHYSICAL EXAM:    Constitutional: NAD, frail  HEENT:  MM  Neck: No LAD, No JVD  Respiratory: dist  Cardiovascular: S1 and S2   Gastrointestinal: BS+, soft, NT/ND  Extremities: chr peripheral edema  Neurological: Alert  : No Chopra  Skin: No rashes  avf      LABS:                        10.1   6.54  )-----------( 125      ( 10 Dec 2022 07:14 )             31.3     10 Dec 2022 07:14    133    |  100    |  8      ----------------------------<  67     4.0     |  24     |  3.29   10 Dec 2022 02:03    134    |  99     |  8      ----------------------------<  84     3.1     |  27     |  3.46     Ca    9.0        10 Dec 2022 07:14  Ca    9.3        10 Dec 2022 02:03  Phos  2.9       10 Dec 2022 07:14  Mg     2.1       10 Dec 2022 07:14    TPro  6.5    /  Alb  2.8    /  TBili  0.7    /  DBili  x      /  AST  36     /  ALT  23     /  AlkPhos  86     10 Dec 2022 07:14  TPro  7.1    /  Alb  3.1    /  TBili  0.7    /  DBili  x      /  AST  31     /  ALT  23     /  AlkPhos  101    10 Dec 2022 02:03          Urine Studies:          RADIOLOGY & ADDITIONAL STUDIES:

## 2022-12-10 NOTE — ED PROVIDER NOTE - OBJECTIVE STATEMENT
87 year-old male with hx of ESRD on HD, HLD presents with episode of syncope.  Patient was home and daughter saw him laying on the floor on their monitor.  Nursing home 5 separate areas.  She did not see him fall.  Patient with history of dementia and does not recall falling.  Denies any pain.  No blood thinner use.  Neuro alert activated upon arrival. 87 year-old male with hx of AFib, ESRD on HD, HLD presents with episode of syncope.  Patient was home and daughter saw him laying on the floor on their monitor.  Nursing home 5 separate areas.  She did not see him fall.  Patient with history of dementia and does not recall falling.  Denies any pain.  No blood thinner use.  Neuro alert activated upon arrival.

## 2022-12-10 NOTE — H&P ADULT - MUSCULOSKELETAL
ROM intact/no joint swelling/no calf tenderness/strength 5/5 bilateral upper extremities/strength 5/5 bilateral lower extremities/no chest wall tenderness

## 2022-12-10 NOTE — ED ADULT NURSE NOTE - OBJECTIVE STATEMENT
Pt BIBA to ED after family witnessed PT fall on camera at home. PT AxOx3 gcs 15. Denies injury. Unknown head strike and LOC. PT 02 90% on room air, pt placed on 2L NC. States he had some alcohol this evening because he was lonely. He believes that's what made him fall. Denies use of anticoagulants as per EMS.

## 2022-12-10 NOTE — CONSULT NOTE ADULT - ASSESSMENT
87 pmh/o afib, s/p R CEA in 2012, pulmonary HTN, hypothyroidism, emphysema on home O2 2L, chronic back pain with compression fracture, ESRD on HD M/W/F, HLD, CAD s/p unsuccessful PCI in 2018, Cardiomyopathy, HTN BIBA after being found on floor. Pt is a poor historian, collateral obtained by chart. + etoh use     ESRD  HD tiw, next monday  K protocol  UF as tolerated    Syncope  Tele, CVS  monitor bp w oral meds    Anemia  MARIS protocol    Thanks, will follow with you  d/c with RN staff, team
Pt is an 86 yo male with a pmh/o afib no longer on ASA or AC due to bleeding (AVF bleeding, LE hematoma post cath due to pseudoaneurysm), s/p R CEA in 2012, pulmonary HTN, hypothyroidism, emphysema on home O2 2L, chronic back pain with compression fracture, ESRD on HD, HLD, CAD s/p unsuccessful PCI in 2018, Cardiomyopathy, HTN, Oligouria, who presents from home, BIBA after being found on floor.  Unclear if syncope  Dehydrated by physical exam  Increase fluid intake   Minimal Troponins  Tele AS fib with rate controlled  2D echo  Will follow

## 2022-12-10 NOTE — ED PROVIDER NOTE - EMPLOYMENT
Pt states she has had abd pain for the past few days, was seen last night and was told she has bowel obstruction and she says she decided to leave. Pt is back because she can not take it anymore.   
N/A

## 2022-12-10 NOTE — H&P ADULT - ASSESSMENT
Pt is an 88 yo male, who has a pmh/o CAD, atrial fibrillation no longer on ASA or AC due to bleeding, HTN, HLD, who was BIBA after being found on floor at home and who is unable to recall events. Pt admitted due to:    #Syncope and collapse  admit to telemetry  concerning for cardiogenic etiology   orthostatic bp  neurock q 8hrs  fall precautions, aspiration precautions  CT imaging reviewed, no acute findings  CXR reviewed, no consolidation or effusion on prelim read  OOB and ambulate with assistance  TTE ordered  Serial trops, first wnl  cardiology consult  SCD for DVT ppx    #Elevated troponin  due to increased demand, concerning for arrhythmic event given h/o PAF/AF  cont to trend  EKG with rate controlled afib, PVC  hold ASA as pt w/o chest pain or current cardiac sx and with high risk of bleeding, pt declined and family agreed pt with h/o bleeding on ASA and AC and is discontinued despite atrial fibrillation    #HypoK  repletion ordered  monitor on serum chemistry    #Protein calorie malnutrition  Nutrition consult    #COPD, oxygen dependent  c/w supplemental oxygen  c/w montelukast    #ESRD  Nephrology consult  c/w calcium acetate  c/w nephrovite  nephrology consult placed    #Anemia of chronic disease  #Thrombocytopenia  monitor h/h on cbc  holding AC for DVT ppx due to bleeding risk  no active bleeding on exam    #HTN/HLD  #Atrial fibrillation, paroxysmal  per family pt "goes in and out of afib"  c/w statin  DASH/TLC diet  c/w carvedilol  no ASA/AC due to bleeding

## 2022-12-10 NOTE — ED ADULT TRIAGE NOTE - CHIEF COMPLAINT QUOTE
Pt BIBA to ED after family witnessed PT fall on camera at home. PT a&ox2 gcs 15. Denies injury. Unknown head strike and LOC. PT 02 90% on room air. Denies use of anticoagulants as per EMS.

## 2022-12-10 NOTE — ED PROVIDER NOTE - PROGRESS NOTE DETAILS
Lucinda OROZCO: Troponin noted.  On review of prior labs patient with similar renal function but normal troponin level.  Will admit for further work-up.  d/w patients daughter. Signout given to Hospitalist Dr. Gallo.

## 2022-12-10 NOTE — H&P ADULT - NEUROLOGICAL
cranial nerves II-XII intact/sensation intact/responds to pain/responds to verbal commands/no spontaneous movement

## 2022-12-10 NOTE — PROGRESS NOTE ADULT - SUBJECTIVE AND OBJECTIVE BOX
Patient is a 87y old  Male who presents with a chief complaint of Syncope and collapse, elevated troponin (10 Dec 2022 12:15)      SUBJECTIVE:   HPI:  Pt is an 86 yo male with a pmh/o afib no longer on ASA or AC due to bleeding (AVF bleeding, LE hematoma post cath due to pseudoaneurysm), s/p R CEA in 2012, pulmonary HTN, hypothyroidism, emphysema on home O2 2L, chronic back pain with compression fracture, ESRD on HD, HLD, CAD s/p unsuccessful PCI in 2018, Cardiomyopathy, HTN, Oligouria, who presents from home, BIBA after being found on floor. Pt is a poor historian, collateral obtained by daughter via telephone [phone]. Pt lives with daughter who is currently out of state. Pt was seen on camera monitor lying on floor (daughter set up "nanny cam"). Neighbors, EMT and RN, came to home and called EMS. Pt states he last remembers drinking two shots of whiskey and then awoke on floor with neighbors looking at him.     Pt states he currently "feels fine" and denies every having or currently having chest pain, palpitations, nausea, claudication, diaphoresis, left arm pain, vomiting, incontinence, tongue biting, headache, parasthesias, changes in vision/speech/gait/hearing/MSK, fever, chills, sob, cough, diarrhea/constipation, dysuria.     ED course: Pt arrived to ED AAOx3 with VSS. Noted to have elevated troponin with PVC on monitor and rate controlled atrial fibrillation. CT head and c/spine w/o acute findings. ASA ordered and pt admitted to medicine. On exam pt declined aspirin as states it causes him to bleed. Daughter also endorses that despite atrial fibrillation, pt has been taken off all AC and antiplt due to bleeding episodes requiring blood transfusions. ASA discontinued. Per daughter, cardio- Dr. Pugh, nephrology Dr. Cordon (10 Dec 2022 04:02)          sub: pt awake and alert, states he remembers the episode. but denied any prodrome prior to the event. Denies cp or sob despite mild elevation in trops.     REVIEW OF SYSTEMS:    CONSTITUTIONAL: No weakness, fevers or chills  EYES/ENT: No visual changes;  No vertigo or throat pain   NECK: No pain or stiffness  RESPIRATORY: No cough, wheezing, hemoptysis; No shortness of breath  CARDIOVASCULAR: No chest pain or palpitations  GASTROINTESTINAL: No abdominal or epigastric pain. No nausea, vomiting, or hematemesis; No diarrhea or constipation. No melena or hematochezia.  GENITOURINARY: No dysuria, frequency or hematuria  NEUROLOGICAL: No numbness or weakness  SKIN: No itching, burning, rashes, or lesions   All other review of systems is negative unless indicated above    Height (cm): 172.7 (12-10 @ 00:57)  Weight (kg): 81.6 (12-10 @ 00:57)  BMI (kg/m2): 27.4 (12-10 @ 00:57)  BSA (m2): 1.95 (12-10 @ 00:57)    Vital Signs Last 24 Hrs  T(C): 36.6 (10 Dec 2022 10:40), Max: 37 (10 Dec 2022 00:57)  T(F): 97.9 (10 Dec 2022 10:40), Max: 98.6 (10 Dec 2022 00:57)  HR: 98 (10 Dec 2022 10:40) (86 - 99)  BP: 112/57 (10 Dec 2022 10:40) (93/56 - 113/68)  BP(mean): 83 (10 Dec 2022 06:12) (78 - 83)  RR: 18 (10 Dec 2022 10:40) (16 - 18)  SpO2: 95% (10 Dec 2022 10:40) (86% - 99%)    Parameters below as of 10 Dec 2022 10:40  Patient On (Oxygen Delivery Method): nasal cannula  O2 Flow (L/min): 3      I&O's Summary      CAPILLARY BLOOD GLUCOSE          PHYSICAL EXAM:    Constitutional: NAD, awake and alert,   HEENT: PERR, EOMI, Normal Hearing, MMM  Neck: Soft and supple, No LAD, No JVD  Respiratory: Breath sounds are clear bilaterally, No wheezing, rales or rhonchi  Cardiovascular: S1 and S2, regular rate and rhythm, no Murmurs, gallops or rubs  Gastrointestinal: Bowel Sounds present, soft, nontender, nondistended, no guarding, no rebound  Extremities: No peripheral edema  Vascular: 2+ peripheral pulses  Neurological: A/O x 3, no focal deficits  Musculoskeletal: 5/5 strength b/l upper and lower extremities  Skin: No rashes    MEDICATIONS:  MEDICATIONS  (STANDING):  atorvastatin 20 milliGRAM(s) Oral at bedtime  budesonide 160 MICROgram(s)/formoterol 4.5 MICROgram(s) Inhaler 2 Puff(s) Inhalation two times a day  calcium acetate 667 milliGRAM(s) Oral three times a day with meals  carvedilol 3.125 milliGRAM(s) Oral every 12 hours  influenza  Vaccine (HIGH DOSE) 0.7 milliLiter(s) IntraMuscular once  levothyroxine 88 MICROGram(s) Oral daily  melatonin 3 milliGRAM(s) Oral at bedtime  montelukast 10 milliGRAM(s) Oral at bedtime  Nephro-leighann 1 Tablet(s) Oral daily      LABS: All Labs Reviewed:                        10.1   6.54  )-----------( 125      ( 10 Dec 2022 07:14 )             31.3     12-10    133<L>  |  100  |  8   ----------------------------<  67<L>  4.0   |  24  |  3.29<H>    Ca    9.0      10 Dec 2022 07:14  Phos  2.9     12-10  Mg     2.1     12-10    TPro  6.5  /  Alb  2.8<L>  /  TBili  0.7  /  DBili  x   /  AST  36  /  ALT  23  /  AlkPhos  86  12-10    PT/INR - ( 10 Dec 2022 07:14 )   PT: 15.8 sec;   INR: 1.36 ratio         PTT - ( 10 Dec 2022 07:14 )  PTT:35.8 sec          Blood Culture:     RADIOLOGY/EKG: reviewed

## 2022-12-10 NOTE — H&P ADULT - NSHPSOCIALHISTORY_GEN_ALL_CORE
non smoker  no etoh abuse (is not allowed to drink alcohol per pt)  no drug use  ADL dependent- lives with daughter and relies on caretaker for medications, groceries, etc.

## 2022-12-10 NOTE — PROGRESS NOTE ADULT - ASSESSMENT
Pt is an 86 yo male, who has a pmh/o CAD, atrial fibrillation no longer on ASA or AC due to bleeding, HTN, HLD, who was BIBA after being found on floor at home and who is unable to recall events. Pt admitted due to:    #Syncope and collapse  #demand ischemia?..  repeat trops and trend to peak  ASA for now  Echo and f/u for any new wma  concerning for cardiogenic etiology   orthostatic bp  neurock q 8hrs  fall precautions, aspiration precautions  CT imaging reviewed, no acute findings  CXR reviewed, no consolidation or effusion on prelim read  OOB and ambulate with assistance      #Acute hypoxemic resp failure  - Per dtr, patient was off home o2 and recently needed it over last two weeks  - he has known PHTN  - With elevate trop, ? syncope and hypoxia, must consider VTE  - Obtain dimer and LE doppler  - if positive, will CTA  - cont heparin SQ for now  - Dtr states he is extremely high bleeding risk and cannot even tolerate aspirin and was being considered for a Watchman's device  - If CTA needed, will coordinate with Dr Cordon for contrast followed by HD      #Elevated troponin  due to increased demand, concerning for arrhythmic event given h/o PAF/AF  cont to trend  EKG with rate controlled afib, PVC  hold ASA as pt w/o chest pain or current cardiac sx and with high risk of bleeding, pt declined and family agreed pt with h/o bleeding on ASA and AC and is discontinued despite atrial fibrillation    #HypoK  repletion ordered  monitor on serum chemistry    #Protein calorie malnutrition  Nutrition consult    #COPD, oxygen dependent  c/w supplemental oxygen  c/w montelukast    #ESRD  Nephrology consult  c/w calcium acetate  c/w nephrovite  nephrology consult placed    #Anemia of chronic disease  #Thrombocytopenia  monitor h/h on cbc  holding AC for DVT ppx due to bleeding risk  no active bleeding on exam    #HTN/HLD  #Atrial fibrillation, paroxysmal  per family pt "goes in and out of afib"  c/w statin  DASH/TLC diet  c/w carvedilol  no ASA/AC due to bleeding        Dtr Diane vogel [phone]  Full code -   Dispo: check dimer, Dopplers -> if postive CTA and dtr requesting palliative consults. W

## 2022-12-10 NOTE — PATIENT PROFILE ADULT - FALL HARM RISK - HARM RISK INTERVENTIONS

## 2022-12-10 NOTE — ED PROVIDER NOTE - CLINICAL SUMMARY MEDICAL DECISION MAKING FREE TEXT BOX
87-year-old male status post episode of syncope and collapse.  Now at his baseline.  Denies any chest pain.  No focal deficits.  Neuro alert activated for CT imaging.  Will obtain labs, EKG.

## 2022-12-10 NOTE — ED PROVIDER NOTE - PHYSICAL EXAMINATION
***GEN - NAD; well appearing; A+O x2 ***HEAD - NC/AT ***EYES/NOSE - PERRL, EOMI, mucous membranes moist, no discharge ***THROAT: Oral cavity and pharynx normal. No inflammation, swelling, exudate, or lesions.  ***NECK: Neck supple, non-tender without lymphadenopathy  ***PULMONARY - CTA b/l, symmetric breath sounds. ***CARDIAC -s1s2, RRR, no M,G,R  ***ABDOMEN - +BS, ND, NT, soft, no guarding, no rebound, no masses   ***BACK - no CVA tenderness, Normal  spine ***EXTREMITIES - + thrill over RUE AVF, symmetric pulses, 2+ dp, capillary refill < 2 seconds ***SKIN - no rash or bruising   ***NEUROLOGIC - alert, CN 2-12 intact

## 2022-12-11 NOTE — PROGRESS NOTE ADULT - SUBJECTIVE AND OBJECTIVE BOX
Patient is a 87y Male who reports no complaints as new. Taking PO. CTA done, + LE dvs        MEDICATIONS  (STANDING):  atorvastatin 20 milliGRAM(s) Oral at bedtime  budesonide 160 MICROgram(s)/formoterol 4.5 MICROgram(s) Inhaler 2 Puff(s) Inhalation two times a day  calcium acetate 667 milliGRAM(s) Oral three times a day with meals  carvedilol 3.125 milliGRAM(s) Oral every 12 hours  heparin  Infusion. 1300 Unit(s)/Hr (13 mL/Hr) IV Continuous <Continuous>  influenza  Vaccine (HIGH DOSE) 0.7 milliLiter(s) IntraMuscular once  levothyroxine 88 MICROGram(s) Oral daily  melatonin 3 milliGRAM(s) Oral at bedtime  montelukast 10 milliGRAM(s) Oral at bedtime  Nephro-leighann 1 Tablet(s) Oral daily  warfarin 5 milliGRAM(s) Oral daily    MEDICATIONS  (PRN):  acetaminophen     Tablet .. 650 milliGRAM(s) Oral every 6 hours PRN Temp greater or equal to 38C (100.4F), Mild Pain (1 - 3)  heparin   Injectable 6500 Unit(s) IV Push every 6 hours PRN For aPTT less than 40  heparin   Injectable 3000 Unit(s) IV Push every 6 hours PRN For aPTT between 40 - 57        T(C): , Max: 37.1 (12-10-22 @ 16:23)  T(F): , Max: 98.8 (12-10-22 @ 16:23)  HR: 94 (12-11-22 @ 08:42)  BP: 119/71 (12-11-22 @ 08:42)  BP(mean): --  RR: 18 (12-11-22 @ 08:42)  SpO2: 95% (12-11-22 @ 08:42)  Wt(kg): --      Weight (kg): 68.1 (12-10 @ 17:15)    PHYSICAL EXAM:    Constitutional: NAD, frail. Ill appearing  HEENT:  MM  dist  Cardiovascular: S1 and S2, RRR  Gastrointestinal: BS+, soft, NT/ND  Extremities:   peripheral edema  Neurological: A/O x 3, no focal deficits  Psychiatric: Normal mood, normal affect  : No Chopra  Skin: No rashes  Access: avf        LABS:                        10.6   6.94  )-----------( 123      ( 11 Dec 2022 08:17 )             32.4     11 Dec 2022 08:17    133    |  98     |  25     ----------------------------<  89     4.5     |  27     |  5.73   10 Dec 2022 07:14    133    |  100    |  8      ----------------------------<  67     4.0     |  24     |  3.29   10 Dec 2022 02:03    134    |  99     |  8      ----------------------------<  84     3.1     |  27     |  3.46     Ca    9.5        11 Dec 2022 08:17  Ca    9.0        10 Dec 2022 07:14  Ca    9.3        10 Dec 2022 02:03  Phos  2.9       10 Dec 2022 07:14  Mg     2.1       10 Dec 2022 07:14    TPro  6.5    /  Alb  2.8    /  TBili  0.7    /  DBili  x      /  AST  36     /  ALT  23     /  AlkPhos  86     10 Dec 2022 07:14  TPro  7.1    /  Alb  3.1    /  TBili  0.7    /  DBili  x      /  AST  31     /  ALT  23     /  AlkPhos  101    10 Dec 2022 02:03          Urine Studies:          RADIOLOGY & ADDITIONAL STUDIES:

## 2022-12-11 NOTE — PROGRESS NOTE ADULT - SUBJECTIVE AND OBJECTIVE BOX
Patient is a 87y old  Male who presents with a chief complaint of Syncope and collapse, elevated troponin (10 Dec 2022 04:02)      HPI:  Pt is an 86 yo male with a pmh/o afib no longer on ASA or AC due to bleeding (AVF bleeding, LE hematoma post cath due to pseudoaneurysm), s/p R CEA in 2012, pulmonary HTN, hypothyroidism, emphysema on home O2 2L, chronic back pain with compression fracture, ESRD on HD, HLD, CAD s/p unsuccessful PCI in 2018, Cardiomyopathy, HTN, Oligouria, who presents from home, BIBA after being found on floor. Pt is a poor historian, collateral obtained by daughter via telephone [phone]. Pt lives with daughter who is currently out of state. Pt was seen on camera monitor lying on floor (daughter set up "nanny cam"). Neighbors, EMT and RN, came to home and called EMS. Pt states he last remembers drinking two shots of whiskey and then awoke on floor with neighbors looking at him.     Pt states he currently "feels fine" and denies every having or currently having chest pain, palpitations, nausea, claudication, diaphoresis, left arm pain, vomiting, incontinence, tongue biting, headache, parasthesias, changes in vision/speech/gait/hearing/MSK, fever, chills, sob, cough, diarrhea/constipation, dysuria.     ED course: Pt arrived to ED AAOx3 with VSS. Noted to have elevated troponin with PVC on monitor and rate controlled atrial fibrillation. CT head and c/spine w/o acute findings. ASA ordered and pt admitted to medicine. On exam pt declined aspirin as states it causes him to bleed. Daughter also endorses that despite atrial fibrillation, pt has been taken off all AC and antiplt due to bleeding episodes requiring blood transfusions. ASA discontinued  Not fully alert, pleasently demented, in no distress    12/11 Examined at bedsite, no complaints  DVT study reveals clot in the soleal vein on the right, asymptomatic  Started on iv Heparine      PAST MEDICAL & SURGICAL HISTORY:  CAD (coronary artery disease)      Cardiomyopathy      HTN (hypertension)      HLD (hyperlipidemia)      ESRD on dialysis      AV fistula          HPI:                PREVIOUS DIAGNOSTIC TESTING:      ECHO  FINDINGS:    STRESS  FINDINGS:    CATHETERIZATION  FINDINGS:    MEDICATIONS  (STANDING):  atorvastatin 20 milliGRAM(s) Oral at bedtime  budesonide 160 MICROgram(s)/formoterol 4.5 MICROgram(s) Inhaler 2 Puff(s) Inhalation two times a day  calcium acetate 667 milliGRAM(s) Oral three times a day with meals  carvedilol 3.125 milliGRAM(s) Oral every 12 hours  levothyroxine 88 MICROGram(s) Oral daily  melatonin 3 milliGRAM(s) Oral at bedtime  montelukast 10 milliGRAM(s) Oral at bedtime  Nephro-leighann 1 Tablet(s) Oral daily    MEDICATIONS  (PRN):  acetaminophen     Tablet .. 650 milliGRAM(s) Oral every 6 hours PRN Temp greater or equal to 38C (100.4F), Mild Pain (1 - 3)      FAMILY HISTORY:  FH: emphysema (Father)        SOCIAL HISTORY:    CIGARETTES:    ALCOHOL:        Vital Signs Last 24 Hrs  T(C): 36.6 (10 Dec 2022 10:40), Max: 37 (10 Dec 2022 00:57)  T(F): 97.9 (10 Dec 2022 10:40), Max: 98.6 (10 Dec 2022 00:57)  HR: 98 (10 Dec 2022 10:40) (86 - 99)  BP: 112/57 (10 Dec 2022 10:40) (93/56 - 113/68)  BP(mean): 83 (10 Dec 2022 06:12) (78 - 83)  RR: 18 (10 Dec 2022 10:40) (16 - 18)  SpO2: 95% (10 Dec 2022 10:40) (86% - 99%)    Parameters below as of 10 Dec 2022 10:40  Patient On (Oxygen Delivery Method): nasal cannula  O2 Flow (L/min): 3      PHYSICAL EXAM-    Constitutional: The patient appears to be normal, well developed, well nourished and alert and oriented to time, place and person. The patient does not appear acutely ill. The patient is alert.     Head: Head is normocephalic and atraumatic.      Neck: The patient's neck is supple without enlargement, has no palpable thyromegaly nor thyroid nodules and has no jugular venous distention. No audible carotid bruits. There are strong carotid pulses bilaterally. No JVD.     Cardiovascular: Irregulary irregular with no murmurs      Respiratory:  The patient has no rales and no rhonchi. The patient has no wheezes.     Abdomen: Soft, nontender, nondistended with positive bowel sounds.      Extremity: No tenderness. There is no pitting edema, skin discoloration, clubbing and cyanosis.     INTERPRETATION OF TELEMETRY:    ECG:    I&O's Detail      LABS:                        10.1   6.54  )-----------( 125      ( 10 Dec 2022 07:14 )             31.3     12-10    133<L>  |  100  |  8   ----------------------------<  67<L>  4.0   |  24  |  3.29<H>    Ca    9.0      10 Dec 2022 07:14  Phos  2.9     12-10  Mg     2.1     12-10    TPro  6.5  /  Alb  2.8<L>  /  TBili  0.7  /  DBili  x   /  AST  36  /  ALT  23  /  AlkPhos  86  12-10        PT/INR - ( 10 Dec 2022 07:14 )   PT: 15.8 sec;   INR: 1.36 ratio         PTT - ( 10 Dec 2022 07:14 )  PTT:35.8 sec    I&O's Summary    BNP  RADIOLOGY & ADDITIONAL STUDIES:

## 2022-12-11 NOTE — PROGRESS NOTE ADULT - ASSESSMENT
Pt is an 86 yo male, who has a pmh/o CAD, atrial fibrillation no longer on ASA or AC due to bleeding, HTN, HLD, who was BIBA after being found on floor at home and who is unable to recall events. Pt admitted due to:    #Syncope and collapse  #demand ischemia  repeat trops and trend to peak. Peaked at 782, today 300s  D/W cardiology, low suspiscion for ACS, no further workup indicated  Echo and f/u for any new wma: EF 55% Severe TR, pHTN  Suspect his collapse was related to ETOG intoxication  fall precautions, aspiration precautions  CT imaging reviewed, no acute findings  CXR reviewed, no consolidation or effusion on prelim read  OOB and ambulate with assistance      #Acute hypoxemic resp failure  - Per dtr, patient was off home o2 and recently needed it over last two weeks. NEeds pulmonary follow up o/p  - he has known severe phtn  - CTA: neg for PE, Short HD today for contrast wash out      #Elevated troponin/demand ischemia  due to increased demand, concerning for arrhythmic event given h/o PAF/AF  cont to trend  EKG with rate controlled afib, PVC      #Chronic afib  - Discussed A/C with dtr, bleeding in the past was mucosal in nature  - He is high CHADS VASC   - New soleal thrombosis  - They both agree with heparin with bridge to coumadin  - He has never has a life threatening bleed per patient or dtr  - Will heparinize and bridge to coumadin    #Protein calorie malnutrition  Nutrition consult    #COPD, oxygen dependent  c/w supplemental oxygen  c/w montelukast    #ESRD  Nephrology consult  c/w calcium acetate  c/w nephrovite  nephrology consult placed    #Anemia of chronic disease  #Thrombocytopenia  monitor h/h on cbc      #HTN/HLD  c/w statin  DASH/TLC diet  c/w carvedilol          Dtr Diane homer vogel [phone]  Full code -   DC tele

## 2022-12-11 NOTE — PROGRESS NOTE ADULT - ASSESSMENT
87 pmh/o afib, s/p R CEA in 2012, pulmonary HTN, hypothyroidism, emphysema on home O2 2L, chronic back pain with compression fracture, ESRD on HD M/W/F, HLD, CAD s/p unsuccessful PCI in 2018, Cardiomyopathy, HTN BIBA after being found on floor. Pt is a poor historian, collateral obtained by chart. + etoh use     ESRD  HD tiw, brief treatment today post contrast study  Resume normal HD tomorrow and maintain M/W/F  K protocol  UF as tolerated with HD    Syncope  Tele, [facility]  monitor bp w oral meds    Anemia  MARIS protocol     Dr Rogers et al to resume care in AM  d/c with RN staff, Dr Schuster

## 2022-12-11 NOTE — PROGRESS NOTE ADULT - SUBJECTIVE AND OBJECTIVE BOX
Patient is a 87y old  Male who presents with a chief complaint of Syncope and collapse, elevated troponin (10 Dec 2022 12:15)      SUBJECTIVE:   HPI:  Pt is an 88 yo male with a pmh/o afib no longer on ASA or AC due to bleeding (AVF bleeding, LE hematoma post cath due to pseudoaneurysm), s/p R CEA in 2012, pulmonary HTN, hypothyroidism, emphysema on home O2 2L, chronic back pain with compression fracture, ESRD on HD, HLD, CAD s/p unsuccessful PCI in 2018, Cardiomyopathy, HTN, Oligouria, who presents from home, BIBA after being found on floor. Pt is a poor historian, collateral obtained by daughter via telephone [phone]. Pt lives with daughter who is currently out of state. Pt was seen on camera monitor lying on floor (daughter set up "nanny cam"). Neighbors, EMT and RN, came to home and called EMS. Pt states he last remembers drinking two shots of whiskey and then awoke on floor with neighbors looking at him.     Pt states he currently "feels fine" and denies every having or currently having chest pain, palpitations, nausea, claudication, diaphoresis, left arm pain, vomiting, incontinence, tongue biting, headache, parasthesias, changes in vision/speech/gait/hearing/MSK, fever, chills, sob, cough, diarrhea/constipation, dysuria.     ED course: Pt arrived to ED AAOx3 with VSS. Noted to have elevated troponin with PVC on monitor and rate controlled atrial fibrillation. CT head and c/spine w/o acute findings. ASA ordered and pt admitted to medicine. On exam pt declined aspirin as states it causes him to bleed. Daughter also endorses that despite atrial fibrillation, pt has been taken off all AC and antiplt due to bleeding episodes requiring blood transfusions. ASA discontinued. Per daughter, cardio- Dr. Pugh, nephrology Dr. Cordon (10 Dec 2022 04:02)          sub: pt awake and alert, no tele events    REVIEW OF SYSTEMS:    CONSTITUTIONAL: No weakness, fevers or chills  EYES/ENT: No visual changes;  No vertigo or throat pain   NECK: No pain or stiffness  RESPIRATORY: No cough, wheezing, hemoptysis; No shortness of breath  CARDIOVASCULAR: No chest pain or palpitations  GASTROINTESTINAL: No abdominal or epigastric pain. No nausea, vomiting, or hematemesis; No diarrhea or constipation. No melena or hematochezia.  GENITOURINARY: No dysuria, frequency or hematuria  NEUROLOGICAL: No numbness or weakness  SKIN: No itching, burning, rashes, or lesions   All other review of systems is negative unless indicated above    ICU Vital Signs Last 24 Hrs  T(C): 36.6 (11 Dec 2022 08:42), Max: 37.1 (10 Dec 2022 16:23)  T(F): 97.9 (11 Dec 2022 08:42), Max: 98.8 (10 Dec 2022 16:23)  HR: 94 (11 Dec 2022 08:42) (84 - 104)  BP: 119/71 (11 Dec 2022 08:42) (102/64 - 119/71)  BP(mean): --  ABP: --  ABP(mean): --  RR: 18 (11 Dec 2022 08:42) (18 - 18)  SpO2: 95% (11 Dec 2022 08:42) (94% - 98%)    O2 Parameters below as of 11 Dec 2022 08:42  Patient On (Oxygen Delivery Method): nasal cannula  O2 Flow (L/min): 2        Patient On (Oxygen Delivery Method): nasal cannula  O2 Flow (L/min): 3      I&O's Summary      CAPILLARY BLOOD GLUCOSE          PHYSICAL EXAM:    Constitutional: NAD, awake and alert,   HEENT: PERR, EOMI, Normal Hearing, MMM  Neck: Soft and supple, No LAD, No JVD  Respiratory: Breath sounds are clear bilaterally, No wheezing, rales or rhonchi  Cardiovascular: S1 and S2, regular rate and rhythm, no Murmurs, gallops or rubs  Gastrointestinal: Bowel Sounds present, soft, nontender, nondistended, no guarding, no rebound  Extremities: No peripheral edema  Vascular: 2+ peripheral pulses  Neurological: A/O x 3, no focal deficits  Musculoskeletal: 5/5 strength b/l upper and lower extremities  Skin: No rashes    MEDICATIONS:  MEDICATIONS  (STANDING):  atorvastatin 20 milliGRAM(s) Oral at bedtime  budesonide 160 MICROgram(s)/formoterol 4.5 MICROgram(s) Inhaler 2 Puff(s) Inhalation two times a day  calcium acetate 667 milliGRAM(s) Oral three times a day with meals  carvedilol 3.125 milliGRAM(s) Oral every 12 hours  influenza  Vaccine (HIGH DOSE) 0.7 milliLiter(s) IntraMuscular once  levothyroxine 88 MICROGram(s) Oral daily  melatonin 3 milliGRAM(s) Oral at bedtime  montelukast 10 milliGRAM(s) Oral at bedtime  Nephro-leighann 1 Tablet(s) Oral daily      LABS: All Labs Reviewed:                        10.1   6.54  )-----------( 125      ( 10 Dec 2022 07:14 )             31.3     12-10    133<L>  |  100  |  8   ----------------------------<  67<L>  4.0   |  24  |  3.29<H>    Ca    9.0      10 Dec 2022 07:14  Phos  2.9     12-10  Mg     2.1     12-10    TPro  6.5  /  Alb  2.8<L>  /  TBili  0.7  /  DBili  x   /  AST  36  /  ALT  23  /  AlkPhos  86  12-10    PT/INR - ( 10 Dec 2022 07:14 )   PT: 15.8 sec;   INR: 1.36 ratio         PTT - ( 10 Dec 2022 07:14 )  PTT:35.8 sec          Blood Culture:     RADIOLOGY/EKG: reviewed

## 2022-12-11 NOTE — PROGRESS NOTE ADULT - ASSESSMENT
Pt is an 86 yo male with a pmh/o afib no longer on ASA or AC due to bleeding (AVF bleeding, LE hematoma post cath due to pseudoaneurysm), s/p R CEA in 2012, pulmonary HTN, hypothyroidism, emphysema on home O2 2L, chronic back pain with compression fracture, ESRD on HD, HLD, CAD s/p unsuccessful PCI in 2018, Cardiomyopathy, HTN, Oligouria, who presents from home, BIBA after being found on floor.  Unclear if syncope  Dehydrated by physical exam  Increase fluid intake   Minimal Troponins  Tele AS fib with rate controlled  Beneficial anticoagulation for a fib  long term may need to consider Watchman

## 2022-12-12 NOTE — PROGRESS NOTE ADULT - ASSESSMENT
A/P: 88 yo male, who has a pmh/o CAD, atrial fibrillation no longer on ASA or AC due to bleeding, HTN, HLD, who was BIBA after being found on floor at home and who is unable to recall events.    1. Syncope. Likely vasovagal event vs ETOH use. Rate controlled afib on tele.   +Troponins- doubt ACS. No CP. n setting of   Cont fall precautions, aspiration precautions  Check orthostatics. Cont HD as per renal.     2. +Troponins. Likely demand ischemia.   2Decho with low normal LVEF- unchanged when compared to prior LVEF.  Cont medical mgmt.     3. Chronic afib. Elevated CHADsVasc score with DVT.  Now on IV heparin/coumadin.   Cont rate control strategy with Bbl.     4. HTN.  BP well controlled at present. Cont current meds.    5. Dyslipidemia. Cont statin.     6. Rt soleal DVT. A/C as above.

## 2022-12-12 NOTE — PROGRESS NOTE ADULT - SUBJECTIVE AND OBJECTIVE BOX
Patient is a 87y old  Male who presents with a chief complaint of Syncope and collapse, elevated troponin (10 Dec 2022 12:15)      SUBJECTIVE:   HPI:  Pt is an 86 yo male with a pmh/o afib no longer on ASA or AC due to bleeding (AVF bleeding, LE hematoma post cath due to pseudoaneurysm), s/p R CEA in 2012, pulmonary HTN, hypothyroidism, emphysema on home O2 2L, chronic back pain with compression fracture, ESRD on HD, HLD, CAD s/p unsuccessful PCI in 2018, Cardiomyopathy, HTN, Oligouria, who presents from home, BIBA after being found on floor. Pt is a poor historian, collateral obtained by daughter via telephone [phone]. Pt lives with daughter who is currently out of state. Pt was seen on camera monitor lying on floor (daughter set up "nanny cam"). Neighbors, EMT and RN, came to home and called EMS. Pt states he last remembers drinking two shots of whiskey and then awoke on floor with neighbors looking at him.     Pt states he currently "feels fine" and denies every having or currently having chest pain, palpitations, nausea, claudication, diaphoresis, left arm pain, vomiting, incontinence, tongue biting, headache, parasthesias, changes in vision/speech/gait/hearing/MSK, fever, chills, sob, cough, diarrhea/constipation, dysuria.     ED course: Pt arrived to ED AAOx3 with VSS. Noted to have elevated troponin with PVC on monitor and rate controlled atrial fibrillation. CT head and c/spine w/o acute findings. ASA ordered and pt admitted to medicine. On exam pt declined aspirin as states it causes him to bleed. Daughter also endorses that despite atrial fibrillation, pt has been taken off all AC and antiplt due to bleeding episodes requiring blood transfusions. ASA discontinued. Per daughter, cardio- Dr. Pugh, nephrology Dr. Cordon (10 Dec 2022 04:02)          sub: no events    REVIEW OF SYSTEMS:    CONSTITUTIONAL: No weakness, fevers or chills  EYES/ENT: No visual changes;  No vertigo or throat pain   NECK: No pain or stiffness  RESPIRATORY: No cough, wheezing, hemoptysis; No shortness of breath  CARDIOVASCULAR: No chest pain or palpitations  GASTROINTESTINAL: No abdominal or epigastric pain. No nausea, vomiting, or hematemesis; No diarrhea or constipation. No melena or hematochezia.  GENITOURINARY: No dysuria, frequency or hematuria  NEUROLOGICAL: No numbness or weakness  SKIN: No itching, burning, rashes, or lesions   All other review of systems is negative unless indicated above    ICU Vital Signs Last 24 Hrs  T(C): 36.6 (12 Dec 2022 12:00), Max: 36.7 (11 Dec 2022 21:00)  T(F): 97.8 (12 Dec 2022 12:00), Max: 98.1 (12 Dec 2022 06:17)  HR: 84 (12 Dec 2022 12:15) (81 - 99)  BP: 157/70 (12 Dec 2022 12:15) (93/63 - 157/70)  BP(mean): --  ABP: --  ABP(mean): --  RR: 19 (12 Dec 2022 12:15) (18 - 20)  SpO2: 99% (12 Dec 2022 08:15) (94% - 99%)    O2 Parameters below as of 12 Dec 2022 12:00  Patient On (Oxygen Delivery Method): nasal cannula  O2 Flow (L/min): 2              Patient On (Oxygen Delivery Method): nasal cannula  O2 Flow (L/min): 3      I&O's Summary      CAPILLARY BLOOD GLUCOSE          PHYSICAL EXAM:    Constitutional: NAD, awake and alert,   HEENT: PERR, EOMI, Normal Hearing, MMM  Neck: Soft and supple, No LAD, No JVD  Respiratory: Breath sounds are clear bilaterally, No wheezing, rales or rhonchi  Cardiovascular: S1 and S2, regular rate and rhythm, no Murmurs, gallops or rubs  Gastrointestinal: Bowel Sounds present, soft, nontender, nondistended, no guarding, no rebound  Extremities: No peripheral edema  Vascular: 2+ peripheral pulses  Neurological: A/O x 3, no focal deficits  Musculoskeletal: 5/5 strength b/l upper and lower extremities  Skin: No rashes    MEDICATIONS:  MEDICATIONS  (STANDING):  atorvastatin 20 milliGRAM(s) Oral at bedtime  budesonide 160 MICROgram(s)/formoterol 4.5 MICROgram(s) Inhaler 2 Puff(s) Inhalation two times a day  calcium acetate 667 milliGRAM(s) Oral three times a day with meals  carvedilol 3.125 milliGRAM(s) Oral every 12 hours  influenza  Vaccine (HIGH DOSE) 0.7 milliLiter(s) IntraMuscular once  levothyroxine 88 MICROGram(s) Oral daily  melatonin 3 milliGRAM(s) Oral at bedtime  montelukast 10 milliGRAM(s) Oral at bedtime  Nephro-leighann 1 Tablet(s) Oral daily      LABS: All Labs Reviewed:                        10.1   6.54  )-----------( 125      ( 10 Dec 2022 07:14 )             31.3     12-10    133<L>  |  100  |  8   ----------------------------<  67<L>  4.0   |  24  |  3.29<H>    Ca    9.0      10 Dec 2022 07:14  Phos  2.9     12-10  Mg     2.1     12-10    TPro  6.5  /  Alb  2.8<L>  /  TBili  0.7  /  DBili  x   /  AST  36  /  ALT  23  /  AlkPhos  86  12-10    PT/INR - ( 10 Dec 2022 07:14 )   PT: 15.8 sec;   INR: 1.36 ratio         PTT - ( 10 Dec 2022 07:14 )  PTT:35.8 sec          Blood Culture:     RADIOLOGY/EKG: reviewed

## 2022-12-12 NOTE — DIETITIAN NUTRITION RISK NOTIFICATION - ADDITIONAL COMMENTS/DIETITIAN RECOMMENDATIONS
1) Liberalize diet to only renal restricted to maximize caloric and nutrient intake.  2) Add nepro TID   3) Monitor bowel movements, if no BM for >3 days, consider implementing bowel regimen.  4) C/w neprovite w/ minerals daily to ensure 100% RDA met  5) Continue to take calcium acetate w/ meals for phosphorus absorption   6) Encourage protein-rich foods, maximize food preferences  7) Consider adding thiamine 100 mg daily 2/2 poor PO intake/ malnutrition  8) Confirm goals of care regarding nutrition support  RD will continue to monitor PO intake, labs, hydration, and wt prn.

## 2022-12-12 NOTE — DIETITIAN INITIAL EVALUATION ADULT - PERTINENT MEDS FT
MEDICATIONS  (STANDING):  atorvastatin 20 milliGRAM(s) Oral at bedtime  budesonide 160 MICROgram(s)/formoterol 4.5 MICROgram(s) Inhaler 2 Puff(s) Inhalation two times a day  calcium acetate 667 milliGRAM(s) Oral three times a day with meals  carvedilol 3.125 milliGRAM(s) Oral every 12 hours  epoetin brianna-epbx (RETACRIT) Injectable 5000 Unit(s) IV Push <User Schedule>  heparin  Infusion. 1300 Unit(s)/Hr (13 mL/Hr) IV Continuous <Continuous>  influenza  Vaccine (HIGH DOSE) 0.7 milliLiter(s) IntraMuscular once  levothyroxine 88 MICROGram(s) Oral daily  melatonin 3 milliGRAM(s) Oral at bedtime  montelukast 10 milliGRAM(s) Oral at bedtime  Nephro-leighann 1 Tablet(s) Oral daily  warfarin 5 milliGRAM(s) Oral daily    MEDICATIONS  (PRN):  acetaminophen     Tablet .. 650 milliGRAM(s) Oral every 6 hours PRN Temp greater or equal to 38C (100.4F), Mild Pain (1 - 3)  heparin   Injectable 6500 Unit(s) IV Push every 6 hours PRN For aPTT less than 40  heparin   Injectable 3000 Unit(s) IV Push every 6 hours PRN For aPTT between 40 - 57  lidocaine/prilocaine Cream 1 Application(s) Topical <User Schedule> PRN numbing of AV fistula site pre-cannulation

## 2022-12-12 NOTE — DIETITIAN INITIAL EVALUATION ADULT - ORAL INTAKE PTA/DIET HISTORY
Pt lives at home w/ daughter & son-in-law; daughter will sometimes grocery shop/cook, but pt reports that he typically "fends for himself." Reports that he is "not a big eater", small appetite, likely consuming <75% of ENN. Receives hemodialysis on MWF, is edentulous & wears dentures.

## 2022-12-12 NOTE — PROGRESS NOTE ADULT - SUBJECTIVE AND OBJECTIVE BOX
Cardiology Consultation    HPI: Pt is an 86 yo male with a pmh/o afib no longer on ASA or AC due to bleeding (AVF bleeding, LE hematoma post cath due to pseudoaneurysm), s/p R CEA in 2012, pulmonary HTN, hypothyroidism, emphysema on home O2 2L, chronic back pain with compression fracture, ESRD on HD, HLD, CAD s/p unsuccessful PCI in 2018, Cardiomyopathy, HTN, Oligouria, who presents from home, BIBA after being found on floor. Pt is a poor historian, collateral obtained by daughter via telephone [phone]. Pt lives with daughter who is currently out of state. Pt was seen on camera monitor lying on floor (daughter set up "nanny cam"). Neighbors, EMT and RN, came to home and called EMS. Pt states he last remembers drinking two shots of whiskey and then awoke on floor with neighbors looking at him.   ED course: Pt arrived to ED AAOx3 with VSS. Noted to have elevated troponin with PVC on monitor and rate controlled atrial fibrillation. CT head and c/spine w/o acute findings. ASA ordered and pt admitted to medicine. On exam pt declined aspirin as states it causes him to bleed. Daughter also endorses that despite atrial fibrillation, pt has been taken off all AC and antiplt due to bleeding episodes requiring blood transfusions. ASA discontinued. Per daughter, cardio- Dr. Pugh, nephrology Dr. Cordon (10 Dec 2022 04:02)        PAST MEDICAL & SURGICAL HISTORY:  CAD (coronary artery disease)  Cardiomyopathy  HTN (hypertension)  HLD (hyperlipidemia)  ESRD on dialysis  AV fistula    Allergies  No Known Allergies    SOCIAL HISTORY: Denies tobacco, etoh abuse or illicit drug use    FAMILY HISTORY:  FH: emphysema (Father)    MEDICATIONS  (STANDING):  atorvastatin 20 milliGRAM(s) Oral at bedtime  budesonide 160 MICROgram(s)/formoterol 4.5 MICROgram(s) Inhaler 2 Puff(s) Inhalation two times a day  calcium acetate 667 milliGRAM(s) Oral three times a day with meals  carvedilol 3.125 milliGRAM(s) Oral every 12 hours  heparin  Infusion. 1300 Unit(s)/Hr (13 mL/Hr) IV Continuous <Continuous>  influenza  Vaccine (HIGH DOSE) 0.7 milliLiter(s) IntraMuscular once  levothyroxine 88 MICROGram(s) Oral daily  melatonin 3 milliGRAM(s) Oral at bedtime  montelukast 10 milliGRAM(s) Oral at bedtime  Nephro-leighann 1 Tablet(s) Oral daily  warfarin 5 milliGRAM(s) Oral daily    MEDICATIONS  (PRN):  acetaminophen     Tablet .. 650 milliGRAM(s) Oral every 6 hours PRN Temp greater or equal to 38C (100.4F), Mild Pain (1 - 3)  heparin   Injectable 6500 Unit(s) IV Push every 6 hours PRN For aPTT less than 40  heparin   Injectable 3000 Unit(s) IV Push every 6 hours PRN For aPTT between 40 - 57  lidocaine/prilocaine Cream 1 Application(s) Topical <User Schedule> PRN numbing of AV fistula site pre-cannulation      Vital Signs Last 24 Hrs  T(C): 36.7 (12 Dec 2022 06:17), Max: 36.7 (11 Dec 2022 21:00)  T(F): 98.1 (12 Dec 2022 06:17), Max: 98.1 (12 Dec 2022 06:17)  HR: 90 (12 Dec 2022 06:17) (81 - 99)  BP: 127/59 (12 Dec 2022 06:17) (102/59 - 127/59)  BP(mean): --  RR: 18 (12 Dec 2022 06:17) (18 - 18)  SpO2: 94% (12 Dec 2022 06:17) (94% - 98%)    Parameters below as of 12 Dec 2022 06:17  Patient On (Oxygen Delivery Method): nasal cannula  O2 Flow (L/min): 2      REVIEW OF SYSTEMS:    CONSTITUTIONAL:  As per HPI. +Syncope  HEENT:  Eyes:  No diplopia or blurred vision. ENT:  No earache, sore throat or runny nose.  CARDIOVASCULAR:  No pressure, squeezing, strangling, tightness, heaviness or aching about the chest, neck, axilla or epigastrium.  RESPIRATORY:  No cough, shortness of breath, PND or orthopnea.  GASTROINTESTINAL:  No nausea, vomiting or diarrhea.  GENITOURINARY:  No dysuria, frequency or urgency.  MUSCULOSKELETAL:  As per HPI.  SKIN:  No change in skin, hair or nails.  NEUROLOGIC:  No paresthesias, fasciculations, seizures or weakness.    PHYSICAL EXAMINATION:    GENERAL APPEARANCE:  Pt. is not currently dyspneic, in no distress. Pt. is alert, oriented, and pleasant.  HEENT:  Pupils are normal and react normally. No icterus. Mucous membranes well colored.  NECK:  Supple. No lymphadenopathy. Jugular venous pressure not elevated. Carotids equal.   HEART:   The cardiac impulse has a normal quality. There are no murmurs, rubs or gallops noted  CHEST:  Chest is clear to auscultation. Normal respiratory effort.  ABDOMEN:  Soft and nontender.   EXTREMITIES:  There is no edema.   I&O's Summary    11 Dec 2022 07:01  -  12 Dec 2022 07:00  --------------------------------------------------------  IN: 0 mL / OUT: 1000 mL / NET: -1000 mL    LABS:                        10.6   6.94  )-----------( 123      ( 11 Dec 2022 08:17 )             32.4     12-11    133<L>  |  98  |  25<H>  ----------------------------<  89  4.5   |  27  |  5.73<H>    Ca    9.5      11 Dec 2022 08:17    PT/INR - ( 11 Dec 2022 08:17 )   PT: 16.3 sec;   INR: 1.40 ratio       PTT - ( 12 Dec 2022 02:02 )  PTT:65.3 sec    EKG: < from: 12 Lead ECG (12.10.22 @ 03:13) >   Atrial fibrillation with premature ventricular or aberrantly conducted complexes  Cannot rule out Anterior infarct , age undetermined  Abnormal ECG    TELEMETRY: afib    CARDIAC TESTS: < from: TTE Echo Complete w/o Contrast w/ Doppler (12.10.22 @ 14:53) >   Mild apical hypokinesis with preserved left ventricle function. Estimated   left ventricular ejection fraction is 50-55 %.   The left atrium is moderately dilated.  The right atrium appears moderately dilated.   Normal appearing right ventricle structure and function.   The aortic valve is well visualized, appears mildly calcified. Valve   opening seems to be normal.   Mild aortic regurgitation is present.   Mild to moderate mitral regurgitation is present.   Severe tricuspid valve regurgitation is present.   Moderate pulmonary hypertension.   IVC is dilated and not collapsing with inspiration.   Aortic root is within the upper limits of normal (4.0 cm).    < end of copied text >  < from: CT Angio Chest PE Protocol w/ IV Cont (12.10.22 @ 16:58) >  IMPRESSION:    No pulmonary embolism.    Mild pulmonary edema and small pleural effusions.    < end of copied text >  < from: US Duplex Venous Lower Ext Complete, Bilateral (12.10.22 @ 14:50) >  IMPRESSION:  DVT is noted in theright soleal vein.    No evidence of deep venous thrombosis in the left lower extremity.      < end of copied text >  < from: CT Head No Cont (12.10.22 @ 01:04) >  IMPRESSION:  CT HEAD: There is no acute intracranial hemorrhage or depressed calvarial   fracture. Chronic findings as above.    CT CERVICAL SPINE: No fracture or acute traumatic malalignment.   Degenerative changes as described.    ASSESSMENT & PLAN:

## 2022-12-12 NOTE — PROGRESS NOTE ADULT - ASSESSMENT
Pt is an 88 yo male, who has a pmh/o CAD, atrial fibrillation no longer on ASA or AC due to bleeding, HTN, HLD, who was BIBA after being found on floor at home and who is unable to recall events.   esrd on hd mwf  demand ischemia   soleal dvt on heparin/coumadin    Rec:  - esrd tolerating hd   - anemia gali with hd   - syncope likely etoh related     echo woth severe tr,  pul htn ef 55%  - soleal dvt heparin/ coumadin   - afib on no ac in past due to bleeding,

## 2022-12-12 NOTE — PROGRESS NOTE ADULT - ASSESSMENT
Pt is an 86 yo male, who has a pmh/o CAD, atrial fibrillation no longer on ASA or AC due to bleeding, HTN, HLD, who was BIBA after being found on floor at home and who is unable to recall events. Pt admitted due to:    #Syncope and collapse  #demand ischemia  repeat trops and trend to peak. Peaked at 782, today 300s  D/W cardiology, low suspiscion for ACS, no further workup indicated  Echo and f/u for any new wma: EF 55% Severe TR, pHTN  Suspect his collapse was related to ETOH intoxication  fall precautions, aspiration precautions  CT imaging reviewed, no acute findings  CXR reviewed, no consolidation or effusion  OOB and ambulate with assistance      #Chronic hypoxemic resp failure  - Needs pulmonary follow up o/p  - known severe phtn  - CTA: neg for PE  - per ftr, pt has concentrator/home o2      #Elevated troponin/demand ischemia  due to increased demand, concerning for arrhythmic event given h/o PAF/AF  cont to trend  EKG with rate controlled afib, PVC      #Chronic afib  - Discussed A/C with dtr, bleeding in the past was mucosal in nature  - He is high CHADS VASC   - New soleal thrombosis  - They both agree with heparin with bridge to coumadin  - He has never has a life threatening bleed per patient or dtr  - Will heparinize and bridge to coumadin. INR 1.5 today. PT failed use of doacs in the past. Cant use lmwh    #Protein calorie malnutrition  Nutrition consult    #COPD, oxygen dependent  c/w supplemental oxygen  c/w montelukast    #ESRD  Nephrology consult  c/w calcium acetate  c/w nephrovite  nephrology consult placed    #Anemia of chronic disease  #Thrombocytopenia  monitor h/h on cbc      #HTN/HLD  c/w statin  DASH/TLC diet  c/w carvedilol      Dtr Diane homer vogel [phone]  Full code -   DC tele

## 2022-12-12 NOTE — DIETITIAN INITIAL EVALUATION ADULT - PERTINENT LABORATORY DATA
12-12    135  |  98  |  22  ----------------------------<  142<H>  3.5   |  28  |  4.99<H>    Ca    9.2      12 Dec 2022 08:40    A1C with Estimated Average Glucose Result: 5.7 % (02-07-22 @ 07:35)

## 2022-12-12 NOTE — DIETITIAN INITIAL EVALUATION ADULT - ADD RECOMMEND
Add ensure + HP shake TID 2/2 renal labs WNL & stable      1) Liberalize diet to only renal restricted to maximize caloric and nutrient intake.  2) Add ensure + HP shake TID 2/2 renal labs WNL & stable   3) Monitor bowel movements, if no BM for >3 days, consider implementing bowel regimen.  4) C/w neprovite w/ minerals daily to ensure 100% RDA met  5) Continue to take calcium acetate w/ meals for phosphorus absorption   6) Encourage protein-rich foods, maximize food preferences  7) Consider adding thiamine 100 mg daily 2/2 poor PO intake/ malnutrition  8) Confirm goals of care regarding nutrition support  RD will continue to monitor PO intake, labs, hydration, and wt prn.       1) Liberalize diet to only renal restricted to maximize caloric and nutrient intake.  2) Add nepro TID  3) Monitor bowel movements, if no BM for >3 days, consider implementing bowel regimen.  4) C/w neprovite w/ minerals daily to ensure 100% RDA met  5) Continue to take calcium acetate w/ meals for phosphorus absorption   6) Encourage protein-rich foods, maximize food preferences  7) Consider adding thiamine 100 mg daily 2/2 poor PO intake/ malnutrition  8) Confirm goals of care regarding nutrition support  RD will continue to monitor PO intake, labs, hydration, and wt prn.

## 2022-12-12 NOTE — PROGRESS NOTE ADULT - SUBJECTIVE AND OBJECTIVE BOX
NEPHROLOGY INTERVAL HPI/OVERNIGHT EVENTS:  12-12-22 @ 11:55    12/12 seen at [facility]d earlier today.  cards input noted   HPI:  Pt is an 88 yo male with a pmh/o afib no longer on ASA or AC due to bleeding (AVF bleeding, LE hematoma post cath due to pseudoaneurysm), s/p R CEA in 2012, pulmonary HTN, hypothyroidism, emphysema on home O2 2L, chronic back pain with compression fracture, ESRD on HD, HLD, CAD s/p unsuccessful PCI in 2018, Cardiomyopathy, HTN, Oligouria, who presents from home, BIBA after being found on floor. Pt is a poor historian, collateral obtained by daughter via telephone [phone]. Pt lives with daughter who is currently out of state. Pt was seen on camera monitor lying on floor (daughter set up "nanny cam"). Neighbors, EMT and RN, came to home and called EMS. Pt states he last remembers drinking two shots of whiskey and then awoke on floor with neighbors looking at him.     Pt states he currently "feels fine" and denies every having or currently having chest pain, palpitations, nausea, claudication, diaphoresis, left arm pain, vomiting, incontinence, tongue biting, headache, parasthesias, changes in vision/speech/gait/hearing/MSK, fever, chills, sob, cough, diarrhea/constipation, dysuria.     ED course: Pt arrived to ED AAOx3 with VSS. Noted to have elevated troponin with PVC on monitor and rate controlled atrial fibrillation. CT head and c/spine w/o acute findings. ASA ordered and pt admitted to medicine. On exam pt declined aspirin as states it causes him to bleed. Daughter also endorses that despite atrial fibrillation, pt has been taken off all AC and antiplt due to bleeding episodes requiring blood transfusions. ASA discontinued. Per daughter, cardio- Dr. Pugh, nephrology Dr. Cordon (10 Dec 2022 04:02)      Patient is a 87y old  Male who presents with a chief complaint of Syncope and collapse, elevated troponin (12 Dec 2022 08:00)  ---------------------      MEDICATIONS  (STANDING):  atorvastatin 20 milliGRAM(s) Oral at bedtime  budesonide 160 MICROgram(s)/formoterol 4.5 MICROgram(s) Inhaler 2 Puff(s) Inhalation two times a day  calcium acetate 667 milliGRAM(s) Oral three times a day with meals  carvedilol 3.125 milliGRAM(s) Oral every 12 hours  epoetin brianna-epbx (RETACRIT) Injectable 5000 Unit(s) IV Push <User Schedule>  heparin  Infusion. 1300 Unit(s)/Hr (13 mL/Hr) IV Continuous <Continuous>  influenza  Vaccine (HIGH DOSE) 0.7 milliLiter(s) IntraMuscular once  levothyroxine 88 MICROGram(s) Oral daily  melatonin 3 milliGRAM(s) Oral at bedtime  montelukast 10 milliGRAM(s) Oral at bedtime  Nephro-leighann 1 Tablet(s) Oral daily  warfarin 5 milliGRAM(s) Oral daily    MEDICATIONS  (PRN):  acetaminophen     Tablet .. 650 milliGRAM(s) Oral every 6 hours PRN Temp greater or equal to 38C (100.4F), Mild Pain (1 - 3)  heparin   Injectable 6500 Unit(s) IV Push every 6 hours PRN For aPTT less than 40  heparin   Injectable 3000 Unit(s) IV Push every 6 hours PRN For aPTT between 40 - 57  lidocaine/prilocaine Cream 1 Application(s) Topical <User Schedule> PRN numbing of AV fistula site pre-cannulation      Allergies    No Known Allergies    Intolerances        I&O's Detail    11 Dec 2022 07:01  -  12 Dec 2022 07:00  --------------------------------------------------------  IN:  Total IN: 0 mL    OUT:    Other (mL): 1000 mL  Total OUT: 1000 mL    Total NET: -1000 mL          .    Patient was seen and evaluated on dialysis.   Patient is tolerating the procedure well.   Continue dialysis:   Dialyzer:  revaclear 300 k protocol uf goal f 1 kg     Vital Signs Last 24 Hrs  T(C): 36.1 (12 Dec 2022 08:15), Max: 36.7 (11 Dec 2022 21:00)  T(F): 97 (12 Dec 2022 08:15), Max: 98.1 (12 Dec 2022 06:17)  HR: 86 (12 Dec 2022 11:15) (81 - 99)  BP: 147/67 (12 Dec 2022 11:15) (93/63 - 147/67)  BP(mean): --  RR: 19 (12 Dec 2022 11:15) (18 - 20)  SpO2: 99% (12 Dec 2022 08:15) (94% - 99%)    Parameters below as of 12 Dec 2022 08:15  Patient On (Oxygen Delivery Method): nasal cannula  O2 Flow (L/min): 2    Daily     Daily     PHYSICAL EXAM:  General: alert. awake NAD  HEENT: MMM  CV: s1s2 rrr  LUNGS: B/L CTA  EXT: no edema    LABS:                        9.5    6.87  )-----------( 122      ( 12 Dec 2022 08:40 )             29.3     12-12    135  |  98  |  22  ----------------------------<  142<H>  3.5   |  28  |  4.99<H>    Ca    9.2      12 Dec 2022 08:40      PT/INR - ( 12 Dec 2022 08:40 )   PT: 17.6 sec;   INR: 1.51 ratio         PTT - ( 12 Dec 2022 08:40 )  PTT:56.4 sec

## 2022-12-12 NOTE — DIETITIAN INITIAL EVALUATION ADULT - OTHER INFO
Pt is an 88 yo male with a pmh/o afib no longer on ASA or AC due to bleeding (AVF bleeding, LE hematoma post cath due to pseudoaneurysm), s/p R CEA in 2012, pulmonary HTN, hypothyroidism, emphysema on home O2 2L, chronic back pain with compression fracture, ESRD on HD, HLD, CAD s/p unsuccessful PCI in 2018, Cardiomyopathy, HTN, Oligouria, who presents from home, BIBA after being found on floor. Pt is a poor historian, collateral obtained by daughter via telephone [phone]. Pt lives with daughter who is currently out of state. Pt was seen on camera monitor lying on floor (daughter set up "nanny cam"). Neighbors, EMT and RN, came to home and called EMS. Pt states he last remembers drinking two shots of whiskey and then awoke on floor with neighbors looking at him.   Admit for syncope and collapse, COPD - oxygen dependent, EDRD    Known to nutr services & dx'd w/ sev mal on multiple admissions. Seen by cardiology & nephrology - is stable at this time; found to be dehydrated. Pt reports decreased appetite & consuming 25-50% of trays since admit (x2 days). Reports UBW of 150# x 10 mo; Bed scale wt of 145# taken by RD on 12/12/22.  Wt hx: 155# (taken by RD on 02/16/22). Unintentional wt loss of 10#/ 6.5% wt loss x 10 mo ; not clinically significant. NFPE reveals moderate-severe muscle/ fat wasting - pt appears thin and frail. Liberalize diet to regular to maximize caloric and nutrient intake. Add ensure + HP shake TID 2/2 renal labs are stable, WNL - pt is receptive. See below for other recommendations.         Pt is an 86 yo male with a pmh/o afib no longer on ASA or AC due to bleeding (AVF bleeding, LE hematoma post cath due to pseudoaneurysm), s/p R CEA in 2012, pulmonary HTN, hypothyroidism, emphysema on home O2 2L, chronic back pain with compression fracture, ESRD on HD, HLD, CAD s/p unsuccessful PCI in 2018, Cardiomyopathy, HTN, Oligouria, who presents from home, BIBA after being found on floor. Pt is a poor historian, collateral obtained by daughter via telephone [phone]. Pt lives with daughter who is currently out of state. Pt was seen on camera monitor lying on floor (daughter set up "nanny cam"). Neighbors, EMT and RN, came to home and called EMS. Pt states he last remembers drinking two shots of whiskey and then awoke on floor with neighbors looking at him.   Admit for syncope and collapse, COPD - oxygen dependent, EDRD    Known to nutr services & dx'd w/ sev mal on multiple admissions. Seen by cardiology & nephrology - is stable at this time; found to be dehydrated. Pt reports decreased appetite & consuming 25-50% of trays since admit (x2 days). Reports UBW of 150# x 10 mo; Bed scale wt of 145# taken by RD on 12/12/22.  Wt hx: 155# (taken by RD on 02/16/22). Unintentional wt loss of 10#/ 6.5% wt loss x 10 mo ; not clinically significant. NFPE reveals moderate-severe muscle/ fat wasting - pt appears thin and frail. Liberalize diet to only renal restricted to maximize caloric and nutrient intake. Add ensure + HP shake TID 2/2 renal labs are stable, WNL - pt is receptive. See below for other recommendations.         Pt is an 88 yo male with a pmh/o afib no longer on ASA or AC due to bleeding (AVF bleeding, LE hematoma post cath due to pseudoaneurysm), s/p R CEA in 2012, pulmonary HTN, hypothyroidism, emphysema on home O2 2L, chronic back pain with compression fracture, ESRD on HD, HLD, CAD s/p unsuccessful PCI in 2018, Cardiomyopathy, HTN, Oligouria, who presents from home, BIBA after being found on floor. Pt is a poor historian, collateral obtained by daughter via telephone [phone]. Pt lives with daughter who is currently out of state. Pt was seen on camera monitor lying on floor (daughter set up "nanny cam"). Neighbors, EMT and RN, came to home and called EMS. Pt states he last remembers drinking two shots of whiskey and then awoke on floor with neighbors looking at him.   Admit for syncope and collapse, COPD - oxygen dependent, EDRD    Known to nutr services & dx'd w/ sev mal on multiple admissions. Seen by cardiology & nephrology - is stable at this time; found to be dehydrated. Pt reports decreased appetite & consuming 25-50% of trays since admit (x2 days). Reports UBW of 150# x 10 mo; Bed scale wt of 145# taken by RD on 12/12/22.  Wt hx: 155# (taken by RD on 02/16/22). Unintentional wt loss of 10#/ 6.5% wt loss x 10 mo ; not clinically significant. NFPE reveals moderate-severe muscle/ fat wasting - pt appears thin and frail. Liberalize diet to only renal restricted to maximize caloric and nutrient intake. Add nepro shake TID - pt is receptive. See below for other recommendations.

## 2022-12-12 NOTE — DIETITIAN INITIAL EVALUATION ADULT - NSFNSGIIOFT_GEN_A_CORE
I&O's Detail    11 Dec 2022 07:01  -  12 Dec 2022 07:00  --------------------------------------------------------  IN:  Total IN: 0 mL    OUT:    Other (mL): 1000 mL  Total OUT: 1000 mL    Total NET: -1000 mL      12 Dec 2022 07:01  -  12 Dec 2022 15:23  --------------------------------------------------------  IN:  Total IN: 0 mL    OUT:    Other (mL): 1000 mL  Total OUT: 1000 mL    Total NET: -1000 mL

## 2022-12-12 NOTE — DIETITIAN NUTRITION RISK NOTIFICATION - TREATMENT: THE FOLLOWING DIET HAS BEEN RECOMMENDED
Diet, Regular:   DASH/TLC {Sodium & Cholesterol Restricted} (DASH)  For patients receiving Renal Replacement - No Protein Restr, No Conc K, No Conc Phos, Low Sodium (RENAL) (12-10-22 @ 03:56) [Active]

## 2022-12-13 NOTE — PHYSICAL THERAPY INITIAL EVALUATION ADULT - PERTINENT HX OF CURRENT PROBLEM, REHAB EVAL
Pt admitted to  secondary to syncope and collapse. Pt remembers taking 2 shots of whiskey and then waking up on the floor with his neighbors looking at him. CT head and c-spine: neg for acute changes. Elevated troponins see results. Bilateral LE dopplers: neg.

## 2022-12-13 NOTE — PHYSICAL THERAPY INITIAL EVALUATION ADULT - PRECAUTIONS/LIMITATIONS, REHAB EVAL
Tele/cardiac precautions/oxygen therapy device and L/min Tele, O2 NC 3 L/min/cardiac precautions/fall precautions/oxygen therapy device and L/min

## 2022-12-13 NOTE — PROGRESS NOTE ADULT - SUBJECTIVE AND OBJECTIVE BOX
Patient is a 87y old  Male who presents with a chief complaint of Syncope and collapse, elevated troponin (10 Dec 2022 12:15)      SUBJECTIVE:   HPI:  Pt is an 88 yo male with a pmh/o afib no longer on ASA or AC due to bleeding (AVF bleeding, LE hematoma post cath due to pseudoaneurysm), s/p R CEA in 2012, pulmonary HTN, hypothyroidism, emphysema on home O2 2L, chronic back pain with compression fracture, ESRD on HD, HLD, CAD s/p unsuccessful PCI in 2018, Cardiomyopathy, HTN, Oligouria, who presents from home, BIBA after being found on floor. Pt is a poor historian, collateral obtained by daughter via telephone [phone]. Pt lives with daughter who is currently out of state. Pt was seen on camera monitor lying on floor (daughter set up "nanny cam"). Neighbors, EMT and RN, came to home and called EMS. Pt states he last remembers drinking two shots of whiskey and then awoke on floor with neighbors looking at him.     Pt states he currently "feels fine" and denies every having or currently having chest pain, palpitations, nausea, claudication, diaphoresis, left arm pain, vomiting, incontinence, tongue biting, headache, parasthesias, changes in vision/speech/gait/hearing/MSK, fever, chills, sob, cough, diarrhea/constipation, dysuria.     ED course: Pt arrived to ED AAOx3 with VSS. Noted to have elevated troponin with PVC on monitor and rate controlled atrial fibrillation. CT head and c/spine w/o acute findings. ASA ordered and pt admitted to medicine. On exam pt declined aspirin as states it causes him to bleed. Daughter also endorses that despite atrial fibrillation, pt has been taken off all AC and antiplt due to bleeding episodes requiring blood transfusions. ASA discontinued. Per daughter, cardio- Dr. Pugh, nephrology Dr. Cordon (10 Dec 2022 04:02)      12/13: laying in bed, feeling well. had episode of svt this morning, pt states he did not feel unwell during such.       sub: no events    REVIEW OF SYSTEMS:    CONSTITUTIONAL: No weakness, fevers or chills  EYES/ENT: No visual changes;  No vertigo or throat pain   NECK: No pain or stiffness  RESPIRATORY: No cough, wheezing, hemoptysis; No shortness of breath  CARDIOVASCULAR: No chest pain or palpitations  GASTROINTESTINAL: No abdominal or epigastric pain. No nausea, vomiting, or hematemesis; No diarrhea or constipation. No melena or hematochezia.  GENITOURINARY: No dysuria, frequency or hematuria  NEUROLOGICAL: No numbness or weakness  SKIN: No itching, burning, rashes, or lesions   All other review of systems is negative unless indicated above      Vital Signs Last 24 Hrs  T(C): 36.7 (13 Dec 2022 08:22), Max: 36.9 (12 Dec 2022 21:30)  T(F): 98.1 (13 Dec 2022 08:22), Max: 98.4 (12 Dec 2022 21:30)  HR: 80 (13 Dec 2022 11:11) (80 - 101)  BP: 102/56 (13 Dec 2022 11:11) (102/56 - 123/51)  BP(mean): --  RR: 18 (13 Dec 2022 08:22) (18 - 18)  SpO2: 96% (13 Dec 2022 08:22) (95% - 97%)    Parameters below as of 12 Dec 2022 21:30  Patient On (Oxygen Delivery Method): nasal cannula  O2 Flow (L/min): 2        PHYSICAL EXAM:    Constitutional: NAD, awake and alert,   HEENT: PERR, EOMI, Normal Hearing, MMM  Neck: Soft and supple, No LAD, No JVD  Respiratory: Breath sounds are clear bilaterally, No wheezing, rales or rhonchi  Cardiovascular: S1 and S2, regular rate and rhythm, no Murmurs, gallops or rubs  Gastrointestinal: Bowel Sounds present, soft, nontender, nondistended, no guarding, no rebound  Extremities: No peripheral edema  Vascular: 2+ peripheral pulses  Neurological: A/O x 3, no focal deficits  Musculoskeletal: 5/5 strength b/l upper and lower extremities  Skin: No rashes      MEDICATIONS:  MEDICATIONS  (STANDING):  atorvastatin 20 milliGRAM(s) Oral at bedtime  budesonide 160 MICROgram(s)/formoterol 4.5 MICROgram(s) Inhaler 2 Puff(s) Inhalation two times a day  calcium acetate 667 milliGRAM(s) Oral three times a day with meals  carvedilol 3.125 milliGRAM(s) Oral every 12 hours  influenza  Vaccine (HIGH DOSE) 0.7 milliLiter(s) IntraMuscular once  levothyroxine 88 MICROGram(s) Oral daily  melatonin 3 milliGRAM(s) Oral at bedtime  montelukast 10 milliGRAM(s) Oral at bedtime  Nephro-leighann 1 Tablet(s) Oral daily      LABS: All Labs Reviewed:                          10.1   6.44  )-----------( 114      ( 13 Dec 2022 08:07 )             30.4     12-13    133<L>  |  97  |  22  ----------------------------<  90  3.6   |  31  |  3.94<H>    Ca    9.1      13 Dec 2022 08:07      PT/INR - ( 13 Dec 2022 08:07 )   PT: 22.0 sec;   INR: 1.88 ratio  PTT - ( 13 Dec 2022 08:07 )  PTT:72.1 sec        24 hour ekg   afib rate 80s with 5 beat run of svt

## 2022-12-13 NOTE — PROGRESS NOTE ADULT - ASSESSMENT
A/P: 88 yo male, who has a pmh/o CAD, atrial fibrillation no longer on ASA or AC due to bleeding, HTN, HLD, who was BIBA after being found on floor at home and who is unable to recall events.    1. Syncope. Likely vasovagal event in setting of HD followed by ETOH use. Rate controlled afib on tele.   +Troponins- doubt ACS. No CP. Cont medical mgmt.   Cont fall precautions, aspiration precautions  Check orthostatics. Cont HD as per renal.     2. +Troponins. Likely demand ischemia.   2Decho with low normal LVEF- unchanged when compared to prior LVEF.  Cont medical mgmt.     3. Chronic afib. Elevated CHADsVasc score with DVT. No significant pauses on tele.   Now on IV heparin/coumadin.   Cont rate control strategy with Bbl. May need to increase Bbl to TID if HR elevated.    4. HTN.  BP well controlled at present. Cont current meds.    5. Dyslipidemia. Cont statin.     6. Rt soleal DVT. A/C as above.      7. CRI. Renal following. Cont HD.

## 2022-12-13 NOTE — PHYSICAL THERAPY INITIAL EVALUATION ADULT - GAIT TRAINING, PT EVAL
By D/C; Pt will amb with RW/' with supervision. By D/C: Pt will ascend/descend 5 steps with HR/SAC, step to gait pattern, and supervision.

## 2022-12-13 NOTE — PROGRESS NOTE ADULT - SUBJECTIVE AND OBJECTIVE BOX
Cardiology Progress Note    HPI: Pt is an 88 yo male with a pmh/o afib no longer on ASA or AC due to bleeding (AVF bleeding, LE hematoma post cath due to pseudoaneurysm), s/p R CEA in 2012, pulmonary HTN, hypothyroidism, emphysema on home O2 2L, chronic back pain with compression fracture, ESRD on HD, HLD, CAD s/p unsuccessful PCI in 2018, Cardiomyopathy, HTN, Oligouria, who presents from home, BIBA after being found on floor. Pt is a poor historian, collateral obtained by daughter via telephone [phone]. Pt lives with daughter who is currently out of state. Pt was seen on camera monitor lying on floor (daughter set up "nanny cam"). Neighbors, EMT and RN, came to home and called EMS. Pt states he last remembers drinking two shots of whiskey and then awoke on floor with neighbors looking at him.   ED course: Pt arrived to ED AAOx3 with VSS. Noted to have elevated troponin with PVC on monitor and rate controlled atrial fibrillation. CT head and c/spine w/o acute findings. ASA ordered and pt admitted to medicine. On exam pt declined aspirin as states it causes him to bleed. Daughter also endorses that despite atrial fibrillation, pt has been taken off all AC and antiplt due to bleeding episodes requiring blood transfusions. ASA discontinued. Per daughter, cardio- Dr. Pugh, nephrology Dr. Cordon (10 Dec 2022 04:02)    12/13. S/p HD yesterday. No CP/SOB. Tele- afib 80-110s.   No events last pm.     PAST MEDICAL & SURGICAL HISTORY:  CAD (coronary artery disease)  Cardiomyopathy  HTN (hypertension)  HLD (hyperlipidemia)  ESRD on dialysis  AV fistula    Allergies  No Known Allergies    SOCIAL HISTORY: Denies tobacco, etoh abuse or illicit drug use    FAMILY HISTORY:  FH: emphysema (Father)    MEDICATIONS  (STANDING):  atorvastatin 20 milliGRAM(s) Oral at bedtime  budesonide 160 MICROgram(s)/formoterol 4.5 MICROgram(s) Inhaler 2 Puff(s) Inhalation two times a day  calcium acetate 667 milliGRAM(s) Oral three times a day with meals  carvedilol 3.125 milliGRAM(s) Oral every 12 hours  heparin  Infusion. 1300 Unit(s)/Hr (13 mL/Hr) IV Continuous <Continuous>  influenza  Vaccine (HIGH DOSE) 0.7 milliLiter(s) IntraMuscular once  levothyroxine 88 MICROGram(s) Oral daily  melatonin 3 milliGRAM(s) Oral at bedtime  montelukast 10 milliGRAM(s) Oral at bedtime  Nephro-leighann 1 Tablet(s) Oral daily  warfarin 5 milliGRAM(s) Oral daily    MEDICATIONS  (PRN):  acetaminophen     Tablet .. 650 milliGRAM(s) Oral every 6 hours PRN Temp greater or equal to 38C (100.4F), Mild Pain (1 - 3)  heparin   Injectable 6500 Unit(s) IV Push every 6 hours PRN For aPTT less than 40  heparin   Injectable 3000 Unit(s) IV Push every 6 hours PRN For aPTT between 40 - 57  lidocaine/prilocaine Cream 1 Application(s) Topical <User Schedule> PRN numbing of AV fistula site pre-cannulation      Vital Signs Last 24 Hrs  T(C): 36.7 (12 Dec 2022 06:17), Max: 36.7 (11 Dec 2022 21:00)  T(F): 98.1 (12 Dec 2022 06:17), Max: 98.1 (12 Dec 2022 06:17)  HR: 90 (12 Dec 2022 06:17) (81 - 99)  BP: 127/59 (12 Dec 2022 06:17) (102/59 - 127/59)  BP(mean): --  RR: 18 (12 Dec 2022 06:17) (18 - 18)  SpO2: 94% (12 Dec 2022 06:17) (94% - 98%)    Parameters below as of 12 Dec 2022 06:17  Patient On (Oxygen Delivery Method): nasal cannula  O2 Flow (L/min): 2    REVIEW OF SYSTEMS:  CONSTITUTIONAL:  As per HPI. +Syncope  HEENT:  Eyes:  No diplopia or blurred vision. ENT:  No earache, sore throat or runny nose.  CARDIOVASCULAR:  No pressure, squeezing, strangling, tightness, heaviness or aching about the chest, neck, axilla or epigastrium.  RESPIRATORY:  No cough, shortness of breath, PND or orthopnea.  GASTROINTESTINAL:  No nausea, vomiting or diarrhea.  GENITOURINARY:  No dysuria, frequency or urgency.  MUSCULOSKELETAL:  As per HPI.    PHYSICAL EXAMINATION:    GENERAL APPEARANCE:  Pt. is not currently dyspneic, in no distress. Pt. is alert, oriented, and pleasant.  HEENT:  Pupils are normal and react normally. No icterus. Mucous membranes well colored.  NECK:  Supple. No lymphadenopathy. Jugular venous pressure not elevated. Carotids equal.   HEART:   The cardiac impulse has a normal quality. There are no murmurs, rubs or gallops noted  CHEST:  Chest is clear to auscultation. Normal respiratory effort.  ABDOMEN:  Soft and nontender.   EXTREMITIES:  There is no edema.   I&O's Summary    11 Dec 2022 07:01  -  12 Dec 2022 07:00  --------------------------------------------------------  IN: 0 mL / OUT: 1000 mL / NET: -1000 mL    LABS:                        10.6   6.94  )-----------( 123      ( 11 Dec 2022 08:17 )             32.4     12-11    133<L>  |  98  |  25<H>  ----------------------------<  89  4.5   |  27  |  5.73<H>    Ca    9.5      11 Dec 2022 08:17    PT/INR - ( 11 Dec 2022 08:17 )   PT: 16.3 sec;   INR: 1.40 ratio       PTT - ( 12 Dec 2022 02:02 )  PTT:65.3 sec    EKG: < from: 12 Lead ECG (12.10.22 @ 03:13) >   Atrial fibrillation with premature ventricular or aberrantly conducted complexes  Cannot rule out Anterior infarct , age undetermined  Abnormal ECG    TELEMETRY: afib    CARDIAC TESTS: < from: TTE Echo Complete w/o Contrast w/ Doppler (12.10.22 @ 14:53) >   Mild apical hypokinesis with preserved left ventricle function. Estimated   left ventricular ejection fraction is 50-55 %.   The left atrium is moderately dilated.  The right atrium appears moderately dilated.   Normal appearing right ventricle structure and function.   The aortic valve is well visualized, appears mildly calcified. Valve   opening seems to be normal.   Mild aortic regurgitation is present.   Mild to moderate mitral regurgitation is present.   Severe tricuspid valve regurgitation is present.   Moderate pulmonary hypertension.   IVC is dilated and not collapsing with inspiration.   Aortic root is within the upper limits of normal (4.0 cm).    < end of copied text >  < from: CT Angio Chest PE Protocol w/ IV Cont (12.10.22 @ 16:58) >  IMPRESSION:    No pulmonary embolism.    Mild pulmonary edema and small pleural effusions.    < end of copied text >  < from: US Duplex Venous Lower Ext Complete, Bilateral (12.10.22 @ 14:50) >  IMPRESSION:  DVT is noted in theright soleal vein.    No evidence of deep venous thrombosis in the left lower extremity.      < end of copied text >  < from: CT Head No Cont (12.10.22 @ 01:04) >  IMPRESSION:  CT HEAD: There is no acute intracranial hemorrhage or depressed calvarial   fracture. Chronic findings as above.    CT CERVICAL SPINE: No fracture or acute traumatic malalignment.   Degenerative changes as described.    ASSESSMENT & PLAN:

## 2022-12-13 NOTE — PHYSICAL THERAPY INITIAL EVALUATION ADULT - ADDITIONAL COMMENTS
Patient owns both a SAC and RW at home. Patient was ambulating with no AD Independently at home PTA. Info obtained from eval 9/2022.

## 2022-12-13 NOTE — PROGRESS NOTE ADULT - ASSESSMENT
Pt is an 86 yo male, who has a pmh/o CAD, atrial fibrillation no longer on ASA or AC due to bleeding, HTN, HLD, who was BIBA after being found on floor at home and who is unable to recall events. Pt admitted due to:    #Syncope and collapse  #demand ischemia  #episode of SVT   repeat trops and trend to peak. Peaked at 782, today 300s  D/W cardiology  Echo and f/u for any new wma: EF 55% Severe TR, pHTN  Suspect his collapse was related to ETOH intoxication vs self limiting episode of svt   cont tele monitoring   keep K+ >4, check mag in AM   fall precautions, aspiration precautions  CT imaging reviewed, no acute findings  CXR reviewed, no consolidation or effusion  OOB and ambulate with assistance  PT eval   cardio eval   orthostatic BP negative.       #Chronic hypoxemic resp failure  - Needs pulmonary follow up o/p  - known severe phtn  - CTA: neg for PE  - per ftr, pt has concentrator/home o2      #Elevated troponin/demand ischemia  due to increased demand, concerning for arrhythmic event given h/o PAF/AF  cont to trend  EKG with rate controlled afib, PVC      #Chronic afib possible SVT for 5 beats    - Discussed A/C with dtr, bleeding in the past was mucosal in nature  - He is high CHADS VASC   - New soleal thrombosis  - They both agree with heparin with bridge to coumadin  - He has never has a life threatening bleed per patient or dtr  - Will heparinize and bridge to coumadin. INR 1.5 today. PT failed use of doacs in the past. Cant use lmwh  - maintain carvedilol  - maintain potassium >4 and check AM mag      #Protein calorie malnutrition  Nutrition consult    #COPD, oxygen dependent  c/w supplemental oxygen  c/w montelukast    #ESRD  Nephrology consult  c/w calcium acetate  c/w nephrovite  nephrology consult placed    #Anemia of chronic disease  #Thrombocytopenia  monitor h/h on cbc      #HTN/HLD  c/w statin  DASH/TLC diet  c/w carvedilol      Dtr Diane vogel [phone]  Full code -   DC tele

## 2022-12-13 NOTE — PHYSICAL THERAPY INITIAL EVALUATION ADULT - GENERAL OBSERVATIONS, REHAB EVAL
Pt found supine in bed with O2 NC 3 L/min, tele monitor, IV, and bed alarm activated. Noted bruising forehead.

## 2022-12-13 NOTE — PHYSICAL THERAPY INITIAL EVALUATION ADULT - PLANNED THERAPY INTERVENTIONS, PT EVAL
Eval, amb, transfers, bed mobility. Pt left OOB in chair with all obsrvation section equipment/material intact and pad alarm intact/activated. Pt with no c/o pain. Will cont to follow pt and increase as tolerated./bed mobility training/gait training/ROM/strengthening/transfer training

## 2022-12-14 NOTE — PROGRESS NOTE ADULT - ASSESSMENT
A/P: 88 yo male, who has a pmh/o CAD, atrial fibrillation no longer on ASA or AC due to bleeding, HTN, HLD, who was BIBA after being found on floor at home and who is unable to recall events.    1. Syncope. Likely vasovagal event in setting of HD followed by ETOH use. Rate controlled afib on tele.   +Troponins- doubt ACS. No CP. Cont medical mgmt.   Cont fall precautions, aspiration precautions  Check orthostatics. Cont HD as per renal.     2. +Troponins. Likely demand ischemia.   2Decho with low normal LVEF- unchanged when compared to prior LVEF.  Cont medical mgmt.     3. Chronic afib. Elevated CHADsVasc score and now with DVT. Pt had minor bleeding in past that did not require transfusion.  No significant pauses on tele. Now on IV heparin/coumadin.   Cont rate control strategy with Bbl. Afib is reasonably rate controlled at present. If HR increases, can change to lopressor 25mg BID.    4. HTN. BP well controlled at present. Cont current meds.    5. Dyslipidemia. Cont statin.     6. Rt soleal DVT. A/C as above.      7. CRI. Renal following. Cont HD.    8. D/C planning. Will sign off for now. Pt to f/u with Dr. Pugh upon d/c. Please call with any questions.

## 2022-12-14 NOTE — PROGRESS NOTE ADULT - SUBJECTIVE AND OBJECTIVE BOX
Pt is an 88 yo male with a pmh/o afib no longer on ASA or AC due to bleeding (AVF bleeding, LE hematoma post cath due to pseudoaneurysm), s/p R CEA in 2012, pulmonary HTN, hypothyroidism, emphysema on home O2 2L, chronic back pain with compression fracture, ESRD on HD, HLD, CAD s/p unsuccessful PCI in 2018, Cardiomyopathy, HTN, Oligouria, who presents from home, BIBA after being found on floor. Pt is a poor historian, collateral obtained by daughter via telephone [phone]. Pt lives with daughter who is currently out of state. Pt was seen on camera monitor lying on floor (daughter set up "nanny cam"). Neighbors, EMT and RN, came to home and called EMS. Pt states he last remembers drinking two shots of whiskey and then awoke on floor with neighbors looking at him.     Pt states he currently "feels fine" and denies every having or currently having chest pain, palpitations, nausea, claudication, diaphoresis, left arm pain, vomiting, incontinence, tongue biting, headache, parasthesias, changes in vision/speech/gait/hearing/MSK, fever, chills, sob, cough, diarrhea/constipation, dysuria.     ED course: Pt arrived to ED AAOx3 with VSS. Noted to have elevated troponin with PVC on monitor and rate controlled atrial fibrillation. CT head and c/spine w/o acute findings. ASA ordered and pt admitted to medicine. On exam pt declined aspirin as states it causes him to bleed. Daughter also endorses that despite atrial fibrillation, pt has been taken off all AC and antiplt due to bleeding episodes requiring blood transfusions. ASA discontinued. Per daughter, cardio- Dr. Pugh, nephrology Dr. Cordon (10 Dec 2022 04:02)      Medical progress: Denies any HA, CP, SOB. no fevers, chills or shakes. Labs and vitals reviewed.   Complaints: no new complaints  State of mind: normal    REVIEW OF SYSTEMS:  CONSTITUTIONAL: No weakness, fevers or chills  EYES/ENT: No visual changes;  No vertigo or throat pain   NECK: No pain or stiffness  RESPIRATORY: No cough, wheezing, hemoptysis; No shortness of breath  CARDIOVASCULAR: No chest pain or palpitations  GASTROINTESTINAL: No abdominal or epigastric pain. No nausea, vomiting, or hematemesis; No diarrhea or constipation. No melena or hematochezia.  GENITOURINARY: No dysuria, frequency or hematuria  NEUROLOGICAL: No numbness or weakness  SKIN: No itching, burning, rashes, or lesions   All other review of systems is negative unless indicated above      PHYSICAL EXAM:  ICU Vital Signs Last 24 Hrs  T(C): 36.1 (14 Dec 2022 14:05), Max: 36.7 (13 Dec 2022 17:17)  T(F): 97 (14 Dec 2022 14:05), Max: 98 (13 Dec 2022 17:17)  HR: 85 (14 Dec 2022 15:11) (79 - 90)  BP: 119/65 (14 Dec 2022 15:11) (97/57 - 119/65)  RR: 18 (14 Dec 2022 14:50) (18 - 19)  SpO2: 91% (14 Dec 2022 08:21) (91% - 96%)  Constitutional: NAD, awake and alert,   HEENT: PERR, EOMI, Normal Hearing, MMM  Neck: Soft and supple, No LAD, No JVD  Respiratory: Breath sounds are clear bilaterally, No wheezing, rales or rhonchi  Cardiovascular: S1 and S2, regular rate and rhythm, no Murmurs, gallops or rubs    LABS:    CBC Full  -  ( 14 Dec 2022 08:59 )  WBC Count : 6.77 K/uL  RBC Count : 2.95 M/uL  Hemoglobin : 10.4 g/dL  Hematocrit : 30.9 %  Platelet Count - Automated : 119 K/uL  Mean Cell Volume : 104.7 fl  Mean Cell Hemoglobin : 35.3 pg  Mean Cell Hemoglobin Concentration : 33.7 gm/dL  Auto Neutrophil # : x  Auto Lymphocyte # : x  Auto Monocyte # : x  Auto Eosinophil # : x  Auto Basophil # : x  Auto Neutrophil % : x  Auto Lymphocyte % : x  Auto Monocyte % : x  Auto Eosinophil % : x  Auto Basophil % : x    PT/INR - ( 14 Dec 2022 08:59 )   PT: 24.7 sec;   INR: 2.11 ratio         PTT - ( 14 Dec 2022 08:59 )  PTT:81.0 sec    12-14    132<L>  |  95<L>  |  39<H>  ----------------------------<  140<H>  4.1   |  30  |  5.47<H>    Ca    9.6      14 Dec 2022 09:57  Phos  2.8     12-14    TPro  x   /  Alb  2.7<L>  /  TBili  x   /  DBili  x   /  AST  x   /  ALT  x   /  AlkPhos  x   12-14  Gastrointestinal: Bowel Sounds present, soft, nontender, nondistended, no guarding, no rebound  Extremities: No peripheral edema  Vascular: 2+ peripheral pulses  Neurological: A/O x 3, no focal deficits  Musculoskeletal: 5/5 strength b/l upper and lower extremities  Skin: No rashes         Pt is an 88 yo male, who has a pmh/o CAD, atrial fibrillation no longer on ASA or AC due to bleeding, HTN, HLD, who was BIBA after being found on floor at home and who is unable to recall events. Pt admitted due to:    # Syncope secondary vasovagal event in setting of HD followed by ETOH use   - chest pain free  - elevated troponin in the setting of HD  - Check orthostatics    # Chronic afib ( good rate control /  poor rhythm control)  - Elevated CHADsVasc score   - Pt had minor bleeding in past that did not require transfusion.  - No significant pauses on tele. Now on IV heparin/coumadin with goal INR 2-3    # Chronic hypoxemic resp failure -  multifactorail in the setting of severe pulmonary HTN / COPD  - Needs pulmonary follow up o/p  - CTA: neg for PE  - per ftr, pt has concentrator/home o2    # Elevated troponin/demand ischemia  - due to increased demand, concerning for arrhythmic event given h/o PAF/AF  - cont to trend  - EKG with rate controlled afib, PVC    # DVT is noted in the right soleal vein  - Coumadin  / heparin bridge  - goal INR 2-3    # Chronic afib possible SVT for 5 beats    - Discussed A/C with dtr, bleeding in the past was mucosal in nature  - He is high CHADS VASC   - New soleal thrombosis  - They both agree with heparin with bridge to coumadin  - He has never has a life threatening bleed per patient or dtr  - Will heparinize and bridge to coumadin. INR 1.5 today. PT failed use of doacs in the past. Cant use lmwh  - maintain carvedilol  - maintain potassium >4 and check AM mag      #COPD, oxygen dependent  - c/w supplemental oxygen  - c/w montelukast    #ESRD  - Nephrology consult  - c/w calcium acetate  - c/w nephrovite  - nephrology consult placed    #Anemia of chronic disease  #Thrombocytopenia  monitor h/h on cbc    #Protein calorie malnutrition  - Nutrition consult    #HTN/HLD  c/w statin  DASH/TLC diet  c/w carvedilol                                            Pt is an 88 yo male with a pmh/o afib (no longer on ASA or AC due to bleeding  - AVF bleeding, LE hematoma post cath due to pseudoaneurysm), s/p R CEA in 2012, pulmonary HTN, hypothyroidism, emphysema on home O2 2L, chronic back pain with compression fracture, ESRD on HD, HLD, CAD s/p unsuccessful PCI in 2018, Cardiomyopathy, HTN, Oligouria, who presents from home, BIBA after being found on floor. Pt is a poor historian, collateral obtained by daughter via telephone [phone]. Pt lives with daughter who is currently out of state. Pt was seen on camera monitor lying on floor (daughter set up "nanny cam"). Neighbors, EMT and RN, came to home and called EMS. Pt states he last remembers drinking two shots of whiskey and then awoke on floor with neighbors looking at him.     Pt states he currently "feels fine" and denies every having or currently having chest pain, palpitations, nausea, claudication, diaphoresis, left arm pain, vomiting, incontinence, tongue biting, headache, parasthesias, changes in vision/speech/gait/hearing/MSK, fever, chills, sob, cough, diarrhea/constipation, dysuria.     ED course: Pt arrived to ED AAOx3 with VSS. Noted to have elevated troponin with PVC on monitor and rate controlled atrial fibrillation. CT head and c/spine w/o acute findings. ASA ordered and pt admitted to medicine. On exam pt declined aspirin as states it causes him to bleed. Daughter also endorses that despite atrial fibrillation, pt has been taken off all AC and antiplt due to bleeding episodes requiring blood transfusions. ASA discontinued. Per daughter, cardio- Dr. Pugh, nephrology Dr. Cordon (10 Dec 2022 04:02)      Medical progress: Denies any HA, CP, SOB. no fevers, chills or shakes. Patient overall very weak, frail, deconditioned, poor functional state.  Labs and vitals reviewed. Currently getting HD.   Complaints: no new complaints  State of mind: normal    Anticipated d/c in the am after 2nd check of therapeutic INR    REVIEW OF SYSTEMS:  CONSTITUTIONAL: + weakness  EYES/ENT: no visual changes  NECK: No pain or stiffness  RESPIRATORY: no wheezing  CARDIOVASCULAR: No chest pain   GASTROINTESTINAL: No abdominal or epigastric pain. No nausea, vomiting, or hematemesis; No diarrhea or constipation. No melena or hematochezia.  GENITOURINARY: No dysuria, frequency or hematuria  NEUROLOGICAL: No numbness or weakness  SKIN: No itching, burning, rashes, or lesions   All other review of systems is negative unless indicated above      PHYSICAL EXAM:  T(C): 36.1 (14 Dec 2022 14:05), Max: 36.7 (13 Dec 2022 17:17)  T(F): 97 (14 Dec 2022 14:05), Max: 98 (13 Dec 2022 17:17)  HR: 85 (14 Dec 2022 15:11) (79 - 90)  BP: 119/65 (14 Dec 2022 15:11) (97/57 - 119/65)  RR: 18 (14 Dec 2022 14:50) (18 - 19)  SpO2: 91% (14 Dec 2022 08:21) (91% - 96%)  Constitutional: SICK, frail, elderly, chronically decodnitioned.   HEENT: temporal muscle wasting  Neck: muscle atrophy  Respiratory: good ins/  exp  Cardiovascular: irregularly irregular  Extemites: chronic skin tissues changes  Gastrointestinal: Bowel Sounds present, soft, nontender, nondistended, no guarding, no rebound  Extremities: No peripheral edema  Vascular: 2+ peripheral pulses  Neurological: A/O x 3, no focal deficits  Musculoskeletal: 5/5 strength b/l upper and lower extremities  Skin: No rashes    LABS:    CBC Full  -  ( 14 Dec 2022 08:59 )  WBC Count : 6.77 K/uL  RBC Count : 2.95 M/uL  Hemoglobin : 10.4 g/dL  Hematocrit : 30.9 %  Platelet Count - Automated : 119 K/uL    PT/INR - ( 14 Dec 2022 08:59 )   PT: 24.7 sec;   INR: 2.11 ratio       PTT - ( 14 Dec 2022 08:59 )  PTT:81.0 sec    132<L>  |  95<L>  |  39<H>  ----------------------------<  140<H>  4.1   |  30  |  5.47<H>    Ca    9.6      14 Dec 2022 09:57  Phos  2.8     12-14    TPro  x   /  Alb  2.7<L>  /  TBili  x   /  DBili  x   /  AST  x   /  ALT  x   /  AlkPhos  x   12-14       Pt is an 88 yo male, who has a pmh/o CAD, atrial fibrillation no longer on ASA or AC due to bleeding, HTN, HLD, who was BIBA after being found on floor at home and who is unable to recall events. Pt admitted due to:    # Syncope secondary vasovagal event in setting of HD followed by ETOH use   - chest pain free  - elevated troponin in the setting of HD  - check orthostatics  - PT in the am  - Cardiology signed off    # Chronic afib ( good rate control /  poor rhythm control)  - Elevated CHADsVasc score   - No significant pauses on tele  - As patient with new DVT and atrial fibrillation -  medical team decided for anticoagulation.    - Now on IV heparin/coumadin with goal INR 2-3  - CHECK INR in the AM    # Chronic hypoxemic respiratory failure -  multifactorail in the setting of severe pulmonary HTN / COPD  - Needs pulmonary follow up o/p  - CTA: neg for PE  - per ftr, pt has concentrator/home o2    # Elevated troponin/demand ischemia  - due to increased demand, concerning for arrhythmic event given h/o PAF/AF  - cont to trend  - EKG with rate controlled afib, PVC    # DVT is noted in the right soleal vein  - Coumadin  / heparin bridge  - goal INR 2-3  - previous team discussed A/C with dtr, bleeding in the past was mucosal in nature, They both agree with heparin with bridge to coumadin  - He has never has a life threatening bleed per patient or dtr    #COPD, oxygen dependent  - c/w supplemental oxygen  - c/w montelukast    #ESRD  - Nephrology consult  - c/w calcium acetate  - c/w nephrovite  - nephrology consult placed    #Anemia of chronic disease  #Thrombocytopenia  monitor h/h on cbc    #Protein calorie malnutrition  - Nutrition consult    #HTN/HLD  c/w statin  DASH/TLC diet  c/w carvedilol

## 2022-12-14 NOTE — PROGRESS NOTE ADULT - SUBJECTIVE AND OBJECTIVE BOX
NEPHROLOGY INTERVAL HPI/OVERNIGHT EVENTS:  12-14-22 @ 16:24    12/14 seen at hd, for dc to [facility]  12/12 seen at hd earlier today.  cards input noted   HPI:  Pt is an 88 yo male with a pmh/o afib no longer on ASA or AC due to bleeding (AVF bleeding, LE hematoma post cath due to pseudoaneurysm), s/p R CEA in 2012, pulmonary HTN, hypothyroidism, emphysema on home O2 2L, chronic back pain with compression fracture, ESRD on HD, HLD, CAD s/p unsuccessful PCI in 2018, Cardiomyopathy, HTN, Oligouria, who presents from home, BIBA after being found on floor. Pt is a poor historian, collateral obtained by daughter via telephone [phone]. Pt lives with daughter who is currently out of state. Pt was seen on camera monitor lying on floor (daughter set up "nanny cam"). Neighbors, EMT and RN, came to home and called EMS. Pt states he last remembers drinking two shots of whiskey and then awoke on floor with neighbors looking at him.     Pt states he currently "feels fine" and denies every having or currently having chest pain, palpitations, nausea, claudication, diaphoresis, left arm pain, vomiting, incontinence, tongue biting, headache, parasthesias, changes in vision/speech/gait/hearing/MSK, fever, chills, sob, cough, diarrhea/constipation, dysuria.     ED course: Pt arrived to ED AAOx3 with VSS. Noted to have elevated troponin with PVC on monitor and rate controlled atrial fibrillation. CT head and c/spine w/o acute findings. ASA ordered and pt admitted to medicine. On exam pt declined aspirin as states it causes him to bleed. Daughter also endorses that despite atrial fibrillation, pt has been taken off all AC and antiplt due to bleeding episodes requiring blood transfusions. ASA discontinued. Per daughter, cardio- Dr. Pugh, nephrology Dr. Cordon (10 Dec 2022 04:02)      Patient is a 87y old  Male who presents with a chief complaint of Syncope and collapse, elevated troponin (12 Dec 2022 08:00)      MEDICATIONS  (STANDING):  atorvastatin 20 milliGRAM(s) Oral at bedtime  budesonide 160 MICROgram(s)/formoterol 4.5 MICROgram(s) Inhaler 2 Puff(s) Inhalation two times a day  calcium acetate 667 milliGRAM(s) Oral three times a day with meals  carvedilol 3.125 milliGRAM(s) Oral every 12 hours  epoetin brianna-epbx (RETACRIT) Injectable 5000 Unit(s) IV Push <User Schedule>  heparin  Infusion. 1300 Unit(s)/Hr (13 mL/Hr) IV Continuous <Continuous>  influenza  Vaccine (HIGH DOSE) 0.7 milliLiter(s) IntraMuscular once  levothyroxine 88 MICROGram(s) Oral daily  melatonin 3 milliGRAM(s) Oral at bedtime  montelukast 10 milliGRAM(s) Oral at bedtime  Nephro-leighann 1 Tablet(s) Oral daily    MEDICATIONS  (PRN):  acetaminophen     Tablet .. 650 milliGRAM(s) Oral every 6 hours PRN Temp greater or equal to 38C (100.4F), Mild Pain (1 - 3)  heparin   Injectable 6500 Unit(s) IV Push every 6 hours PRN For aPTT less than 40  heparin   Injectable 3000 Unit(s) IV Push every 6 hours PRN For aPTT between 40 - 57  lidocaine/prilocaine Cream 1 Application(s) Topical <User Schedule> PRN numbing of AV fistula site pre-cannulation      Allergies    No Known Allergies    Intolerances        I&O's Detail      .    Patient was seen and evaluated on dialysis.   Patient is tolerating the procedure well.   Continue dialysis:   Dialyzer: revaclear 300 k protocol uf goal of 1.5kg       Vital Signs Last 24 Hrs  T(C): 36.1 (14 Dec 2022 14:05), Max: 36.7 (13 Dec 2022 17:17)  T(F): 97 (14 Dec 2022 14:05), Max: 98 (13 Dec 2022 17:17)  HR: 86 (14 Dec 2022 16:12) (79 - 91)  BP: 116/69 (14 Dec 2022 16:12) (97/57 - 119/65)  BP(mean): --  RR: 18 (14 Dec 2022 16:12) (18 - 19)  SpO2: 91% (14 Dec 2022 08:21) (91% - 96%)    Parameters below as of 14 Dec 2022 14:50  Patient On (Oxygen Delivery Method): nasal cannula  O2 Flow (L/min): 3    Daily     Daily     PHYSICAL EXAM:  General: alert. awake NAD  HEENT: MMM  CV: s1s2 rrr  LUNGS: B/L CTA  EXT: no edema    LABS:                        10.4   6.77  )-----------( 119      ( 14 Dec 2022 08:59 )             30.9     12-14    132<L>  |  95<L>  |  39<H>  ----------------------------<  140<H>  4.1   |  30  |  5.47<H>    Ca    9.6      14 Dec 2022 09:57  Phos  2.8     12-14    TPro  x   /  Alb  2.7<L>  /  TBili  x   /  DBili  x   /  AST  x   /  ALT  x   /  AlkPhos  x   12-14    PT/INR - ( 14 Dec 2022 08:59 )   PT: 24.7 sec;   INR: 2.11 ratio         PTT - ( 14 Dec 2022 08:59 )  PTT:81.0 sec    Phosphorus Level, Serum: 2.8 mg/dL (12-14 @ 09:57)

## 2022-12-14 NOTE — PROGRESS NOTE ADULT - ASSESSMENT
Pt is an 88 yo male, who has a pmh/o CAD, atrial fibrillation no longer on ASA or AC due to bleeding, HTN, HLD, who was BIBA after being found on floor at home and who is unable to recall events.   esrd on hd mwf  demand ischemia   soleal dvt on heparin/coumadin    Rec:  - esrd tolerating hd   - anemia gali with hd   - syncope likely etoh related     echo woth severe tr,  pul htn ef 55%  - soleal dvt heparin/ coumadin   - afib on no ac in past due to bleeding,     12/14 MK   - esrd tolerating hd, 1.5 uf with hd   - anemia gali with hd   - syncope likely etoh related     echo with severe tr,  pul htn ef 55%  - soleal dvt heparin/ coumadin   - afib on no ac in past due to bleeding,

## 2022-12-14 NOTE — PROGRESS NOTE ADULT - SUBJECTIVE AND OBJECTIVE BOX
Cardiology Progress Note    HPI: Pt is an 88 yo male with a pmh/o afib no longer on ASA or AC due to bleeding (AVF bleeding, LE hematoma post cath due to pseudoaneurysm), s/p R CEA in 2012, pulmonary HTN, hypothyroidism, emphysema on home O2 2L, chronic back pain with compression fracture, ESRD on HD, HLD, CAD s/p unsuccessful PCI in 2018, Cardiomyopathy, HTN, Oligouria, who presents from home, BIBA after being found on floor. Pt is a poor historian, collateral obtained by daughter via telephone [phone]. Pt lives with daughter who is currently out of state. Pt was seen on camera monitor lying on floor (daughter set up "nanny cam"). Neighbors, EMT and RN, came to home and called EMS. Pt states he last remembers drinking two shots of whiskey and then awoke on floor with neighbors looking at him.   ED course: Pt arrived to ED AAOx3 with VSS. Noted to have elevated troponin with PVC on monitor and rate controlled atrial fibrillation. CT head and c/spine w/o acute findings. ASA ordered and pt admitted to medicine. On exam pt declined aspirin as states it causes him to bleed. Daughter also endorses that despite atrial fibrillation, pt has been taken off all AC and antiplt due to bleeding episodes requiring blood transfusions. ASA discontinued. Per daughter, cardio- Dr. Pugh, nephrology Dr. Cordon (10 Dec 2022 04:02)    12/13. S/p HD yesterday. No CP/SOB. Tele- afib 80-110s.   No events last pm.     12/14. No events last pm. Afib in 90s. No CP/SOB.     PAST MEDICAL & SURGICAL HISTORY:  CAD (coronary artery disease)  Cardiomyopathy  HTN (hypertension)  HLD (hyperlipidemia)  ESRD on dialysis  AV fistula    Allergies  No Known Allergies    SOCIAL HISTORY: Denies tobacco, etoh abuse or illicit drug use    FAMILY HISTORY:  FH: emphysema (Father)    MEDICATIONS  (STANDING):  atorvastatin 20 milliGRAM(s) Oral at bedtime  budesonide 160 MICROgram(s)/formoterol 4.5 MICROgram(s) Inhaler 2 Puff(s) Inhalation two times a day  calcium acetate 667 milliGRAM(s) Oral three times a day with meals  carvedilol 3.125 milliGRAM(s) Oral every 12 hours  heparin  Infusion. 1300 Unit(s)/Hr (13 mL/Hr) IV Continuous <Continuous>  influenza  Vaccine (HIGH DOSE) 0.7 milliLiter(s) IntraMuscular once  levothyroxine 88 MICROGram(s) Oral daily  melatonin 3 milliGRAM(s) Oral at bedtime  montelukast 10 milliGRAM(s) Oral at bedtime  Nephro-leighann 1 Tablet(s) Oral daily  warfarin 5 milliGRAM(s) Oral daily    MEDICATIONS  (PRN):  acetaminophen     Tablet .. 650 milliGRAM(s) Oral every 6 hours PRN Temp greater or equal to 38C (100.4F), Mild Pain (1 - 3)  heparin   Injectable 6500 Unit(s) IV Push every 6 hours PRN For aPTT less than 40  heparin   Injectable 3000 Unit(s) IV Push every 6 hours PRN For aPTT between 40 - 57  lidocaine/prilocaine Cream 1 Application(s) Topical <User Schedule> PRN numbing of AV fistula site pre-cannulation      Vital Signs Last 24 Hrs  T(C): 36.7 (12 Dec 2022 06:17), Max: 36.7 (11 Dec 2022 21:00)  T(F): 98.1 (12 Dec 2022 06:17), Max: 98.1 (12 Dec 2022 06:17)  HR: 90 (12 Dec 2022 06:17) (81 - 99)  BP: 127/59 (12 Dec 2022 06:17) (102/59 - 127/59)  BP(mean): --  RR: 18 (12 Dec 2022 06:17) (18 - 18)  SpO2: 94% (12 Dec 2022 06:17) (94% - 98%)    Parameters below as of 12 Dec 2022 06:17  Patient On (Oxygen Delivery Method): nasal cannula  O2 Flow (L/min): 2    REVIEW OF SYSTEMS:  CONSTITUTIONAL:  As per HPI. +Syncope  HEENT:  Eyes:  No diplopia or blurred vision. ENT:  No earache, sore throat or runny nose.  CARDIOVASCULAR:  No pressure, squeezing, strangling, tightness, heaviness or aching about the chest, neck, axilla or epigastrium.  RESPIRATORY:  No cough, shortness of breath, PND or orthopnea.  GASTROINTESTINAL:  No nausea, vomiting or diarrhea.  GENITOURINARY:  No dysuria, frequency or urgency.  MUSCULOSKELETAL:  As per HPI.    PHYSICAL EXAMINATION:    GENERAL APPEARANCE:  Pt. is not currently dyspneic, in no distress. Pt. is alert, oriented, and pleasant.  HEENT:  Pupils are normal and react normally. No icterus. Mucous membranes well colored.  NECK:  Supple. No lymphadenopathy. Jugular venous pressure not elevated. Carotids equal.   HEART:   The cardiac impulse has a normal quality. There are no murmurs, rubs or gallops noted  CHEST:  Chest is clear to auscultation. Normal respiratory effort.  ABDOMEN:  Soft and nontender.   EXTREMITIES:  There is no edema.   I&O's Summary    11 Dec 2022 07:01  -  12 Dec 2022 07:00  --------------------------------------------------------  IN: 0 mL / OUT: 1000 mL / NET: -1000 mL    LABS:                        10.6   6.94  )-----------( 123      ( 11 Dec 2022 08:17 )             32.4     12-11    133<L>  |  98  |  25<H>  ----------------------------<  89  4.5   |  27  |  5.73<H>    Ca    9.5      11 Dec 2022 08:17    PT/INR - ( 11 Dec 2022 08:17 )   PT: 16.3 sec;   INR: 1.40 ratio       PTT - ( 12 Dec 2022 02:02 )  PTT:65.3 sec    EKG: < from: 12 Lead ECG (12.10.22 @ 03:13) >   Atrial fibrillation with premature ventricular or aberrantly conducted complexes  Cannot rule out Anterior infarct , age undetermined  Abnormal ECG    TELEMETRY: afib    CARDIAC TESTS: < from: TTE Echo Complete w/o Contrast w/ Doppler (12.10.22 @ 14:53) >   Mild apical hypokinesis with preserved left ventricle function. Estimated   left ventricular ejection fraction is 50-55 %.   The left atrium is moderately dilated.  The right atrium appears moderately dilated.   Normal appearing right ventricle structure and function.   The aortic valve is well visualized, appears mildly calcified. Valve   opening seems to be normal.   Mild aortic regurgitation is present.   Mild to moderate mitral regurgitation is present.   Severe tricuspid valve regurgitation is present.   Moderate pulmonary hypertension.   IVC is dilated and not collapsing with inspiration.   Aortic root is within the upper limits of normal (4.0 cm).    < end of copied text >  < from: CT Angio Chest PE Protocol w/ IV Cont (12.10.22 @ 16:58) >  IMPRESSION:    No pulmonary embolism.    Mild pulmonary edema and small pleural effusions.    < end of copied text >  < from: US Duplex Venous Lower Ext Complete, Bilateral (12.10.22 @ 14:50) >  IMPRESSION:  DVT is noted in theright soleal vein.    No evidence of deep venous thrombosis in the left lower extremity.      < end of copied text >  < from: CT Head No Cont (12.10.22 @ 01:04) >  IMPRESSION:  CT HEAD: There is no acute intracranial hemorrhage or depressed calvarial   fracture. Chronic findings as above.    CT CERVICAL SPINE: No fracture or acute traumatic malalignment.   Degenerative changes as described.    < from: TTE Echo Complete w/o Contrast w/ Doppler (12.10.22 @ 14:53) >   Mild apical hypokinesis with preserved left ventricle function. Estimated   left ventricular ejection fraction is 50-55 %.   The left atrium is moderately dilated.  The right atrium appears moderately dilated.   Normal appearing right ventricle structure and function.   The aortic valve is well visualized, appears mildly calcified. Valve   opening seems to be normal.   Mild aortic regurgitation is present.   Mild to moderate mitral regurgitation is present.   Severe tricuspid valve regurgitation is present.   Moderate pulmonary hypertension.   IVC is dilated and not collapsing with inspiration.   Aortic root is within the upper limits of normal (4.0 cm).    < end of copied text >      ASSESSMENT & PLAN:

## 2022-12-15 NOTE — PROGRESS NOTE ADULT - SUBJECTIVE AND OBJECTIVE BOX
CC: Syncope and collapse, elevated troponin (14 Dec 2022 16:24)    HPI:  86 yo male with a pmh/o afib no longer on ASA or AC due to bleeding (AVF bleeding, LE hematoma post cath due to pseudoaneurysm), s/p R CEA in 2012, pulmonary HTN, hypothyroidism, emphysema on home O2 2L, chronic back pain with compression fracture, ESRD on HD, HLD, CAD s/p unsuccessful PCI in 2018, Cardiomyopathy, HTN, Oligouria, who presents from home, BIBA after being found on floor. Pt is a poor historian, collateral obtained by daughter via telephone [phone]. Pt lives with daughter who is currently out of state. Pt was seen on camera monitor lying on floor (daughter set up "nanny cam"). Neighbors, EMT and RN, came to home and called EMS. Pt states he last remembers drinking two shots of whiskey and then awoke on floor with neighbors looking at him.     Pt states he currently "feels fine" and denies every having or currently having chest pain, palpitations, nausea, claudication, diaphoresis, left arm pain, vomiting, incontinence, tongue biting, headache, parasthesias, changes in vision/speech/gait/hearing/MSK, fever, chills, sob, cough, diarrhea/constipation, dysuria.     ED course: Pt arrived to ED AAOx3 with VSS. Noted to have elevated troponin with PVC on monitor and rate controlled atrial fibrillation. CT head and c/spine w/o acute findings. ASA ordered and pt admitted to medicine. On exam pt declined aspirin as states it causes him to bleed. Daughter also endorses that despite atrial fibrillation, pt has been taken off all AC and antiplt due to bleeding episodes requiring blood transfusions. ASA discontinued. Per daughter, cardio- Dr. Pugh, nephrology Dr. Cordon (10 Dec 2022 04:02)    INTERVAL HPI/OVERNIGHT EVENTS:    Vital Signs Last 24 Hrs  T(C): 36.7 (15 Dec 2022 09:11), Max: 36.9 (14 Dec 2022 20:53)  T(F): 98.1 (15 Dec 2022 09:11), Max: 98.4 (14 Dec 2022 20:53)  HR: 92 (15 Dec 2022 09:11) (79 - 112)  BP: 118/58 (15 Dec 2022 09:11) (97/57 - 133/79)  BP(mean): --  RR: 18 (15 Dec 2022 09:11) (16 - 19)  SpO2: 100% (15 Dec 2022 09:11) (100% - 100%)    Parameters below as of 15 Dec 2022 09:11  Patient On (Oxygen Delivery Method): nasal cannula  O2 Flow (L/min): 2    I&O's Detail    14 Dec 2022 07:01  -  15 Dec 2022 07:00  --------------------------------------------------------  IN:    Heparin Infusion: 108 mL  Total IN: 108 mL    OUT:  Total OUT: 0 mL    Total NET: 108 mL        REVIEW OF SYSTEMS:    CONSTITUTIONAL: No weakness, fevers or chills  EYES/ENT: No visual changes;  No vertigo or throat pain   NECK: No pain or stiffness  RESPIRATORY: No cough, wheezing, hemoptysis; No shortness of breath  CARDIOVASCULAR: No chest pain or palpitations  GASTROINTESTINAL: No abdominal or epigastric pain. No nausea, vomiting, or hematemesis; No diarrhea or constipation. No melena or hematochezia.  GENITOURINARY: No dysuria, frequency or hematuria  NEUROLOGICAL: No numbness or weakness  SKIN: No itching, burning, rashes, or lesions   All other review of systems is negative unless indicated above.  PHYSICAL EXAM:    General: Well developed; well nourished; in no acute distress  Eyes: PERRLA, EOMI; conjunctiva and sclera clear  Head: Normocephalic; atraumatic  ENMT: No nasal discharge; airway clear  Neck: Supple; non tender; no masses  Respiratory: No wheezes, rales or rhonchi  Cardiovascular: Regular rate and rhythm. S1 and S2 Normal; No murmurs, gallops or rubs  Gastrointestinal: Soft non-tender non-distended; Normal bowel sounds  Genitourinary: No  suprapubic  tenderness  Extremities: Normal range of motion, No clubbing, cyanosis or edema  Vascular: Peripheral pulses palpable 2+ bilaterally  Neurological: Alert and oriented x4  Skin: Warm and dry. No acute rash  Lymph Nodes: No acute cervical adenopathy  Musculoskeletal: Normal muscle tone, without deformities  Psychiatric: Cooperative and appropriate                            10.9   7.04  )-----------( 130      ( 15 Dec 2022 08:48 )             33.9     15 Dec 2022 08:48    131    |  98     |  23     ----------------------------<  87     4.0     |  25     |  3.87     Ca    9.7        15 Dec 2022 08:48  Phos  2.8       14 Dec 2022 09:57    TPro  7.4    /  Alb  3.0    /  TBili  0.8    /  DBili  x      /  AST  41     /  ALT  26     /  AlkPhos  140    15 Dec 2022 08:48    PT/INR - ( 15 Dec 2022 08:48 )   PT: 20.4 sec;   INR: 1.75 ratio         PTT - ( 15 Dec 2022 08:48 )  PTT:70.4 sec  CAPILLARY BLOOD GLUCOSE        LIVER FUNCTIONS - ( 15 Dec 2022 08:48 )  Alb: 3.0 g/dL / Pro: 7.4 gm/dL / ALK PHOS: 140 U/L / ALT: 26 U/L / AST: 41 U/L / GGT: x               MEDICATIONS  (STANDING):  atorvastatin 20 milliGRAM(s) Oral at bedtime  budesonide 160 MICROgram(s)/formoterol 4.5 MICROgram(s) Inhaler 2 Puff(s) Inhalation two times a day  calcium acetate 667 milliGRAM(s) Oral three times a day with meals  carvedilol 3.125 milliGRAM(s) Oral every 12 hours  epoetin brianna-epbx (RETACRIT) Injectable 5000 Unit(s) IV Push <User Schedule>  heparin  Infusion. 1300 Unit(s)/Hr (13 mL/Hr) IV Continuous <Continuous>  influenza  Vaccine (HIGH DOSE) 0.7 milliLiter(s) IntraMuscular once  levothyroxine 88 MICROGram(s) Oral daily  melatonin 3 milliGRAM(s) Oral at bedtime  montelukast 10 milliGRAM(s) Oral at bedtime  Nephro-leighann 1 Tablet(s) Oral daily  warfarin 5 milliGRAM(s) Oral daily    MEDICATIONS  (PRN):  acetaminophen     Tablet .. 650 milliGRAM(s) Oral every 6 hours PRN Temp greater or equal to 38C (100.4F), Mild Pain (1 - 3)  heparin   Injectable 6500 Unit(s) IV Push every 6 hours PRN For aPTT less than 40  heparin   Injectable 3000 Unit(s) IV Push every 6 hours PRN For aPTT between 40 - 57  lidocaine/prilocaine Cream 1 Application(s) Topical <User Schedule> PRN numbing of AV fistula site pre-cannulation      RADIOLOGY & ADDITIONAL TESTS: CC: Syncope and collapse, elevated troponin (14 Dec 2022 16:24)    HPI:  86 yo male with a pmh/o afib no longer on ASA or AC due to bleeding (AVF bleeding, LE hematoma post cath due to pseudoaneurysm), s/p R CEA in 2012, pulmonary HTN, hypothyroidism, emphysema on home O2 2L, chronic back pain with compression fracture, ESRD on HD, HLD, CAD s/p unsuccessful PCI in 2018, Cardiomyopathy, HTN, Oligouria, who presents from home, BIBA after being found on floor. Pt is a poor historian, collateral obtained by daughter via telephone [phone]. Pt lives with daughter who is currently out of state. Pt was seen on camera monitor lying on floor (daughter set up "nanny cam"). Neighbors, EMT and RN, came to home and called EMS. Pt states he last remembers drinking two shots of whiskey and then awoke on floor with neighbors looking at him.     Pt states he currently "feels fine" and denies every having or currently having chest pain, palpitations, nausea, claudication, diaphoresis, left arm pain, vomiting, incontinence, tongue biting, headache, parasthesias, changes in vision/speech/gait/hearing/MSK, fever, chills, sob, cough, diarrhea/constipation, dysuria.     ED course: Pt arrived to ED AAOx3 with VSS. Noted to have elevated troponin with PVC on monitor and rate controlled atrial fibrillation. CT head and c/spine w/o acute findings. ASA ordered and pt admitted to medicine. On exam pt declined aspirin as states it causes him to bleed. Daughter also endorses that despite atrial fibrillation, pt has been taken off all AC and antiplt due to bleeding episodes requiring blood transfusions. ASA discontinued. Per daughter, cardio- Dr. Pugh, nephrology Dr. Cordon     INTERVAL HPI/ OVERNIGHT EVENTS: chart reviewed, Pt was seen and examined, reports no new complains, POC discussed.     Vital Signs Last 24 Hrs  T(C): 36.7 (15 Dec 2022 09:11), Max: 36.9 (14 Dec 2022 20:53)  T(F): 98.1 (15 Dec 2022 09:11), Max: 98.4 (14 Dec 2022 20:53)  HR: 92 (15 Dec 2022 09:11) (79 - 112)  BP: 118/58 (15 Dec 2022 09:11) (97/57 - 133/79  RR: 18 (15 Dec 2022 09:11) (16 - 19)  SpO2: 100% (15 Dec 2022 09:11) (100% - 100%)    Parameters below as of 15 Dec 2022 09:11  Patient On (Oxygen Delivery Method): nasal cannula  O2 Flow (L/min): 2        REVIEW OF SYSTEMS:  All other review of systems is negative unless indicated above.      PHYSICAL EXAM:  General: Well developed;  frail elderly male, in no acute distress  Eyes: EOMI; conjunctiva and sclera clear  Head: Normocephalic; atraumatic  Respiratory: No wheezes, rales or rhonchi  Cardiovascular: Irregular rate and rhythm. S1 and S2 Normal;   Gastrointestinal: Soft non-tender non-distended; Normal bowel sounds  Genitourinary: No  suprapubic  tenderness  Extremities: No edema  Vascular: Peripheral pulses palpable 2+ bilaterally  Neurological: Alert and oriented x3, non focal   Musculoskeletal: Normal muscle tone, without deformities  Psychiatric: Cooperative and appropriate    LABS::                         10.9   7.04  )-----------( 130      ( 15 Dec 2022 08:48 )             33.9     15 Dec 2022 08:48    131    |  98     |  23     ----------------------------<  87     4.0     |  25     |  3.87     Ca    9.7        15 Dec 2022 08:48  Phos  2.8       14 Dec 2022 09:57    TPro  7.4    /  Alb  3.0    /  TBili  0.8    /  DBili  x      /  AST  41     /  ALT  26     /  AlkPhos  140    15 Dec 2022 08:48    PT/INR - ( 15 Dec 2022 08:48 )   PT: 20.4 sec;   INR: 1.75 ratio         PTT - ( 15 Dec 2022 08:48 )  PTT:70.4 sec  CAPILLARY BLOOD GLUCOSE        LIVER FUNCTIONS - ( 15 Dec 2022 08:48 )  Alb: 3.0 g/dL / Pro: 7.4 gm/dL / ALK PHOS: 140 U/L / ALT: 26 U/L / AST: 41 U/L / GGT: x               MEDICATIONS  (STANDING):  atorvastatin 20 milliGRAM(s) Oral at bedtime  budesonide 160 MICROgram(s)/formoterol 4.5 MICROgram(s) Inhaler 2 Puff(s) Inhalation two times a day  calcium acetate 667 milliGRAM(s) Oral three times a day with meals  carvedilol 3.125 milliGRAM(s) Oral every 12 hours  epoetin brianna-epbx (RETACRIT) Injectable 5000 Unit(s) IV Push <User Schedule>  heparin  Infusion. 1300 Unit(s)/Hr (13 mL/Hr) IV Continuous <Continuous>  influenza  Vaccine (HIGH DOSE) 0.7 milliLiter(s) IntraMuscular once  levothyroxine 88 MICROGram(s) Oral daily  melatonin 3 milliGRAM(s) Oral at bedtime  montelukast 10 milliGRAM(s) Oral at bedtime  Nephro-leighann 1 Tablet(s) Oral daily  warfarin 5 milliGRAM(s) Oral daily    MEDICATIONS  (PRN):  acetaminophen     Tablet .. 650 milliGRAM(s) Oral every 6 hours PRN Temp greater or equal to 38C (100.4F), Mild Pain (1 - 3)  heparin   Injectable 6500 Unit(s) IV Push every 6 hours PRN For aPTT less than 40  heparin   Injectable 3000 Unit(s) IV Push every 6 hours PRN For aPTT between 40 - 57  lidocaine/prilocaine Cream 1 Application(s) Topical <User Schedule> PRN numbing of AV fistula site pre-cannulation      RADIOLOGY & ADDITIONAL TESTS:  < from: CT Angio Chest PE Protocol w/ IV Cont (12.10.22 @ 16:58) >    ACC: [id number] [facility]                          PROCEDURE DATE:  12/10/2022          INTERPRETATION:  INDICATION: Hypoxia    TECHNIQUE: Volumetric images of the chest were obtained after the   administration of 90 mL of Omnipaque 350. Maximum intensity projection   images were generated.    COMPARISON: CT chest 2/12/2022.    FINDINGS:    PULMONARY ANGIOGRAM:  No pulmonary embolism.    LUNGS/AIRWAYS/PLEURA: No endobronchial lesion. Low lung volumes and image   acquisition during exhalation, which probably accounts for symmetric   groundglass in the posterior aspect of the upper lobes. Mild bilateral   interlobular septal thickening. Unchanged small nodule along the   horizontal fissure, likely an intrapulmonary lymph node. Small pleural   effusions.    LYMPH NODES/MEDIASTINUM: No enlarged lymph nodes.    HEART/VASCULATURE: Enlarged heart. No pericardial effusion. Coronary   artery calcifications. Normal caliber aorta.    UPPER ABDOMEN: Cholelithiasis. Cysts inthe left hepatic lobe. Bilateral   renal atrophy.    BONES/SOFT TISSUES: Unchanged L1 compression fracture resulting in severe   loss of height.      IMPRESSION:    No pulmonary embolism.    Mild pulmonary edema and small pleural effusions.        ACC: [id number] EXAM:  US DPLX LWR EXT VEINS COMPL BI                          PROCEDURE DATE:  12/10/2022          INTERPRETATION:  CLINICAL INFORMATION: Leg swelling    COMPARISON: There are no prior studies available for comparison.    TECHNIQUE: Duplex sonography of the BILATERAL LOWER extremity veins with   color and spectral Doppler, with and without compression.    FINDINGS:    RIGHT:  Normal compressibility of the RIGHT common femoral, femoral and popliteal   veins.  DVT is noted in the right soleal vein.    LEFT:  Normal compressibility of the LEFT common femoral, femoral and popliteal   veins.  Doppler examination shows normal spontaneous and phasic flow.  No LEFT calf vein thrombosis is detected.    IMPRESSION:  DVT is noted in theright soleal vein.    No evidence of deep venous thrombosis in the left lower extremity.

## 2022-12-15 NOTE — PROGRESS NOTE ADULT - ASSESSMENT
Pt is an 86 yo male, who has a pmh/o CAD, atrial fibrillation no longer on ASA or AC due to bleeding, HTN, HLD, who was BIBA after being found on floor at home and who is unable to recall events. Pt admitted due to:    # Syncope secondary vasovagal event in setting of HD followed by ETOH use   - chest pain free  - elevated troponin in the setting of HD  - check orthostatics  - PT in the am  - Cardiology signed off    # Chronic afib ( good rate control /  poor rhythm control)  - Elevated CHADsVasc score   - No significant pauses on tele  - As patient with new DVT and atrial fibrillation -  medical team decided for anticoagulation.    - Now on IV heparin/coumadin with goal INR 2-3  - CHECK INR in the AM    # Chronic hypoxemic respiratory failure -  multifactorail in the setting of severe pulmonary HTN / COPD  - Needs pulmonary follow up o/p  - CTA: neg for PE  - per ftr, pt has concentrator/home o2    # Elevated troponin/demand ischemia  - due to increased demand, concerning for arrhythmic event given h/o PAF/AF  - cont to trend  - EKG with rate controlled afib, PVC    # DVT is noted in the right soleal vein  - Coumadin  / heparin bridge  - goal INR 2-3  - previous team discussed A/C with dtr, bleeding in the past was mucosal in nature, They both agree with heparin with bridge to coumadin  - He has never has a life threatening bleed per patient or dtr    #COPD, oxygen dependent  - c/w supplemental oxygen  - c/w montelukast    #ESRD  - Nephrology consult  - c/w calcium acetate  - c/w nephrovite  - nephrology consult placed    #Anemia of chronic disease  #Thrombocytopenia  monitor h/h on cbc    #Protein calorie malnutrition  - Nutrition consult    #HTN/HLD  c/w statin  DASH/TLC diet  c/w carvedilol 88 yo male, who has a pmh/o CAD, atrial fibrillation, HTN, HLD        # Syncope secondary vasovagal event in setting of HD Tx  followed by ETOH use   - elevated troponin in the setting of HD, no chest pain   - check orthostatics  - PT in the am  - Cardiology signed off    # Chronic afib ( good rate control /  poor rhythm control)  - Elevated CHADsVasc score   - As patient with new DVT and atrial fibrillation -  medical team decided for anticoagulation.    - C/w  IV heparin/coumadin with goal INR 2-3  - INR sub Tx this am  - Coumadin ordered     # Chronic hypoxemic respiratory failure -  multifactorail in the setting of severe pulmonary HTN / COPD  - Needs pulmonary follow up o/p  - CTA: neg for PE  - per ftr, pt has concentrator/home o2    # Elevated troponin/demand ischemia  - due to increased demand, concerning for arrhythmic event given h/o PAF/AF  - cont to trend  - EKG with rate controlled afib, PVC    # DVT is noted in the right soleal vein  -  c/w Coumadin  / heparin bridge  - goal INR 2-3  - previous team discussed A/C with dtr, bleeding in the past was mucosal in nature, They both agree with heparin with bridge to coumadin  - He has never has a life threatening bleed per patient or dtr    #COPD, oxygen dependent  - c/w supplemental oxygen  - c/w montelukast    #ESRD  - c/w calcium acetate  - c/w nephrovite  - HD as per renal team, due in am     #Anemia of chronic disease  #Thrombocytopenia  monitor h/h on cbc    #Protein calorie malnutrition  - Nutrition consult    #HTN/HLD  c/w statin  DASH/TLC diet  c/w carvedilol

## 2022-12-16 NOTE — PROGRESS NOTE ADULT - SUBJECTIVE AND OBJECTIVE BOX
CC: Syncope and collapse, elevated troponin (14 Dec 2022 16:24)    HPI:  88 yo male with a pmh/o afib no longer on ASA or AC due to bleeding (AVF bleeding, LE hematoma post cath due to pseudoaneurysm), s/p R CEA in 2012, pulmonary HTN, hypothyroidism, emphysema on home O2 2L, chronic back pain with compression fracture, ESRD on HD, HLD, CAD s/p unsuccessful PCI in 2018, Cardiomyopathy, HTN, Oligouria, who presents from home, BIBA after being found on floor. Pt is a poor historian, collateral obtained by daughter via telephone [phone]. Pt lives with daughter who is currently out of state. Pt was seen on camera monitor lying on floor (daughter set up "nanny cam"). Neighbors, EMT and RN, came to home and called EMS. Pt states he last remembers drinking two shots of whiskey and then awoke on floor with neighbors looking at him.     Pt states he currently "feels fine" and denies every having or currently having chest pain, palpitations, nausea, claudication, diaphoresis, left arm pain, vomiting, incontinence, tongue biting, headache, parasthesias, changes in vision/speech/gait/hearing/MSK, fever, chills, sob, cough, diarrhea/constipation, dysuria.     ED course: Pt arrived to ED AAOx3 with VSS. Noted to have elevated troponin with PVC on monitor and rate controlled atrial fibrillation. CT head and c/spine w/o acute findings. ASA ordered and pt admitted to medicine. On exam pt declined aspirin as states it causes him to bleed. Daughter also endorses that despite atrial fibrillation, pt has been taken off all AC and antiplt due to bleeding episodes requiring blood transfusions. ASA discontinued. Per daughter, cardio- Dr. Pugh, nephrology Dr. Cordon     INTERVAL HPI/ OVERNIGHT EVENTS: chart reviewed, Pt was seen and examined, reports no new complains, POC discussed.     Vital Signs Last 24 Hrs  T(C): 36.1 (16 Dec 2022 08:40), Max: 36.6 (15 Dec 2022 20:48)  T(F): 97 (16 Dec 2022 08:40), Max: 97.9 (15 Dec 2022 20:48)  HR: 91 (16 Dec 2022 10:58) (73 - 98)  BP: 111/71 (16 Dec 2022 10:58) (109/59 - 121/70)  RR: 17 (16 Dec 2022 10:58) (17 - 18)  SpO2: 100% (16 Dec 2022 08:36) (99% - 100%)    Parameters below as of 16 Dec 2022 08:40  Patient On (Oxygen Delivery Method): nasal cannula  O2 Flow (L/min): 2        REVIEW OF SYSTEMS:  All other review of systems is negative unless indicated above.      PHYSICAL EXAM:  General: Well developed;  frail elderly male, in no acute distress  Eyes: EOMI; conjunctiva and sclera clear  Head: Normocephalic; atraumatic  Respiratory: No wheezes, rales or rhonchi  Cardiovascular: Irregular rate and rhythm. S1 and S2 Normal;   Gastrointestinal: Soft non-tender non-distended; Normal bowel sounds  Genitourinary: No  suprapubic  tenderness  Extremities: No edema  Vascular: Peripheral pulses palpable 2+ bilaterally  Neurological: Alert and oriented x3, non focal   Musculoskeletal: Normal muscle tone, without deformities  Psychiatric: Cooperative and appropriate    LABS::                         10.9   7.04  )-----------( 130      ( 15 Dec 2022 08:48 )             33.9     15 Dec 2022 08:48    131    |  98     |  23     ----------------------------<  87     4.0     |  25     |  3.87     Ca    9.7        15 Dec 2022 08:48  Phos  2.8       14 Dec 2022 09:57    TPro  7.4    /  Alb  3.0    /  TBili  0.8    /  DBili  x      /  AST  41     /  ALT  26     /  AlkPhos  140    15 Dec 2022 08:48    PT/INR - ( 15 Dec 2022 08:48 )   PT: 20.4 sec;   INR: 1.75 ratio         PTT - ( 15 Dec 2022 08:48 )  PTT:70.4 sec  CAPILLARY BLOOD GLUCOSE        LIVER FUNCTIONS - ( 15 Dec 2022 08:48 )  Alb: 3.0 g/dL / Pro: 7.4 gm/dL / ALK PHOS: 140 U/L / ALT: 26 U/L / AST: 41 U/L / GGT: x               MEDICATIONS  (STANDING):  atorvastatin 20 milliGRAM(s) Oral at bedtime  budesonide 160 MICROgram(s)/formoterol 4.5 MICROgram(s) Inhaler 2 Puff(s) Inhalation two times a day  calcium acetate 667 milliGRAM(s) Oral three times a day with meals  carvedilol 3.125 milliGRAM(s) Oral every 12 hours  epoetin brianna-epbx (RETACRIT) Injectable 5000 Unit(s) IV Push <User Schedule>  heparin  Infusion. 1300 Unit(s)/Hr (13 mL/Hr) IV Continuous <Continuous>  influenza  Vaccine (HIGH DOSE) 0.7 milliLiter(s) IntraMuscular once  levothyroxine 88 MICROGram(s) Oral daily  melatonin 3 milliGRAM(s) Oral at bedtime  montelukast 10 milliGRAM(s) Oral at bedtime  Nephro-leighann 1 Tablet(s) Oral daily  warfarin 5 milliGRAM(s) Oral daily    MEDICATIONS  (PRN):  acetaminophen     Tablet .. 650 milliGRAM(s) Oral every 6 hours PRN Temp greater or equal to 38C (100.4F), Mild Pain (1 - 3)  heparin   Injectable 6500 Unit(s) IV Push every 6 hours PRN For aPTT less than 40  heparin   Injectable 3000 Unit(s) IV Push every 6 hours PRN For aPTT between 40 - 57  lidocaine/prilocaine Cream 1 Application(s) Topical <User Schedule> PRN numbing of AV fistula site pre-cannulation      RADIOLOGY & ADDITIONAL TESTS:  < from: CT Angio Chest PE Protocol w/ IV Cont (12.10.22 @ 16:58) >    ACC: [id number] [facility]                          PROCEDURE DATE:  12/10/2022          INTERPRETATION:  INDICATION: Hypoxia    TECHNIQUE: Volumetric images of the chest were obtained after the   administration of 90 mL of Omnipaque 350. Maximum intensity projection   images were generated.    COMPARISON: CT chest 2/12/2022.    FINDINGS:    PULMONARY ANGIOGRAM:  No pulmonary embolism.    LUNGS/AIRWAYS/PLEURA: No endobronchial lesion. Low lung volumes and image   acquisition during exhalation, which probably accounts for symmetric   groundglass in the posterior aspect of the upper lobes. Mild bilateral   interlobular septal thickening. Unchanged small nodule along the   horizontal fissure, likely an intrapulmonary lymph node. Small pleural   effusions.    LYMPH NODES/MEDIASTINUM: No enlarged lymph nodes.    HEART/VASCULATURE: Enlarged heart. No pericardial effusion. Coronary   artery calcifications. Normal caliber aorta.    UPPER ABDOMEN: Cholelithiasis. Cysts inthe left hepatic lobe. Bilateral   renal atrophy.    BONES/SOFT TISSUES: Unchanged L1 compression fracture resulting in severe   loss of height.      IMPRESSION:    No pulmonary embolism.    Mild pulmonary edema and small pleural effusions.        ACC: [id number] EXAM:  US DPLX LWR EXT VEINS COMPL BI                          PROCEDURE DATE:  12/10/2022          INTERPRETATION:  CLINICAL INFORMATION: Leg swelling    COMPARISON: There are no prior studies available for comparison.    TECHNIQUE: Duplex sonography of the BILATERAL LOWER extremity veins with   color and spectral Doppler, with and without compression.    FINDINGS:    RIGHT:  Normal compressibility of the RIGHT common femoral, femoral and popliteal   veins.  DVT is noted in the right soleal vein.    LEFT:  Normal compressibility of the LEFT common femoral, femoral and popliteal   veins.  Doppler examination shows normal spontaneous and phasic flow.  No LEFT calf vein thrombosis is detected.    IMPRESSION:  DVT is noted in theright soleal vein.    No evidence of deep venous thrombosis in the left lower extremity.         CC: Syncope and collapse, elevated troponin (14 Dec 2022 16:24)    HPI:  88 yo male with a pmh/o afib no longer on ASA or AC due to bleeding (AVF bleeding, LE hematoma post cath due to pseudoaneurysm), s/p R CEA in 2012, pulmonary HTN, hypothyroidism, emphysema on home O2 2L, chronic back pain with compression fracture, ESRD on HD, HLD, CAD s/p unsuccessful PCI in 2018, Cardiomyopathy, HTN, Oligouria, who presents from home, BIBA after being found on floor. Pt is a poor historian, collateral obtained by daughter via telephone [phone]. Pt lives with daughter who is currently out of state. Pt was seen on camera monitor lying on floor (daughter set up "nanny cam"). Neighbors, EMT and RN, came to home and called EMS. Pt states he last remembers drinking two shots of whiskey and then awoke on floor with neighbors looking at him.     Pt states he currently "feels fine" and denies every having or currently having chest pain, palpitations, nausea, claudication, diaphoresis, left arm pain, vomiting, incontinence, tongue biting, headache, parasthesias, changes in vision/speech/gait/hearing/MSK, fever, chills, sob, cough, diarrhea/constipation, dysuria.     ED course: Pt arrived to ED AAOx3 with VSS. Noted to have elevated troponin with PVC on monitor and rate controlled atrial fibrillation. CT head and c/spine w/o acute findings. ASA ordered and pt admitted to medicine. On exam pt declined aspirin as states it causes him to bleed. Daughter also endorses that despite atrial fibrillation, pt has been taken off all AC and antiplt due to bleeding episodes requiring blood transfusions. ASA discontinued. Per daughter, cardio- Dr. Pugh, nephrology Dr. Cordon     INTERVAL HPI/ OVERNIGHT EVENTS:  Pt was seen and examined,  just back from HD, reports feels hungry, didnt have breakfast. Denies CP or SOB, no dizziness. Plan discussed     Vital Signs Last 24 Hrs  T(C): 36.1 (16 Dec 2022 08:40), Max: 36.6 (15 Dec 2022 20:48)  T(F): 97 (16 Dec 2022 08:40), Max: 97.9 (15 Dec 2022 20:48)  HR: 91 (16 Dec 2022 10:58) (73 - 98)  BP: 111/71 (16 Dec 2022 10:58) (109/59 - 121/70)  RR: 17 (16 Dec 2022 10:58) (17 - 18)  SpO2: 100% (16 Dec 2022 08:36) (99% - 100%)    Parameters below as of 16 Dec 2022 08:40  Patient On (Oxygen Delivery Method): nasal cannula  O2 Flow (L/min): 2        REVIEW OF SYSTEMS:  All other review of systems is negative unless indicated above.      PHYSICAL EXAM:  General: Well developed;  frail elderly male, in no acute distress  Eyes: EOMI; conjunctiva and sclera clear  Head: Normocephalic; atraumatic  Respiratory: No wheezes, rales or rhonchi  Cardiovascular: Irregular rate and rhythm. S1 and S2 Normal;   Gastrointestinal: Soft non-tender non-distended; Normal bowel sounds  Genitourinary: No  suprapubic  tenderness  Extremities: No edema  Vascular: Peripheral pulses palpable 2+ bilaterally  Neurological: Alert and oriented x3, non focal   Musculoskeletal: Normal muscle tone, without deformities  Psychiatric: Cooperative and appropriate    LABS:                        10.3   6.47  )-----------( 126      ( 16 Dec 2022 08:00 )             31.0     12-16    132<L>  |  94<L>  |  39<H>  ----------------------------<  114<H>  4.1   |  29  |  4.68<H>    Ca    9.8      16 Dec 2022 08:00  Phos  3.1     12-16    TPro  x   /  Alb  2.8<L>  /  TBili  x   /  DBili  x   /  AST  x   /  ALT  x   /  AlkPhos  x   12-16    LIVER FUNCTIONS - ( 16 Dec 2022 08:00 )  Alb: 2.8 g/dL / Pro: x     / ALK PHOS: x     / ALT: x     / AST: x     / GGT: x         PT/INR - ( 16 Dec 2022 10:34 )   PT: 16.8 sec;   INR: 1.44 ratio    PTT - ( 16 Dec 2022 10:34 )  PTT:83.1 sec                              10.9   7.04  )-----------( 130      ( 15 Dec 2022 08:48 )             33.9     15 Dec 2022 08:48    131    |  98     |  23     ----------------------------<  87     4.0     |  25     |  3.87     Ca    9.7        15 Dec 2022 08:48  Phos  2.8       14 Dec 2022 09:57    TPro  7.4    /  Alb  3.0    /  TBili  0.8    /  DBili  x      /  AST  41     /  ALT  26     /  AlkPhos  140    15 Dec 2022 08:48    PT/INR - ( 15 Dec 2022 08:48 )   PT: 20.4 sec;   INR: 1.75 ratio    PTT - ( 15 Dec 2022 08:48 )  PTT:70.4 sec      LIVER FUNCTIONS - ( 15 Dec 2022 08:48 )  Alb: 3.0 g/dL / Pro: 7.4 gm/dL / ALK PHOS: 140 U/L / ALT: 26 U/L / AST: 41 U/L / GGT: x               MEDICATIONS  (STANDING):  atorvastatin 20 milliGRAM(s) Oral at bedtime  budesonide 160 MICROgram(s)/formoterol 4.5 MICROgram(s) Inhaler 2 Puff(s) Inhalation two times a day  calcium acetate 667 milliGRAM(s) Oral three times a day with meals  carvedilol 3.125 milliGRAM(s) Oral every 12 hours  epoetin brianna-epbx (RETACRIT) Injectable 5000 Unit(s) IV Push <User Schedule>  heparin  Infusion. 1300 Unit(s)/Hr (13 mL/Hr) IV Continuous <Continuous>  influenza  Vaccine (HIGH DOSE) 0.7 milliLiter(s) IntraMuscular once  levothyroxine 88 MICROGram(s) Oral daily  melatonin 3 milliGRAM(s) Oral at bedtime  montelukast 10 milliGRAM(s) Oral at bedtime  Nephro-leighann 1 Tablet(s) Oral daily  warfarin 5 milliGRAM(s) Oral daily    MEDICATIONS  (PRN):  acetaminophen     Tablet .. 650 milliGRAM(s) Oral every 6 hours PRN Temp greater or equal to 38C (100.4F), Mild Pain (1 - 3)  heparin   Injectable 6500 Unit(s) IV Push every 6 hours PRN For aPTT less than 40  heparin   Injectable 3000 Unit(s) IV Push every 6 hours PRN For aPTT between 40 - 57  lidocaine/prilocaine Cream 1 Application(s) Topical <User Schedule> PRN numbing of AV fistula site pre-cannulation      RADIOLOGY & ADDITIONAL TESTS:      ACC: [id number] EXAM:  CT ANGIO CHEST PULM ART [facility]                        PROCEDURE DATE:  12/10/2022      COMPARISON: CT chest 2/12/2022.    FINDINGS:    PULMONARY ANGIOGRAM:  No pulmonary embolism.    LUNGS/AIRWAYS/PLEURA: No endobronchial lesion. Low lung volumes and image   acquisition during exhalation, which probably accounts for symmetric   groundglass in the posterior aspect of the upper lobes. Mild bilateral   interlobular septal thickening. Unchanged small nodule along the   horizontal fissure, likely an intrapulmonary lymph node. Small pleural   effusions.    LYMPH NODES/MEDIASTINUM: No enlarged lymph nodes.    HEART/VASCULATURE: Enlarged heart. No pericardial effusion. Coronary   artery calcifications. Normal caliber aorta.    UPPER ABDOMEN: Cholelithiasis. Cysts inthe left hepatic lobe. Bilateral   renal atrophy.    BONES/SOFT TISSUES: Unchanged L1 compression fracture resulting in severe   loss of height.      IMPRESSION:    No pulmonary embolism.    Mild pulmonary edema and small pleural effusions.        ACC: [id number] EXAM:  US DPLX LWR EXT VEINS COMPL BI                          PROCEDURE DATE:  12/10/2022          INTERPRETATION:  CLINICAL INFORMATION: Leg swelling    COMPARISON: There are no prior studies available for comparison.    TECHNIQUE: Duplex sonography of the BILATERAL LOWER extremity veins with   color and spectral Doppler, with and without compression.    FINDINGS:    RIGHT:  Normal compressibility of the RIGHT common femoral, femoral and popliteal   veins.  DVT is noted in the right soleal vein.    LEFT:  Normal compressibility of the LEFT common femoral, femoral and popliteal   veins.  Doppler examination shows normal spontaneous and phasic flow.  No LEFT calf vein thrombosis is detected.    IMPRESSION:  DVT is noted in theright soleal vein.    No evidence of deep venous thrombosis in the left lower extremity.

## 2022-12-16 NOTE — PROGRESS NOTE ADULT - SUBJECTIVE AND OBJECTIVE BOX
NEPHROLOGY INTERVAL HPI/OVERNIGHT EVENTS:    12/16- seen on HD , no new events  12/14 seen at hd, for dc to [facility]  12/12 seen at hd earlier today.  cards input noted   HPI:  Pt is an 88 yo male with a pmh/o afib no longer on ASA or AC due to bleeding (AVF bleeding, LE hematoma post cath due to pseudoaneurysm), s/p R CEA in 2012, pulmonary HTN, hypothyroidism, emphysema on home O2 2L, chronic back pain with compression fracture, ESRD on HD, HLD, CAD s/p unsuccessful PCI in 2018, Cardiomyopathy, HTN, Oligouria, who presents from home, BIBA after being found on floor. Pt is a poor historian, collateral obtained by daughter via telephone [phone]. Pt lives with daughter who is currently out of state. Pt was seen on camera monitor lying on floor (daughter set up "nanny cam"). Neighbors, EMT and RN, came to home and called EMS. Pt states he last remembers drinking two shots of whiskey and then awoke on floor with neighbors looking at him.     Pt states he currently "feels fine" and denies every having or currently having chest pain, palpitations, nausea, claudication, diaphoresis, left arm pain, vomiting, incontinence, tongue biting, headache, parasthesias, changes in vision/speech/gait/hearing/MSK, fever, chills, sob, cough, diarrhea/constipation, dysuria.     ED course: Pt arrived to ED AAOx3 with VSS. Noted to have elevated troponin with PVC on monitor and rate controlled atrial fibrillation. CT head and c/spine w/o acute findings. ASA ordered and pt admitted to medicine. On exam pt declined aspirin as states it causes him to bleed. Daughter also endorses that despite atrial fibrillation, pt has been taken off all AC and antiplt due to bleeding episodes requiring blood transfusions. ASA discontinued. Per daughter, cardio- Dr. Pugh, nephrology Dr. Cordon (10 Dec 2022 04:02)      Patient is a 87y old  Male who presents with a chief complaint of Syncope and collapse, elevated troponin (12 Dec 2022 08:00)      MEDICATIONS  (STANDING):  atorvastatin 20 milliGRAM(s) Oral at bedtime  budesonide 160 MICROgram(s)/formoterol 4.5 MICROgram(s) Inhaler 2 Puff(s) Inhalation two times a day  calcium acetate 667 milliGRAM(s) Oral three times a day with meals  carvedilol 3.125 milliGRAM(s) Oral every 12 hours  epoetin brianna-epbx (RETACRIT) Injectable 5000 Unit(s) IV Push <User Schedule>  heparin  Infusion. 1300 Unit(s)/Hr (13 mL/Hr) IV Continuous <Continuous>  influenza  Vaccine (HIGH DOSE) 0.7 milliLiter(s) IntraMuscular once  levothyroxine 88 MICROGram(s) Oral daily  melatonin 3 milliGRAM(s) Oral at bedtime  montelukast 10 milliGRAM(s) Oral at bedtime  Nephro-leighann 1 Tablet(s) Oral daily    MEDICATIONS  (PRN):  acetaminophen     Tablet .. 650 milliGRAM(s) Oral every 6 hours PRN Temp greater or equal to 38C (100.4F), Mild Pain (1 - 3)  heparin   Injectable 6500 Unit(s) IV Push every 6 hours PRN For aPTT less than 40  heparin   Injectable 3000 Unit(s) IV Push every 6 hours PRN For aPTT between 40 - 57  lidocaine/prilocaine Cream 1 Application(s) Topical <User Schedule> PRN numbing of AV fistula site pre-cannulation      Allergies    No Known Allergies    Intolerances        I&O's Detail      .    Patient was seen and evaluated on dialysis.   Patient is tolerating the procedure well.   Continue dialysis:   Dialyzer: revaclear 300 k protocol uf goal of 1.5kg     Vital Signs Last 24 Hrs  T(C): 36.1 (16 Dec 2022 12:18), Max: 36.6 (15 Dec 2022 20:48)  T(F): 97 (16 Dec 2022 12:18), Max: 97.9 (15 Dec 2022 20:48)  HR: 83 (16 Dec 2022 12:18) (73 - 98)  BP: 121/63 (16 Dec 2022 12:18) (109/59 - 121/70)  BP(mean): --  RR: 17 (16 Dec 2022 12:18) (17 - 18)  SpO2: 100% (16 Dec 2022 08:36) (99% - 100%)    Parameters below as of 16 Dec 2022 08:40  Patient On (Oxygen Delivery Method): nasal cannula  O2 Flow (L/min): 2    Daily     Daily     PHYSICAL EXAM:  General: alert. awake NAD  HEENT: MMM  CV: s1s2 rrr  LUNGS: B/L CTA  Abd  soft non tender  EXT: no edema    LABS:    12-16    132<L>  |  94<L>  |  39<H>  ----------------------------<  114<H>  4.1   |  29  |  4.68<H>    Ca    9.8      16 Dec 2022 08:00  Phos  3.1     12-16    TPro  x   /  Alb  2.8<L>  /  TBili  x   /  DBili  x   /  AST  x   /  ALT  x   /  AlkPhos  x   12-16                          10.3   6.47  )-----------( 126      ( 16 Dec 2022 08:00 )             31.0                10.4   6.77  )-----------( 119      ( 14 Dec 2022 08:59 )             30.9     12-14    132<L>  |  95<L>  |  39<H>  ----------------------------<  140<H>  4.1   |  30  |  5.47<H>    Ca    9.6      14 Dec 2022 09:57  Phos  2.8     12-14    TPro  x   /  Alb  2.7<L>  /  TBili  x   /  DBili  x   /  AST  x   /  ALT  x   /  AlkPhos  x   12-14    PT/INR - ( 14 Dec 2022 08:59 )   PT: 24.7 sec;   INR: 2.11 ratio         PTT - ( 14 Dec 2022 08:59 )  PTT:81.0 sec    Phosphorus Level, Serum: 2.8 mg/dL (12-14 @ 09:57)

## 2022-12-16 NOTE — PROGRESS NOTE ADULT - ASSESSMENT
86 yo male, who has a pmh/o CAD, atrial fibrillation, HTN, HLD        # Syncope secondary vasovagal event in setting of HD Tx  followed by ETOH use   - elevated troponin in the setting of HD, no chest pain   - check orthostatics  - PT in the am  - Cardiology signed off    # Chronic afib ( good rate control /  poor rhythm control)  - Elevated CHADsVasc score   - As patient with new DVT and atrial fibrillation -  medical team decided for anticoagulation.    - C/w  IV heparin/coumadin with goal INR 2-3  - INR sub Tx this am  - Coumadin ordered     # Chronic hypoxemic respiratory failure -  multifactorail in the setting of severe pulmonary HTN / COPD  - Needs pulmonary follow up o/p  - CTA: neg for PE  - per ftr, pt has concentrator/home o2    # Elevated troponin/demand ischemia  - due to increased demand, concerning for arrhythmic event given h/o PAF/AF  - cont to trend  - EKG with rate controlled afib, PVC    # DVT is noted in the right soleal vein  -  c/w Coumadin  / heparin bridge  - goal INR 2-3  - previous team discussed A/C with dtr, bleeding in the past was mucosal in nature, They both agree with heparin with bridge to coumadin  - He has never has a life threatening bleed per patient or dtr    #COPD, oxygen dependent  - c/w supplemental oxygen  - c/w montelukast    #ESRD  - c/w calcium acetate  - c/w nephrovite  - HD as per renal team, due in am     #Anemia of chronic disease  #Thrombocytopenia  monitor h/h on cbc    #Protein calorie malnutrition  - Nutrition consult    #HTN/HLD  c/w statin  DASH/TLC diet  c/w carvedilol 88 yo male, who has a pmh/o CAD, atrial fibrillation, HTN, HLD admitted for:       # Syncope secondary vasovagal event in setting of HD Tx  followed by ETOH use   - elevated troponin in the setting of HD, no chest pain   - orthostatics noted, no drop in BP  but had raise in HR   - Cardiology signed off    # Chronic afib   - Elevated CHADsVasc score   - As patient with new DVT and atrial fibrillation -  medical team decided for anticoagulation.    - C/w  IV heparin/coumadin with goal INR 2-3  - INR sub Tx this am  - Coumadin ordered   - trend INr daily     # Chronic hypoxemic respiratory failure -  multifactorail in the setting of severe pulmonary HTN / COPD  - Needs pulmonary follow up o/p  - CTA: neg for PE  - per daughter,  pt has concentrator/home o2    # Elevated troponin/demand ischemia  - due to increased demand,  in settings of syncope   - cont to trend  - EKG with rate controlled afib, PVC    # DVT  right soleal vein  -  c/w Coumadin  / heparin bridge  - goal INR 2-3  - previous team discussed A/C with dtr, bleeding in the past was mucosal in nature, They both agree with heparin with bridge to coumadin  - He has never has a life threatening bleed per patient or dtr    #COPD, oxygen dependent  - c/w supplemental oxygen  - c/w montelukast    #ESRD  - c/w calcium acetate  - c/w nephrovite  - HD as per renal team    #Anemia of chronic disease  #Thrombocytopenia  monitor h/h on cbc    #Protein calorie malnutrition  - Nutrition consult    #HTN/HLD  c/w statin  DASH/TLC diet  c/w carvedilol    Dispo; Monitor INR, needs 2 consecutive therapeutic INRs before dc. Plan to D/c back to [facility]

## 2022-12-16 NOTE — PROGRESS NOTE ADULT - ASSESSMENT
Pt is an 88 yo male, who has a pmh/o CAD, atrial fibrillation no longer on ASA or AC due to bleeding, HTN, HLD, who was BIBA after being found on floor at home and who is unable to recall events.   esrd on hd mwf  demand ischemia   soleal dvt on heparin/coumadin    Rec:  - esrd tolerating hd   - anemia gali with hd   - syncope likely etoh related     echo woth severe tr,  pul htn ef 55%  - soleal dvt heparin/ coumadin   - afib on no ac in past due to bleeding,     12/14 MK   - esrd tolerating hd, 1.5 uf with hd   - anemia gali with hd   - syncope likely etoh related     echo with severe tr,  pul htn ef 55%  - soleal dvt heparin/ coumadin   - afib on no ac in past due to bleeding,     12/16  seen on HD   tolerating treatment, no new events  VVS

## 2022-12-17 NOTE — PROGRESS NOTE ADULT - ASSESSMENT
86 yo male, who has a pmh/o CAD, atrial fibrillation, HTN, HLD admitted for:       # Syncope secondary vasovagal event in setting of HD Tx  followed by ETOH use   - elevated troponin in the setting of HD, no chest pain   - orthostatics noted, no drop in BP  but had raise in HR   - Cardiology signed off    # Chronic afib   - Elevated CHADsVasc score   - As patient with new DVT and atrial fibrillation -  medical team decided for anticoagulation.    - C/w  IV heparin/coumadin with goal INR 2-3  - INR sub Tx this am  - Coumadin ordered   - trend INr daily     # Chronic hypoxemic respiratory failure -  multifactorail in the setting of severe pulmonary HTN / COPD  - Needs pulmonary follow up o/p  - CTA: neg for PE  - per daughter,  pt has concentrator/home o2    # Elevated troponin/demand ischemia  - due to increased demand,  in settings of syncope   - cont to trend  - EKG with rate controlled afib, PVC    # DVT  right soleal vein  -  c/w Coumadin  / heparin bridge  - goal INR 2-3  - previous team discussed A/C with dtr, bleeding in the past was mucosal in nature, They both agree with heparin with bridge to coumadin  - He has never has a life threatening bleed per patient or dtr    #COPD, oxygen dependent  - c/w supplemental oxygen  - c/w montelukast    #ESRD  - c/w calcium acetate  - c/w nephrovite  - HD as per renal team    #Anemia of chronic disease  #Thrombocytopenia  monitor h/h on cbc    #Protein calorie malnutrition  - Nutrition consult    #HTN/HLD  c/w statin  DASH/TLC diet  c/w carvedilol    Dispo; Monitor INR, needs 2 consecutive therapeutic INRs before dc. Plan to D/c back to [facility] 88 yo male, who has a pmh/o CAD, atrial fibrillation, HTN, HLD admitted for:       # Syncope secondary vasovagal event in setting of HD Tx  followed by ETOH use   - elevated troponin in the setting of HD, no chest pain   - orthostatics noted, no drop in BP  but had raise in HR   - Cardiology signed off    # Chronic afib   - Elevated CHADsVasc score   - As patient with new DVT and atrial fibrillation -  medical team decided for anticoagulation.    - C/w  IV heparin/coumadin with goal INR 2-3  - INR sub Tx this am but trending up   - Coumadin ordered   - trend INr daily     # Chronic hypoxemic respiratory failure -  multifactorail in the setting of severe pulmonary HTN / COPD  - Needs pulmonary follow up o/p  - CTA: neg for PE  - per daughter,  pt has concentrator/home o2      # Elevated troponin/demand ischemia  - due to increased demand,  in settings of syncope   - cont to trend  - EKG with rate controlled afib, PVC    # DVT  right soleal vein  -  c/w Coumadin  / heparin bridge  - goal INR 2-3  - previous team discussed A/C with dtr, bleeding in the past was mucosal in nature, They both agree with heparin with bridge to coumadin  - He has never has a life threatening bleed per patient or dtr    #COPD, oxygen dependent  - c/w supplemental oxygen  - c/w montelukast    #ESRD  - c/w calcium acetate  - c/w nephrovite  - HD as per renal team    #Anemia of chronic disease  #Thrombocytopenia  monitor h/h on cbc    #Protein calorie malnutrition  - Nutrition consult    #HTN/HLD  c/w statin  DASH/TLC diet  c/w carvedilol    Dispo; Monitor INR, needs 2 consecutive therapeutic INRs before dc. Plan to D/c back to [facility]

## 2022-12-17 NOTE — PROGRESS NOTE ADULT - SUBJECTIVE AND OBJECTIVE BOX
CC: Syncope and collapse, elevated troponin (14 Dec 2022 16:24)    HPI:  86 yo male with a pmh/o afib no longer on ASA or AC due to bleeding (AVF bleeding, LE hematoma post cath due to pseudoaneurysm), s/p R CEA in 2012, pulmonary HTN, hypothyroidism, emphysema on home O2 2L, chronic back pain with compression fracture, ESRD on HD, HLD, CAD s/p unsuccessful PCI in 2018, Cardiomyopathy, HTN, Oligouria, who presents from home, BIBA after being found on floor. Pt is a poor historian, collateral obtained by daughter via telephone [phone]. Pt lives with daughter who is currently out of state. Pt was seen on camera monitor lying on floor (daughter set up "nanny cam"). Neighbors, EMT and RN, came to home and called EMS. Pt states he last remembers drinking two shots of whiskey and then awoke on floor with neighbors looking at him.     Pt states he currently "feels fine" and denies every having or currently having chest pain, palpitations, nausea, claudication, diaphoresis, left arm pain, vomiting, incontinence, tongue biting, headache, parasthesias, changes in vision/speech/gait/hearing/MSK, fever, chills, sob, cough, diarrhea/constipation, dysuria.     ED course: Pt arrived to ED AAOx3 with VSS. Noted to have elevated troponin with PVC on monitor and rate controlled atrial fibrillation. CT head and c/spine w/o acute findings. ASA ordered and pt admitted to medicine. On exam pt declined aspirin as states it causes him to bleed. Daughter also endorses that despite atrial fibrillation, pt has been taken off all AC and antiplt due to bleeding episodes requiring blood transfusions. ASA discontinued. Per daughter, cardio- Dr. Pugh, nephrology Dr. Cordon     INTERVAL HPI/ OVERNIGHT EVENTS:  Pt was seen and examined,       Vital Signs Last 24 Hrs  T(C): 36.3 (17 Dec 2022 09:12), Max: 37 (16 Dec 2022 21:23)  T(F): 97.3 (17 Dec 2022 09:12), Max: 98.6 (16 Dec 2022 21:23)  HR: 79 (17 Dec 2022 09:12) (78 - 94)  BP: 108/70 (17 Dec 2022 09:12) (95/53 - 121/70)  RR: 18 (17 Dec 2022 09:12) (17 - 18)  SpO2: 97% (17 Dec 2022 09:12) (96% - 98%)    Parameters below as of 16 Dec 2022 21:23  Patient On (Oxygen Delivery Method): nasal cannula  O2 Flow (L/min): 3          REVIEW OF SYSTEMS:  All other review of systems is negative unless indicated above.      PHYSICAL EXAM:  General: Well developed;  frail elderly male, in no acute distress  Eyes: EOMI; conjunctiva and sclera clear  Head: Normocephalic; atraumatic  Respiratory: No wheezes, rales or rhonchi  Cardiovascular: Irregular rate and rhythm. S1 and S2 Normal;   Gastrointestinal: Soft non-tender non-distended; Normal bowel sounds  Genitourinary: No  suprapubic  tenderness  Extremities: No edema  Vascular: Peripheral pulses palpable 2+ bilaterally  Neurological: Alert and oriented x3, non focal   Musculoskeletal: Normal muscle tone, without deformities  Psychiatric: Cooperative and appropriate    LABS:                        10.2   6.56  )-----------( 120      ( 17 Dec 2022 07:14 )             30.9     12-16    132<L>  |  94<L>  |  39<H>  ----------------------------<  114<H>  4.1   |  29  |  4.68<H>    Ca    9.8      16 Dec 2022 08:00  Phos  3.1     12-16    TPro  x   /  Alb  2.8<L>  /  TBili  x   /  DBili  x   /  AST  x   /  ALT  x   /  AlkPhos  x   12-16    LIVER FUNCTIONS - ( 16 Dec 2022 08:00 )  Alb: 2.8 g/dL / Pro: x     / ALK PHOS: x     / ALT: x     / AST: x     / GGT: x           PT/INR - ( 17 Dec 2022 07:14 )   PT: 20.5 sec;   INR: 1.76 ratio    PTT - ( 17 Dec 2022 07:14 )  PTT:163.5 sec                          10.3   6.47  )-----------( 126      ( 16 Dec 2022 08:00 )             31.0     12-16    132<L>  |  94<L>  |  39<H>  ----------------------------<  114<H>  4.1   |  29  |  4.68<H>    Ca    9.8      16 Dec 2022 08:00  Phos  3.1     12-16    TPro  x   /  Alb  2.8<L>  /  TBili  x   /  DBili  x   /  AST  x   /  ALT  x   /  AlkPhos  x   12-16    LIVER FUNCTIONS - ( 16 Dec 2022 08:00 )  Alb: 2.8 g/dL / Pro: x     / ALK PHOS: x     / ALT: x     / AST: x     / GGT: x         PT/INR - ( 16 Dec 2022 10:34 )   PT: 16.8 sec;   INR: 1.44 ratio    PTT - ( 16 Dec 2022 10:34 )  PTT:83.1 sec                              10.9   7.04  )-----------( 130      ( 15 Dec 2022 08:48 )             33.9     15 Dec 2022 08:48    131    |  98     |  23     ----------------------------<  87     4.0     |  25     |  3.87     Ca    9.7        15 Dec 2022 08:48  Phos  2.8       14 Dec 2022 09:57    TPro  7.4    /  Alb  3.0    /  TBili  0.8    /  DBili  x      /  AST  41     /  ALT  26     /  AlkPhos  140    15 Dec 2022 08:48    PT/INR - ( 15 Dec 2022 08:48 )   PT: 20.4 sec;   INR: 1.75 ratio    PTT - ( 15 Dec 2022 08:48 )  PTT:70.4 sec      LIVER FUNCTIONS - ( 15 Dec 2022 08:48 )  Alb: 3.0 g/dL / Pro: 7.4 gm/dL / ALK PHOS: 140 U/L / ALT: 26 U/L / AST: 41 U/L / GGT: x               MEDICATIONS  (STANDING):  atorvastatin 20 milliGRAM(s) Oral at bedtime  budesonide 160 MICROgram(s)/formoterol 4.5 MICROgram(s) Inhaler 2 Puff(s) Inhalation two times a day  calcium acetate 667 milliGRAM(s) Oral three times a day with meals  carvedilol 3.125 milliGRAM(s) Oral every 12 hours  epoetin brianna-epbx (RETACRIT) Injectable 5000 Unit(s) IV Push <User Schedule>  heparin  Infusion. 1300 Unit(s)/Hr (13 mL/Hr) IV Continuous <Continuous>  influenza  Vaccine (HIGH DOSE) 0.7 milliLiter(s) IntraMuscular once  levothyroxine 88 MICROGram(s) Oral daily  melatonin 3 milliGRAM(s) Oral at bedtime  montelukast 10 milliGRAM(s) Oral at bedtime  Nephro-leighann 1 Tablet(s) Oral daily  warfarin 5 milliGRAM(s) Oral daily    MEDICATIONS  (PRN):  acetaminophen     Tablet .. 650 milliGRAM(s) Oral every 6 hours PRN Temp greater or equal to 38C (100.4F), Mild Pain (1 - 3)  heparin   Injectable 6500 Unit(s) IV Push every 6 hours PRN For aPTT less than 40  heparin   Injectable 3000 Unit(s) IV Push every 6 hours PRN For aPTT between 40 - 57  lidocaine/prilocaine Cream 1 Application(s) Topical <User Schedule> PRN numbing of AV fistula site pre-cannulation      RADIOLOGY & ADDITIONAL TESTS:      ACC: [id number] [facility]                        PROCEDURE DATE:  12/10/2022      COMPARISON: CT chest 2/12/2022.    FINDINGS:    PULMONARY ANGIOGRAM:  No pulmonary embolism.    LUNGS/AIRWAYS/PLEURA: No endobronchial lesion. Low lung volumes and image   acquisition during exhalation, which probably accounts for symmetric   groundglass in the posterior aspect of the upper lobes. Mild bilateral   interlobular septal thickening. Unchanged small nodule along the   horizontal fissure, likely an intrapulmonary lymph node. Small pleural   effusions.    LYMPH NODES/MEDIASTINUM: No enlarged lymph nodes.    HEART/VASCULATURE: Enlarged heart. No pericardial effusion. Coronary   artery calcifications. Normal caliber aorta.    UPPER ABDOMEN: Cholelithiasis. Cysts inthe left hepatic lobe. Bilateral   renal atrophy.    BONES/SOFT TISSUES: Unchanged L1 compression fracture resulting in severe   loss of height.      IMPRESSION:    No pulmonary embolism.    Mild pulmonary edema and small pleural effusions.        ACC: [id number] EXAM:  US DPLX LWR EXT VEINS COMPL BI                          PROCEDURE DATE:  12/10/2022          INTERPRETATION:  CLINICAL INFORMATION: Leg swelling    COMPARISON: There are no prior studies available for comparison.    TECHNIQUE: Duplex sonography of the BILATERAL LOWER extremity veins with   color and spectral Doppler, with and without compression.    FINDINGS:    RIGHT:  Normal compressibility of the RIGHT common femoral, femoral and popliteal   veins.  DVT is noted in the right soleal vein.    LEFT:  Normal compressibility of the LEFT common femoral, femoral and popliteal   veins.  Doppler examination shows normal spontaneous and phasic flow.  No LEFT calf vein thrombosis is detected.    IMPRESSION:  DVT is noted in theright soleal vein.    No evidence of deep venous thrombosis in the left lower extremity.         CC: Syncope and collapse, elevated troponin (14 Dec 2022 16:24)    HPI:  86 yo male with a pmh/o afib no longer on ASA or AC due to bleeding (AVF bleeding, LE hematoma post cath due to pseudoaneurysm), s/p R CEA in 2012, pulmonary HTN, hypothyroidism, emphysema on home O2 2L, chronic back pain with compression fracture, ESRD on HD, HLD, CAD s/p unsuccessful PCI in 2018, Cardiomyopathy, HTN, Oligouria, who presents from home, BIBA after being found on floor. Pt is a poor historian, collateral obtained by daughter via telephone [phone]. Pt lives with daughter who is currently out of state. Pt was seen on camera monitor lying on floor (daughter set up "nanny cam"). Neighbors, EMT and RN, came to home and called EMS. Pt states he last remembers drinking two shots of whiskey and then awoke on floor with neighbors looking at him.     Pt states he currently "feels fine" and denies every having or currently having chest pain, palpitations, nausea, claudication, diaphoresis, left arm pain, vomiting, incontinence, tongue biting, headache, parasthesias, changes in vision/speech/gait/hearing/MSK, fever, chills, sob, cough, diarrhea/constipation, dysuria.     ED course: Pt arrived to ED AAOx3 with VSS. Noted to have elevated troponin with PVC on monitor and rate controlled atrial fibrillation. CT head and c/spine w/o acute findings. ASA ordered and pt admitted to medicine. On exam pt declined aspirin as states it causes him to bleed. Daughter also endorses that despite atrial fibrillation, pt has been taken off all AC and antiplt due to bleeding episodes requiring blood transfusions. ASA discontinued. Per daughter, cardio- Dr. Pugh, nephrology Dr. Cordon     INTERVAL HPI/ OVERNIGHT EVENTS:  Pt was seen and examined, comfortable in beds, reports no complains, min cough, denies SOB.       Vital Signs Last 24 Hrs  T(C): 36.3 (17 Dec 2022 09:12), Max: 37 (16 Dec 2022 21:23)  T(F): 97.3 (17 Dec 2022 09:12), Max: 98.6 (16 Dec 2022 21:23)  HR: 79 (17 Dec 2022 09:12) (78 - 94)  BP: 108/70 (17 Dec 2022 09:12) (95/53 - 121/70)  RR: 18 (17 Dec 2022 09:12) (17 - 18)  SpO2: 97% (17 Dec 2022 09:12) (96% - 98%)    Parameters below as of 16 Dec 2022 21:23  Patient On (Oxygen Delivery Method): nasal cannula  O2 Flow (L/min): 3      REVIEW OF SYSTEMS:  All other review of systems is negative unless indicated above.      PHYSICAL EXAM:  General: Well developed;  frail elderly male, in no acute distress  Eyes: EOMI; conjunctiva and sclera clear  Head: Normocephalic; atraumatic  Respiratory: No wheezes, rales or rhonchi, decreased BS at bases b/l   Cardiovascular: Irregular rate and rhythm. S1 and S2 Normal;   Gastrointestinal: Soft non-tender non-distended; Normal bowel sounds  Genitourinary: No  suprapubic  tenderness  Extremities: No edema  Vascular: Peripheral pulses palpable 2+ bilaterally  Neurological: Alert and oriented x3, non focal   Musculoskeletal: Normal muscle tone, without deformities  Psychiatric: Cooperative and appropriate    LABS:                          10.2   6.56  )-----------( 120      ( 17 Dec 2022 07:14 )             30.9     12-16    132<L>  |  94<L>  |  39<H>  ----------------------------<  114<H>  4.1   |  29  |  4.68<H>    Ca    9.8      16 Dec 2022 08:00  Phos  3.1     12-16    TPro  x   /  Alb  2.8<L>  /  TBili  x   /  DBili  x   /  AST  x   /  ALT  x   /  AlkPhos  x   12-16    LIVER FUNCTIONS - ( 16 Dec 2022 08:00 )  Alb: 2.8 g/dL / Pro: x     / ALK PHOS: x     / ALT: x     / AST: x     / GGT: x           PT/INR - ( 17 Dec 2022 07:14 )   PT: 20.5 sec;   INR: 1.76 ratio    PTT - ( 17 Dec 2022 07:14 )  PTT:163.5 sec                          10.3   6.47  )-----------( 126      ( 16 Dec 2022 08:00 )             31.0     12-16    132<L>  |  94<L>  |  39<H>  ----------------------------<  114<H>  4.1   |  29  |  4.68<H>    Ca    9.8      16 Dec 2022 08:00  Phos  3.1     12-16    TPro  x   /  Alb  2.8<L>  /  TBili  x   /  DBili  x   /  AST  x   /  ALT  x   /  AlkPhos  x   12-16    LIVER FUNCTIONS - ( 16 Dec 2022 08:00 )  Alb: 2.8 g/dL / Pro: x     / ALK PHOS: x     / ALT: x     / AST: x     / GGT: x         PT/INR - ( 16 Dec 2022 10:34 )   PT: 16.8 sec;   INR: 1.44 ratio    PTT - ( 16 Dec 2022 10:34 )  PTT:83.1 sec                              10.9   7.04  )-----------( 130      ( 15 Dec 2022 08:48 )             33.9     15 Dec 2022 08:48    131    |  98     |  23     ----------------------------<  87     4.0     |  25     |  3.87     Ca    9.7        15 Dec 2022 08:48  Phos  2.8       14 Dec 2022 09:57    TPro  7.4    /  Alb  3.0    /  TBili  0.8    /  DBili  x      /  AST  41     /  ALT  26     /  AlkPhos  140    15 Dec 2022 08:48    PT/INR - ( 15 Dec 2022 08:48 )   PT: 20.4 sec;   INR: 1.75 ratio    PTT - ( 15 Dec 2022 08:48 )  PTT:70.4 sec      LIVER FUNCTIONS - ( 15 Dec 2022 08:48 )  Alb: 3.0 g/dL / Pro: 7.4 gm/dL / ALK PHOS: 140 U/L / ALT: 26 U/L / AST: 41 U/L / GGT: x               MEDICATIONS  (STANDING):  atorvastatin 20 milliGRAM(s) Oral at bedtime  budesonide 160 MICROgram(s)/formoterol 4.5 MICROgram(s) Inhaler 2 Puff(s) Inhalation two times a day  calcium acetate 667 milliGRAM(s) Oral three times a day with meals  carvedilol 3.125 milliGRAM(s) Oral every 12 hours  epoetin brianna-epbx (RETACRIT) Injectable 5000 Unit(s) IV Push <User Schedule>  heparin  Infusion. 1300 Unit(s)/Hr (13 mL/Hr) IV Continuous <Continuous>  influenza  Vaccine (HIGH DOSE) 0.7 milliLiter(s) IntraMuscular once  levothyroxine 88 MICROGram(s) Oral daily  melatonin 3 milliGRAM(s) Oral at bedtime  montelukast 10 milliGRAM(s) Oral at bedtime  Nephro-leighann 1 Tablet(s) Oral daily  warfarin 5 milliGRAM(s) Oral daily    MEDICATIONS  (PRN):  acetaminophen     Tablet .. 650 milliGRAM(s) Oral every 6 hours PRN Temp greater or equal to 38C (100.4F), Mild Pain (1 - 3)  heparin   Injectable 6500 Unit(s) IV Push every 6 hours PRN For aPTT less than 40  heparin   Injectable 3000 Unit(s) IV Push every 6 hours PRN For aPTT between 40 - 57  lidocaine/prilocaine Cream 1 Application(s) Topical <User Schedule> PRN numbing of AV fistula site pre-cannulation      RADIOLOGY & ADDITIONAL TESTS:      ACC: [id number] [facility]                        PROCEDURE DATE:  12/10/2022      COMPARISON: CT chest 2/12/2022.    FINDINGS:    PULMONARY ANGIOGRAM:  No pulmonary embolism.    LUNGS/AIRWAYS/PLEURA: No endobronchial lesion. Low lung volumes and image   acquisition during exhalation, which probably accounts for symmetric   groundglass in the posterior aspect of the upper lobes. Mild bilateral   interlobular septal thickening. Unchanged small nodule along the   horizontal fissure, likely an intrapulmonary lymph node. Small pleural   effusions.    LYMPH NODES/MEDIASTINUM: No enlarged lymph nodes.    HEART/VASCULATURE: Enlarged heart. No pericardial effusion. Coronary   artery calcifications. Normal caliber aorta.    UPPER ABDOMEN: Cholelithiasis. Cysts inthe left hepatic lobe. Bilateral   renal atrophy.    BONES/SOFT TISSUES: Unchanged L1 compression fracture resulting in severe   loss of height.      IMPRESSION:    No pulmonary embolism.    Mild pulmonary edema and small pleural effusions.        ACC: [id number] EXAM:  US DPLX LWR EXT VEINS COMPL BI                          PROCEDURE DATE:  12/10/2022          INTERPRETATION:  CLINICAL INFORMATION: Leg swelling    COMPARISON: There are no prior studies available for comparison.    TECHNIQUE: Duplex sonography of the BILATERAL LOWER extremity veins with   color and spectral Doppler, with and without compression.    FINDINGS:    RIGHT:  Normal compressibility of the RIGHT common femoral, femoral and popliteal   veins.  DVT is noted in the right soleal vein.    LEFT:  Normal compressibility of the LEFT common femoral, femoral and popliteal   veins.  Doppler examination shows normal spontaneous and phasic flow.  No LEFT calf vein thrombosis is detected.    IMPRESSION:  DVT is noted in theright soleal vein.    No evidence of deep venous thrombosis in the left lower extremity.

## 2022-12-18 NOTE — PROGRESS NOTE ADULT - ASSESSMENT
88 yo male, who has a pmh/o CAD, atrial fibrillation, HTN, HLD admitted for:       # Syncope secondary vasovagal event in setting of HD Tx  followed by ETOH use   - elevated troponin in the setting of HD, no chest pain   - orthostatics noted, no drop in BP  but had raise in HR   - Cardiology signed off    # Chronic afib   - Elevated CHADsVasc score   - As patient with new DVT and atrial fibrillation -  medical team decided for anticoagulation.    - C/w  IV heparin/coumadin with goal INR 2-3  - INR sub Tx this am but trending up   - Coumadin ordered   - trend INr daily     # Chronic hypoxemic respiratory failure -  multifactorail in the setting of severe pulmonary HTN / COPD  - Needs pulmonary follow up o/p  - CTA: neg for PE  - per daughter,  pt has concentrator/home o2      # Elevated troponin/demand ischemia  - due to increased demand,  in settings of syncope   - cont to trend  - EKG with rate controlled afib, PVC      # DVT  right soleal vein  - c/w Coumadin  / heparin bridge  - goal INR 2-3  - Tx todday, D/c heparin If INR tx tomorrow in am   - previous team discussed A/C with dtr, bleeding in the past was mucosal in nature, They both agree with heparin with bridge to coumadin  - He has never has a life threatening bleed per patient or dtr  - Monitor for bleeding     #COPD, oxygen dependent  - c/w supplemental oxygen  - c/w montelukast    #ESRD  - c/w calcium acetate  - c/w nephrovite  - HD as per renal team    #Anemia of chronic disease  #Thrombocytopenia  monitor h/h on cbc    #Protein calorie malnutrition  - Nutrition consult    #HTN/HLD  c/w statin  DASH/TLC diet  c/w carvedilol    Dispo; Monitor INR, needs 2 consecutive therapeutic INRs before dc. Plan to D/c back to [facility]

## 2022-12-18 NOTE — PROGRESS NOTE ADULT - SUBJECTIVE AND OBJECTIVE BOX
CC: Syncope and collapse, elevated troponin (14 Dec 2022 16:24)    HPI:  86 yo male with a pmh/o afib no longer on ASA or AC due to bleeding (AVF bleeding, LE hematoma post cath due to pseudoaneurysm), s/p R CEA in 2012, pulmonary HTN, hypothyroidism, emphysema on home O2 2L, chronic back pain with compression fracture, ESRD on HD, HLD, CAD s/p unsuccessful PCI in 2018, Cardiomyopathy, HTN, Oligouria, who presents from home, BIBA after being found on floor. Pt is a poor historian, collateral obtained by daughter via telephone [phone]. Pt lives with daughter who is currently out of state. Pt was seen on camera monitor lying on floor (daughter set up "nanny cam"). Neighbors, EMT and RN, came to home and called EMS. Pt states he last remembers drinking two shots of whiskey and then awoke on floor with neighbors looking at him.     Pt states he currently "feels fine" and denies every having or currently having chest pain, palpitations, nausea, claudication, diaphoresis, left arm pain, vomiting, incontinence, tongue biting, headache, parasthesias, changes in vision/speech/gait/hearing/MSK, fever, chills, sob, cough, diarrhea/constipation, dysuria.     ED course: Pt arrived to ED AAOx3 with VSS. Noted to have elevated troponin with PVC on monitor and rate controlled atrial fibrillation. CT head and c/spine w/o acute findings. ASA ordered and pt admitted to medicine. On exam pt declined aspirin as states it causes him to bleed. Daughter also endorses that despite atrial fibrillation, pt has been taken off all AC and antiplt due to bleeding episodes requiring blood transfusions. ASA discontinued. Per daughter, cardio- Dr. Pugh, nephrology Dr. Cordon     INTERVAL HPI/ OVERNIGHT EVENTS:  Pt was seen and examined,  reports no SOB, has bleeding from skin tears x 2, now improved, dressings applied. D/w RN     Vital Signs Last 24 Hrs  T(C): 36.3 (18 Dec 2022 17:10), Max: 36.8 (18 Dec 2022 08:25)  T(F): 97.4 (18 Dec 2022 17:10), Max: 98.2 (18 Dec 2022 08:25)  HR: 82 (18 Dec 2022 17:10) (82 - 96)  BP: 105/57 (18 Dec 2022 17:10) (104/69 - 117/56)  RR: 18 (18 Dec 2022 17:10) (18 - 18)  SpO2: 96% (18 Dec 2022 17:10) (94% - 96%)    Parameters below as of 18 Dec 2022 17:10  Patient On (Oxygen Delivery Method): nasal cannula  O2 Flow (L/min): 2        REVIEW OF SYSTEMS:  All other review of systems is negative unless indicated above.      PHYSICAL EXAM:  General: Well developed;  frail elderly male, in no acute distress  Eyes: EOMI; conjunctiva and sclera clear  Head: Normocephalic; atraumatic  Respiratory: No wheezes, rales or rhonchi, decreased BS at bases b/l   Cardiovascular: Irregular rate and rhythm. S1 and S2 Normal;   Gastrointestinal: Soft non-tender non-distended; Normal bowel sounds  Genitourinary: No  suprapubic  tenderness  Extremities: No edema  Vascular: Peripheral pulses palpable 2+ bilaterally  Neurological: Alert and oriented x3, non focal   Musculoskeletal: Normal muscle tone, without deformities  Psychiatric: Cooperative and appropriate    LABS:                        9.6    6.59  )-----------( 138      ( 18 Dec 2022 08:06 )             29.0     12-18    133<L>  |  96  |  50<H>  ----------------------------<  102<H>  4.6   |  30  |  5.45<H>    Ca    10.2<H>      18 Dec 2022 08:06    PT/INR - ( 18 Dec 2022 08:06 )   PT: 24.5 sec;   INR: 2.10 ratio         PTT - ( 18 Dec 2022 15:46 )  PTT:87.8 sec                            10.2   6.56  )-----------( 120      ( 17 Dec 2022 07:14 )             30.9     12-16    132<L>  |  94<L>  |  39<H>  ----------------------------<  114<H>  4.1   |  29  |  4.68<H>    Ca    9.8      16 Dec 2022 08:00  Phos  3.1     12-16    TPro  x   /  Alb  2.8<L>  /  TBili  x   /  DBili  x   /  AST  x   /  ALT  x   /  AlkPhos  x   12-16    LIVER FUNCTIONS - ( 16 Dec 2022 08:00 )  Alb: 2.8 g/dL / Pro: x     / ALK PHOS: x     / ALT: x     / AST: x     / GGT: x           PT/INR - ( 17 Dec 2022 07:14 )   PT: 20.5 sec;   INR: 1.76 ratio    PTT - ( 17 Dec 2022 07:14 )  PTT:163.5 sec                          10.3   6.47  )-----------( 126      ( 16 Dec 2022 08:00 )             31.0     12-16    132<L>  |  94<L>  |  39<H>  ----------------------------<  114<H>  4.1   |  29  |  4.68<H>    Ca    9.8      16 Dec 2022 08:00  Phos  3.1     12-16    TPro  x   /  Alb  2.8<L>  /  TBili  x   /  DBili  x   /  AST  x   /  ALT  x   /  AlkPhos  x   12-16    LIVER FUNCTIONS - ( 16 Dec 2022 08:00 )  Alb: 2.8 g/dL / Pro: x     / ALK PHOS: x     / ALT: x     / AST: x     / GGT: x         PT/INR - ( 16 Dec 2022 10:34 )   PT: 16.8 sec;   INR: 1.44 ratio    PTT - ( 16 Dec 2022 10:34 )  PTT:83.1 sec                              10.9   7.04  )-----------( 130      ( 15 Dec 2022 08:48 )             33.9     15 Dec 2022 08:48    131    |  98     |  23     ----------------------------<  87     4.0     |  25     |  3.87     Ca    9.7        15 Dec 2022 08:48  Phos  2.8       14 Dec 2022 09:57    TPro  7.4    /  Alb  3.0    /  TBili  0.8    /  DBili  x      /  AST  41     /  ALT  26     /  AlkPhos  140    15 Dec 2022 08:48    PT/INR - ( 15 Dec 2022 08:48 )   PT: 20.4 sec;   INR: 1.75 ratio    PTT - ( 15 Dec 2022 08:48 )  PTT:70.4 sec      LIVER FUNCTIONS - ( 15 Dec 2022 08:48 )  Alb: 3.0 g/dL / Pro: 7.4 gm/dL / ALK PHOS: 140 U/L / ALT: 26 U/L / AST: 41 U/L / GGT: x               MEDICATIONS  (STANDING):  atorvastatin 20 milliGRAM(s) Oral at bedtime  budesonide 160 MICROgram(s)/formoterol 4.5 MICROgram(s) Inhaler 2 Puff(s) Inhalation two times a day  calcium acetate 667 milliGRAM(s) Oral three times a day with meals  carvedilol 3.125 milliGRAM(s) Oral every 12 hours  epoetin brianna-epbx (RETACRIT) Injectable 5000 Unit(s) IV Push <User Schedule>  heparin  Infusion. 1300 Unit(s)/Hr (13 mL/Hr) IV Continuous <Continuous>  influenza  Vaccine (HIGH DOSE) 0.7 milliLiter(s) IntraMuscular once  levothyroxine 88 MICROGram(s) Oral daily  melatonin 3 milliGRAM(s) Oral at bedtime  montelukast 10 milliGRAM(s) Oral at bedtime  Nephro-leighann 1 Tablet(s) Oral daily  warfarin 5 milliGRAM(s) Oral daily    MEDICATIONS  (PRN):  acetaminophen     Tablet .. 650 milliGRAM(s) Oral every 6 hours PRN Temp greater or equal to 38C (100.4F), Mild Pain (1 - 3)  heparin   Injectable 6500 Unit(s) IV Push every 6 hours PRN For aPTT less than 40  heparin   Injectable 3000 Unit(s) IV Push every 6 hours PRN For aPTT between 40 - 57  lidocaine/prilocaine Cream 1 Application(s) Topical <User Schedule> PRN numbing of AV fistula site pre-cannulation      RADIOLOGY & ADDITIONAL TESTS:      ACC: [id number] EXAM:  CT ANGIO CHEST PULM [facility]                        PROCEDURE DATE:  12/10/2022      COMPARISON: CT chest 2/12/2022.    FINDINGS:    PULMONARY ANGIOGRAM:  No pulmonary embolism.    LUNGS/AIRWAYS/PLEURA: No endobronchial lesion. Low lung volumes and image   acquisition during exhalation, which probably accounts for symmetric   groundglass in the posterior aspect of the upper lobes. Mild bilateral   interlobular septal thickening. Unchanged small nodule along the   horizontal fissure, likely an intrapulmonary lymph node. Small pleural   effusions.    LYMPH NODES/MEDIASTINUM: No enlarged lymph nodes.    HEART/VASCULATURE: Enlarged heart. No pericardial effusion. Coronary   artery calcifications. Normal caliber aorta.    UPPER ABDOMEN: Cholelithiasis. Cysts inthe left hepatic lobe. Bilateral   renal atrophy.    BONES/SOFT TISSUES: Unchanged L1 compression fracture resulting in severe   loss of height.      IMPRESSION:    No pulmonary embolism.    Mild pulmonary edema and small pleural effusions.        ACC: [id number] EXAM:  US DPLX LWR EXT VEINS COMPL BI                          PROCEDURE DATE:  12/10/2022          INTERPRETATION:  CLINICAL INFORMATION: Leg swelling    COMPARISON: There are no prior studies available for comparison.    TECHNIQUE: Duplex sonography of the BILATERAL LOWER extremity veins with   color and spectral Doppler, with and without compression.    FINDINGS:    RIGHT:  Normal compressibility of the RIGHT common femoral, femoral and popliteal   veins.  DVT is noted in the right soleal vein.    LEFT:  Normal compressibility of the LEFT common femoral, femoral and popliteal   veins.  Doppler examination shows normal spontaneous and phasic flow.  No LEFT calf vein thrombosis is detected.    IMPRESSION:  DVT is noted in theright soleal vein.    No evidence of deep venous thrombosis in the left lower extremity.

## 2022-12-18 NOTE — PROGRESS NOTE ADULT - NUTRITIONAL ASSESSMENT
This patient has been assessed with a concern for Malnutrition and has been determined to have a diagnosis/diagnoses of Severe protein-calorie malnutrition.    This patient is being managed with:   Diet Regular-  DASH/TLC {Sodium & Cholesterol Restricted} (DASH)  For patients receiving Renal Replacement - No Protein Restr No Conc K No Conc Phos Low Sodium (RENAL)  Entered: Dec 10 2022  3:54AM    

## 2022-12-19 NOTE — DISCHARGE NOTE PROVIDER - CARE PROVIDER_API CALL
Ezequiel Gandara)  Internal Medicine  [address], [address]  [place] [address]  Phone: [phone]  Fax: [phone]  Follow Up Time:     Jose Cruz Saez)  Critical Care Medicine; Internal Medicine; Pulmonary Disease; Sleep Medicine  [address] [place] [address]  Phone: [phone]  Fax: [phone]  Follow Up Time:     Oleg Choudhury (DO)  Cardiology  [address] [place] [address]  Phone: [phone]  Fax: [phone]  Follow Up Time:

## 2022-12-19 NOTE — PROGRESS NOTE ADULT - SUBJECTIVE AND OBJECTIVE BOX
NEPHROLOGY INTERVAL HPI/OVERNIGHT EVENTS:  12-19-22 @ 11:43    12/19- seen at hd, multiple skin tears   12/16- seen on HD , no new events  12/14 seen at hd, for dc to [facility]  12/12 seen at hd earlier today.  cards input noted   HPI:  Pt is an 86 yo male with a pmh/o afib no longer on ASA or AC due to bleeding (AVF bleeding, LE hematoma post cath due to pseudoaneurysm), s/p R CEA in 2012, pulmonary HTN, hypothyroidism, emphysema on home O2 2L, chronic back pain with compression fracture, ESRD on HD, HLD, CAD s/p unsuccessful PCI in 2018, Cardiomyopathy, HTN, Oligouria, who presents from home, BIBA after being found on floor. Pt is a poor historian, collateral obtained by daughter via telephone [phone]. Pt lives with daughter who is currently out of state. Pt was seen on camera monitor lying on floor (daughter set up "nanny cam"). Neighbors, EMT and RN, came to home and called EMS. Pt states he last remembers drinking two shots of whiskey and then awoke on floor with neighbors looking at him.     Pt states he currently "feels fine" and denies every having or currently having chest pain, palpitations, nausea, claudication, diaphoresis, left arm pain, vomiting, incontinence, tongue biting, headache, parasthesias, changes in vision/speech/gait/hearing/MSK, fever, chills, sob, cough, diarrhea/constipation, dysuria.     ED course: Pt arrived to ED AAOx3 with VSS. Noted to have elevated troponin with PVC on monitor and rate controlled atrial fibrillation. CT head and c/spine w/o acute findings. ASA ordered and pt admitted to medicine. On exam pt declined aspirin as states it causes him to bleed. Daughter also endorses that despite atrial fibrillation, pt has been taken off all AC and antiplt due to bleeding episodes requiring blood transfusions. ASA discontinued. Per daughter, cardio- Dr. Pugh, nephrology Dr. Cordon (10 Dec 2022 04:02)      Patient is a 87y old  Male who presents with a chief complaint of Syncope and collapse, elevated troponin (12 Dec 2022 08:00)      MEDICATIONS  (STANDING):  atorvastatin 20 milliGRAM(s) Oral at bedtime  budesonide 160 MICROgram(s)/formoterol 4.5 MICROgram(s) Inhaler 2 Puff(s) Inhalation two times a day  calcium acetate 667 milliGRAM(s) Oral three times a day with meals  carvedilol 3.125 milliGRAM(s) Oral every 12 hours  epoetin brianna-epbx (RETACRIT) Injectable 5000 Unit(s) IV Push <User Schedule>  influenza  Vaccine (HIGH DOSE) 0.7 milliLiter(s) IntraMuscular once  levothyroxine 88 MICROGram(s) Oral daily  melatonin 3 milliGRAM(s) Oral at bedtime  montelukast 10 milliGRAM(s) Oral at bedtime  Nephro-leighann 1 Tablet(s) Oral daily  warfarin 5 milliGRAM(s) Oral daily    MEDICATIONS  (PRN):  acetaminophen     Tablet .. 650 milliGRAM(s) Oral every 6 hours PRN Temp greater or equal to 38C (100.4F), Mild Pain (1 - 3)  albuterol    90 MICROgram(s) HFA Inhaler 2 Puff(s) Inhalation every 6 hours PRN Shortness of Breath and/or Wheezing  lidocaine/prilocaine Cream 1 Application(s) Topical <User Schedule> PRN numbing of AV fistula site pre-cannulation      Allergies    No Known Allergies    Intolerances        I&O's Detail    19 Dec 2022 07:01  -  19 Dec 2022 11:43  --------------------------------------------------------  IN:  Total IN: 0 mL    OUT:    Other (mL): 2000 mL  Total OUT: 2000 mL    Total NET: -2000 mL          .    Patient was seen and evaluated on dialysis.   Patient is tolerating the procedure well.   Continue dialysis:   Dialyzer: revaclear 300 k protocol uf goal of 2kg   avf    Vital Signs Last 24 Hrs  T(C): 36.6 (19 Dec 2022 11:20), Max: 36.8 (19 Dec 2022 06:39)  T(F): 97.8 (19 Dec 2022 11:20), Max: 98.2 (19 Dec 2022 06:39)  HR: 86 (19 Dec 2022 11:20) (79 - 90)  BP: 117/61 (19 Dec 2022 11:20) (94/66 - 117/61)  BP(mean): --  RR: 18 (19 Dec 2022 11:20) (18 - 18)  SpO2: 97% (19 Dec 2022 06:39) (94% - 98%)    Parameters below as of 19 Dec 2022 11:20  Patient On (Oxygen Delivery Method): nasal cannula  O2 Flow (L/min): 2    Daily     Daily     PHYSICAL EXAM:  General: alert. awake NAD  HEENT: MMM  CV: s1s2 rrr  LUNGS: B/L CTA  EXT: no edema    LABS:                        9.7    7.10  )-----------( 141      ( 19 Dec 2022 07:39 )             28.9     12-19    134<L>  |  97  |  63<H>  ----------------------------<  167<H>  4.9   |  25  |  6.99<H>    Ca    10.2<H>      19 Dec 2022 07:39  Phos  4.6     12-19    TPro  x   /  Alb  2.9<L>  /  TBili  x   /  DBili  x   /  AST  x   /  ALT  x   /  AlkPhos  x   12-19    PT/INR - ( 19 Dec 2022 07:39 )   PT: 30.8 sec;   INR: 2.63 ratio         PTT - ( 19 Dec 2022 07:39 )  PTT:78.8 sec    Phosphorus Level, Serum: 4.6 mg/dL (12-19 @ 07:39)

## 2022-12-19 NOTE — DISCHARGE NOTE PROVIDER - NSDCCPCAREPLAN_GEN_ALL_CORE_FT
PRINCIPAL DISCHARGE DIAGNOSIS  Diagnosis: Syncope  Assessment and Plan of Treatment: outpatient follow up with cardio and pulmonary doctors after      SECONDARY DISCHARGE DIAGNOSES  Diagnosis: DVT, lower extremity  Assessment and Plan of Treatment: continue coumadin  outpatient heme/onc follow up for workup after rehab    Diagnosis: Elevated troponin  Assessment and Plan of Treatment:

## 2022-12-19 NOTE — DISCHARGE NOTE NURSING/CASE MANAGEMENT/SOCIAL WORK - PATIENT PORTAL LINK FT
You can access the FollowMyHealth Patient Portal offered by [facility] by registering at the following website: [email or web address]. By joining [facility]’s FollowMyHealth portal, you will also be able to view your health information using other applications (apps) compatible with our system.

## 2022-12-19 NOTE — DISCHARGE NOTE PROVIDER - HOSPITAL COURSE
pcp - chandok  renal - jose cruz    HPI:  88 yo male with a pmh/o afib no longer on ASA or AC due to bleeding (AVF bleeding, LE hematoma post cath due to pseudoaneurysm), s/p R CEA in 2012, pulmonary HTN, hypothyroidism, emphysema on home O2 2L, chronic back pain with compression fracture, ESRD on HD, HLD, CAD s/p unsuccessful PCI in 2018, Cardiomyopathy, HTN, Oligouria, who presents from home, BIBA after being found on floor. Pt is a poor historian, collateral obtained by daughter via telephone [phone]. Pt lives with daughter who is currently out of state. Pt was seen on camera monitor lying on floor (daughter set up "nanny cam"). Neighbors, EMT and RN, came to home and called EMS. Pt states he last remembers drinking two shots of whiskey and then awoke on floor with neighbors looking at him.     Pt states he currently "feels fine" and denies every having or currently having chest pain, palpitations, nausea, claudication, diaphoresis, left arm pain, vomiting, incontinence, tongue biting, headache, parasthesias, changes in vision/speech/gait/hearing/MSK, fever, chills, sob, cough, diarrhea/constipation, dysuria.     ED course: Pt arrived to ED AAOx3 with VSS. Noted to have elevated troponin with PVC on monitor and rate controlled atrial fibrillation. CT head and c/spine w/o acute findings. ASA ordered and pt admitted to medicine. On exam pt declined aspirin as states it causes him to bleed. Daughter also endorses that despite atrial fibrillation, pt has been taken off all AC and antiplt due to bleeding episodes requiring blood transfusions. ASA discontinued. Per daughter, cardio- Dr. Pugh, nephrology Dr. Cordon     pt admitted and cleared by cardio for syncope. likely 2/2 vasovagal post HD in setting of ETOH.  pt noted to have acute dvt and started on heparin drip and bridged to coumadin.  course. pt now agreeable to [facility]. PE workup negative.     Vital Signs Last 24 Hrs  T(C): 36.6 (19 Dec 2022 11:20), Max: 36.8 (19 Dec 2022 06:39)  T(F): 97.8 (19 Dec 2022 11:20), Max: 98.2 (19 Dec 2022 06:39)  HR: 86 (19 Dec 2022 11:20) (79 - 90)  BP: 117/61 (19 Dec 2022 11:20) (94/66 - 117/61)  BP(mean): --  RR: 18 (19 Dec 2022 11:20) (18 - 18)  SpO2: 97% (19 Dec 2022 06:39) (94% - 98%)    Parameters below as of 19 Dec 2022 11:20  Patient On (Oxygen Delivery Method): nasal cannula  O2 Flow (L/min): 2    ACC: [id number] EXAM:  CT ANGIO CHEST PULM [facility]                        PROCEDURE DATE:  12/10/2022    COMPARISON: CT chest 2/12/2022.  IMPRESSION:  No pulmonary embolism.  Mild pulmonary edema and small pleural effusions.    ACC: [id number] EXAM:  US DPLX LWR EXT VEINS COMPL BI                        PROCEDURE DATE:  12/10/2022    IMPRESSION:  DVT is noted in theright soleal vein.    No evidence of deep venous thrombosis in the left lower extremity.    # Syncope secondary vasovagal event in setting of HD Tx  followed by ETOH use   - elevated troponin in the setting of HD, no chest pain . stable from cardio perspective    # Chronic afib   - Elevated CHADsVasc score   - As patient with new DVT and atrial fibrillation -  medical team decided for anticoagulation.    - C/w  IV heparin/coumadin with goal INR 2-3  - INR therapeutic x 2 would continue INR dialy til stable and then as per protocol at [facility]    # Chronic hypoxemic respiratory failure -  multifactorail in the setting of mod pulmonary HTN / COPD  - Needs pulmonary follow up o/p  - CTA: neg for PE  - per daughter,  pt has concentrator/home o2    # Elevated troponin/demand ischemia  - due to increased demand,  in settings of syncope  - cardi input noted    # DVT  right soleal vein  - c/w Coumadin  / heparin bridge - > INR now therpeutic X 2 continue coumadin upon dc  - goal INR 2-3  - previous team discussed A/C with dtr, bleeding in the past was mucosal in nature, They both agree with heparin with bridge to coumadin  - He has never has a life threatening bleed per patient or dtr    #COPD, oxygen dependent  - c/w supplemental oxygen  - c/w montelukast    #ESRD  - c/w calcium acetate  - c/w nephrovite  - HD as per renal team    #Anemia of chronic disease  #Thrombocytopenia  monitor h/h on cbc    #Protein calorie malnutrition  - Nutrition consult    #HTN/HLD  c/w statin  DASH/TLC diet  c/w carvedilol      dc to [facility] time sepnt 39 mins  dtr kole updated and in agreement with dc plan

## 2022-12-19 NOTE — PROGRESS NOTE ADULT - ASSESSMENT
Pt is an 86 yo male, who has a pmh/o CAD, atrial fibrillation no longer on ASA or AC due to bleeding, HTN, HLD, who was BIBA after being found on floor at home and who is unable to recall events.   esrd on hd mwf  demand ischemia   soleal dvt on heparin/coumadin    Rec:  - esrd tolerating hd   - anemia gali with hd   - syncope likely etoh related     echo woth severe tr,  pul htn ef 55%  - soleal dvt heparin/ coumadin   - afib on no ac in past due to bleeding,     12/14 MK   - esrd tolerating hd, 1.5 uf with hd   - anemia gali with hd   - syncope likely etoh related     echo with severe tr,  pul htn ef 55%  - soleal dvt heparin/ coumadin   - afib on no ac in past due to bleeding,     12/16  seen on HD   tolerating treatment, no new events  VVS    12/19 MK   - esrd tolerating hd,   - anema gali with hd   - soleal dvt on therapeutic coumadin   - disp [facility] with hd

## 2022-12-19 NOTE — DISCHARGE NOTE PROVIDER - DETAILS OF MALNUTRITION DIAGNOSIS/DIAGNOSES
This patient has been assessed with a concern for Malnutrition and was treated during this hospitalization for the following Nutrition diagnosis/diagnoses:     -  12/12/2022: Severe protein-calorie malnutrition

## 2022-12-19 NOTE — PROGRESS NOTE ADULT - PROVIDER SPECIALTY LIST ADULT
Nephrology
Cardiology
Hospitalist
Nephrology
Cardiology
Hospitalist
Nephrology

## 2022-12-19 NOTE — DISCHARGE NOTE PROVIDER - NSDCMRMEDTOKEN_GEN_ALL_CORE_FT
acetaminophen 325 mg oral tablet: 2 tab(s) orally 2 times a day, As Needed for pain  albuterol 90 mcg/inh inhalation aerosol: 2 puff(s) inhaled every 6 hours, As needed, Shortness of Breath and/or Wheezing  budesonide-formoterol 160 mcg-4.5 mcg/inh inhalation aerosol: 2 puff(s) inhaled 2 times a day  CALCIUM ACETATE 667 MG TABLET: GIVE 1 TABLET BY MOUTH WITH MEALS FOR PHOSPHAYE BINDER  carvedilol 3.125 mg oral tablet: 1 tab(s) orally every 12 hours  Lipitor 20 mg oral tablet: 1 tab(s) orally once a day  Melatonin 3 mg oral tablet: 1 tab(s) orally once a day (at bedtime), As Needed  montelukast 10 mg oral tablet: 1 tab(s) orally once a day (at bedtime)  Nephro-Juan Ramon oral tablet: 1 tab(s) orally once a day  Synthroid 88 mcg (0.088 mg) oral tablet: 1 tab(s) orally once a day  warfarin 5 mg oral tablet: 1 tab(s) orally once a day

## 2022-12-19 NOTE — DISCHARGE NOTE PROVIDER - CARE PROVIDERS DIRECT ADDRESSES
,DirectAddress_Unknown,DirectAddress_Unknown,[email or web address]

## 2022-12-19 NOTE — DISCHARGE NOTE NURSING/CASE MANAGEMENT/SOCIAL WORK - NSDCPEFALRISK_GEN_ALL_CORE
For information on Fall & Injury Prevention, visit: [email or web address] OR  [email or web address] OR  https://www.cdc.gov/steadi/patient.html

## 2022-12-19 NOTE — PROGRESS NOTE ADULT - REASON FOR ADMISSION
Syncope and collapse, elevated troponin

## 2022-12-19 NOTE — DISCHARGE NOTE PROVIDER - NSDCFUSCHEDAPPT_GEN_ALL_CORE_FT
Gucci Pérez Physician [facility]  VASCULAR [address]  Scheduled Appointment: 01/25/2023

## 2022-12-19 NOTE — DISCHARGE NOTE PROVIDER - PROVIDER TOKENS
PROVIDER:[TOKEN:[[id number]]],PROVIDER:[TOKEN:[[id number]]],PROVIDER:[TOKEN:[[id number]]]

## 2022-12-27 NOTE — CDI QUERY NOTE - NSCDIOTHERTXTBX_GEN_ALL_CORE_HH
Clarification linking wording "in the setting"   Approved linking words are : "Related to" , "associated to", "with",  "due to"      -Syncope due to  vasovagal event associated with  HD Tx  followed by ETOH use   -Other please specify  -Unable to determine      Chart documentation      DC summary-pt admitted and cleared by cardio for syncope. likely 2/2 vasovagal post HD in setting of ETOH.  pt noted to have acute dvt and started on heparin drip and bridged to coumadin.  course. pt now agreeable to [facility]. PE workup negative.     # Syncope secondary vasovagal event in setting of HD Tx  followed by ETOH use

## 2023-01-01 ENCOUNTER — TRANSCRIPTION ENCOUNTER (OUTPATIENT)
Age: 88
End: 2023-01-01

## 2023-01-01 ENCOUNTER — EMERGENCY (EMERGENCY)
Facility: HOSPITAL | Age: 88
LOS: 0 days | Discharge: HOME | End: 2023-05-06
Attending: EMERGENCY MEDICINE
Payer: MEDICARE

## 2023-01-01 ENCOUNTER — INPATIENT (INPATIENT)
Facility: HOSPITAL | Age: 88
LOS: 0 days | DRG: 871 | End: 2023-05-17
Attending: SURGERY | Admitting: SURGERY
Payer: MEDICARE

## 2023-01-01 ENCOUNTER — APPOINTMENT (OUTPATIENT)
Dept: VASCULAR SURGERY | Facility: CLINIC | Age: 88
End: 2023-01-01

## 2023-01-01 ENCOUNTER — INPATIENT (INPATIENT)
Facility: HOSPITAL | Age: 88
LOS: 3 days | Discharge: NURSING, REHAB OR LONG-TERM CARE | DRG: 551 | End: 2023-05-15
Attending: INTERNAL MEDICINE | Admitting: FAMILY MEDICINE
Payer: MEDICARE

## 2023-01-01 VITALS
TEMPERATURE: 98 F | HEART RATE: 80 BPM | DIASTOLIC BLOOD PRESSURE: 63 MMHG | RESPIRATION RATE: 16 BRPM | OXYGEN SATURATION: 96 % | SYSTOLIC BLOOD PRESSURE: 112 MMHG

## 2023-01-01 VITALS
HEART RATE: 112 BPM | SYSTOLIC BLOOD PRESSURE: 87 MMHG | OXYGEN SATURATION: 85 % | RESPIRATION RATE: 20 BRPM | DIASTOLIC BLOOD PRESSURE: 52 MMHG

## 2023-01-01 VITALS
HEART RATE: 94 BPM | RESPIRATION RATE: 16 BRPM | DIASTOLIC BLOOD PRESSURE: 60 MMHG | SYSTOLIC BLOOD PRESSURE: 107 MMHG | OXYGEN SATURATION: 94 % | TEMPERATURE: 98 F

## 2023-01-01 VITALS — WEIGHT: 149.91 LBS | HEIGHT: 70 IN

## 2023-01-01 VITALS
DIASTOLIC BLOOD PRESSURE: 63 MMHG | OXYGEN SATURATION: 95 % | RESPIRATION RATE: 18 BRPM | HEIGHT: 70 IN | SYSTOLIC BLOOD PRESSURE: 106 MMHG | TEMPERATURE: 99 F | HEART RATE: 74 BPM | WEIGHT: 164.91 LBS

## 2023-01-01 VITALS — HEIGHT: 70 IN | WEIGHT: 179.9 LBS

## 2023-01-01 DIAGNOSIS — S32.019A UNSPECIFIED FRACTURE OF FIRST LUMBAR VERTEBRA, INITIAL ENCOUNTER FOR CLOSED FRACTURE: ICD-10-CM

## 2023-01-01 DIAGNOSIS — I25.10 ATHEROSCLEROTIC HEART DISEASE OF NATIVE CORONARY ARTERY WITHOUT ANGINA PECTORIS: ICD-10-CM

## 2023-01-01 DIAGNOSIS — Z99.81 DEPENDENCE ON SUPPLEMENTAL OXYGEN: ICD-10-CM
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
## 2023-01-01 DIAGNOSIS — I13.2 HYPERTENSIVE HEART AND CHRONIC KIDNEY DISEASE WITH HEART FAILURE AND WITH STAGE 5 CHRONIC KIDNEY DISEASE, OR END STAGE RENAL DISEASE: ICD-10-CM

## 2023-01-01 DIAGNOSIS — S32.000A WEDGE COMPRESSION FRACTURE OF UNSPECIFIED LUMBAR VERTEBRA, INITIAL ENCOUNTER FOR CLOSED FRACTURE: ICD-10-CM
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
## 2023-01-01 DIAGNOSIS — E03.9 HYPOTHYROIDISM, UNSPECIFIED: ICD-10-CM

## 2023-01-01 DIAGNOSIS — I48.91 UNSPECIFIED ATRIAL FIBRILLATION: ICD-10-CM

## 2023-01-01 DIAGNOSIS — Z79.01 LONG TERM (CURRENT) USE OF ANTICOAGULANTS: ICD-10-CM

## 2023-01-01 DIAGNOSIS — G89.29 OTHER CHRONIC PAIN: ICD-10-CM

## 2023-01-01 DIAGNOSIS — J43.9 EMPHYSEMA, UNSPECIFIED: ICD-10-CM

## 2023-01-01 DIAGNOSIS — M54.89 OTHER DORSALGIA: ICD-10-CM

## 2023-01-01 DIAGNOSIS — R09.89 OTHER SPECIFIED SYMPTOMS AND SIGNS INVOLVING THE CIRCULATORY AND RESPIRATORY SYSTEMS: ICD-10-CM

## 2023-01-01 DIAGNOSIS — N18.6 END STAGE RENAL DISEASE: ICD-10-CM

## 2023-01-01 DIAGNOSIS — Z99.2 DEPENDENCE ON RENAL DIALYSIS: ICD-10-CM
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
## 2023-01-01 DIAGNOSIS — I50.9 HEART FAILURE, UNSPECIFIED: ICD-10-CM

## 2023-01-01 DIAGNOSIS — M25.552 PAIN IN LEFT HIP: ICD-10-CM

## 2023-01-01 DIAGNOSIS — I77.0 ARTERIOVENOUS FISTULA, ACQUIRED: Chronic | ICD-10-CM

## 2023-01-01 DIAGNOSIS — Z23 ENCOUNTER FOR IMMUNIZATION: ICD-10-CM

## 2023-01-01 DIAGNOSIS — R50.9 FEVER, UNSPECIFIED: ICD-10-CM

## 2023-01-01 DIAGNOSIS — I42.9 CARDIOMYOPATHY, UNSPECIFIED: ICD-10-CM

## 2023-01-01 DIAGNOSIS — M54.9 DORSALGIA, UNSPECIFIED: ICD-10-CM
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
## 2023-01-01 DIAGNOSIS — E78.5 HYPERLIPIDEMIA, UNSPECIFIED: ICD-10-CM

## 2023-01-01 LAB
ACANTHOCYTES BLD QL SMEAR: SLIGHT — SIGNIFICANT CHANGE UP
ADD ON TEST-SPECIMEN IN LAB: SIGNIFICANT CHANGE UP
ALBUMIN SERPL ELPH-MCNC: 2.6 G/DL — LOW (ref 3.3–5)
ALBUMIN SERPL ELPH-MCNC: 2.6 G/DL — LOW (ref 3.3–5)
ALBUMIN SERPL ELPH-MCNC: 2.8 G/DL — LOW (ref 3.3–5)
ALBUMIN SERPL ELPH-MCNC: 2.9 G/DL — LOW (ref 3.3–5)
ALBUMIN SERPL ELPH-MCNC: 3.2 G/DL — LOW (ref 3.3–5)
ALLERGY+IMMUNOLOGY DIAG STUDY NOTE: SIGNIFICANT CHANGE UP
ALP SERPL-CCNC: 101 U/L — SIGNIFICANT CHANGE UP (ref 40–120)
ALP SERPL-CCNC: 102 U/L — SIGNIFICANT CHANGE UP (ref 40–120)
ALP SERPL-CCNC: 115 U/L — SIGNIFICANT CHANGE UP (ref 40–120)
ALP SERPL-CCNC: 135 U/L — HIGH (ref 40–120)
ALP SERPL-CCNC: 141 U/L — HIGH (ref 40–120)
ALT FLD-CCNC: 27 U/L — SIGNIFICANT CHANGE UP (ref 12–78)
ALT FLD-CCNC: 30 U/L — SIGNIFICANT CHANGE UP (ref 12–78)
ALT FLD-CCNC: 31 U/L — SIGNIFICANT CHANGE UP (ref 12–78)
ALT FLD-CCNC: 33 U/L — SIGNIFICANT CHANGE UP (ref 12–78)
ALT FLD-CCNC: 33 U/L — SIGNIFICANT CHANGE UP (ref 12–78)
ANION GAP SERPL CALC-SCNC: 10 MMOL/L — SIGNIFICANT CHANGE UP (ref 5–17)
ANION GAP SERPL CALC-SCNC: 11 MMOL/L — SIGNIFICANT CHANGE UP (ref 5–17)
ANION GAP SERPL CALC-SCNC: 13 MMOL/L — SIGNIFICANT CHANGE UP (ref 5–17)
ANION GAP SERPL CALC-SCNC: 5 MMOL/L — SIGNIFICANT CHANGE UP (ref 5–17)
ANION GAP SERPL CALC-SCNC: 5 MMOL/L — SIGNIFICANT CHANGE UP (ref 5–17)
ANION GAP SERPL CALC-SCNC: 7 MMOL/L — SIGNIFICANT CHANGE UP (ref 5–17)
ANION GAP SERPL CALC-SCNC: 8 MMOL/L — SIGNIFICANT CHANGE UP (ref 5–17)
ANISOCYTOSIS BLD QL: SIGNIFICANT CHANGE UP
ANISOCYTOSIS BLD QL: SLIGHT — SIGNIFICANT CHANGE UP
ANISOCYTOSIS BLD QL: SLIGHT — SIGNIFICANT CHANGE UP
APTT BLD: 39.1 SEC — HIGH (ref 27.5–35.5)
APTT BLD: 46.7 SEC — HIGH (ref 27.5–35.5)
APTT BLD: 46.7 SEC — HIGH (ref 27.5–35.5)
AST SERPL-CCNC: 43 U/L — HIGH (ref 15–37)
AST SERPL-CCNC: 49 U/L — HIGH (ref 15–37)
AST SERPL-CCNC: 63 U/L — HIGH (ref 15–37)
AST SERPL-CCNC: 65 U/L — HIGH (ref 15–37)
AST SERPL-CCNC: 68 U/L — HIGH (ref 15–37)
BASE EXCESS BLDA CALC-SCNC: -4.9 MMOL/L — LOW (ref -2–3)
BASOPHILS # BLD AUTO: 0 K/UL — SIGNIFICANT CHANGE UP (ref 0–0.2)
BASOPHILS # BLD AUTO: 0.05 K/UL — SIGNIFICANT CHANGE UP (ref 0–0.2)
BASOPHILS # BLD AUTO: 0.06 K/UL — SIGNIFICANT CHANGE UP (ref 0–0.2)
BASOPHILS NFR BLD AUTO: 0 % — SIGNIFICANT CHANGE UP (ref 0–2)
BASOPHILS NFR BLD AUTO: 0.7 % — SIGNIFICANT CHANGE UP (ref 0–2)
BASOPHILS NFR BLD AUTO: 0.8 % — SIGNIFICANT CHANGE UP (ref 0–2)
BILIRUB DIRECT SERPL-MCNC: 0.7 MG/DL — HIGH (ref 0–0.3)
BILIRUB INDIRECT FLD-MCNC: 0.9 MG/DL — SIGNIFICANT CHANGE UP (ref 0.2–1)
BILIRUB SERPL-MCNC: 1.5 MG/DL — HIGH (ref 0.2–1.2)
BILIRUB SERPL-MCNC: 1.6 MG/DL — HIGH (ref 0.2–1.2)
BILIRUB SERPL-MCNC: 1.7 MG/DL — HIGH (ref 0.2–1.2)
BILIRUB SERPL-MCNC: 1.8 MG/DL — HIGH (ref 0.2–1.2)
BILIRUB SERPL-MCNC: 2.1 MG/DL — HIGH (ref 0.2–1.2)
BILIRUB SERPL-MCNC: 2.8 MG/DL — HIGH (ref 0.2–1.2)
BIZARRE PLATELETS BLD QL SMEAR: PRESENT — SIGNIFICANT CHANGE UP
BLD GP AB SCN SERPL QL: SIGNIFICANT CHANGE UP
BLOOD GAS COMMENTS ARTERIAL: SIGNIFICANT CHANGE UP
BUN SERPL-MCNC: 20 MG/DL — SIGNIFICANT CHANGE UP (ref 7–23)
BUN SERPL-MCNC: 20 MG/DL — SIGNIFICANT CHANGE UP (ref 7–23)
BUN SERPL-MCNC: 25 MG/DL — HIGH (ref 7–23)
BUN SERPL-MCNC: 30 MG/DL — HIGH (ref 7–23)
BUN SERPL-MCNC: 32 MG/DL — HIGH (ref 7–23)
BUN SERPL-MCNC: 34 MG/DL — HIGH (ref 7–23)
BUN SERPL-MCNC: 36 MG/DL — HIGH (ref 7–23)
BUN SERPL-MCNC: 40 MG/DL — HIGH (ref 7–23)
BUN SERPL-MCNC: 48 MG/DL — HIGH (ref 7–23)
BURR CELLS BLD QL SMEAR: PRESENT — SIGNIFICANT CHANGE UP
CA-I BLD-SCNC: 1.12 MMOL/L — LOW (ref 1.15–1.33)
CALCIUM SERPL-MCNC: 8.6 MG/DL — SIGNIFICANT CHANGE UP (ref 8.5–10.1)
CALCIUM SERPL-MCNC: 8.9 MG/DL — SIGNIFICANT CHANGE UP (ref 8.5–10.1)
CALCIUM SERPL-MCNC: 9.1 MG/DL — SIGNIFICANT CHANGE UP (ref 8.5–10.1)
CALCIUM SERPL-MCNC: 9.1 MG/DL — SIGNIFICANT CHANGE UP (ref 8.5–10.1)
CALCIUM SERPL-MCNC: 9.2 MG/DL — SIGNIFICANT CHANGE UP (ref 8.5–10.1)
CALCIUM SERPL-MCNC: 9.2 MG/DL — SIGNIFICANT CHANGE UP (ref 8.5–10.1)
CALCIUM SERPL-MCNC: 9.3 MG/DL — SIGNIFICANT CHANGE UP (ref 8.5–10.1)
CALCIUM SERPL-MCNC: 9.3 MG/DL — SIGNIFICANT CHANGE UP (ref 8.5–10.1)
CALCIUM SERPL-MCNC: 9.4 MG/DL — SIGNIFICANT CHANGE UP (ref 8.5–10.1)
CHLORIDE SERPL-SCNC: 100 MMOL/L — SIGNIFICANT CHANGE UP (ref 96–108)
CHLORIDE SERPL-SCNC: 102 MMOL/L — SIGNIFICANT CHANGE UP (ref 96–108)
CHLORIDE SERPL-SCNC: 102 MMOL/L — SIGNIFICANT CHANGE UP (ref 96–108)
CHLORIDE SERPL-SCNC: 103 MMOL/L — SIGNIFICANT CHANGE UP (ref 96–108)
CHLORIDE SERPL-SCNC: 94 MMOL/L — LOW (ref 96–108)
CHLORIDE SERPL-SCNC: 97 MMOL/L — SIGNIFICANT CHANGE UP (ref 96–108)
CHLORIDE SERPL-SCNC: 99 MMOL/L — SIGNIFICANT CHANGE UP (ref 96–108)
CK SERPL-CCNC: 61 U/L — SIGNIFICANT CHANGE UP (ref 26–308)
CO2 SERPL-SCNC: 20 MMOL/L — LOW (ref 22–31)
CO2 SERPL-SCNC: 23 MMOL/L — SIGNIFICANT CHANGE UP (ref 22–31)
CO2 SERPL-SCNC: 26 MMOL/L — SIGNIFICANT CHANGE UP (ref 22–31)
CO2 SERPL-SCNC: 26 MMOL/L — SIGNIFICANT CHANGE UP (ref 22–31)
CO2 SERPL-SCNC: 27 MMOL/L — SIGNIFICANT CHANGE UP (ref 22–31)
CO2 SERPL-SCNC: 28 MMOL/L — SIGNIFICANT CHANGE UP (ref 22–31)
CO2 SERPL-SCNC: 28 MMOL/L — SIGNIFICANT CHANGE UP (ref 22–31)
CO2 SERPL-SCNC: 30 MMOL/L — SIGNIFICANT CHANGE UP (ref 22–31)
CO2 SERPL-SCNC: 30 MMOL/L — SIGNIFICANT CHANGE UP (ref 22–31)
CREAT SERPL-MCNC: 3.65 MG/DL — HIGH (ref 0.5–1.3)
CREAT SERPL-MCNC: 4.19 MG/DL — HIGH (ref 0.5–1.3)
CREAT SERPL-MCNC: 4.64 MG/DL — HIGH (ref 0.5–1.3)
CREAT SERPL-MCNC: 4.91 MG/DL — HIGH (ref 0.5–1.3)
CREAT SERPL-MCNC: 5.06 MG/DL — HIGH (ref 0.5–1.3)
CREAT SERPL-MCNC: 5.17 MG/DL — HIGH (ref 0.5–1.3)
CREAT SERPL-MCNC: 5.39 MG/DL — HIGH (ref 0.5–1.3)
CREAT SERPL-MCNC: 5.4 MG/DL — HIGH (ref 0.5–1.3)
CREAT SERPL-MCNC: 6.44 MG/DL — HIGH (ref 0.5–1.3)
DACRYOCYTES BLD QL SMEAR: SLIGHT — SIGNIFICANT CHANGE UP
DIR ANTIGLOB POLYSPECIFIC INTERPRETATION: SIGNIFICANT CHANGE UP
EGFR: 10 ML/MIN/1.73M2 — LOW
EGFR: 11 ML/MIN/1.73M2 — LOW
EGFR: 11 ML/MIN/1.73M2 — LOW
EGFR: 12 ML/MIN/1.73M2 — LOW
EGFR: 13 ML/MIN/1.73M2 — LOW
EGFR: 16 ML/MIN/1.73M2 — LOW
EGFR: 8 ML/MIN/1.73M2 — LOW
ELLIPTOCYTES BLD QL SMEAR: SLIGHT — SIGNIFICANT CHANGE UP
EOSINOPHIL # BLD AUTO: 0 K/UL — SIGNIFICANT CHANGE UP (ref 0–0.5)
EOSINOPHIL # BLD AUTO: 0.01 K/UL — SIGNIFICANT CHANGE UP (ref 0–0.5)
EOSINOPHIL # BLD AUTO: 0.06 K/UL — SIGNIFICANT CHANGE UP (ref 0–0.5)
EOSINOPHIL # BLD AUTO: 0.09 K/UL — SIGNIFICANT CHANGE UP (ref 0–0.5)
EOSINOPHIL # BLD AUTO: 0.12 K/UL — SIGNIFICANT CHANGE UP (ref 0–0.5)
EOSINOPHIL # BLD AUTO: 0.13 K/UL — SIGNIFICANT CHANGE UP (ref 0–0.5)
EOSINOPHIL # BLD AUTO: 0.17 K/UL — SIGNIFICANT CHANGE UP (ref 0–0.5)
EOSINOPHIL NFR BLD AUTO: 0 % — SIGNIFICANT CHANGE UP (ref 0–6)
EOSINOPHIL NFR BLD AUTO: 0.1 % — SIGNIFICANT CHANGE UP (ref 0–6)
EOSINOPHIL NFR BLD AUTO: 0.8 % — SIGNIFICANT CHANGE UP (ref 0–6)
EOSINOPHIL NFR BLD AUTO: 1.5 % — SIGNIFICANT CHANGE UP (ref 0–6)
EOSINOPHIL NFR BLD AUTO: 1.7 % — SIGNIFICANT CHANGE UP (ref 0–6)
EOSINOPHIL NFR BLD AUTO: 1.8 % — SIGNIFICANT CHANGE UP (ref 0–6)
EOSINOPHIL NFR BLD AUTO: 2.4 % — SIGNIFICANT CHANGE UP (ref 0–6)
GAS PNL BLDA: SIGNIFICANT CHANGE UP
GLUCOSE SERPL-MCNC: 146 MG/DL — HIGH (ref 70–99)
GLUCOSE SERPL-MCNC: 58 MG/DL — LOW (ref 70–99)
GLUCOSE SERPL-MCNC: 87 MG/DL — SIGNIFICANT CHANGE UP (ref 70–99)
GLUCOSE SERPL-MCNC: 90 MG/DL — SIGNIFICANT CHANGE UP (ref 70–99)
GLUCOSE SERPL-MCNC: 91 MG/DL — SIGNIFICANT CHANGE UP (ref 70–99)
GLUCOSE SERPL-MCNC: 94 MG/DL — SIGNIFICANT CHANGE UP (ref 70–99)
GLUCOSE SERPL-MCNC: 95 MG/DL — SIGNIFICANT CHANGE UP (ref 70–99)
GRAM STN FLD: SIGNIFICANT CHANGE UP
HAV IGM SER-ACNC: SIGNIFICANT CHANGE UP
HAV IGM SER-ACNC: SIGNIFICANT CHANGE UP
HBV CORE IGM SER-ACNC: SIGNIFICANT CHANGE UP
HBV CORE IGM SER-ACNC: SIGNIFICANT CHANGE UP
HBV SURFACE AG SER-ACNC: SIGNIFICANT CHANGE UP
HBV SURFACE AG SER-ACNC: SIGNIFICANT CHANGE UP
HCO3 BLDA-SCNC: 20 MMOL/L — LOW (ref 21–28)
HCT VFR BLD CALC: 33 % — LOW (ref 39–50)
HCT VFR BLD CALC: 33.2 % — LOW (ref 39–50)
HCT VFR BLD CALC: 34.5 % — LOW (ref 39–50)
HCT VFR BLD CALC: 34.5 % — LOW (ref 39–50)
HCT VFR BLD CALC: 34.7 % — LOW (ref 39–50)
HCT VFR BLD CALC: 35.1 % — LOW (ref 39–50)
HCT VFR BLD CALC: 36.2 % — LOW (ref 39–50)
HCV AB S/CO SERPL IA: 0.09 S/CO — SIGNIFICANT CHANGE UP (ref 0–0.99)
HCV AB S/CO SERPL IA: 0.11 S/CO — SIGNIFICANT CHANGE UP (ref 0–0.99)
HCV AB SERPL-IMP: SIGNIFICANT CHANGE UP
HCV AB SERPL-IMP: SIGNIFICANT CHANGE UP
HGB BLD-MCNC: 11.3 G/DL — LOW (ref 13–17)
HGB BLD-MCNC: 11.4 G/DL — LOW (ref 13–17)
HGB BLD-MCNC: 11.6 G/DL — LOW (ref 13–17)
HGB BLD-MCNC: 11.6 G/DL — LOW (ref 13–17)
HGB BLD-MCNC: 11.8 G/DL — LOW (ref 13–17)
HGB BLD-MCNC: 11.9 G/DL — LOW (ref 13–17)
HGB BLD-MCNC: 11.9 G/DL — LOW (ref 13–17)
IMM GRANULOCYTES NFR BLD AUTO: 0.1 % — SIGNIFICANT CHANGE UP (ref 0–0.9)
IMM GRANULOCYTES NFR BLD AUTO: 0.3 % — SIGNIFICANT CHANGE UP (ref 0–0.9)
IMM GRANULOCYTES NFR BLD AUTO: 0.4 % — SIGNIFICANT CHANGE UP (ref 0–0.9)
IMM GRANULOCYTES NFR BLD AUTO: 0.5 % — SIGNIFICANT CHANGE UP (ref 0–0.9)
IMM GRANULOCYTES NFR BLD AUTO: 0.6 % — SIGNIFICANT CHANGE UP (ref 0–0.9)
IMM GRANULOCYTES NFR BLD AUTO: 1.9 % — HIGH (ref 0–0.9)
INR BLD: 1.92 RATIO — HIGH (ref 0.88–1.16)
INR BLD: 2.15 RATIO — HIGH (ref 0.88–1.16)
INR BLD: 2.25 RATIO — HIGH (ref 0.88–1.16)
INR BLD: 2.72 RATIO — HIGH (ref 0.88–1.16)
INR BLD: 2.86 RATIO — HIGH (ref 0.88–1.16)
INR BLD: 3.07 RATIO — HIGH (ref 0.88–1.16)
INR BLD: 3.15 RATIO — HIGH (ref 0.88–1.16)
INR BLD: 3.2 RATIO — HIGH (ref 0.88–1.16)
LACTATE SERPL-SCNC: 3 MMOL/L — HIGH (ref 0.7–2)
LACTATE SERPL-SCNC: 3.6 MMOL/L — HIGH (ref 0.7–2)
LACTATE SERPL-SCNC: 3.6 MMOL/L — HIGH (ref 0.7–2)
LIDOCAIN IGE QN: 292 U/L — SIGNIFICANT CHANGE UP (ref 73–393)
LYMPHOCYTES # BLD AUTO: 0.48 K/UL — LOW (ref 1–3.3)
LYMPHOCYTES # BLD AUTO: 0.57 K/UL — LOW (ref 1–3.3)
LYMPHOCYTES # BLD AUTO: 0.6 K/UL — LOW (ref 1–3.3)
LYMPHOCYTES # BLD AUTO: 0.6 K/UL — LOW (ref 1–3.3)
LYMPHOCYTES # BLD AUTO: 0.65 K/UL — LOW (ref 1–3.3)
LYMPHOCYTES # BLD AUTO: 0.76 K/UL — LOW (ref 1–3.3)
LYMPHOCYTES # BLD AUTO: 1.01 K/UL — SIGNIFICANT CHANGE UP (ref 1–3.3)
LYMPHOCYTES # BLD AUTO: 10.5 % — LOW (ref 13–44)
LYMPHOCYTES # BLD AUTO: 3 % — LOW (ref 13–44)
LYMPHOCYTES # BLD AUTO: 6.3 % — LOW (ref 13–44)
LYMPHOCYTES # BLD AUTO: 6.7 % — LOW (ref 13–44)
LYMPHOCYTES # BLD AUTO: 8.4 % — LOW (ref 13–44)
LYMPHOCYTES # BLD AUTO: 9.2 % — LOW (ref 13–44)
LYMPHOCYTES # BLD AUTO: 9.8 % — LOW (ref 13–44)
MACROCYTES BLD QL: SIGNIFICANT CHANGE UP
MACROCYTES BLD QL: SIGNIFICANT CHANGE UP
MACROCYTES BLD QL: SLIGHT — SIGNIFICANT CHANGE UP
MAGNESIUM SERPL-MCNC: 1.8 MG/DL — SIGNIFICANT CHANGE UP (ref 1.6–2.6)
MAGNESIUM SERPL-MCNC: 2 MG/DL — SIGNIFICANT CHANGE UP (ref 1.6–2.6)
MAGNESIUM SERPL-MCNC: 2.2 MG/DL — SIGNIFICANT CHANGE UP (ref 1.6–2.6)
MANUAL SMEAR VERIFICATION: SIGNIFICANT CHANGE UP
MCHC RBC-ENTMCNC: 32.9 GM/DL — SIGNIFICANT CHANGE UP (ref 32–36)
MCHC RBC-ENTMCNC: 33.4 GM/DL — SIGNIFICANT CHANGE UP (ref 32–36)
MCHC RBC-ENTMCNC: 33.6 GM/DL — SIGNIFICANT CHANGE UP (ref 32–36)
MCHC RBC-ENTMCNC: 33.6 GM/DL — SIGNIFICANT CHANGE UP (ref 32–36)
MCHC RBC-ENTMCNC: 34.2 GM/DL — SIGNIFICANT CHANGE UP (ref 32–36)
MCHC RBC-ENTMCNC: 34.3 GM/DL — SIGNIFICANT CHANGE UP (ref 32–36)
MCHC RBC-ENTMCNC: 34.5 GM/DL — SIGNIFICANT CHANGE UP (ref 32–36)
MCHC RBC-ENTMCNC: 36 PG — HIGH (ref 27–34)
MCHC RBC-ENTMCNC: 36.1 PG — HIGH (ref 27–34)
MCHC RBC-ENTMCNC: 36.2 PG — HIGH (ref 27–34)
MCHC RBC-ENTMCNC: 36.2 PG — HIGH (ref 27–34)
MCHC RBC-ENTMCNC: 36.7 PG — HIGH (ref 27–34)
MCV RBC AUTO: 105.1 FL — HIGH (ref 80–100)
MCV RBC AUTO: 105.1 FL — HIGH (ref 80–100)
MCV RBC AUTO: 106.5 FL — HIGH (ref 80–100)
MCV RBC AUTO: 107.5 FL — HIGH (ref 80–100)
MCV RBC AUTO: 107.7 FL — HIGH (ref 80–100)
MCV RBC AUTO: 108.1 FL — HIGH (ref 80–100)
MCV RBC AUTO: 110 FL — HIGH (ref 80–100)
METHOD TYPE: SIGNIFICANT CHANGE UP
MONOCYTES # BLD AUTO: 0.3 K/UL — SIGNIFICANT CHANGE UP (ref 0–0.9)
MONOCYTES # BLD AUTO: 0.8 K/UL — SIGNIFICANT CHANGE UP (ref 0–0.9)
MONOCYTES # BLD AUTO: 0.82 K/UL — SIGNIFICANT CHANGE UP (ref 0–0.9)
MONOCYTES # BLD AUTO: 0.88 K/UL — SIGNIFICANT CHANGE UP (ref 0–0.9)
MONOCYTES # BLD AUTO: 0.95 K/UL — HIGH (ref 0–0.9)
MONOCYTES # BLD AUTO: 1.02 K/UL — HIGH (ref 0–0.9)
MONOCYTES # BLD AUTO: 1.68 K/UL — HIGH (ref 0–0.9)
MONOCYTES NFR BLD AUTO: 10.8 % — SIGNIFICANT CHANGE UP (ref 2–14)
MONOCYTES NFR BLD AUTO: 12.4 % — SIGNIFICANT CHANGE UP (ref 2–14)
MONOCYTES NFR BLD AUTO: 13.1 % — SIGNIFICANT CHANGE UP (ref 2–14)
MONOCYTES NFR BLD AUTO: 13.2 % — SIGNIFICANT CHANGE UP (ref 2–14)
MONOCYTES NFR BLD AUTO: 14 % — SIGNIFICANT CHANGE UP (ref 2–14)
MONOCYTES NFR BLD AUTO: 3.5 % — SIGNIFICANT CHANGE UP (ref 2–14)
MONOCYTES NFR BLD AUTO: 5 % — SIGNIFICANT CHANGE UP (ref 2–14)
MSSA DNA SPEC QL NAA+PROBE: SIGNIFICANT CHANGE UP
NEUTROPHILS # BLD AUTO: 30.99 K/UL — HIGH (ref 1.8–7.4)
NEUTROPHILS # BLD AUTO: 4.57 K/UL — SIGNIFICANT CHANGE UP (ref 1.8–7.4)
NEUTROPHILS # BLD AUTO: 5.28 K/UL — SIGNIFICANT CHANGE UP (ref 1.8–7.4)
NEUTROPHILS # BLD AUTO: 5.33 K/UL — SIGNIFICANT CHANGE UP (ref 1.8–7.4)
NEUTROPHILS # BLD AUTO: 5.42 K/UL — SIGNIFICANT CHANGE UP (ref 1.8–7.4)
NEUTROPHILS # BLD AUTO: 6.13 K/UL — SIGNIFICANT CHANGE UP (ref 1.8–7.4)
NEUTROPHILS # BLD AUTO: 7.45 K/UL — HIGH (ref 1.8–7.4)
NEUTROPHILS NFR BLD AUTO: 72.5 % — SIGNIFICANT CHANGE UP (ref 43–77)
NEUTROPHILS NFR BLD AUTO: 74.5 % — SIGNIFICANT CHANGE UP (ref 43–77)
NEUTROPHILS NFR BLD AUTO: 75.2 % — SIGNIFICANT CHANGE UP (ref 43–77)
NEUTROPHILS NFR BLD AUTO: 75.5 % — SIGNIFICANT CHANGE UP (ref 43–77)
NEUTROPHILS NFR BLD AUTO: 80.9 % — HIGH (ref 43–77)
NEUTROPHILS NFR BLD AUTO: 82 % — HIGH (ref 43–77)
NEUTROPHILS NFR BLD AUTO: 87.1 % — HIGH (ref 43–77)
NEUTS BAND # BLD: 10 % — HIGH (ref 0–8)
NRBC # BLD: 0 /100 — SIGNIFICANT CHANGE UP (ref 0–0)
NRBC # BLD: SIGNIFICANT CHANGE UP /100 WBCS (ref 0–0)
OVALOCYTES BLD QL SMEAR: SLIGHT — SIGNIFICANT CHANGE UP
PCO2 BLDA: 35 MMHG — SIGNIFICANT CHANGE UP (ref 35–48)
PH BLDA: 7.36 — SIGNIFICANT CHANGE UP (ref 7.35–7.45)
PHOSPHATE SERPL-MCNC: 3.3 MG/DL — SIGNIFICANT CHANGE UP (ref 2.5–4.5)
PHOSPHATE SERPL-MCNC: 3.9 MG/DL — SIGNIFICANT CHANGE UP (ref 2.5–4.5)
PHOSPHATE SERPL-MCNC: 4.3 MG/DL — SIGNIFICANT CHANGE UP (ref 2.5–4.5)
PHOSPHATE SERPL-MCNC: 4.8 MG/DL — HIGH (ref 2.5–4.5)
PHOSPHATE SERPL-MCNC: 5 MG/DL — HIGH (ref 2.5–4.5)
PLAT MORPH BLD: NORMAL — SIGNIFICANT CHANGE UP
PLATELET # BLD AUTO: 111 K/UL — LOW (ref 150–400)
PLATELET # BLD AUTO: 122 K/UL — LOW (ref 150–400)
PLATELET # BLD AUTO: 86 K/UL — LOW (ref 150–400)
PLATELET # BLD AUTO: 88 K/UL — LOW (ref 150–400)
PLATELET # BLD AUTO: 93 K/UL — LOW (ref 150–400)
PLATELET # BLD AUTO: 95 K/UL — LOW (ref 150–400)
PLATELET # BLD AUTO: 98 K/UL — LOW (ref 150–400)
PLATELET COUNT - ESTIMATE: ABNORMAL
PLATELET COUNT - ESTIMATE: ABNORMAL
PO2 BLDA: 82 MMHG — LOW (ref 83–108)
POIKILOCYTOSIS BLD QL AUTO: SIGNIFICANT CHANGE UP
POIKILOCYTOSIS BLD QL AUTO: SLIGHT — SIGNIFICANT CHANGE UP
POLYCHROMASIA BLD QL SMEAR: SLIGHT — SIGNIFICANT CHANGE UP
POTASSIUM SERPL-MCNC: 3.7 MMOL/L — SIGNIFICANT CHANGE UP (ref 3.5–5.3)
POTASSIUM SERPL-MCNC: 3.9 MMOL/L — SIGNIFICANT CHANGE UP (ref 3.5–5.3)
POTASSIUM SERPL-MCNC: 4 MMOL/L — SIGNIFICANT CHANGE UP (ref 3.5–5.3)
POTASSIUM SERPL-MCNC: 4.5 MMOL/L — SIGNIFICANT CHANGE UP (ref 3.5–5.3)
POTASSIUM SERPL-MCNC: 4.7 MMOL/L — SIGNIFICANT CHANGE UP (ref 3.5–5.3)
POTASSIUM SERPL-MCNC: 5 MMOL/L — SIGNIFICANT CHANGE UP (ref 3.5–5.3)
POTASSIUM SERPL-MCNC: 5.1 MMOL/L — SIGNIFICANT CHANGE UP (ref 3.5–5.3)
POTASSIUM SERPL-SCNC: 3.7 MMOL/L — SIGNIFICANT CHANGE UP (ref 3.5–5.3)
POTASSIUM SERPL-SCNC: 3.9 MMOL/L — SIGNIFICANT CHANGE UP (ref 3.5–5.3)
POTASSIUM SERPL-SCNC: 4 MMOL/L — SIGNIFICANT CHANGE UP (ref 3.5–5.3)
POTASSIUM SERPL-SCNC: 4.5 MMOL/L — SIGNIFICANT CHANGE UP (ref 3.5–5.3)
POTASSIUM SERPL-SCNC: 4.7 MMOL/L — SIGNIFICANT CHANGE UP (ref 3.5–5.3)
POTASSIUM SERPL-SCNC: 5 MMOL/L — SIGNIFICANT CHANGE UP (ref 3.5–5.3)
POTASSIUM SERPL-SCNC: 5.1 MMOL/L — SIGNIFICANT CHANGE UP (ref 3.5–5.3)
PROCALCITONIN SERPL-MCNC: 67.7 NG/ML — HIGH (ref 0.02–0.1)
PROT SERPL-MCNC: 6 GM/DL — SIGNIFICANT CHANGE UP (ref 6–8.3)
PROT SERPL-MCNC: 6.6 GM/DL — SIGNIFICANT CHANGE UP (ref 6–8.3)
PROT SERPL-MCNC: 6.6 GM/DL — SIGNIFICANT CHANGE UP (ref 6–8.3)
PROT SERPL-MCNC: 6.7 GM/DL — SIGNIFICANT CHANGE UP (ref 6–8.3)
PROT SERPL-MCNC: 7.3 GM/DL — SIGNIFICANT CHANGE UP (ref 6–8.3)
PROTHROM AB SERPL-ACNC: 22.4 SEC — HIGH (ref 10.5–13.4)
PROTHROM AB SERPL-ACNC: 25.1 SEC — HIGH (ref 10.5–13.4)
PROTHROM AB SERPL-ACNC: 26.3 SEC — HIGH (ref 10.5–13.4)
PROTHROM AB SERPL-ACNC: 31.9 SEC — HIGH (ref 10.5–13.4)
PROTHROM AB SERPL-ACNC: 33.5 SEC — HIGH (ref 10.5–13.4)
PROTHROM AB SERPL-ACNC: 36 SEC — HIGH (ref 10.5–13.4)
PROTHROM AB SERPL-ACNC: 37 SEC — HIGH (ref 10.5–13.4)
PROTHROM AB SERPL-ACNC: 37.5 SEC — HIGH (ref 10.5–13.4)
RAPID RVP RESULT: SIGNIFICANT CHANGE UP
RBC # BLD: 3.14 M/UL — LOW (ref 4.2–5.8)
RBC # BLD: 3.16 M/UL — LOW (ref 4.2–5.8)
RBC # BLD: 3.21 M/UL — LOW (ref 4.2–5.8)
RBC # BLD: 3.21 M/UL — LOW (ref 4.2–5.8)
RBC # BLD: 3.24 M/UL — LOW (ref 4.2–5.8)
RBC # BLD: 3.26 M/UL — LOW (ref 4.2–5.8)
RBC # BLD: 3.29 M/UL — LOW (ref 4.2–5.8)
RBC # FLD: 17.2 % — HIGH (ref 10.3–14.5)
RBC # FLD: 17.3 % — HIGH (ref 10.3–14.5)
RBC # FLD: 17.7 % — HIGH (ref 10.3–14.5)
RBC # FLD: 18 % — HIGH (ref 10.3–14.5)
RBC # FLD: 18 % — HIGH (ref 10.3–14.5)
RBC # FLD: 18.1 % — HIGH (ref 10.3–14.5)
RBC # FLD: 18.6 % — HIGH (ref 10.3–14.5)
RBC BLD AUTO: ABNORMAL
RBC BLD AUTO: NORMAL — SIGNIFICANT CHANGE UP
SAO2 % BLDA: 97 % — SIGNIFICANT CHANGE UP (ref 94–98)
SARS-COV-2 RNA SPEC QL NAA+PROBE: SIGNIFICANT CHANGE UP
SCHISTOCYTES BLD QL AUTO: SLIGHT — SIGNIFICANT CHANGE UP
SCHISTOCYTES BLD QL AUTO: SLIGHT — SIGNIFICANT CHANGE UP
SODIUM SERPL-SCNC: 130 MMOL/L — LOW (ref 135–145)
SODIUM SERPL-SCNC: 130 MMOL/L — LOW (ref 135–145)
SODIUM SERPL-SCNC: 134 MMOL/L — LOW (ref 135–145)
SODIUM SERPL-SCNC: 135 MMOL/L — SIGNIFICANT CHANGE UP (ref 135–145)
SODIUM SERPL-SCNC: 137 MMOL/L — SIGNIFICANT CHANGE UP (ref 135–145)
SODIUM SERPL-SCNC: 137 MMOL/L — SIGNIFICANT CHANGE UP (ref 135–145)
SPECIMEN SOURCE: SIGNIFICANT CHANGE UP
SPECIMEN SOURCE: SIGNIFICANT CHANGE UP
T4 AB SER-ACNC: 6.9 UG/DL — SIGNIFICANT CHANGE UP (ref 4.6–12)
TROPONIN I, HIGH SENSITIVITY RESULT: 160.07 NG/L — HIGH
TROPONIN I, HIGH SENSITIVITY RESULT: 804.88 NG/L — HIGH
TSH SERPL-MCNC: 2.58 UU/ML — SIGNIFICANT CHANGE UP (ref 0.34–4.82)
WBC # BLD: 33.69 K/UL — HIGH (ref 3.8–10.5)
WBC # BLD: 6.13 K/UL — SIGNIFICANT CHANGE UP (ref 3.8–10.5)
WBC # BLD: 7.09 K/UL — SIGNIFICANT CHANGE UP (ref 3.8–10.5)
WBC # BLD: 7.18 K/UL — SIGNIFICANT CHANGE UP (ref 3.8–10.5)
WBC # BLD: 7.27 K/UL — SIGNIFICANT CHANGE UP (ref 3.8–10.5)
WBC # BLD: 7.58 K/UL — SIGNIFICANT CHANGE UP (ref 3.8–10.5)
WBC # BLD: 8.55 K/UL — SIGNIFICANT CHANGE UP (ref 3.8–10.5)
WBC # FLD AUTO: 33.69 K/UL — HIGH (ref 3.8–10.5)
WBC # FLD AUTO: 6.13 K/UL — SIGNIFICANT CHANGE UP (ref 3.8–10.5)
WBC # FLD AUTO: 7.09 K/UL — SIGNIFICANT CHANGE UP (ref 3.8–10.5)
WBC # FLD AUTO: 7.18 K/UL — SIGNIFICANT CHANGE UP (ref 3.8–10.5)
WBC # FLD AUTO: 7.27 K/UL — SIGNIFICANT CHANGE UP (ref 3.8–10.5)
WBC # FLD AUTO: 7.58 K/UL — SIGNIFICANT CHANGE UP (ref 3.8–10.5)
WBC # FLD AUTO: 8.55 K/UL — SIGNIFICANT CHANGE UP (ref 3.8–10.5)

## 2023-01-01 PROCEDURE — 71250 CT THORAX DX C-: CPT | Mod: MD

## 2023-01-01 PROCEDURE — 83605 ASSAY OF LACTIC ACID: CPT

## 2023-01-01 PROCEDURE — 85610 PROTHROMBIN TIME: CPT

## 2023-01-01 PROCEDURE — 36415 COLL VENOUS BLD VENIPUNCTURE: CPT

## 2023-01-01 PROCEDURE — 71045 X-RAY EXAM CHEST 1 VIEW: CPT | Mod: 26,76

## 2023-01-01 PROCEDURE — 86901 BLOOD TYPING SEROLOGIC RH(D): CPT

## 2023-01-01 PROCEDURE — 71250 CT THORAX DX C-: CPT | Mod: 26,MA

## 2023-01-01 PROCEDURE — 82248 BILIRUBIN DIRECT: CPT

## 2023-01-01 PROCEDURE — 86077 PHYS BLOOD BANK SERV XMATCH: CPT

## 2023-01-01 PROCEDURE — 99233 SBSQ HOSP IP/OBS HIGH 50: CPT

## 2023-01-01 PROCEDURE — 71250 CT THORAX DX C-: CPT | Mod: 26

## 2023-01-01 PROCEDURE — 84145 PROCALCITONIN (PCT): CPT

## 2023-01-01 PROCEDURE — 84436 ASSAY OF TOTAL THYROXINE: CPT

## 2023-01-01 PROCEDURE — 73610 X-RAY EXAM OF ANKLE: CPT | Mod: 26,LT

## 2023-01-01 PROCEDURE — 83735 ASSAY OF MAGNESIUM: CPT

## 2023-01-01 PROCEDURE — 72125 CT NECK SPINE W/O DYE: CPT | Mod: 26

## 2023-01-01 PROCEDURE — 84100 ASSAY OF PHOSPHORUS: CPT

## 2023-01-01 PROCEDURE — 93308 TTE F-UP OR LMTD: CPT | Mod: 26

## 2023-01-01 PROCEDURE — 86870 RBC ANTIBODY IDENTIFICATION: CPT

## 2023-01-01 PROCEDURE — 84443 ASSAY THYROID STIM HORMONE: CPT

## 2023-01-01 PROCEDURE — 36600 WITHDRAWAL OF ARTERIAL BLOOD: CPT

## 2023-01-01 PROCEDURE — 99291 CRITICAL CARE FIRST HOUR: CPT

## 2023-01-01 PROCEDURE — 80053 COMPREHEN METABOLIC PANEL: CPT

## 2023-01-01 PROCEDURE — 99283 EMERGENCY DEPT VISIT LOW MDM: CPT

## 2023-01-01 PROCEDURE — 86900 BLOOD TYPING SEROLOGIC ABO: CPT

## 2023-01-01 PROCEDURE — 97530 THERAPEUTIC ACTIVITIES: CPT | Mod: GP

## 2023-01-01 PROCEDURE — 72125 CT NECK SPINE W/O DYE: CPT | Mod: 26,MA

## 2023-01-01 PROCEDURE — 73521 X-RAY EXAM HIPS BI 2 VIEWS: CPT

## 2023-01-01 PROCEDURE — 99284 EMERGENCY DEPT VISIT MOD MDM: CPT

## 2023-01-01 PROCEDURE — 74176 CT ABD & PELVIS W/O CONTRAST: CPT | Mod: 26

## 2023-01-01 PROCEDURE — 73552 X-RAY EXAM OF FEMUR 2/>: CPT | Mod: 50

## 2023-01-01 PROCEDURE — 99284 EMERGENCY DEPT VISIT MOD MDM: CPT | Mod: 25

## 2023-01-01 PROCEDURE — 94640 AIRWAY INHALATION TREATMENT: CPT

## 2023-01-01 PROCEDURE — 93010 ELECTROCARDIOGRAM REPORT: CPT

## 2023-01-01 PROCEDURE — 99239 HOSP IP/OBS DSCHRG MGMT >30: CPT

## 2023-01-01 PROCEDURE — 90471 IMMUNIZATION ADMIN: CPT

## 2023-01-01 PROCEDURE — 99285 EMERGENCY DEPT VISIT HI MDM: CPT

## 2023-01-01 PROCEDURE — 70450 CT HEAD/BRAIN W/O DYE: CPT | Mod: MA

## 2023-01-01 PROCEDURE — 70450 CT HEAD/BRAIN W/O DYE: CPT | Mod: 26

## 2023-01-01 PROCEDURE — 93005 ELECTROCARDIOGRAM TRACING: CPT

## 2023-01-01 PROCEDURE — 83690 ASSAY OF LIPASE: CPT

## 2023-01-01 PROCEDURE — 85025 COMPLETE CBC W/AUTO DIFF WBC: CPT

## 2023-01-01 PROCEDURE — 99232 SBSQ HOSP IP/OBS MODERATE 35: CPT

## 2023-01-01 PROCEDURE — 73552 X-RAY EXAM OF FEMUR 2/>: CPT | Mod: 26,50

## 2023-01-01 PROCEDURE — 72125 CT NECK SPINE W/O DYE: CPT | Mod: MA

## 2023-01-01 PROCEDURE — 85730 THROMBOPLASTIN TIME PARTIAL: CPT

## 2023-01-01 PROCEDURE — 72125 CT NECK SPINE W/O DYE: CPT | Mod: MD

## 2023-01-01 PROCEDURE — 97116 GAIT TRAINING THERAPY: CPT | Mod: GP

## 2023-01-01 PROCEDURE — 76705 ECHO EXAM OF ABDOMEN: CPT

## 2023-01-01 PROCEDURE — 71250 CT THORAX DX C-: CPT | Mod: MA

## 2023-01-01 PROCEDURE — 76604 US EXAM CHEST: CPT | Mod: 26

## 2023-01-01 PROCEDURE — 80069 RENAL FUNCTION PANEL: CPT

## 2023-01-01 PROCEDURE — 74176 CT ABD & PELVIS W/O CONTRAST: CPT | Mod: MA

## 2023-01-01 PROCEDURE — 86880 COOMBS TEST DIRECT: CPT

## 2023-01-01 PROCEDURE — 99292 CRITICAL CARE ADDL 30 MIN: CPT

## 2023-01-01 PROCEDURE — 80048 BASIC METABOLIC PNL TOTAL CA: CPT

## 2023-01-01 PROCEDURE — 82803 BLOOD GASES ANY COMBINATION: CPT

## 2023-01-01 PROCEDURE — 82247 BILIRUBIN TOTAL: CPT

## 2023-01-01 PROCEDURE — 70450 CT HEAD/BRAIN W/O DYE: CPT | Mod: MD

## 2023-01-01 PROCEDURE — 76705 ECHO EXAM OF ABDOMEN: CPT | Mod: 26

## 2023-01-01 PROCEDURE — 94760 N-INVAS EAR/PLS OXIMETRY 1: CPT

## 2023-01-01 PROCEDURE — 97162 PT EVAL MOD COMPLEX 30 MIN: CPT | Mod: GP

## 2023-01-01 PROCEDURE — 87635 SARS-COV-2 COVID-19 AMP PRB: CPT

## 2023-01-01 PROCEDURE — 82330 ASSAY OF CALCIUM: CPT

## 2023-01-01 PROCEDURE — 71045 X-RAY EXAM CHEST 1 VIEW: CPT | Mod: 26

## 2023-01-01 PROCEDURE — 74176 CT ABD & PELVIS W/O CONTRAST: CPT | Mod: MD

## 2023-01-01 PROCEDURE — 73610 X-RAY EXAM OF ANKLE: CPT | Mod: LT

## 2023-01-01 PROCEDURE — 70450 CT HEAD/BRAIN W/O DYE: CPT | Mod: 26,MA

## 2023-01-01 PROCEDURE — 71045 X-RAY EXAM CHEST 1 VIEW: CPT

## 2023-01-01 PROCEDURE — 80074 ACUTE HEPATITIS PANEL: CPT

## 2023-01-01 PROCEDURE — 73521 X-RAY EXAM HIPS BI 2 VIEWS: CPT | Mod: 26

## 2023-01-01 PROCEDURE — 99223 1ST HOSP IP/OBS HIGH 75: CPT

## 2023-01-01 PROCEDURE — 86850 RBC ANTIBODY SCREEN: CPT

## 2023-01-01 PROCEDURE — 84484 ASSAY OF TROPONIN QUANT: CPT

## 2023-01-01 PROCEDURE — 74176 CT ABD & PELVIS W/O CONTRAST: CPT | Mod: 26,MA

## 2023-01-01 PROCEDURE — 90935 HEMODIALYSIS ONE EVALUATION: CPT

## 2023-01-01 PROCEDURE — 99222 1ST HOSP IP/OBS MODERATE 55: CPT

## 2023-01-01 PROCEDURE — 90715 TDAP VACCINE 7 YRS/> IM: CPT

## 2023-01-01 RX ORDER — OXYCODONE HYDROCHLORIDE 5 MG/1
2.5 TABLET ORAL EVERY 4 HOURS
Refills: 0 | Status: DISCONTINUED | OUTPATIENT
Start: 2023-01-01 | End: 2023-01-01

## 2023-01-01 RX ORDER — ALBUTEROL 90 UG/1
2 AEROSOL, METERED ORAL EVERY 6 HOURS
Refills: 0 | Status: DISCONTINUED | OUTPATIENT
Start: 2023-01-01 | End: 2023-01-01

## 2023-01-01 RX ORDER — CHLORHEXIDINE GLUCONATE 213 G/1000ML
1 SOLUTION TOPICAL
Refills: 0 | Status: DISCONTINUED | OUTPATIENT
Start: 2023-01-01 | End: 2023-01-01

## 2023-01-01 RX ORDER — MONTELUKAST 10 MG/1
10 TABLET, FILM COATED ORAL
Refills: 0 | Status: DISCONTINUED | COMMUNITY
End: 2023-01-01

## 2023-01-01 RX ORDER — HYDROMORPHONE HYDROCHLORIDE 2 MG/ML
0.5 INJECTION INTRAMUSCULAR; INTRAVENOUS; SUBCUTANEOUS
Refills: 0 | Status: DISCONTINUED | OUTPATIENT
Start: 2023-01-01 | End: 2023-01-01

## 2023-01-01 RX ORDER — VANCOMYCIN HCL 1 G
1250 VIAL (EA) INTRAVENOUS ONCE
Refills: 0 | Status: COMPLETED | OUTPATIENT
Start: 2023-01-01 | End: 2023-01-01

## 2023-01-01 RX ORDER — CYCLOBENZAPRINE HYDROCHLORIDE 10 MG/1
5 TABLET, FILM COATED ORAL EVERY 8 HOURS
Refills: 0 | Status: DISCONTINUED | OUTPATIENT
Start: 2023-01-01 | End: 2023-01-01

## 2023-01-01 RX ORDER — WARFARIN SODIUM 2.5 MG/1
5 TABLET ORAL ONCE
Refills: 0 | Status: COMPLETED | OUTPATIENT
Start: 2023-01-01 | End: 2023-01-01

## 2023-01-01 RX ORDER — DOCUSATE SODIUM 100 MG
1 CAPSULE ORAL
Refills: 0 | DISCHARGE

## 2023-01-01 RX ORDER — HYDROMORPHONE HYDROCHLORIDE 2 MG/ML
0.5 INJECTION INTRAMUSCULAR; INTRAVENOUS; SUBCUTANEOUS ONCE
Refills: 0 | Status: DISCONTINUED | OUTPATIENT
Start: 2023-01-01 | End: 2023-01-01

## 2023-01-01 RX ORDER — BUDESONIDE AND FORMOTEROL FUMARATE DIHYDRATE 160; 4.5 UG/1; UG/1
160-4.5 AEROSOL RESPIRATORY (INHALATION)
Refills: 0 | Status: ACTIVE | COMMUNITY

## 2023-01-01 RX ORDER — ACETAMINOPHEN 500 MG
650 TABLET ORAL EVERY 6 HOURS
Refills: 0 | Status: DISCONTINUED | OUTPATIENT
Start: 2023-01-01 | End: 2023-01-01

## 2023-01-01 RX ORDER — OXYCODONE HYDROCHLORIDE 5 MG/1
2.5 TABLET ORAL
Qty: 0 | Refills: 0 | DISCHARGE
Start: 2023-01-01

## 2023-01-01 RX ORDER — LANOLIN ALCOHOL/MO/W.PET/CERES
3 CREAM (GRAM) TOPICAL AT BEDTIME
Refills: 0 | Status: DISCONTINUED | OUTPATIENT
Start: 2023-01-01 | End: 2023-01-01

## 2023-01-01 RX ORDER — LEVOTHYROXINE SODIUM 125 MCG
88 TABLET ORAL DAILY
Refills: 0 | Status: DISCONTINUED | OUTPATIENT
Start: 2023-01-01 | End: 2023-01-01

## 2023-01-01 RX ORDER — NOREPINEPHRINE BITARTRATE/D5W 8 MG/250ML
0.01 PLASTIC BAG, INJECTION (ML) INTRAVENOUS
Qty: 8 | Refills: 0 | Status: DISCONTINUED | OUTPATIENT
Start: 2023-01-01 | End: 2023-01-01

## 2023-01-01 RX ORDER — MONTELUKAST 4 MG/1
10 TABLET, CHEWABLE ORAL ONCE
Refills: 0 | Status: COMPLETED | OUTPATIENT
Start: 2023-01-01 | End: 2023-01-01

## 2023-01-01 RX ORDER — LIDOCAINE 4 G/100G
1 CREAM TOPICAL DAILY
Refills: 0 | Status: DISCONTINUED | OUTPATIENT
Start: 2023-01-01 | End: 2023-01-01

## 2023-01-01 RX ORDER — CALCIUM ACETATE 667 MG
667 TABLET ORAL
Refills: 0 | Status: DISCONTINUED | OUTPATIENT
Start: 2023-01-01 | End: 2023-01-01

## 2023-01-01 RX ORDER — OXYCODONE HYDROCHLORIDE 5 MG/1
5 TABLET ORAL EVERY 4 HOURS
Refills: 0 | Status: DISCONTINUED | OUTPATIENT
Start: 2023-01-01 | End: 2023-01-01

## 2023-01-01 RX ORDER — HYDROCORTISONE 20 MG
100 TABLET ORAL ONCE
Refills: 0 | Status: COMPLETED | OUTPATIENT
Start: 2023-01-01 | End: 2023-01-01

## 2023-01-01 RX ORDER — FLUTICASONE PROPIONATE AND SALMETEROL 100; 50 UG/1; UG/1
100-50 POWDER RESPIRATORY (INHALATION)
Refills: 0 | Status: DISCONTINUED | COMMUNITY
End: 2023-01-01

## 2023-01-01 RX ORDER — PSYLLIUM SEED (WITH DEXTROSE)
1 POWDER (GRAM) ORAL DAILY
Refills: 0 | Status: DISCONTINUED | OUTPATIENT
Start: 2023-01-01 | End: 2023-01-01

## 2023-01-01 RX ORDER — CALCIUM ACETATE 667 MG
1 TABLET ORAL
Refills: 0 | DISCHARGE

## 2023-01-01 RX ORDER — LANOLIN ALCOHOL/MO/W.PET/CERES
1 CREAM (GRAM) TOPICAL
Qty: 0 | Refills: 0 | DISCHARGE

## 2023-01-01 RX ORDER — ACETAMINOPHEN 500 MG/1
500 TABLET ORAL
Refills: 0 | Status: ACTIVE | COMMUNITY

## 2023-01-01 RX ORDER — NOREPINEPHRINE BITARTRATE/D5W 8 MG/250ML
0.05 PLASTIC BAG, INJECTION (ML) INTRAVENOUS
Qty: 16 | Refills: 0 | Status: DISCONTINUED | OUTPATIENT
Start: 2023-01-01 | End: 2023-01-01

## 2023-01-01 RX ORDER — ACETAMINOPHEN 500 MG
1000 TABLET ORAL ONCE
Refills: 0 | Status: COMPLETED | OUTPATIENT
Start: 2023-01-01 | End: 2023-01-01

## 2023-01-01 RX ORDER — MEROPENEM 1 G/30ML
INJECTION INTRAVENOUS
Refills: 0 | Status: DISCONTINUED | OUTPATIENT
Start: 2023-01-01 | End: 2023-01-01

## 2023-01-01 RX ORDER — PSYLLIUM SEED (WITH DEXTROSE)
1 POWDER (GRAM) ORAL
Refills: 0 | DISCHARGE

## 2023-01-01 RX ORDER — LIDOCAINE 4 G/100G
1 CREAM TOPICAL
Qty: 0 | Refills: 0 | DISCHARGE
Start: 2023-01-01

## 2023-01-01 RX ORDER — ALBUMIN HUMAN 25 %
50 VIAL (ML) INTRAVENOUS
Refills: 0 | Status: DISCONTINUED | OUTPATIENT
Start: 2023-01-01 | End: 2023-01-01

## 2023-01-01 RX ORDER — ATORVASTATIN CALCIUM 80 MG/1
20 TABLET, FILM COATED ORAL AT BEDTIME
Refills: 0 | Status: DISCONTINUED | OUTPATIENT
Start: 2023-01-01 | End: 2023-01-01

## 2023-01-01 RX ORDER — DIGOXIN 250 MCG
250 TABLET ORAL ONCE
Refills: 0 | Status: COMPLETED | OUTPATIENT
Start: 2023-01-01 | End: 2023-01-01

## 2023-01-01 RX ORDER — CEFAZOLIN SODIUM 1 G
2000 VIAL (EA) INJECTION
Refills: 0 | Status: DISCONTINUED | OUTPATIENT
Start: 2023-01-01 | End: 2023-01-01

## 2023-01-01 RX ORDER — BUDESONIDE AND FORMOTEROL FUMARATE DIHYDRATE 160; 4.5 UG/1; UG/1
2 AEROSOL RESPIRATORY (INHALATION)
Refills: 0 | Status: DISCONTINUED | OUTPATIENT
Start: 2023-01-01 | End: 2023-01-01

## 2023-01-01 RX ORDER — SODIUM CHLORIDE 9 MG/ML
2500 INJECTION INTRAMUSCULAR; INTRAVENOUS; SUBCUTANEOUS ONCE
Refills: 0 | Status: COMPLETED | OUTPATIENT
Start: 2023-01-01 | End: 2023-01-01

## 2023-01-01 RX ORDER — MEROPENEM 1 G/30ML
500 INJECTION INTRAVENOUS ONCE
Refills: 0 | Status: COMPLETED | OUTPATIENT
Start: 2023-01-01 | End: 2023-01-01

## 2023-01-01 RX ORDER — ACETAMINOPHEN 500 MG
2 TABLET ORAL
Qty: 0 | Refills: 0 | DISCHARGE

## 2023-01-01 RX ORDER — SENNA PLUS 8.6 MG/1
2 TABLET ORAL
Qty: 0 | Refills: 0 | DISCHARGE
Start: 2023-01-01

## 2023-01-01 RX ORDER — CALCIUM ACETATE 667 MG
0 TABLET ORAL
Qty: 0 | Refills: 0 | DISCHARGE

## 2023-01-01 RX ORDER — LEVOTHYROXINE SODIUM 88 UG/1
88 TABLET ORAL
Refills: 0 | Status: DISCONTINUED | COMMUNITY
End: 2023-01-01

## 2023-01-01 RX ORDER — PIPERACILLIN AND TAZOBACTAM 4; .5 G/20ML; G/20ML
3.38 INJECTION, POWDER, LYOPHILIZED, FOR SOLUTION INTRAVENOUS ONCE
Refills: 0 | Status: COMPLETED | OUTPATIENT
Start: 2023-01-01 | End: 2023-01-01

## 2023-01-01 RX ORDER — VIT B COMPLX 9/FA/VIT C/VIT E 1MG-60MG-5
1 TABLET ORAL
Refills: 0 | Status: ACTIVE | COMMUNITY

## 2023-01-01 RX ORDER — VASOPRESSIN 20 [USP'U]/ML
0.04 INJECTION INTRAVENOUS
Qty: 40 | Refills: 0 | Status: DISCONTINUED | OUTPATIENT
Start: 2023-01-01 | End: 2023-01-01

## 2023-01-01 RX ORDER — ROBINUL 0.2 MG/ML
0.1 INJECTION INTRAMUSCULAR; INTRAVENOUS
Refills: 0 | Status: DISCONTINUED | OUTPATIENT
Start: 2023-01-01 | End: 2023-01-01

## 2023-01-01 RX ORDER — WARFARIN SODIUM 2.5 MG/1
5 TABLET ORAL DAILY
Refills: 0 | Status: DISCONTINUED | OUTPATIENT
Start: 2023-01-01 | End: 2023-01-01

## 2023-01-01 RX ORDER — MORPHINE SULFATE 50 MG/1
1 CAPSULE, EXTENDED RELEASE ORAL ONCE
Refills: 0 | Status: DISCONTINUED | OUTPATIENT
Start: 2023-01-01 | End: 2023-01-01

## 2023-01-01 RX ORDER — MONTELUKAST 4 MG/1
10 TABLET, CHEWABLE ORAL AT BEDTIME
Refills: 0 | Status: DISCONTINUED | OUTPATIENT
Start: 2023-01-01 | End: 2023-01-01

## 2023-01-01 RX ORDER — TETANUS TOXOID, REDUCED DIPHTHERIA TOXOID AND ACELLULAR PERTUSSIS VACCINE, ADSORBED 5; 2.5; 8; 8; 2.5 [IU]/.5ML; [IU]/.5ML; UG/.5ML; UG/.5ML; UG/.5ML
0.5 SUSPENSION INTRAMUSCULAR ONCE
Refills: 0 | Status: COMPLETED | OUTPATIENT
Start: 2023-01-01 | End: 2023-01-01

## 2023-01-01 RX ORDER — WARFARIN SODIUM 2.5 MG/1
3 TABLET ORAL DAILY
Refills: 0 | Status: DISCONTINUED | OUTPATIENT
Start: 2023-01-01 | End: 2023-01-01

## 2023-01-01 RX ORDER — WARFARIN SODIUM 2.5 MG/1
1 TABLET ORAL
Refills: 0 | DISCHARGE

## 2023-01-01 RX ORDER — TETANUS TOXOID, REDUCED DIPHTHERIA TOXOID AND ACELLULAR PERTUSSIS VACCINE, ADSORBED 5; 2.5; 8; 8; 2.5 [IU]/.5ML; [IU]/.5ML; UG/.5ML; UG/.5ML; UG/.5ML
0.5 SUSPENSION INTRAMUSCULAR ONCE
Refills: 0 | Status: DISCONTINUED | OUTPATIENT
Start: 2023-01-01 | End: 2023-01-01

## 2023-01-01 RX ORDER — POLYETHYLENE GLYCOL 3350 17 G/17G
17 POWDER, FOR SOLUTION ORAL DAILY
Refills: 0 | Status: DISCONTINUED | OUTPATIENT
Start: 2023-01-01 | End: 2023-01-01

## 2023-01-01 RX ORDER — ACETAMINOPHEN 500 MG
1000 TABLET ORAL ONCE
Refills: 0 | Status: DISCONTINUED | OUTPATIENT
Start: 2023-01-01 | End: 2023-01-01

## 2023-01-01 RX ORDER — LEVOTHYROXINE SODIUM 125 MCG
1 TABLET ORAL
Qty: 0 | Refills: 0 | DISCHARGE

## 2023-01-01 RX ORDER — ATORVASTATIN CALCIUM 80 MG/1
1 TABLET, FILM COATED ORAL
Qty: 0 | Refills: 0 | DISCHARGE

## 2023-01-01 RX ORDER — NALOXONE HYDROCHLORIDE 4 MG/.1ML
0.4 SPRAY NASAL ONCE
Refills: 0 | Status: DISCONTINUED | OUTPATIENT
Start: 2023-01-01 | End: 2023-01-01

## 2023-01-01 RX ORDER — HYDROCORTISONE 20 MG
50 TABLET ORAL EVERY 8 HOURS
Refills: 0 | Status: DISCONTINUED | OUTPATIENT
Start: 2023-01-01 | End: 2023-01-01

## 2023-01-01 RX ORDER — BUDESONIDE AND FORMOTEROL FUMARATE DIHYDRATE 160; 4.5 UG/1; UG/1
160-4.5 AEROSOL RESPIRATORY (INHALATION)
Refills: 0 | Status: DISCONTINUED | COMMUNITY
End: 2023-01-01

## 2023-01-01 RX ORDER — SENNA PLUS 8.6 MG/1
2 TABLET ORAL AT BEDTIME
Refills: 0 | Status: DISCONTINUED | OUTPATIENT
Start: 2023-01-01 | End: 2023-01-01

## 2023-01-01 RX ORDER — AZITHROMYCIN 500 MG/1
500 TABLET, FILM COATED ORAL DAILY
Refills: 0 | Status: DISCONTINUED | OUTPATIENT
Start: 2023-01-01 | End: 2023-01-01

## 2023-01-01 RX ORDER — ONDANSETRON 8 MG/1
4 TABLET, FILM COATED ORAL EVERY 6 HOURS
Refills: 0 | Status: DISCONTINUED | OUTPATIENT
Start: 2023-01-01 | End: 2023-01-01

## 2023-01-01 RX ORDER — FOLIC ACID/VIT B COMPLEX AND C 0.8 MG
TABLET ORAL
Refills: 0 | Status: DISCONTINUED | COMMUNITY
End: 2023-01-01

## 2023-01-01 RX ORDER — MEROPENEM 1 G/30ML
500 INJECTION INTRAVENOUS EVERY 24 HOURS
Refills: 0 | Status: DISCONTINUED | OUTPATIENT
Start: 2023-01-01 | End: 2023-01-01

## 2023-01-01 RX ORDER — CARVEDILOL PHOSPHATE 80 MG/1
3.12 CAPSULE, EXTENDED RELEASE ORAL EVERY 12 HOURS
Refills: 0 | Status: DISCONTINUED | OUTPATIENT
Start: 2023-01-01 | End: 2023-01-01

## 2023-01-01 RX ADMIN — OXYCODONE HYDROCHLORIDE 5 MILLIGRAM(S): 5 TABLET ORAL at 22:28

## 2023-01-01 RX ADMIN — ATORVASTATIN CALCIUM 20 MILLIGRAM(S): 80 TABLET, FILM COATED ORAL at 21:23

## 2023-01-01 RX ADMIN — Medication 1 TABLET(S): at 09:13

## 2023-01-01 RX ADMIN — Medication 1000 MILLIGRAM(S): at 13:14

## 2023-01-01 RX ADMIN — VASOPRESSIN 6 UNIT(S)/MIN: 20 INJECTION INTRAVENOUS at 22:14

## 2023-01-01 RX ADMIN — WARFARIN SODIUM 3 MILLIGRAM(S): 2.5 TABLET ORAL at 22:29

## 2023-01-01 RX ADMIN — LIDOCAINE 1 PATCH: 4 CREAM TOPICAL at 05:21

## 2023-01-01 RX ADMIN — Medication 1 PACKET(S): at 09:55

## 2023-01-01 RX ADMIN — LIDOCAINE 1 PATCH: 4 CREAM TOPICAL at 19:58

## 2023-01-01 RX ADMIN — Medication 3.83 MICROGRAM(S)/KG/MIN: at 00:16

## 2023-01-01 RX ADMIN — Medication 667 MILLIGRAM(S): at 10:21

## 2023-01-01 RX ADMIN — Medication 88 MICROGRAM(S): at 05:18

## 2023-01-01 RX ADMIN — Medication 667 MILLIGRAM(S): at 12:00

## 2023-01-01 RX ADMIN — Medication 1 PACKET(S): at 09:12

## 2023-01-01 RX ADMIN — Medication 1 PACKET(S): at 10:21

## 2023-01-01 RX ADMIN — LIDOCAINE 1 PATCH: 4 CREAM TOPICAL at 10:48

## 2023-01-01 RX ADMIN — OXYCODONE HYDROCHLORIDE 5 MILLIGRAM(S): 5 TABLET ORAL at 06:14

## 2023-01-01 RX ADMIN — Medication 166.67 MILLIGRAM(S): at 21:29

## 2023-01-01 RX ADMIN — PIPERACILLIN AND TAZOBACTAM 200 GRAM(S): 4; .5 INJECTION, POWDER, LYOPHILIZED, FOR SOLUTION INTRAVENOUS at 19:48

## 2023-01-01 RX ADMIN — MONTELUKAST 10 MILLIGRAM(S): 4 TABLET, CHEWABLE ORAL at 21:48

## 2023-01-01 RX ADMIN — CARVEDILOL PHOSPHATE 3.12 MILLIGRAM(S): 80 CAPSULE, EXTENDED RELEASE ORAL at 09:55

## 2023-01-01 RX ADMIN — Medication 667 MILLIGRAM(S): at 09:55

## 2023-01-01 RX ADMIN — Medication 650 MILLIGRAM(S): at 11:43

## 2023-01-01 RX ADMIN — Medication 3 MILLIGRAM(S): at 22:29

## 2023-01-01 RX ADMIN — CYCLOBENZAPRINE HYDROCHLORIDE 5 MILLIGRAM(S): 10 TABLET, FILM COATED ORAL at 11:43

## 2023-01-01 RX ADMIN — Medication 1000 MILLIGRAM(S): at 12:15

## 2023-01-01 RX ADMIN — ATORVASTATIN CALCIUM 20 MILLIGRAM(S): 80 TABLET, FILM COATED ORAL at 22:28

## 2023-01-01 RX ADMIN — BUDESONIDE AND FORMOTEROL FUMARATE DIHYDRATE 2 PUFF(S): 160; 4.5 AEROSOL RESPIRATORY (INHALATION) at 09:00

## 2023-01-01 RX ADMIN — HYDROMORPHONE HYDROCHLORIDE 0.5 MILLIGRAM(S): 2 INJECTION INTRAMUSCULAR; INTRAVENOUS; SUBCUTANEOUS at 13:34

## 2023-01-01 RX ADMIN — BUDESONIDE AND FORMOTEROL FUMARATE DIHYDRATE 2 PUFF(S): 160; 4.5 AEROSOL RESPIRATORY (INHALATION) at 08:01

## 2023-01-01 RX ADMIN — WARFARIN SODIUM 5 MILLIGRAM(S): 2.5 TABLET ORAL at 21:47

## 2023-01-01 RX ADMIN — Medication 88 MICROGRAM(S): at 06:00

## 2023-01-01 RX ADMIN — LIDOCAINE 1 PATCH: 4 CREAM TOPICAL at 18:20

## 2023-01-01 RX ADMIN — MONTELUKAST 10 MILLIGRAM(S): 4 TABLET, CHEWABLE ORAL at 21:23

## 2023-01-01 RX ADMIN — MEROPENEM 100 MILLIGRAM(S): 1 INJECTION INTRAVENOUS at 00:16

## 2023-01-01 RX ADMIN — Medication 3.83 MICROGRAM(S)/KG/MIN: at 07:59

## 2023-01-01 RX ADMIN — POLYETHYLENE GLYCOL 3350 17 GRAM(S): 17 POWDER, FOR SOLUTION ORAL at 09:13

## 2023-01-01 RX ADMIN — MONTELUKAST 10 MILLIGRAM(S): 4 TABLET, CHEWABLE ORAL at 01:37

## 2023-01-01 RX ADMIN — MONTELUKAST 10 MILLIGRAM(S): 4 TABLET, CHEWABLE ORAL at 22:44

## 2023-01-01 RX ADMIN — HYDROMORPHONE HYDROCHLORIDE 0.5 MILLIGRAM(S): 2 INJECTION INTRAMUSCULAR; INTRAVENOUS; SUBCUTANEOUS at 09:40

## 2023-01-01 RX ADMIN — TETANUS TOXOID, REDUCED DIPHTHERIA TOXOID AND ACELLULAR PERTUSSIS VACCINE, ADSORBED 0.5 MILLILITER(S): 5; 2.5; 8; 8; 2.5 SUSPENSION INTRAMUSCULAR at 17:30

## 2023-01-01 RX ADMIN — POLYETHYLENE GLYCOL 3350 17 GRAM(S): 17 POWDER, FOR SOLUTION ORAL at 10:21

## 2023-01-01 RX ADMIN — SODIUM CHLORIDE 2500 MILLILITER(S): 9 INJECTION INTRAMUSCULAR; INTRAVENOUS; SUBCUTANEOUS at 19:44

## 2023-01-01 RX ADMIN — SENNA PLUS 2 TABLET(S): 8.6 TABLET ORAL at 21:47

## 2023-01-01 RX ADMIN — AZITHROMYCIN 255 MILLIGRAM(S): 500 TABLET, FILM COATED ORAL at 10:01

## 2023-01-01 RX ADMIN — Medication 1 TABLET(S): at 10:48

## 2023-01-01 RX ADMIN — ATORVASTATIN CALCIUM 20 MILLIGRAM(S): 80 TABLET, FILM COATED ORAL at 01:37

## 2023-01-01 RX ADMIN — Medication 667 MILLIGRAM(S): at 09:13

## 2023-01-01 RX ADMIN — WARFARIN SODIUM 3 MILLIGRAM(S): 2.5 TABLET ORAL at 21:23

## 2023-01-01 RX ADMIN — Medication 667 MILLIGRAM(S): at 18:18

## 2023-01-01 RX ADMIN — Medication 667 MILLIGRAM(S): at 18:34

## 2023-01-01 RX ADMIN — Medication 1000 MILLIGRAM(S): at 19:00

## 2023-01-01 RX ADMIN — Medication 667 MILLIGRAM(S): at 12:59

## 2023-01-01 RX ADMIN — SODIUM CHLORIDE 2500 MILLILITER(S): 9 INJECTION INTRAMUSCULAR; INTRAVENOUS; SUBCUTANEOUS at 18:44

## 2023-01-01 RX ADMIN — Medication 400 MILLIGRAM(S): at 12:59

## 2023-01-01 RX ADMIN — BUDESONIDE AND FORMOTEROL FUMARATE DIHYDRATE 2 PUFF(S): 160; 4.5 AEROSOL RESPIRATORY (INHALATION) at 23:11

## 2023-01-01 RX ADMIN — LIDOCAINE 1 PATCH: 4 CREAM TOPICAL at 09:13

## 2023-01-01 RX ADMIN — HYDROMORPHONE HYDROCHLORIDE 0.5 MILLIGRAM(S): 2 INJECTION INTRAMUSCULAR; INTRAVENOUS; SUBCUTANEOUS at 09:20

## 2023-01-01 RX ADMIN — CARVEDILOL PHOSPHATE 3.12 MILLIGRAM(S): 80 CAPSULE, EXTENDED RELEASE ORAL at 09:13

## 2023-01-01 RX ADMIN — LIDOCAINE 1 PATCH: 4 CREAM TOPICAL at 10:01

## 2023-01-01 RX ADMIN — BUDESONIDE AND FORMOTEROL FUMARATE DIHYDRATE 2 PUFF(S): 160; 4.5 AEROSOL RESPIRATORY (INHALATION) at 19:44

## 2023-01-01 RX ADMIN — MORPHINE SULFATE 1 MILLIGRAM(S): 50 CAPSULE, EXTENDED RELEASE ORAL at 23:40

## 2023-01-01 RX ADMIN — Medication 667 MILLIGRAM(S): at 18:14

## 2023-01-01 RX ADMIN — CYCLOBENZAPRINE HYDROCHLORIDE 5 MILLIGRAM(S): 10 TABLET, FILM COATED ORAL at 05:18

## 2023-01-01 RX ADMIN — OXYCODONE HYDROCHLORIDE 5 MILLIGRAM(S): 5 TABLET ORAL at 22:58

## 2023-01-01 RX ADMIN — Medication 3 MILLIGRAM(S): at 21:47

## 2023-01-01 RX ADMIN — Medication 667 MILLIGRAM(S): at 11:31

## 2023-01-01 RX ADMIN — CARVEDILOL PHOSPHATE 3.12 MILLIGRAM(S): 80 CAPSULE, EXTENDED RELEASE ORAL at 21:54

## 2023-01-01 RX ADMIN — BUDESONIDE AND FORMOTEROL FUMARATE DIHYDRATE 2 PUFF(S): 160; 4.5 AEROSOL RESPIRATORY (INHALATION) at 09:55

## 2023-01-01 RX ADMIN — CYCLOBENZAPRINE HYDROCHLORIDE 5 MILLIGRAM(S): 10 TABLET, FILM COATED ORAL at 21:47

## 2023-01-01 RX ADMIN — OXYCODONE HYDROCHLORIDE 5 MILLIGRAM(S): 5 TABLET ORAL at 05:44

## 2023-01-01 RX ADMIN — Medication 250 MICROGRAM(S): at 10:01

## 2023-01-01 RX ADMIN — LIDOCAINE 1 PATCH: 4 CREAM TOPICAL at 20:03

## 2023-01-01 RX ADMIN — Medication 88 MICROGRAM(S): at 01:36

## 2023-01-01 RX ADMIN — Medication 400 MILLIGRAM(S): at 12:00

## 2023-01-01 RX ADMIN — SENNA PLUS 2 TABLET(S): 8.6 TABLET ORAL at 21:23

## 2023-01-01 RX ADMIN — MORPHINE SULFATE 1 MILLIGRAM(S): 50 CAPSULE, EXTENDED RELEASE ORAL at 00:10

## 2023-01-01 RX ADMIN — AZITHROMYCIN 255 MILLIGRAM(S): 500 TABLET, FILM COATED ORAL at 00:17

## 2023-01-01 RX ADMIN — Medication 400 MILLIGRAM(S): at 18:44

## 2023-01-01 RX ADMIN — Medication 1.53 MICROGRAM(S)/KG/MIN: at 21:48

## 2023-01-01 RX ADMIN — ATORVASTATIN CALCIUM 20 MILLIGRAM(S): 80 TABLET, FILM COATED ORAL at 21:47

## 2023-01-01 RX ADMIN — POLYETHYLENE GLYCOL 3350 17 GRAM(S): 17 POWDER, FOR SOLUTION ORAL at 10:48

## 2023-01-01 RX ADMIN — SENNA PLUS 2 TABLET(S): 8.6 TABLET ORAL at 22:29

## 2023-01-01 RX ADMIN — Medication 88 MICROGRAM(S): at 05:44

## 2023-01-01 RX ADMIN — BUDESONIDE AND FORMOTEROL FUMARATE DIHYDRATE 2 PUFF(S): 160; 4.5 AEROSOL RESPIRATORY (INHALATION) at 08:37

## 2023-01-01 RX ADMIN — Medication 650 MILLIGRAM(S): at 12:20

## 2023-01-01 RX ADMIN — Medication 1 TABLET(S): at 10:21

## 2023-01-01 RX ADMIN — CARVEDILOL PHOSPHATE 3.12 MILLIGRAM(S): 80 CAPSULE, EXTENDED RELEASE ORAL at 22:29

## 2023-01-01 RX ADMIN — Medication 50 MILLIGRAM(S): at 06:22

## 2023-01-01 RX ADMIN — BUDESONIDE AND FORMOTEROL FUMARATE DIHYDRATE 2 PUFF(S): 160; 4.5 AEROSOL RESPIRATORY (INHALATION) at 01:35

## 2023-01-01 RX ADMIN — Medication 1 MILLIGRAM(S): at 14:24

## 2023-01-01 RX ADMIN — Medication 3.83 MICROGRAM(S)/KG/MIN: at 03:56

## 2023-01-01 RX ADMIN — BUDESONIDE AND FORMOTEROL FUMARATE DIHYDRATE 2 PUFF(S): 160; 4.5 AEROSOL RESPIRATORY (INHALATION) at 21:15

## 2023-01-01 RX ADMIN — CARVEDILOL PHOSPHATE 3.12 MILLIGRAM(S): 80 CAPSULE, EXTENDED RELEASE ORAL at 21:23

## 2023-01-01 RX ADMIN — LIDOCAINE 1 PATCH: 4 CREAM TOPICAL at 10:21

## 2023-01-01 RX ADMIN — LIDOCAINE 1 PATCH: 4 CREAM TOPICAL at 23:58

## 2023-01-01 RX ADMIN — Medication 100 MILLIGRAM(S): at 23:56

## 2023-02-17 NOTE — ED ADULT NURSE NOTE - NSFALLRSKASSISTTYPE_ED_ALL_ED
Prep Survey    Flowsheet Row Responses   Religious facility patient discharged from? [facility]   Is LACE score < 7 ? No   Eligibility Readm Mgmt   Discharge diagnosis Acute exacerbation of COPD with asthma   Does the patient have one of the following disease processes/diagnoses(primary or secondary)? COPD   Does the patient have Home health ordered? No   Is there a DME ordered? No   Prep survey completed? Yes          Radha HYMAN - Registered Nurse         Standing/Walking/Toileting

## 2023-04-19 NOTE — PHYSICAL THERAPY INITIAL EVALUATION ADULT - GAIT PATTERN USED, PT EVAL
3-point gait Aklief Pregnancy And Lactation Text: It is unknown if this medication is safe to use during pregnancy.  It is unknown if this medication is excreted in breast milk.  Breastfeeding women should use the topical cream on the smallest area of the skin for the shortest time needed while breastfeeding.  Do not apply to nipple and areola.

## 2023-05-06 NOTE — ED PROVIDER NOTE - WR INTERPRETED BY 1
Addended by: MYRIAM REHMAN on: 4/26/2019 02:31 PM     Modules accepted: Orders    
Ricky Neumann on behalf of Dr. Moreira

## 2023-05-06 NOTE — ED ADULT NURSE REASSESSMENT NOTE - NS ED NURSE REASSESS COMMENT FT1
Pt report received from Joy REYES. Pt contact made. Pt has no new complaints at this time. Pt repositioned and given blankets, pillows. Pt is pending CT and XR results.  Pt is Aox4 and speaking in full sentences. Pt plan pending.  Pt safety and comfort maintained.

## 2023-05-06 NOTE — ED ADULT TRIAGE NOTE - WEIGHT IN LBS
Encounter Date: 6/13/2017    SCRIBE #1 NOTE: I, Teodora Tolliver , am scribing for, and in the presence of, Dr. Davenport.       History     Chief Complaint   Patient presents with    Hyperglycemia     pt from [facility] sent for evelvated BG and HTN     Review of patient's allergies indicates:  No Known Allergies  Time seen by provider: 2:50 AM    This is a 86 y.o. male who presents to the ED via EMS with complaint of hyperglycemia. Symptoms began last night. Per EMS, CBG was in the 500 range. Pt has no complaints, and denies fever, chills, nausea, vomiting, abdominal pain, vision disturbance, myalgias, weakness, or numbness. Hyperglycemia has been constant. Pt did not receive insulin last night. He was sent to the ED from [facility].      The history is provided by the patient.     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     TIA (transient ischemic attack) 2016    Type 1 diabetes     Type 1 diabetes mellitus with diabetic polyneuropathy 3/25/2013    Type 1 diabetes mellitus with hyperglycemia 4/8/2014    Type 1 diabetes mellitus with hypoglycemia and without coma 4/8/2014    Type 1 diabetes mellitus with stage 4 chronic kidney disease     Poor control due to cognitive impairment, with history of hypoglycemia and DKA     Vitamin D deficiency disease 7/31/2013     Past Surgical History:   Procedure Laterality Date     CATARACT EXTRACTION W/  INTRAOCULAR LENS IMPLANT  8/26/2009, 10/14/2009    CRANIOTOMY  1995    CRANIOTOMY  12/21/2010    EYE SURGERY       Family History   Problem Relation Age of Onset    Diabetes Mother     Cataracts Mother     No Known Problems Father     Diabetes Sister     No Known Problems Son     No Known Problems Daughter     No Known Problems Maternal Grandmother     No Known Problems Maternal Grandfather     No Known Problems Paternal Grandmother     No Known Problems Paternal Grandfather     Diabetes Sister     Diabetes Brother     No Known Problems Maternal Aunt     No Known Problems Maternal Uncle     No Known Problems Paternal Aunt     No Known Problems Paternal Uncle     Diabetes Sister     Diabetes Brother     Amblyopia Neg Hx     Blindness Neg Hx     Cancer Neg Hx     Glaucoma Neg Hx     Hypertension Neg Hx     Macular degeneration Neg Hx     Retinal detachment Neg Hx     Strabismus Neg Hx     Stroke Neg Hx     Thyroid disease Neg Hx      Social History   Substance Use Topics    Smoking status: Former Smoker     Packs/day: 1.00     Types: Cigarettes     Quit date: 12/31/1994    Smokeless tobacco: Former User     Quit date: 4/3/2010    Alcohol use No     Review of Systems   Constitutional: Negative for chills and fever.        Positive for hyperglycemia.    HENT: Negative for sore throat.    Eyes: Negative for visual disturbance.   Respiratory: Negative for shortness of breath.    Cardiovascular: Negative for chest pain.   Gastrointestinal: Negative for abdominal pain, nausea and vomiting.   Genitourinary: Negative for dysuria.   Musculoskeletal: Negative for back pain and myalgias.   Skin: Negative for rash.   Neurological: Negative for weakness and numbness.   Hematological: Does not bruise/bleed easily.       Physical Exam     Initial Vitals [06/13/17 0119]   BP Pulse Resp Temp SpO2   (!) 232/110 65 18 97.6 °F (36.4 °C) 100 %     Physical Exam    Nursing note and  vitals reviewed.  Constitutional: He appears well-developed and well-nourished. He is not diaphoretic. No distress.   HENT:   Head: Normocephalic and atraumatic.   Right Ear: External ear normal.   Left Ear: External ear normal.   Eyes: EOM are normal. Pupils are equal, round, and reactive to light. Right eye exhibits no discharge. Left eye exhibits no discharge.   Neck: Normal range of motion.   Cardiovascular: Normal rate, regular rhythm and normal heart sounds. Exam reveals no gallop and no friction rub.    No murmur heard.  Pulmonary/Chest: Breath sounds normal. No respiratory distress. He has no wheezes. He has no rhonchi. He has no rales.   Abdominal: Soft. There is no tenderness. There is no rebound and no guarding.   Musculoskeletal: Normal range of motion. He exhibits no edema or tenderness.   Neurological: He is alert.   Pt follows commands. Oriented to person and place.    Skin: Skin is warm and dry. No rash and no abscess noted. No erythema. No pallor.   Psychiatric: He has a normal mood and affect. His behavior is normal. Judgment and thought content normal.         ED Course   Procedures  Labs Reviewed - No data to display  EKG Readings: (Independently Interpreted)   Initial Reading: No STEMI.   Normal sinus rhythm at a rate of 63 bpm. Prominent T waves in v2 which is unchanged from EKG from 3/30/17.           Medical Decision Making:   Clinical Tests:   Lab Tests: Ordered and Reviewed  Medical Tests: Ordered and Reviewed  ED Management:  Patient transfer by EMS from nursing home with reported hyperglycemia.  Patient is not a great historian, he believes they have not been giving him his short-acting insulin appropriately.  He denies any complaints at this time, including no pain, dizziness, no urinary symptoms.  No nausea or vomiting.  Blood glucose greater than 500 here on CMP.  Also demonstrate significant NIALL with worsening creatinine from one month ago.  Will admit for hydration and insulin  therapy.    FRANKLIN Davenport M.D.  06/13/2017  6:31 AM    3:33 AM- Discussed and consulted case with moonlighter, who will admit the patient.             Scribe Attestation:   Scribe #1: I performed the above scribed service and the documentation accurately describes the services I performed. I attest to the accuracy of the note.    Attending Attestation:           Physician Attestation for Scribe:  Physician Attestation Statement for Scribe #1: I, Dr. Davenport, reviewed documentation, as scribed by Teodora Tolliver in my presence, and it is both accurate and complete.                 ED Course     Clinical Impression:     1. Hyperglycemia    2. NIALL (acute kidney injury)                Franklin Davenport MD  06/13/17 0631     164.9

## 2023-05-06 NOTE — ED PROVIDER NOTE - NEUROLOGICAL, MLM
Alert and oriented, no focal deficits, no motor or sensory deficits. Alert and oriented, no focal deficits, no motor or sensory deficits.  Normal speech, CNs intact.

## 2023-05-06 NOTE — ED PROVIDER NOTE - OBJECTIVE STATEMENT
88 y/o M w/PMHx of afib no longer on ASA or AC due to bleeding (AVF bleeding, LE hematoma post cath due to pseudoaneurysm), s/p R CEA in 2012, pulmonary HTN, hypothyroidism, emphysema on home O2 2L, chronic back pain with compression fracture, ESRD on HD, HLD, CAD, COPD s/p unsuccessful PCI in 2018, Cardiomyopathy, HTN, Oligouria BIBEMS s/p fall on rollator walker at 1330pm . as per son, although it is not a wheelchair, pt wanted to sit down so he sat down and then his son pushed him, but he rolled over a crack on the sidewalk. pt fell backwards and landed on his back. son does not believe he hit his head. endorses mid back pain worse with movement, severe L hip pain, and L elbow skin tear. pt on Coumadin. denies LOC. pt lives at home with daughter. family adds hx of skin CA on forehead and DVT. pt on dialysis for ESRD M/W/F. Nephro: MD Bryson. PCP: MD Gandara 88 y/o M w/PMHx of aFib on warfarin, s/p R CEA in 2012, pulmonary HTN, hypothyroidism, emphysema on home O2 2L, chronic back pain with compression fracture, ESRD on HD, HLD, CAD, COPD s/p unsuccessful PCI in 2018, Cardiomyopathy, HTN, BIBEMS s/p fall from rollator walker at 1330pm . as per son, although it is not a wheelchair, pt wanted to sit down so he sat down and then his son pushed him, but he rolled over a crack on the sidewalk. pt fell backwards and landed on his back. son does not believe he hit his head. Pt endorses mid back pain worse with movement, L hip pain, and L elbow skin tear.  denies LOC. pt lives at home with daughter. family adds hx of skin CA on forehead and DVT. pt on dialysis for ESRD M/W/F.   Nephro: MD Bryson. PCP: MD Gandara 86 y/o M w/PMHx of aFib on warfarin, emphysema on home O2 2L, chronic back pain with compression fracture, ESRD on HD, HLD, CAD, COPD , Cardiomyopathy, HTN, BIBEMS s/p fall from rollator walker at 1330pm . as per son, although it is not a wheelchair, pt wanted to sit down so he sat down and then his son pushed him, but he rolled over a crack on the sidewalk. pt fell backwards and landed on his back. son does not believe he hit his head. Pt endorses mid back pain worse with movement, L hip pain, and L elbow skin tear.  denies LOC. pt lives at home with daughter. family adds hx of skin CA on forehead and DVT. pt on dialysis for ESRD M/W/F.   Nephro: MD Bryson. PCP: MD Gandara

## 2023-05-06 NOTE — ED PROVIDER NOTE - NSFOLLOWUPINSTRUCTIONS_ED_ALL_ED_FT
Tylenol 325 mg 2 tabs every 6 hours as needed for headache, aches & pains.  Continue regular medications as per routine.  Follow up Dr. Greg Segura, Spine specialist, as listed below.  Follow up this upcoming week with your own doctor.  Use walker for ambulation assistance.  Don't use rollater as a wheelchair.      Contusion    A contusion is a deep bruise. Contusions are the result of a blunt injury to tissues and muscle fibers under the skin. The skin overlying the contusion may turn blue, purple, or yellow. Symptoms also include pain and swelling in the injured area.    SEEK IMMEDIATE MEDICAL CARE IF YOU HAVE ANY OF THE FOLLOWING SYMPTOMS: severe pain, numbness, tingling, pain, weakness, or skin color/temperature change in any part of your body distal to the injury.        Closed Head Injury    A closed head injury is an injury to your head that may or may not involve a traumatic brain injury (TBI). Symptoms of TBI can be short or long lasting and include headache, dizziness, interference with memory or speech, fatigue, confusion, changes in sleep, mood changes, nausea, depression/anxiety, and dulling of senses. Make sure to obtain proper rest which includes getting plenty of sleep, avoiding excessive visual stimulation, and avoiding activities that may cause physical or mental stress. Avoid any situation where there is potential for another head injury, including sports.    SEEK IMMEDIATE MEDICAL CARE IF YOU HAVE ANY OF THE FOLLOWING SYMPTOMS: unusual drowsiness, vomiting, severe dizziness, seizures, lightheadedness, muscular weakness, different pupil sizes, visual changes, or clear or bloody discharge from your ears or nose.        Abrasion    WHAT YOU NEED TO KNOW:    An abrasion is a scrape on your skin. It happens when your skin rubs against a rough surface. Some examples of an abrasion include rug burn, a skinned elbow, or road rash. Abrasions can be many shapes and sizes. The wound may hurt, bleed, bruise, or swell.     DISCHARGE INSTRUCTIONS:    Return to the emergency department if:     The bleeding does not stop after 10 minutes of firm pressure.      You cannot rinse one or more foreign objects out of your wound.      You have red streaks on your skin coming from your wound.    Contact your healthcare provider if:     You have a fever or chills.       Your abrasion is red, warm, swollen, or draining pus.      You have questions or concerns about your condition or care.    Care for your abrasion:     Wash your hands and dry them with a clean towel.      Press a clean cloth against your wound to stop any bleeding.      Rinse your wound with a lot of clean water. Do not use harsh soap, alcohol, or iodine solutions.      Use a clean, wet cloth to remove any objects, such as small pieces of rocks or dirt.      Rub antibiotic ointment on your wound. This may help prevent infection and help your wound heal.      Cover the wound with a non-stick bandage. Change the bandage daily, and if gets wet or dirty.     Follow up with your healthcare provider as directed: Write down your questions so you remember to ask them during your visits.        Abrasion    WHAT YOU NEED TO KNOW:    An abrasion is a scrape on your skin. It happens when your skin rubs against a rough surface. Some examples of an abrasion include rug burn, a skinned elbow, or road rash. Abrasions can be many shapes and sizes. The wound may hurt, bleed, bruise, or swell.     DISCHARGE INSTRUCTIONS:    Return to the emergency department if:     The bleeding does not stop after 10 minutes of firm pressure.      You cannot rinse one or more foreign objects out of your wound.      You have red streaks on your skin coming from your wound.    Contact your healthcare provider if:     You have a fever or chills.       Your abrasion is red, warm, swollen, or draining pus.      You have questions or concerns about your condition or care.    Care for your abrasion:     Wash your hands and dry them with a clean towel.      Press a clean cloth against your wound to stop any bleeding.      Rinse your wound with a lot of clean water. Do not use harsh soap, alcohol, or iodine solutions.      Use a clean, wet cloth to remove any objects, such as small pieces of rocks or dirt.      Rub antibiotic ointment on your wound. This may help prevent infection and help your wound heal.      Cover the wound with a non-stick bandage. Change the bandage daily, and if gets wet or dirty.     Follow up with your healthcare provider as directed: Write down your questions so you remember to ask them during your visits.

## 2023-05-06 NOTE — ED PROVIDER NOTE - CLINICAL SUMMARY MEDICAL DECISION MAKING FREE TEXT BOX
88 y/o wm, hx of ESRD on hdq m/w/f, htn, copd, chf, afib on warfarin BIBA s/p fell through rollator onto his buttocks then fell back upon his back onto ground and rollator. pt c/o pain to back posterior ribs, L hip, and ankle. unable to get up afterwards. ? if head injury, no LOC.   plan: pt upgraded to trauma alert after MD eval: pan scan noncon, XR pelvis b/l hips and femur, l ankle, trauma labs, TDaP, fall precautions, monitor, observe, reassess 88 y/o wm, hx of ESRD on HD m/w/f, htn, copd, chf, AFib on warfarin, BIBA s/p fell through rollator onto his buttocks then fell back upon his back onto ground and rollator. pt c/o pain to back, posterior ribs, L hip, and ankle. unable to get up afterwards. ? if head injury, no LOC.   Plan: pt upgraded to trauma alert after MD eval: pan-scan noncon, XR pelvis b/l hips and femur, L ankle, trauma labs, TDaP, fall precautions, monitor, observe, reassess 88 y/o wm, hx of ESRD on HD m/w/f, htn, copd, chf, AFib on warfarin, BIBA s/p fell through rollator onto his buttocks then fell back upon his back onto ground and rollator. pt c/o pain to back, posterior ribs, L hip, and ankle. unable to get up afterwards. ? if head injury, no LOC.   Plan: pt upgraded to trauma alert after MD eval: pan-scan noncon, XR pelvis b/l hips and femur, L ankle, trauma labs, TDaP, fall precautions, monitor, observe, reassess    TAMARA Moreira MD:  XRs wet reads no acute fx/disloc.  Labs: INR therapeutic at 2.7, CBC stable, CMP: Cr baseline.  CT studies no acute traumatic pathology except + severe compression deformity L4, new since 9/2022, + stable L1 comp. fx.    18:55,  Ab Neumann for Dr. Moreira: Neurosurgery PA aware of spine consult.    21:14, TAMARA Moreira MD:  MATTIE Spine consult appreciated: L4 comp. fx not believed to be acute.  Pt passed ambulation trial with walker, stable for DC home, po Tylenol, outpt PCP & Spine f/u Dr. RAYMUNDO Segura.  Pt & daughter agree to use his back brace if LBP acts up.

## 2023-05-06 NOTE — ED PROVIDER NOTE - CARE PROVIDER_API CALL
Greg Segura; PhD)  Neurosurgery  [address] [facility] [address], [address] [place] [address]  Phone: [phone]  Fax: [phone]  Follow Up Time:

## 2023-05-06 NOTE — ED PROVIDER NOTE - CONSTITUTIONAL, MLM
normal... elderly WM, no respiratory distress, non toxic elderly WM, no respiratory distress, non-toxic
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
## 2023-05-06 NOTE — ED PROVIDER NOTE - CARE PLAN
1 Principal Discharge DX:	Contusion of lower back and pelvis, initial encounter  Secondary Diagnosis:	ESRD on hemodialysis

## 2023-05-06 NOTE — ED ADULT TRIAGE NOTE - CHIEF COMPLAINT QUOTE
pt BIBEMS s/p witnessed fall. pt was sitting in transfer chair when the chair gave out and pt fell directly onto bottom, hitting left hip. pt c/o severe left hip pain with palpation and left elbow skin tear. pt takes Coumadin. denies head injury, LOC.

## 2023-05-06 NOTE — ED PROVIDER NOTE - CARE PROVIDERS DIRECT ADDRESSES
[email or web address]

## 2023-05-06 NOTE — ED PROVIDER NOTE - MUSCULOSKELETAL, MLM
neck: non tender, supple without pain. KOVACS x4, no focal swelling, tenderness neck: non tender, supple without pain. KOVACS x4, no focal swelling, tenderness.  B/L SLR 35 degrees w/o pain, good AROM B/L hips/knees.  Back: mild M/L lumbar tender w/o obvious deformity/swelling.

## 2023-05-06 NOTE — ED PROVIDER NOTE - PATIENT PORTAL LINK FT
You can access the FollowMyHealth Patient Portal offered by [facility] by registering at the following website: [email or web address] By joining [employer]’s FollowMyHealth portal, you will also be able to view your health information using other applications (apps) compatible with our system.

## 2023-05-06 NOTE — ED ADULT NURSE REASSESSMENT NOTE - NS ED NURSE REASSESS COMMENT FT1
called the lab, checked the status of the lab results, awaiting for blood re-draw, MD mary made aware.

## 2023-05-06 NOTE — ED PROVIDER NOTE - ENMT, MLM
Head: NC/AT. no clinical evidence of facial injury. no battles. oropharynx clear, mucous membrane slightly dry Head: NC/AT. no clinical evidence of facial injury. no Martines's. oropharynx clear, mucous membrane slightly dry

## 2023-05-11 NOTE — ED ADULT NURSE NOTE - CHIEF COMPLAINT QUOTE
English
Pt BIB daughter c/o multiple medical complaints. Pt was seen at  on Sunday for back pain and left hip pain and had a CT that showed 3 compression fractures. Daughter states since Sunday he is having increased pain and is not able to ambulate. Pt states he is unable to bear weight on his left leg due to pain/weakness. Pt denies numbness or tingling. Daughter gave him tylenol for pain 45 minutes PTA w/ minimal relief. PMH ESRD on Dialysis, Afib on Warfarin, CHF, COPD. NKDA.

## 2023-05-11 NOTE — ED ADULT NURSE NOTE - BEFAST ARM SIDE DRIFT
Detail Level: Detailed Plan: Extracted in office today Plan: Treated in office today with liquid nitrogen No

## 2023-05-11 NOTE — ED ADULT TRIAGE NOTE - CHIEF COMPLAINT QUOTE
Pt BIB daughter c/o multiple medical complaints. Pt was seen at  on Sunday for back pain and left hip pain and had a CT that showed 3 compression fractures. Daughter states since Sunday he is having increased pain and is not able to ambulate. Pt states he is unable to bear weight on his left leg due to pain/weakness. Pt denies numbness or tingling. Daughter gave him tylenol for pain 45 minutes PTA w/ minimal relief. PMH ESRD on Dialysis, Afib on Warfarin, CHF, COPD. NKDA.

## 2023-05-11 NOTE — ED ADULT NURSE NOTE - DOES PATIENT HAVE ADVANCE DIRECTIVE
A/P 60-year-old male complaining of his ears being clogged left greater than right. Both ears flushed with warm water with minimal return of cerumen. Patient states he has worsening feeling of his ears being clogged with hearing loss.   Send patient to EN No

## 2023-05-11 NOTE — ED ADULT NURSE NOTE - NSFALLRISKINTERV_ED_ALL_ED

## 2023-05-11 NOTE — ED ADULT NURSE NOTE - OBJECTIVE STATEMENT
Ambulatory to ER with c/o worsening back pain; L hip pain x 4 days. Patient seen in  Sunday for same c/o. A & ox4, respirations even and unlabored on room air. Await MD herring.

## 2023-05-12 NOTE — H&P ADULT - HISTORY OF PRESENT ILLNESS
87 M PMHx of aFib on warfarin, emphysema on home O2 2L, chronic back pain with compression fracture, ESRD on HD, HLD, CAD, COPD , Cardiomyopathy, HTN recently diagnosed with chronic L1 fracture and stable but likely chronic L4 compression fracture last week. Was evaluated at that time by neurosurgery who recc brace for comfort and o/p office follow up. Since then dtr  states patient cannot perform his ADLs and despite attempts at home, she is no longer able to care for him.   They otherwise deny urinary incontinence, bowel incontinence or saddle anethesia/Paresthesias to LEs. Plan for FR[facility] placement.             PAST MEDICAL/SURGICAL/FAMILY/SOCIAL HISTORY:    Past Medical, Past Surgical, and Family History:  PAST MEDICAL HISTORY:  CAD (coronary artery disease)     Cardiomyopathy     ESRD on dialysis     HLD (hyperlipidemia)     HTN (hypertension).     PAST SURGICAL HISTORY:  AV fistula.     FAMILY HISTORY:  Father  Still living? Unknown  FH: emphysema, Age at diagnosis: Age Unknown.     Tobacco Usage:  · Tobacco Usage	Never smoker    ALLERGIES AND HOME MEDICATIONS:   Allergies:        Allergies:  	No Known Allergies:

## 2023-05-12 NOTE — PATIENT PROFILE ADULT - HAVE YOU RECENTLY LOST WEIGHT WITHOUT TRYING?
Patient Education     Treating ADHD: Learning More  Before you can help your child, you must understand what ADHD is. Although ADHD is not a learning problem, it can interfere with learning. With the proper help, your child will find it easier to learn both at school and at home.    Learning about ADHD  One of the best ways to help your child is by learning about ADHD. You can start by believing that your child is not lazy or stupid. Once you understand the special needs that ADHD creates in your child, share what you learn with others. Some people may resist the diagnosis or deny the problem. Even so, let them know how they can help your child.  Learning with ADHD  Except in rare cases, there is nothing wrong with the intelligence of a child with ADHD. To make learning easier, work with your child s teacher. Share the tips for teachers below. Keep in mind, federal law supports your child s right to receive the help he or she needs.  Parent s role  Here are some ways you can help your child:    Stay informed. Read about ADHD. Join a local ADHD parent support group.    Reassure your child that ADHD is not his or her fault.    Request a teacher who can help your child. Stay in touch.    Create a tidy, quiet study space for your child at home.  Teacher s role  Here are a few tips the teacher can try:    Seat the child near the front of the room, away from any distractions such as windows or noisy radiators.    Find the best way to  reach and teach  the child. Use tape recorders, computers, or games if they promote learning.    Encourage the child to pursue favorite subjects. Offer special projects to boost self-esteem.  Child s role  Here are some hints for your child:    Tell your parents and teachers when you need their help.    Set aside one place at home and another at school to store your books, folders, and projects.    Make a list of your assignments and their due dates. Marking dates on a calendar can  help.    Take short breaks between homework assignments. Set a timer to signal when to end the break and return to homework.  Date Last Reviewed: 12/1/2016 2000-2018 [employer]. [address] [place] [address], [place] [address]. All rights reserved. This information is not intended as a substitute for professional medical care. Always follow your healthcare professional's instructions.            No (0)

## 2023-05-12 NOTE — PHYSICAL THERAPY INITIAL EVALUATION ADULT - GENERAL OBSERVATIONS, REHAB EVAL
Patient received on a stretcher in the ED +O2@2L via nc (home use). +O2 sat monitor and BP cuff.  VSS. Patient denied pain at rest.  C/O pain at 8/10 during stance, reduced to 4/10 back on stretcher.
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
## 2023-05-12 NOTE — ED ADULT NURSE REASSESSMENT NOTE - NS ED NURSE REASSESS COMMENT FT1
Report received from yaneth mckeon. Pt resting comfortably in bed. Pt requesting to eat, MD maravilla placed diet order, menu provided. Pt denies any pain or any other symptoms. Awaiting PT, SW and dialysis consults.

## 2023-05-12 NOTE — PATIENT PROFILE ADULT - FALL HARM RISK - HARM RISK INTERVENTIONS
No Assistance with ambulation/Assistance OOB with selected safe patient handling equipment/Communicate Risk of Fall with Harm to all staff/Discuss with provider need for PT consult/Monitor gait and stability/Provide patient with walking aids - walker, cane, crutches/Reinforce activity limits and safety measures with patient and family/Tailored Fall Risk Interventions/Visual Cue: Yellow wristband and red socks/Bed in lowest position, wheels locked, appropriate side rails in place/Call bell, personal items and telephone in reach/Instruct patient to call for assistance before getting out of bed or chair/Non-slip footwear when patient is out of bed/[place] to call system/Physically safe environment - no spills, clutter or unnecessary equipment/Purposeful Proactive Rounding/Room/bathroom lighting operational, light cord in reach

## 2023-05-12 NOTE — PHARMACOTHERAPY INTERVENTION NOTE - COMMENTS
Medication reconciliation completed.  Patient was unable to provide medication information, spoke to daughter Neo ([phone]) and they provided current medication list; confirmed with Dr. [facility].

## 2023-05-12 NOTE — ED PROVIDER NOTE - DIFFERENTIAL DIAGNOSIS
metabolic abdnomality vs pain from his known recent spine compression fractures Differential Diagnosis metabolic abnormality vs pain from his known recent spine compression fractures

## 2023-05-12 NOTE — H&P ADULT - NSHPLABSRESULTS_GEN_ALL_CORE
CBC:            11.8   7.27  )-----------( 86       ( 05-12-23 @ 00:59 )             35.1         Chem:         ( 05-12-23 @ 00:59 )    137  |  102  |  25<H>  ----------------------------<  91  4.0   |  27  |  4.64<H>        Liver Functions:     Type & Screen:         < from: CT Abdomen and Pelvis No Cont (05.06.23 @ 17:24) >    IMPRESSION:    Severe compression deformity of L4 of uncertain chronicity, new since   9/4/2022. Unchanged compression of L1.    No evidence of traumatic visceral injury. No visible hematoma.    Pulmonary emphysema. Cardiomegaly. Small pleural effusions and small   ascites. Mesenteric edema.    < end of copied text >

## 2023-05-12 NOTE — PHYSICAL THERAPY INITIAL EVALUATION ADULT - MODALITIES TREATMENT COMMENTS
Pain limits function. Patient returned to [facility] kinney rails up, lines intact, RN in room.  MD, TRESSA RN informed of session/status.

## 2023-05-12 NOTE — ED PROVIDER NOTE - CARE PLAN
Principal Discharge DX:	Chronic back pain   1 Principal Discharge DX:	Lumbar compression fracture  Secondary Diagnosis:	Intractable back pain

## 2023-05-12 NOTE — PHYSICAL THERAPY INITIAL EVALUATION ADULT - PATIENT PROFILE REVIEW, REHAB EVAL
PMHx of aFib on warfarin, emphysema on home O2 2L, chronic back pain with compression fracture, ESRD on HD, HLD, CAD, COPD , Cardiomyopathy, HTN/yes

## 2023-05-12 NOTE — PATIENT PROFILE ADULT - FUNCTIONAL ASSESSMENT - BASIC MOBILITY 6.
2-calculated by average/Not able to assess (calculate score using [facility] averaging method)

## 2023-05-12 NOTE — CONSULT NOTE ADULT - ASSESSMENT
87 M PMHx of aFib on warfarin, emphysema on home O2 2L, chronic back pain with compression fracture, ESRD on HD, HLD, CAD, COPD , Cardiomyopathy, HTN recently diagnosed with chronic L1 fracture and stable but likely chronic L4 compression fracture last week. Was evaluated at that time by neurosurgery who recc brace for comfort and o/p office follow up. Since then dtr  states patient cannot perform his ADLs and despite attempts at home, she is no longer able to care for him.   They otherwise deny urinary incontinence, bowel incontinence or saddle anethesia/Paresthesias to LEs. Plan for [facility] placement.     Nephrology:  Pt admitted with back pain, chronic lumbar vertebrae fracture  No acute sxs  Seen on HD   Agreeable for rehab if needed  Next HD Monday  Dr hall covering weekend if needed

## 2023-05-12 NOTE — H&P ADULT - ASSESSMENT
#L1/4 chronic compression fx  - intractable back pain  - no e/o acute neurological changes on exam  - no indictaion for repeat CT scan at this juncture  - PT consult-> will benefit from [facility]  - brace for comfort  - pain control        # Chronic afib   - inr 3.07  - goal 2-3  - bb      # Chronic hypoxemic respiratory failure   #Severe pulm htn  #COPD  - currently not in exacerbation  - supp o2 at 2L  - op pulm follow visit      #ESRD  - pt missed HD on 5/10  - Renal consulted  - no obvious e/o fluid overload on exam  - cxr pending  - bmp in am      #Anemia of chronic disease  #Thrombocytopenia  - at baseline  - no e/o bleeding        #HTN/HLD  - resume home meds        #Mild transaminitis  - ruq sono  - afebrile  - GI as o/p      DVT px: coumadin  -hold warfarin for inr > 3

## 2023-05-12 NOTE — ED PROVIDER NOTE - CLINICAL SUMMARY MEDICAL DECISION MAKING FREE TEXT BOX
spoke with daughter they made an admirable attempt to care for her father at home post new spine fracture a couple of days ago. will require evaluation by PT SW as pt is not walking in the house 2/2 the pain. pt and pts daughter agree to this plan of care

## 2023-05-12 NOTE — PHYSICAL THERAPY INITIAL EVALUATION ADULT - GAIT DISTANCE, PT EVAL
3 steps up the bed post marching in place.  Patient c/o increased back pain with standing, unable to ambulate farther.

## 2023-05-12 NOTE — ED PROVIDER NOTE - NSICDXPASTMEDICALHX_GEN_ALL_CORE_FT
The physician has been notified. PAST MEDICAL HISTORY:  CAD (coronary artery disease)     Cardiomyopathy     ESRD on dialysis     HLD (hyperlipidemia)     HTN (hypertension)

## 2023-05-12 NOTE — ED PROVIDER NOTE - OBJECTIVE STATEMENT
86 y/o M w/PMHx of aFib on warfarin, emphysema on home O2 2L, chronic back pain with compression fracture, ESRD on HD, HLD, CAD, COPD , Cardiomyopathy, HTN presents to ED with daughter 2/2 unable to perform ADLs at home 2/2 to his chronic back pain. he had his nml dialysis session on Wednesday. his back pain currently is 3/10 midline without new neuro deficits. no new falls. no bowel or bladder retention or incontinence. no current headache . he is compliant with his coumadin

## 2023-05-12 NOTE — GOALS OF CARE CONVERSATION - ADVANCED CARE PLANNING - CONVERSATION DETAILS
Full code. All risks and benefots of CPR explained to patient including chances of successful rosc.     Time spent: 18 min

## 2023-05-12 NOTE — CONSULT NOTE ADULT - SUBJECTIVE AND OBJECTIVE BOX
NEPHROLOGY CONSULT  HPI:   87 M PMHx of aFib on warfarin, emphysema on home O2 2L, chronic back pain with compression fracture, ESRD on HD, HLD, CAD, COPD , Cardiomyopathy, HTN recently diagnosed with chronic L1 fracture and stable but likely chronic L4 compression fracture last week. Was evaluated at that time by neurosurgery who recc brace for comfort and o/p office follow up. Since then dtr  states patient cannot perform his ADLs and despite attempts at home, she is no longer able to care for him.   They otherwise deny urinary incontinence, bowel incontinence or saddle anethesia/Paresthesias to LEs. Plan for [facility] placement.     Nephrology:  Pt admitted with back pain, chronic lumbar vertebrae fracture  No acute sxs  Seen on HD   Agreeable for rehab if needed            PAST MEDICAL/SURGICAL/FAMILY/SOCIAL HISTORY:    Past Medical, Past Surgical, and Family History:  PAST MEDICAL HISTORY:  CAD (coronary artery disease)     Cardiomyopathy     ESRD on dialysis     HLD (hyperlipidemia)     HTN (hypertension).     PAST SURGICAL HISTORY:  AV fistula.     FAMILY HISTORY:  Father  Still living? Unknown  FH: emphysema, Age at diagnosis: Age Unknown.     Tobacco Usage:  · Tobacco Usage	Never smoker    ALLERGIES AND HOME MEDICATIONS:   Allergies:        Allergies:  	No Known Allergies:          (12 May 2023 09:49)      PAST MEDICAL & SURGICAL HISTORY:  CAD (coronary artery disease)      Cardiomyopathy      HTN (hypertension)      HLD (hyperlipidemia)      ESRD on dialysis      AV fistula          FAMILY HISTORY:  FH: emphysema (Father)        MEDICATIONS  (STANDING):  atorvastatin 20 milliGRAM(s) Oral at bedtime  budesonide 160 MICROgram(s)/formoterol 4.5 MICROgram(s) Inhaler 2 Puff(s) Inhalation two times a day  calcium acetate 667 milliGRAM(s) Oral three times a day with meals  carvedilol 3.125 milliGRAM(s) Oral every 12 hours  levothyroxine 88 MICROGram(s) Oral daily  lidocaine   4% Patch 1 Patch Transdermal daily  montelukast 10 milliGRAM(s) Oral at bedtime  multivitamin 1 Tablet(s) Oral daily  naloxone Injectable 0.4 milliGRAM(s) IV Push once  polyethylene glycol 3350 17 Gram(s) Oral daily  psyllium Powder 1 Packet(s) Oral daily  senna 2 Tablet(s) Oral at bedtime  warfarin 3 milliGRAM(s) Oral daily    MEDICATIONS  (PRN):  acetaminophen     Tablet .. 650 milliGRAM(s) Oral every 6 hours PRN Mild Pain (1 - 3)  albuterol    90 MICROgram(s) HFA Inhaler 2 Puff(s) Inhalation every 6 hours PRN Shortness of Breath and/or Wheezing  aluminum hydroxide/magnesium hydroxide/simethicone Suspension 30 milliLiter(s) Oral every 4 hours PRN Dyspepsia  benzonatate 100 milliGRAM(s) Oral every 8 hours PRN Cough  bisacodyl 5 milliGRAM(s) Oral daily PRN Constipation  cyclobenzaprine 5 milliGRAM(s) Oral every 8 hours PRN Spasm  melatonin 3 milliGRAM(s) Oral at bedtime PRN Insomnia  ondansetron Injectable 4 milliGRAM(s) IV Push every 6 hours PRN Nausea and/or Vomiting  oxyCODONE    IR 2.5 milliGRAM(s) Oral every 4 hours PRN Moderate Pain (4 - 6)  oxyCODONE    IR 5 milliGRAM(s) Oral every 4 hours PRN Severe Pain (7 - 10)      Allergies    No Known Allergies    Intolerances        I&O's Summary    12 May 2023 07:01  -  12 May 2023 20:52  --------------------------------------------------------  IN: 360 mL / OUT: 0 mL / NET: 360 mL          REVIEW OF SYSTEMS:    CONSTITUTIONAL:  As per HPI.  CONSTITUTIONAL: No weakness, fevers or chills  EYES/ENT: No visual changes;  No vertigo or throat pain   NECK: No pain or stiffness  CARDIOVASCULAR: No chest pain or palpitations  GASTROINTESTINAL: No abdominal or epigastric pain. No nausea, vomiting, or hematemesis; No diarrhea or constipation. No melena or hematochezia.  GENITOURINARY: No dysuria, frequency or hematuria  NEUROLOGICAL: No numbness or weakness  SKIN: No itching, burning, rashes, or lesions   All other review of systems is negative unless indicated above      Vital Signs Last 24 Hrs  T(C): 36.7 (12 May 2023 16:59), Max: 37.2 (11 May 2023 21:33)  T(F): 98.1 (12 May 2023 16:59), Max: 98.9 (11 May 2023 21:33)  HR: 100 (12 May 2023 16:59) (78 - 100)  BP: 128/71 (12 May 2023 16:59) (100/78 - 129/76)  BP(mean): 86 (12 May 2023 09:53) (78 - 86)  RR: 16 (12 May 2023 16:59) (16 - 19)  SpO2: 96% (12 May 2023 16:59) (92% - 99%)    Parameters below as of 12 May 2023 16:59  Patient On (Oxygen Delivery Method): room air        Daily     Daily     I&O's Summary    12 May 2023 07:01  -  12 May 2023 20:52  --------------------------------------------------------  IN: 360 mL / OUT: 0 mL / NET: 360 mL        PHYSICAL EXAM:    General:  Alert, No acute distress.    Neuro:  Alert and oriented to person, place, and time. Able to communicate  well.      HEENT:  No JVD, no masses, Eyes anicteric, ,    Cardiovascular:  Regular rate and rhythm, with normal S1 and S2.    Lungs:  clear. no rales, no wheezing, .    Abdomen:  Normoactive bowel sounds. Soft, flat, non-tender, and non-distended.  positive bowel sounds    Skin:  Warm, dry    Extremities:  warm,  no cyanosis    LABS:                        11.6   6.13  )-----------( 93       ( 12 May 2023 11:52 )             34.5     05-12    135  |  100  |  30<H>  ----------------------------<  95  3.9   |  30  |  5.40<H>    Ca    9.1      12 May 2023 11:52  Phos  4.3     05-12    TPro  x   /  Alb  2.9<L>  /  TBili  x   /  DBili  x   /  AST  x   /  ALT  x   /  AlkPhos  x   05-12    PT/INR - ( 12 May 2023 00:59 )   PT: 36.0 sec;   INR: 3.07 ratio         PTT - ( 12 May 2023 00:59 )  PTT:46.7 sec    Phosphorus Level, Serum: 4.3 mg/dL (05-12 @ 11:52)

## 2023-05-12 NOTE — PHYSICAL THERAPY INITIAL EVALUATION ADULT - ADDITIONAL COMMENTS
Patient denied living with his daughter, reported goes to [facility]. Statement Selected

## 2023-05-12 NOTE — H&P ADULT - NSHPPHYSICALEXAM_GEN_ALL_CORE
ICU Vital Signs Last 24 Hrs  T(C): 37 (12 May 2023 09:53), Max: 37.2 (11 May 2023 21:33)  T(F): 98.6 (12 May 2023 09:53), Max: 98.9 (11 May 2023 21:33)  HR: 96 (12 May 2023 09:53) (84 - 96)  BP: 118/70 (12 May 2023 09:53) (105/73 - 121/69)  BP(mean): 86 (12 May 2023 09:53) (78 - 86)  ABP: --  ABP(mean): --  RR: 16 (12 May 2023 09:53) (16 - 19)  SpO2: 99% (12 May 2023 09:53) (92% - 99%)    O2 Parameters below as of 12 May 2023 09:53  Patient On (Oxygen Delivery Method): nasal cannula  O2 Flow (L/min): 2          PHYSICAL EXAM:    Constitutional: NAD, awake and alert  HEENT: PERR, EOMI, Normal Hearing, MMM  Neck: Soft and supple, No LAD, No JVD  Respiratory: Breath sounds are clear bilaterally, No wheezing, rales or rhonchi  Cardiovascular: S1 and S2, regular rate and rhythm, no Murmurs, gallops or rubs  Gastrointestinal: Bowel Sounds present, soft, nontender, nondistended, no guarding, no rebound  Extremities: No peripheral edema  Vascular: 2+ peripheral pulses  Neurological: A/O x 3, no focal deficits  Musculoskeletal: 5/5 strength b/l upper and lower extremities  Skin: No rashes

## 2023-05-12 NOTE — PATIENT PROFILE ADULT - FALL HARM RISK - TYPE OF ASSESSMENT
Chart reviewed    Patient seen and examined    Agree with plan as outlined above Seen/examined. agree with above. I personally saw and examined the patient in detail.  I have spoken to the above provider regarding the assessment and plan of care.  I reviewed the above assessment and plan of care, and agree.  I have made changes in the body of the note where appropriate.  Total time spent taking care of patient, reviewing data, and discussing case with medical team and staff in excess of 35 minutes.  Patient with significant junctional and sinus bradycardia.  Continue to hold Toprol and propafenone.  Hopefully can avoid PPM. I saw and examined the patient personally. Spoke with above provider regarding this case. I reviewed the above findings completely.  I agree with the above history, physical, and plan which I have edited where appropriate.   Bradycardia is likely secondary to underlying conduction disease and medications. Continue HOLD propafenone and Toprol. Her HR on admission was 37.  today is sustaining in 50's. Tele appear that she has a low lying atrial focus. Check 12 lead EKG. Hopefully this will continue to improve over the next 24 hours.  We would like to have her ambulate while watching tele to assess her chronotropic competence.   spoke with granddaughter at length reagarding options. will defer ppm at this time. Though I told her that she likely will need an EP eval in future. Admission I personally conducted a physical examination of the patient. I personally gathered the patient's history. I edited the above listed findings which were prepared by the listed resident physician. I personally discussed the plan of care with the patient. The questions and concerns were addressed to the best of my ability. The patient is in agreement with the listed treatment plan.     - b/l pleural effusions but no intervention recommended at this time  - d/c planning. MBS eval pending for the morning, then plan to switch to po abx and dispo likely for home I personally conducted a physical examination of the patient. I personally gathered the patient's history. I edited the above listed findings which were prepared by the listed resident physician. I personally discussed the plan of care with the patient. The questions and concerns were addressed to the best of my ability. The patient is in agreement with the listed treatment plan.     - family requesting pulm consult, will reviewed CXR in the AM and determine need  - continue tele monitoring, pacer pads on chest and zoll @ bedside  - no events today, discussed at length w/ family

## 2023-05-12 NOTE — ED PROVIDER NOTE - PROGRESS NOTE DETAILS
Attending Segura, PT eval pt and recommends pt to go to rehab.  Case management also eval pt.    d/w Dr. Aj for admission for intractable back pain and compression fx

## 2023-05-13 NOTE — PROGRESS NOTE ADULT - SUBJECTIVE AND OBJECTIVE BOX
C/c: back pain    HPI: 87 M PMHx of HTN, HLD, aFib, RLE DVT on warfarin, emphysema, pulmonary HTN,  on home O2 2L PRN, CAD, s/p unsuccessful PCI in 2018, Cardiomyopathy, ESRD on HD, Right CEA 2012, Hypothyroid, chronic back pain with compression fracture, ESRD on HD, who was recently seen in ED after a fall, imaging showed a new L4 fracture since 9/2022 and a chronic L1 fracture. He was seen by neurosx and discharged home from ED.   Since then dtr states patient cannot perform his ADLs and despite attempts at home, she is no longer able to care for him.   They otherwise deny urinary incontinence, bowel incontinence or saddle anethesia/Paresthesias to LEs. Plan for [facility] placement.     pt seen and examined this am. Laying flat in bed. c/o back pain. no sob/chest pain. States he doesn't always use o2 at home.     ROS: all 10 systems reviewed and is as above otherwise negative.     Vital Signs Last 24 Hrs  T(C): 36.6 (13 May 2023 07:50), Max: 36.7 (12 May 2023 16:59)  T(F): 97.9 (13 May 2023 07:50), Max: 98.1 (12 May 2023 16:59)  HR: 79 (13 May 2023 08:03) (79 - 104)  BP: 104/61 (13 May 2023 07:50) (100/66 - 129/76)  RR: 18 (13 May 2023 07:50) (16 - 18)  SpO2: 93% (13 May 2023 08:03) (93% - 96%)    PHYSICAL EXAM:    GENERAL: Elderly male, laying flat in bed, Comfortable, no acute distress  HEAD:  Atraumatic, Normocephalic  EYES: EOMI, PERRLA  HEENT: Moist mucous membranes  NECK: Supple, No JVD  NERVOUS SYSTEM:  Alert & Oriented X3, grossly non focal.   CHEST/LUNG: Clear to auscultation bilaterally  HEART: Regular rate and rhythm;  ABDOMEN: Soft, Nontender, Nondistended; Bowel sounds present  GENITOURINARY- Voiding, no palpable bladder  EXTREMITIES:  No clubbing, cyanosis, or edema  MUSCULOSKELETAL- lower back tenderness  SKIN-no rash        LABS:                        11.6   7.18  )-----------( 95       ( 13 May 2023 06:55 )             34.7     05-13    134<L>  |  99  |  20  ----------------------------<  87  4.0   |  30  |  3.65<H>    Ca    9.1      13 May 2023 06:55  Phos  4.3     05-12    TPro  6.7  /  Alb  2.9<L>  /  TBili  1.7<H>  /  DBili  x   /  AST  43<H>  /  ALT  27  /  AlkPhos  102  05-13    PT/INR - ( 13 May 2023 06:55 )   PT: 26.3 sec;   INR: 2.25 ratio         PTT - ( 12 May 2023 00:59 )  PTT:46.7 sec      MEDS  acetaminophen   IVPB .. 1000 milliGRAM(s) IV Intermittent once PRN  albuterol    90 MICROgram(s) HFA Inhaler 2 Puff(s) Inhalation every 6 hours PRN  aluminum hydroxide/magnesium hydroxide/simethicone Suspension 30 milliLiter(s) Oral every 4 hours PRN  atorvastatin 20 milliGRAM(s) Oral at bedtime  benzonatate 100 milliGRAM(s) Oral every 8 hours PRN  bisacodyl 5 milliGRAM(s) Oral daily PRN  budesonide 160 MICROgram(s)/formoterol 4.5 MICROgram(s) Inhaler 2 Puff(s) Inhalation two times a day  calcium acetate 667 milliGRAM(s) Oral three times a day with meals  carvedilol 3.125 milliGRAM(s) Oral every 12 hours  cyclobenzaprine 5 milliGRAM(s) Oral every 8 hours PRN  levothyroxine 88 MICROGram(s) Oral daily  lidocaine   4% Patch 1 Patch Transdermal daily  melatonin 3 milliGRAM(s) Oral at bedtime PRN  montelukast 10 milliGRAM(s) Oral at bedtime  multivitamin 1 Tablet(s) Oral daily  naloxone Injectable 0.4 milliGRAM(s) IV Push once  ondansetron Injectable 4 milliGRAM(s) IV Push every 6 hours PRN  oxyCODONE    IR 5 milliGRAM(s) Oral every 4 hours PRN  oxyCODONE    IR 2.5 milliGRAM(s) Oral every 4 hours PRN  polyethylene glycol 3350 17 Gram(s) Oral daily  psyllium Powder 1 Packet(s) Oral daily  senna 2 Tablet(s) Oral at bedtime  warfarin 3 milliGRAM(s) Oral daily    ASSESSMENT AND PLAN:  87m,PMH as above a/w    1. Subacute L4/chronic L1 fracture with severe pain, inability to perform ADLs:  -try iv tylenol.   -prn oxycodone.   -physical therapy eval   -incentive spirometry  -bowel regimen    2. HTN:  -continue bb    3. HLD  -statin    4. Chronic atrial fibrillation:  -bb  -coumadin to keep inr 2-3.    5. RLE DVT  -coumadin     6. COPD/Pulmonary HTN with chronic respiratory failure  -on 2L prn.   -symbicort.   -singulair  -no s/o exacerbation at this time.    7. H/o CAD:  -bb/statin.    8. ESRD  -HD M, W, F    9. Hypothyroid  -synthroid.     dispo:  [facility] monday.

## 2023-05-14 NOTE — PROGRESS NOTE ADULT - SUBJECTIVE AND OBJECTIVE BOX
C/c: back pain    HPI: 87 M PMHx of HTN, HLD, aFib, RLE DVT on warfarin, emphysema, pulmonary HTN,  on home O2 2L PRN, CAD, s/p unsuccessful PCI in 2018, Cardiomyopathy, ESRD on HD, Right CEA 2012, Hypothyroid, chronic back pain with compression fracture, ESRD on HD, who was recently seen in ED after a fall, imaging showed a new L4 fracture since 9/2022 and a chronic L1 fracture. He was seen by neurosx and discharged home from ED.   Since then dtr states patient cannot perform his ADLs and despite attempts at home, she is no longer able to care for him.   They otherwise deny urinary incontinence, bowel incontinence or saddle anesthesia/Paresthesias to LEs. Plan for [facility] placement.     pt seen and examined this am. feels back pain is a little better. no sob/chest pain. tolerating po. no difficulty voiding.    ROS: all 10 systems reviewed and is as above otherwise negative.     Vital Signs Last 24 Hrs  T(C): 36.4 (14 May 2023 10:11), Max: 36.9 (14 May 2023 07:43)  T(F): 97.5 (14 May 2023 10:11), Max: 98.4 (14 May 2023 07:43)  HR: 84 (14 May 2023 10:11) (84 - 100)  BP: 98/55 (14 May 2023 10:11) (91/63 - 98/55)  RR: 18 (14 May 2023 10:11) (18 - 18)  SpO2: 99% (14 May 2023 10:11) (93% - 99%)    Parameters below as of 14 May 2023 10:11  Patient On (Oxygen Delivery Method): nasal cannula        PHYSICAL EXAM:    GENERAL: Elderly male, laying flat in bed, Comfortable, no acute distress  HEAD:  Atraumatic, Normocephalic  EYES: EOMI, PERRLA  HEENT: Moist mucous membranes  NECK: Supple, No JVD  NERVOUS SYSTEM:  Alert & Oriented X3, grossly non focal.   CHEST/LUNG: Clear to auscultation bilaterally  HEART: Regular rate and rhythm;  ABDOMEN: Soft, Nontender, Nondistended; Bowel sounds present  GENITOURINARY- Voiding, no palpable bladder  EXTREMITIES:  No clubbing, cyanosis, or edema  MUSCULOSKELETAL- lower back tenderness  SKIN-no rash    LABS:                        11.6   7.18  )-----------( 95       ( 13 May 2023 06:55 )             34.7     05-14    130<L>  |  97  |  32<H>  ----------------------------<  87  4.5   |  26  |  4.91<H>    Ca    9.2      14 May 2023 08:32  Phos  3.9     05-14  Mg     2.2     05-14    TPro  6.7  /  Alb  2.9<L>  /  TBili  1.7<H>  /  DBili  0.7<H>  /  AST  43<H>  /  ALT  27  /  AlkPhos  102  05-13    PT/INR - ( 14 May 2023 08:32 )   PT: 22.4 sec;   INR: 1.92 ratio         MEDS  acetaminophen   IVPB .. 1000 milliGRAM(s) IV Intermittent once PRN  albuterol    90 MICROgram(s) HFA Inhaler 2 Puff(s) Inhalation every 6 hours PRN  aluminum hydroxide/magnesium hydroxide/simethicone Suspension 30 milliLiter(s) Oral every 4 hours PRN  atorvastatin 20 milliGRAM(s) Oral at bedtime  benzonatate 100 milliGRAM(s) Oral every 8 hours PRN  bisacodyl 5 milliGRAM(s) Oral daily PRN  budesonide 160 MICROgram(s)/formoterol 4.5 MICROgram(s) Inhaler 2 Puff(s) Inhalation two times a day  calcium acetate 667 milliGRAM(s) Oral three times a day with meals  carvedilol 3.125 milliGRAM(s) Oral every 12 hours  cyclobenzaprine 5 milliGRAM(s) Oral every 8 hours PRN  levothyroxine 88 MICROGram(s) Oral daily  lidocaine   4% Patch 1 Patch Transdermal daily  melatonin 3 milliGRAM(s) Oral at bedtime PRN  montelukast 10 milliGRAM(s) Oral at bedtime  multivitamin 1 Tablet(s) Oral daily  naloxone Injectable 0.4 milliGRAM(s) IV Push once  ondansetron Injectable 4 milliGRAM(s) IV Push every 6 hours PRN  oxyCODONE    IR 5 milliGRAM(s) Oral every 4 hours PRN  oxyCODONE    IR 2.5 milliGRAM(s) Oral every 4 hours PRN  polyethylene glycol 3350 17 Gram(s) Oral daily  psyllium Powder 1 Packet(s) Oral daily  senna 2 Tablet(s) Oral at bedtime  warfarin 3 milliGRAM(s) Oral daily    ASSESSMENT AND PLAN:  87m,PMH as above a/w    1. Subacute L4/chronic L1 fracture with severe pain, inability to perform ADLs:  -pain meds prn  -physical therapy   -incentive spirometry  -bowel regimen    2. HTN:  -continue bb    3. HLD  -statin    4. Chronic atrial fibrillation:  -bb  -coumadin to keep inr 2-3.    5. RLE DVT  -coumadin     6. COPD/Pulmonary HTN with chronic respiratory failure  -on 2L prn.   -symbicort.   -singulair  -no s/o exacerbation at this time.    7. H/o CAD:  -bb/statin.    8. ESRD  -HD M, W, F    9. Hypothyroid  -synthroid.     dispo:  [facility] monday.

## 2023-05-14 NOTE — PROGRESS NOTE ADULT - SUBJECTIVE AND OBJECTIVE BOX
NEPHROLOGY INTERVAL HPI/OVERNIGHT EVENTS:    Date of Service: 05-14-23 @ 12:53      HPI:   87 M PMHx of aFib on warfarin, emphysema on home O2 2L, chronic back pain with compression fracture, ESRD on HD, HLD, CAD, COPD , Cardiomyopathy, HTN recently diagnosed with chronic L1 fracture and stable but likely chronic L4 compression fracture last week. Was evaluated at that time by neurosurgery who recc brace for comfort and o/p office follow up. Since then dtr  states patient cannot perform his ADLs and despite attempts at home, she is no longer able to care for him.   They otherwise deny urinary incontinence, bowel incontinence or saddle anethesia/Paresthesias to LEs. Plan for [facility] placement.     Nephrology:  Pt admitted with back pain, chronic lumbar vertebrae fracture  No acute sxs  Seen on HD   Agreeable for rehab if needed       PAST MEDICAL HISTORY:  CAD (coronary artery disease)   Cardiomyopathy   ESRD on dialysis   HLD (hyperlipidemia)   HTN (hypertension).       MEDICATIONS  (STANDING):  atorvastatin 20 milliGRAM(s) Oral at bedtime  budesonide 160 MICROgram(s)/formoterol 4.5 MICROgram(s) Inhaler 2 Puff(s) Inhalation two times a day  calcium acetate 667 milliGRAM(s) Oral three times a day with meals  carvedilol 3.125 milliGRAM(s) Oral every 12 hours  levothyroxine 88 MICROGram(s) Oral daily  lidocaine   4% Patch 1 Patch Transdermal daily  montelukast 10 milliGRAM(s) Oral at bedtime  multivitamin 1 Tablet(s) Oral daily  naloxone Injectable 0.4 milliGRAM(s) IV Push once  polyethylene glycol 3350 17 Gram(s) Oral daily  psyllium Powder 1 Packet(s) Oral daily  senna 2 Tablet(s) Oral at bedtime  warfarin 5 milliGRAM(s) Oral once    MEDICATIONS  (PRN):  acetaminophen   IVPB .. 1000 milliGRAM(s) IV Intermittent once PRN Severe Pain (7 - 10)  albuterol    90 MICROgram(s) HFA Inhaler 2 Puff(s) Inhalation every 6 hours PRN Shortness of Breath and/or Wheezing  aluminum hydroxide/magnesium hydroxide/simethicone Suspension 30 milliLiter(s) Oral every 4 hours PRN Dyspepsia  benzonatate 100 milliGRAM(s) Oral every 8 hours PRN Cough  bisacodyl 5 milliGRAM(s) Oral daily PRN Constipation  cyclobenzaprine 5 milliGRAM(s) Oral every 8 hours PRN Spasm  melatonin 3 milliGRAM(s) Oral at bedtime PRN Insomnia  ondansetron Injectable 4 milliGRAM(s) IV Push every 6 hours PRN Nausea and/or Vomiting  oxyCODONE    IR 5 milliGRAM(s) Oral every 4 hours PRN Severe Pain (7 - 10)  oxyCODONE    IR 2.5 milliGRAM(s) Oral every 4 hours PRN Mild Pain (1 - 3)    Vital Signs Last 24 Hrs  T(C): 36.4 (14 May 2023 10:11), Max: 36.9 (14 May 2023 07:43)  T(F): 97.5 (14 May 2023 10:11), Max: 98.4 (14 May 2023 07:43)  HR: 84 (14 May 2023 10:11) (84 - 100)  BP: 98/55 (14 May 2023 10:11) (91/63 - 98/55)  BP(mean): --  RR: 18 (14 May 2023 10:11) (18 - 18)  SpO2: 99% (14 May 2023 10:11) (93% - 99%)    Parameters below as of 14 May 2023 10:11  Patient On (Oxygen Delivery Method): nasal cannula    05-13 @ 07:01  -  05-14 @ 07:00  --------------------------------------------------------  IN: 100 mL / OUT: 0 mL / NET: 100 mL    PHYSICAL EXAM:  GENERAL:   CHEST/LUNG:   HEART:   ABDOMEN:   EXTREMITIES:   SKIN:     LABS:                        11.6   7.18  )-----------( 95       ( 13 May 2023 06:55 )             34.7     05-14    130<L>  |  97  |  32<H>  ----------------------------<  87  4.5   |  26  |  4.91<H>    Ca    9.2      14 May 2023 08:32  Phos  3.9     05-14  Mg     2.2     05-14    TPro  6.7  /  Alb  2.9<L>  /  TBili  1.7<H>  /  DBili  0.7<H>  /  AST  43<H>  /  ALT  27  /  AlkPhos  102  05-13    PT/INR - ( 14 May 2023 08:32 )   PT: 22.4 sec;   INR: 1.92 ratio             Magnesium, Serum: 2.2 mg/dL (05-14 @ 08:32)  Phosphorus Level, Serum: 3.9 mg/dL (05-14 @ 08:32)          RADIOLOGY & ADDITIONAL TESTS:   NEPHROLOGY INTERVAL HPI/OVERNIGHT EVENTS:    Date of Service: 05-14-23 @ 12:53    5/14--Resting comfortably.  No distress.  Back pain control acceptable.    HPI:   87 M PMHx of aFib on warfarin, emphysema on home O2 2L, chronic back pain with compression fracture, ESRD on HD, HLD, CAD, COPD , Cardiomyopathy, HTN recently diagnosed with chronic L1 fracture and stable but likely chronic L4 compression fracture last week. Was evaluated at that time by neurosurgery who recc brace for comfort and o/p office follow up. Since then dtr  states patient cannot perform his ADLs and despite attempts at home, she is no longer able to care for him.   They otherwise deny urinary incontinence, bowel incontinence or saddle anethesia/Paresthesias to LEs. Plan for [facility] placement.     Nephrology:  Pt admitted with back pain, chronic lumbar vertebrae fracture  No acute sxs  Seen on HD   Agreeable for rehab if needed       PAST MEDICAL HISTORY:  CAD (coronary artery disease)   Cardiomyopathy   ESRD on dialysis   HLD (hyperlipidemia)   HTN (hypertension).       MEDICATIONS  (STANDING):  atorvastatin 20 milliGRAM(s) Oral at bedtime  budesonide 160 MICROgram(s)/formoterol 4.5 MICROgram(s) Inhaler 2 Puff(s) Inhalation two times a day  calcium acetate 667 milliGRAM(s) Oral three times a day with meals  carvedilol 3.125 milliGRAM(s) Oral every 12 hours  levothyroxine 88 MICROGram(s) Oral daily  lidocaine   4% Patch 1 Patch Transdermal daily  montelukast 10 milliGRAM(s) Oral at bedtime  multivitamin 1 Tablet(s) Oral daily  naloxone Injectable 0.4 milliGRAM(s) IV Push once  polyethylene glycol 3350 17 Gram(s) Oral daily  psyllium Powder 1 Packet(s) Oral daily  senna 2 Tablet(s) Oral at bedtime  warfarin 5 milliGRAM(s) Oral once    MEDICATIONS  (PRN):  acetaminophen   IVPB .. 1000 milliGRAM(s) IV Intermittent once PRN Severe Pain (7 - 10)  albuterol    90 MICROgram(s) HFA Inhaler 2 Puff(s) Inhalation every 6 hours PRN Shortness of Breath and/or Wheezing  aluminum hydroxide/magnesium hydroxide/simethicone Suspension 30 milliLiter(s) Oral every 4 hours PRN Dyspepsia  benzonatate 100 milliGRAM(s) Oral every 8 hours PRN Cough  bisacodyl 5 milliGRAM(s) Oral daily PRN Constipation  cyclobenzaprine 5 milliGRAM(s) Oral every 8 hours PRN Spasm  melatonin 3 milliGRAM(s) Oral at bedtime PRN Insomnia  ondansetron Injectable 4 milliGRAM(s) IV Push every 6 hours PRN Nausea and/or Vomiting  oxyCODONE    IR 5 milliGRAM(s) Oral every 4 hours PRN Severe Pain (7 - 10)  oxyCODONE    IR 2.5 milliGRAM(s) Oral every 4 hours PRN Mild Pain (1 - 3)    Vital Signs Last 24 Hrs  T(C): 36.4 (14 May 2023 10:11), Max: 36.9 (14 May 2023 07:43)  T(F): 97.5 (14 May 2023 10:11), Max: 98.4 (14 May 2023 07:43)  HR: 84 (14 May 2023 10:11) (84 - 100)  BP: 98/55 (14 May 2023 10:11) (91/63 - 98/55)  BP(mean): --  RR: 18 (14 May 2023 10:11) (18 - 18)  SpO2: 99% (14 May 2023 10:11) (93% - 99%)    Parameters below as of 14 May 2023 10:11  Patient On (Oxygen Delivery Method): nasal cannula    05-13 @ 07:01  -  05-14 @ 07:00  --------------------------------------------------------  IN: 100 mL / OUT: 0 mL / NET: 100 mL    PHYSICAL EXAM:  GENERAL: No distress  CHEST/LUNG: occ rhonchi  HEART: S1S2 RRR  ABDOMEN: soft  EXTREMITIES: no edema  SKIN:     LABS:                        11.6   7.18  )-----------( 95       ( 13 May 2023 06:55 )             34.7     05-14    130<L>  |  97  |  32<H>  ----------------------------<  87  4.5   |  26  |  4.91<H>    Ca    9.2      14 May 2023 08:32  Phos  3.9     05-14  Mg     2.2     05-14    TPro  6.7  /  Alb  2.9<L>  /  TBili  1.7<H>  /  DBili  0.7<H>  /  AST  43<H>  /  ALT  27  /  AlkPhos  102  05-13    PT/INR - ( 14 May 2023 08:32 )   PT: 22.4 sec;   INR: 1.92 ratio             Magnesium, Serum: 2.2 mg/dL (05-14 @ 08:32)  Phosphorus Level, Serum: 3.9 mg/dL (05-14 @ 08:32)          RADIOLOGY & ADDITIONAL TESTS:

## 2023-05-14 NOTE — PROGRESS NOTE ADULT - ASSESSMENT
87 M PMHx of aFib on warfarin, emphysema on home O2 2L, chronic back pain with compression fracture, ESRD on HD, HLD, CAD, COPD , Cardiomyopathy, HTN recently diagnosed with chronic L1 fracture and stable but likely chronic L4 compression fracture last week. Was evaluated at that time by neurosurgery who recc brace for comfort and o/p office follow up. Since then dtr  states patient cannot perform his ADLs and despite attempts at home, she is no longer able to care for him.   They otherwise deny urinary incontinence, bowel incontinence or saddle anethesia/Paresthesias to LEs. Plan for [facility] placement.     Nephrology:  Pt admitted with back pain, chronic lumbar vertebrae fracture  No acute sxs  Seen on HD   Agreeable for rehab if needed  Next HD Monday  Dr hall covering weekend if needed    87 M PMHx of aFib on warfarin, emphysema on home O2 2L, chronic back pain with compression fracture, ESRD on HD, HLD, CAD, COPD , Cardiomyopathy, HTN recently diagnosed with chronic L1 fracture and stable but likely chronic L4 compression fracture last week. Was evaluated at that time by neurosurgery who recc brace for comfort and o/p office follow up. Since then dtr  states patient cannot perform his ADLs and despite attempts at home, she is no longer able to care for him.   They otherwise deny urinary incontinence, bowel incontinence or saddle anethesia/Paresthesias to LEs. Plan for [facility] placement.     Nephrology:  Pt admitted with back pain, chronic lumbar vertebrae fracture  No acute sxs  Seen on HD   Agreeable for rehab if needed  Next HD Monday  Dr hall covering weekend if needed    5/14 SY  --ESRD : HD in am  --Anemia : stable.  --BP : borderline : monitor closely  --Back pain with response to meds.  : rehab d/c planning.

## 2023-05-15 NOTE — DISCHARGE NOTE PROVIDER - HOSPITAL COURSE
87 M PMHx of HTN, HLD, atrial fibrillation, RLE DVT on warfarin, emphysema, pulmonary HTN,  on home O2 2L PRN, CAD, s/p unsuccessful PCI in 2018, Cardiomyopathy, ESRD on HD, Right CEA 2012, Hypothyroid, chronic back pain with compression fracture, ESRD on HD, who was recently seen in ED after a fall, imaging showed a new L4 fracture since 9/2022 and a chronic L1 fracture. He was seen by neurosx and discharged home from ED.   Since then dtr states patient cannot perform his ADLs and despite attempts at home, she is no longer able to care for him.   They otherwise deny urinary incontinence, bowel incontinence or saddle anesthesia/Paresthesias to LEs. Was seen by physical therapy and recommended [facility].   He is medically stable for dc     Vital Signs Last 24 Hrs  T(C): 36.6 (15 May 2023 11:38), Max: 36.8 (15 May 2023 00:00)  T(F): 97.9 (15 May 2023 11:38), Max: 98.3 (15 May 2023 00:00)  HR: 88 (15 May 2023 11:38) (71 - 104)  BP: 109/71 (15 May 2023 11:38) (92/50 - 109/71)  RR: 18 (15 May 2023 11:38) (16 - 18)  SpO2: 97% (15 May 2023 11:38) (93% - 97%)  PHYSICAL EXAM:    GENERAL: Comfortable, no acute distress   HEAD:  Normocephalic, atraumatic  EYES: EOMI, PERRLA  HEENT: Moist mucous membranes  NECK: Supple, No JVD  NERVOUS SYSTEM:  Alert & Oriented X3, Motor Strength 5/5 B/L upper and lower extremities  CHEST/LUNG: Clear to auscultation bilaterally  HEART: Regular rate and rhythm  ABDOMEN: Soft, Nontender, Nondistended, Bowel sounds present  GENITOURINARY: Voiding, no palpable bladder  EXTREMITIES:   No clubbing, cyanosis, or edema  MUSCULOSKELETAL- No muscle tenderness, no joint tenderness  SKIN-no rash    LABS:    05-14    130<L>  |  97  |  32<H>  ----------------------------<  87  4.5   |  26  |  4.91<H>    Ca    9.2      14 May 2023 08:32  Phos  3.9     05-14  Mg     2.2     05-14  PT/INR - ( 15 May 2023 07:38 )   PT: 25.1 sec;   INR: 2.15 ratio                               87 M PMHx of HTN, HLD, atrial fibrillation, RLE DVT on warfarin, emphysema, pulmonary HTN,  on home O2 2L PRN, CAD, s/p unsuccessful PCI in 2018, Cardiomyopathy, ESRD on HD, Right CEA 2012, Hypothyroid, chronic back pain with compression fracture, ESRD on HD, who was recently seen in ED after a fall, imaging showed a new L4 fracture since 9/2022 and a chronic L1 fracture. He was seen by neurosx and discharged home from ED.   Since then dtr states patient cannot perform his ADLs and despite attempts at home, she is no longer able to care for him.   They otherwise deny urinary incontinence, bowel incontinence or saddle anesthesia/Paresthesias to LEs. Was seen by physical therapy and recommended [facility].   He is medically stable for dc     Vital Signs Last 24 Hrs  T(C): 36.6 (15 May 2023 11:38), Max: 36.8 (15 May 2023 00:00)  T(F): 97.9 (15 May 2023 11:38), Max: 98.3 (15 May 2023 00:00)  HR: 88 (15 May 2023 11:38) (71 - 104)  BP: 109/71 (15 May 2023 11:38) (92/50 - 109/71)  RR: 18 (15 May 2023 11:38) (16 - 18)  SpO2: 97% (15 May 2023 11:38) (93% - 97%)  PHYSICAL EXAM:    GENERAL: Comfortable, no acute distress   HEAD:  Normocephalic, atraumatic  EYES: EOMI, PERRLA  HEENT: Moist mucous membranes  NECK: Supple, No JVD  NERVOUS SYSTEM:  Alert & Oriented X3, Motor Strength 5/5 B/L upper and lower extremities  CHEST/LUNG: Clear to auscultation bilaterally  HEART: Regular rate and rhythm  ABDOMEN: Soft, Nontender, Nondistended, Bowel sounds present  GENITOURINARY: Voiding, no palpable bladder  EXTREMITIES:   No clubbing, cyanosis, or edema  MUSCULOSKELETAL- No muscle tenderness, no joint tenderness  SKIN-no rash    LABS:    05-14    130<L>  |  97  |  32<H>  ----------------------------<  87  4.5   |  26  |  4.91<H>    Ca    9.2      14 May 2023 08:32  Phos  3.9     05-14  Mg     2.2     05-14  PT/INR - ( 15 May 2023 07:38 )   PT: 25.1 sec;   INR: 2.15 ratio      FINAL DIAGNOSIS:  1. Subacute L4/chronic L1 fracture with severe pain, inability to perform ADLs  2. HTN  3. HLD  4. Chronic atrial fibrillation  5. RLE DVT  6. COPD/Pulmonary HTN with chronic respiratory failure  7. H/o CAD  8. ESRD  9. Hypothyroid    Time taken for dc 44 min  d/w pt  summary to be faxed to pcp

## 2023-05-15 NOTE — DISCHARGE NOTE NURSING/CASE MANAGEMENT/SOCIAL WORK - NSDCPEFALRISK_GEN_ALL_CORE
For information on Fall & Injury Prevention, visit: [email or web address] OR  [email or web address] OR  https://www.cdc.gov/steadi/patient.html

## 2023-05-15 NOTE — PROGRESS NOTE ADULT - SUBJECTIVE AND OBJECTIVE BOX
NEPHROLOGY INTERVAL HPI/OVERNIGHT EVENTS:  05-15-23 @ 14:23  HPI:   87 M PMHx of aFib on warfarin, emphysema on home O2 2L, chronic back pain with compression fracture, ESRD on HD, HLD, CAD, COPD , Cardiomyopathy, HTN recently diagnosed with chronic L1 fracture and stable but likely chronic L4 compression fracture last week. Was evaluated at that time by neurosurgery who recc brace for comfort and o/p office follow up. Since then dtr  states patient cannot perform his ADLs and despite attempts at home, she is no longer able to care for him.   They otherwise deny urinary incontinence, bowel incontinence or saddle anethesia/Paresthesias to LEs. Plan for [facility] placement.             PAST MEDICAL/SURGICAL/FAMILY/SOCIAL HISTORY:    Past Medical, Past Surgical, and Family History:  PAST MEDICAL HISTORY:  CAD (coronary artery disease)     Cardiomyopathy     ESRD on dialysis     HLD (hyperlipidemia)     HTN (hypertension).     PAST SURGICAL HISTORY:  AV fistula.     FAMILY HISTORY:  Father  Still living? Unknown  FH: emphysema, Age at diagnosis: Age Unknown.     Tobacco Usage:  · Tobacco Usage	Never smoker    ALLERGIES AND HOME MEDICATIONS:   Allergies:        Allergies:  	No Known Allergies:          (12 May 2023 09:49)      Patient is a 87y old  Male who presents with a chief complaint of intractable back pain (15 May 2023 11:52)      MEDICATIONS  (STANDING):  atorvastatin 20 milliGRAM(s) Oral at bedtime  budesonide 160 MICROgram(s)/formoterol 4.5 MICROgram(s) Inhaler 2 Puff(s) Inhalation two times a day  calcium acetate 667 milliGRAM(s) Oral three times a day with meals  carvedilol 3.125 milliGRAM(s) Oral every 12 hours  levothyroxine 88 MICROGram(s) Oral daily  lidocaine   4% Patch 1 Patch Transdermal daily  montelukast 10 milliGRAM(s) Oral at bedtime  multivitamin 1 Tablet(s) Oral daily  naloxone Injectable 0.4 milliGRAM(s) IV Push once  polyethylene glycol 3350 17 Gram(s) Oral daily  psyllium Powder 1 Packet(s) Oral daily  senna 2 Tablet(s) Oral at bedtime  warfarin 5 milliGRAM(s) Oral daily    MEDICATIONS  (PRN):  acetaminophen   IVPB .. 1000 milliGRAM(s) IV Intermittent once PRN Severe Pain (7 - 10)  albuterol    90 MICROgram(s) HFA Inhaler 2 Puff(s) Inhalation every 6 hours PRN Shortness of Breath and/or Wheezing  aluminum hydroxide/magnesium hydroxide/simethicone Suspension 30 milliLiter(s) Oral every 4 hours PRN Dyspepsia  benzonatate 100 milliGRAM(s) Oral every 8 hours PRN Cough  bisacodyl 5 milliGRAM(s) Oral daily PRN Constipation  cyclobenzaprine 5 milliGRAM(s) Oral every 8 hours PRN Spasm  melatonin 3 milliGRAM(s) Oral at bedtime PRN Insomnia  ondansetron Injectable 4 milliGRAM(s) IV Push every 6 hours PRN Nausea and/or Vomiting  oxyCODONE    IR 5 milliGRAM(s) Oral every 4 hours PRN Severe Pain (7 - 10)  oxyCODONE    IR 2.5 milliGRAM(s) Oral every 4 hours PRN Mild Pain (1 - 3)      Allergies    No Known Allergies    Intolerances        I&O's Detail    14 May 2023 07:01  -  15 May 2023 07:00  --------------------------------------------------------  IN:    IV PiggyBack: 100 mL  Total IN: 100 mL    OUT:  Total OUT: 0 mL    Total NET: 100 mL          .    Patient was seen and evaluated on dialysis.   Patient is tolerating the procedure well.   Continue dialysis:   Dialyzer:          QB:        QD:   Goal UF ___ over ___ Hours       Vital Signs Last 24 Hrs  T(C): 36 (15 May 2023 13:10), Max: 36.8 (15 May 2023 00:00)  T(F): 96.8 (15 May 2023 13:10), Max: 98.3 (15 May 2023 00:00)  HR: 79 (15 May 2023 14:21) (71 - 104)  BP: 114/62 (15 May 2023 14:21) (92/50 - 114/62)  BP(mean): --  RR: 16 (15 May 2023 14:21) (16 - 18)  SpO2: 97% (15 May 2023 13:17) (92% - 97%)    Parameters below as of 15 May 2023 14:21  Patient On (Oxygen Delivery Method): nasal cannula  O2 Flow (L/min): 3    Daily     Daily     PHYSICAL EXAM:  General: alert. awake Ox3  HEENT: MMM  CV: s1s2 rrr  LUNGS: B/L CTA  EXT: no edema    LABS:                        11.4   7.09  )-----------( 122      ( 15 May 2023 13:20 )             33.2     05-15    130<L>  |  94<L>  |  48<H>  ----------------------------<  146<H>  4.7   |  26  |  6.44<H>    Ca    9.3      15 May 2023 13:20  Phos  4.8     05-15  Mg     2.2     05-14    TPro  x   /  Alb  2.8<L>  /  TBili  x   /  DBili  x   /  AST  x   /  ALT  x   /  AlkPhos  x   05-15    PT/INR - ( 15 May 2023 07:38 )   PT: 25.1 sec;   INR: 2.15 ratio             Phosphorus Level, Serum: 4.8 mg/dL (05-15 @ 13:20)             NEPHROLOGY INTERVAL HPI/OVERNIGHT EVENTS:  05-15-23 @ 14:23    5/15 no c/o for dc to [facility] no pain   5/14--Resting comfortably.  No distress.  Back pain control acceptable.    HPI:   87 M PMHx of aFib on warfarin, emphysema on home O2 2L, chronic back pain with compression fracture, ESRD on HD, HLD, CAD, COPD , Cardiomyopathy, HTN recently diagnosed with chronic L1 fracture and stable but likely chronic L4 compression fracture last week. Was evaluated at that time by neurosurgery who recc brace for comfort and o/p office follow up. Since then dtr  states patient cannot perform his ADLs and despite attempts at home, she is no longer able to care for him.   They otherwise deny urinary incontinence, bowel incontinence or saddle anethesia/Paresthesias to LEs. Plan for [facility] placement.     Nephrology:  Pt admitted with back pain, chronic lumbar vertebrae fracture  No acute sxs  Seen on HD   Agreeable for rehab if needed       PAST MEDICAL HISTORY:  CAD (coronary artery disease)   Cardiomyopathy   ESRD on dialysis   HLD (hyperlipidemia)   HTN (hypertension).             MEDICATIONS  (STANDING):  atorvastatin 20 milliGRAM(s) Oral at bedtime  budesonide 160 MICROgram(s)/formoterol 4.5 MICROgram(s) Inhaler 2 Puff(s) Inhalation two times a day  calcium acetate 667 milliGRAM(s) Oral three times a day with meals  carvedilol 3.125 milliGRAM(s) Oral every 12 hours  levothyroxine 88 MICROGram(s) Oral daily  lidocaine   4% Patch 1 Patch Transdermal daily  montelukast 10 milliGRAM(s) Oral at bedtime  multivitamin 1 Tablet(s) Oral daily  naloxone Injectable 0.4 milliGRAM(s) IV Push once  polyethylene glycol 3350 17 Gram(s) Oral daily  psyllium Powder 1 Packet(s) Oral daily  senna 2 Tablet(s) Oral at bedtime  warfarin 5 milliGRAM(s) Oral daily    MEDICATIONS  (PRN):  acetaminophen   IVPB .. 1000 milliGRAM(s) IV Intermittent once PRN Severe Pain (7 - 10)  albuterol    90 MICROgram(s) HFA Inhaler 2 Puff(s) Inhalation every 6 hours PRN Shortness of Breath and/or Wheezing  aluminum hydroxide/magnesium hydroxide/simethicone Suspension 30 milliLiter(s) Oral every 4 hours PRN Dyspepsia  benzonatate 100 milliGRAM(s) Oral every 8 hours PRN Cough  bisacodyl 5 milliGRAM(s) Oral daily PRN Constipation  cyclobenzaprine 5 milliGRAM(s) Oral every 8 hours PRN Spasm  melatonin 3 milliGRAM(s) Oral at bedtime PRN Insomnia  ondansetron Injectable 4 milliGRAM(s) IV Push every 6 hours PRN Nausea and/or Vomiting  oxyCODONE    IR 5 milliGRAM(s) Oral every 4 hours PRN Severe Pain (7 - 10)  oxyCODONE    IR 2.5 milliGRAM(s) Oral every 4 hours PRN Mild Pain (1 - 3)      Allergies    No Known Allergies    Intolerances        I&O's Detail    14 May 2023 07:01  -  15 May 2023 07:00  --------------------------------------------------------  IN:    IV PiggyBack: 100 mL  Total IN: 100 mL    OUT:  Total OUT: 0 mL    Total NET: 100 mL          .    Patient was seen and evaluated on dialysis.   Patient is tolerating the procedure well.   Continue dialysis:   Dialyzer:    f180 k protocol uf goal 1kg   avf 400 bfr      Vital Signs Last 24 Hrs  T(C): 36 (15 May 2023 13:10), Max: 36.8 (15 May 2023 00:00)  T(F): 96.8 (15 May 2023 13:10), Max: 98.3 (15 May 2023 00:00)  HR: 79 (15 May 2023 14:21) (71 - 104)  BP: 114/62 (15 May 2023 14:21) (92/50 - 114/62)  BP(mean): --  RR: 16 (15 May 2023 14:21) (16 - 18)  SpO2: 97% (15 May 2023 13:17) (92% - 97%)    Parameters below as of 15 May 2023 14:21  Patient On (Oxygen Delivery Method): nasal cannula  O2 Flow (L/min): 3    Daily     Daily     PHYSICAL EXAM:  General: alert. awake NAD  HEENT: MMM  CV: s1s2 rrr  LUNGS: B/L CTA  EXT: no edema    LABS:                        11.4   7.09  )-----------( 122      ( 15 May 2023 13:20 )             33.2     05-15    130<L>  |  94<L>  |  48<H>  ----------------------------<  146<H>  4.7   |  26  |  6.44<H>    Ca    9.3      15 May 2023 13:20  Phos  4.8     05-15  Mg     2.2     05-14    TPro  x   /  Alb  2.8<L>  /  TBili  x   /  DBili  x   /  AST  x   /  ALT  x   /  AlkPhos  x   05-15    PT/INR - ( 15 May 2023 07:38 )   PT: 25.1 sec;   INR: 2.15 ratio             Phosphorus Level, Serum: 4.8 mg/dL (05-15 @ 13:20)

## 2023-05-15 NOTE — DISCHARGE NOTE PROVIDER - CARE PROVIDER_API CALL
Greg Segura; PhD)  Neurosurgery  [address], [address]nd [place] [address]  Phone: [phone]  Fax: [phone]  Follow Up Time: 1 week

## 2023-05-15 NOTE — DISCHARGE NOTE NURSING/CASE MANAGEMENT/SOCIAL WORK - NSDCVIVACCINE_GEN_ALL_CORE_FT
Tdap; 06-May-2023 17:30; Trini Santo (ERIC); [employer]; [id number] (Exp. Date: 15-Feb-2025); IntraMuscular; Deltoid Left.; 0.5 milliLiter(s); VIS (VIS Published: 09-May-2013, VIS Presented: 06-May-2023);

## 2023-05-15 NOTE — DISCHARGE NOTE NURSING/CASE MANAGEMENT/SOCIAL WORK - PATIENT PORTAL LINK FT
You can access the FollowMyHealth Patient Portal offered by [facility] by registering at the following website: [email or web address]. By joining [employer]’s FollowMyHealth portal, you will also be able to view your health information using other applications (apps) compatible with our system.

## 2023-05-15 NOTE — PROGRESS NOTE ADULT - ASSESSMENT
87 M PMHx of aFib on warfarin, emphysema on home O2 2L, chronic back pain with compression fracture, ESRD on HD, HLD, CAD, COPD , Cardiomyopathy, HTN recently diagnosed with chronic L1 fracture and stable but likely chronic L4 compression fracture last week. Was evaluated at that time by neurosurgery who recc brace for comfort and o/p office follow up. Since then dtr  states patient cannot perform his ADLs and despite attempts at home, she is no longer able to care for him.   They otherwise deny urinary incontinence, bowel incontinence or saddle anethesia/Paresthesias to LEs. Plan for [facility] placement.     Nephrology:  Pt admitted with back pain, chronic lumbar vertebrae fracture  No acute sxs  Seen on HD   Agreeable for rehab if needed  Next HD Monday  Dr hall covering weekend if needed    5/14 SY  --ESRD : HD in am  --Anemia : stable.  --BP : borderline : monitor closely  --Back pain with response to meds.  : rehab d/c planning.    5/15 MK   - esrd tolerating hd, mwf    inc uf with hd to 2 kg   - anemia fu trend of h/h  - Lumbar fx pain controlled for rehab

## 2023-05-15 NOTE — DISCHARGE NOTE PROVIDER - NSDCMRMEDTOKEN_GEN_ALL_CORE_FT
acetaminophen 500 mg oral tablet: 2 orally every 8 hours as needed for  mild pain  albuterol 90 mcg/inh inhalation aerosol: 2 puff(s) inhaled every 6 hours, As needed, Shortness of Breath and/or Wheezing  budesonide-formoterol 160 mcg-4.5 mcg/inh inhalation aerosol: 2 puff(s) inhaled 2 times a day  calcium acetate 667 mg oral capsule: 1 cap(s) orally 3 times a day (with meals)  carvedilol 3.125 mg oral tablet: 1 tab(s) orally every 12 hours  Colace 100 mg oral capsule: 1 cap(s) orally once a day (at bedtime)  lidocaine 4% topical film: Apply topically to affected area once a day keep off X 12 hours daily  Lipitor 20 mg oral tablet: 1 tab(s) orally once a day  Melatonin 3 mg oral tablet: 1 tab(s) orally once a day (at bedtime), As Needed  Metamucil 400 mg oral capsule: 1 cap(s) orally once a day  montelukast 10 mg oral tablet: 1 tab(s) orally once a day (at bedtime)  Nephro-Juan Ramon oral tablet: 1 tab(s) orally once a day  oxyCODONE: 2.5 milligram(s) orally every 4 hours as needed for  moderate to severe pain  senna leaf extract oral tablet: 2 tab(s) orally once a day (at bedtime)  Synthroid 88 mcg (0.088 mg) oral tablet: 1 tab(s) orally once a day  warfarin 3 mg oral tablet: 1 tab(s) orally 5 times a week  warfarin 5 mg oral tablet: 1 tab(s) orally 2 times a week

## 2023-05-16 NOTE — H&P ADULT - HISTORY OF PRESENT ILLNESS
86 y/o male with a PMHx of ESRD on dialysis, HLD, HTN, cardiomyopathy, CAD, AV fistula, COPD, Afib on Coumadin presents to the ED BIBEMS from [facility]. Pt daughter was visiting him when he was found to be febrile, hypotensive, SOB, AMS. Daughter at bedside states pt was not talking like himself, was not making sense, contracted. Pt receives dialysis MWF, last received yesterday in  ED. Pt febrile, lactate 3.0, appearing in septic Shock started on levo, CVC inserted, added Vaso, Braxton Scan and admit to ICU

## 2023-05-16 NOTE — ED PROVIDER NOTE - NS_ ATTENDINGSCRIBEDETAILS _ED_A_ED_FT
I Deacon Edouard MD saw and examined the patient. Scribe documented for me and under my supervision. I have modified the scribe's documentation where necessary to reflect my history, physical exam and other relevant documentations pertinent to the care of the patient.

## 2023-05-16 NOTE — ED ADULT NURSE NOTE - OBJECTIVE STATEMENT
Pt arrives to ED from [facility] for hypoxia, altered mental status. Pt had family visiting him at the time when they noticed he was confused, agitated. Pt warm to touch. Upon EMS arrival pt tachypneic, hypoxia. Pt placed on CPAP by EMS. Upon arrival to ED septic workup initiated. BIPAP placed upon arrival to ED. Family at bedside.

## 2023-05-16 NOTE — PHARMACOTHERAPY INTERVENTION NOTE - COMMENTS
Medication reconciliation completed.  Pt recently dc'd from  yesterday 5-15, current medication list taken from Pt Discharge Paperwork; confirmed with Dr. First Med[facility]x.

## 2023-05-16 NOTE — ED PROVIDER NOTE - CRITICAL CARE ATTENDING CONTRIBUTION TO CARE
severely ill patient, hypotensive, fever, septic, labs, ICU consult, presser application, constant observation, discussion of goals of care with family, etc.

## 2023-05-16 NOTE — H&P ADULT - NSHPPHYSICALEXAM_GEN_ALL_CORE
General: in mild distress    HEENT: Pupils equal, reactive to light. Symmetric. No scleral icterus or injection.    PULM: Coarse to auscultation B/L. No wheezes, rales, or rhonchi appreciated. No significant sputum production or increased respiratory effort.    NECK: Supple, no lymphadenopathy, trachea midline.    CVS: Regular rate and rhythm, no murmurs appreciated, +s1/s2.    ABD: Soft, nondistended, nontender, normoactive bowel sounds.    EXT: No edema, nontender.    SKIN: Warm and well perfused, no rashes noted.    NEURO: Alert, oriented, interactive, nonfocal.

## 2023-05-16 NOTE — H&P ADULT - NSHPREVIEWOFSYSTEMS_GEN_ALL_CORE
Review of Systems:    CONSTITUTIONAL: No fever, chills, or fatigue.  EYES: No eye pain, visual disturbances, or discharge.  ENMT:  No difficulty hearing, tinnitus, or vertigo. No sinus or throat pain.  NECK: No pain or stiffness.  RESPIRATORY: shortness of breath, no  cough, or wheezing.  CARDIOVASCULAR: No chest pain, palpitations, dizziness, or leg swelling.  GASTROINTESTINAL: No abdominal or epigastric pain. No nausea, vomiting,  diarrhea, or constipation. No hematemesis, melena, or hematochezia.  GENITOURINARY: No dysuria, frequency, hematuria, or incontinence.  NEUROLOGICAL: No headaches, memory loss, loss of strength, numbness, or tremors.  SKIN: No itching, burning, rashes, or lesions.  MUSCULOSKELETAL: No joint pain or swelling;  Back Pain  PSYCHIATRIC: No depression, anxiety, mood swings, or difficulty sleeping.

## 2023-05-16 NOTE — PROCEDURE NOTE - NSINDICATIONS_GEN_A_CORE
arterial puncture to obtain ABG's/critical patient/monitoring purposes
critical illness/emergency venous access

## 2023-05-16 NOTE — H&P ADULT - ASSESSMENT
# Acute Hypoxic Respiratory Failure  # Septic Shock  # Lactic Acidosis Type A  # ESRD HD    Neuro:  - Prn Pain control    CV:  - Avoiding fluid overload  - 2 pressor shock actively titrating Levo to maintain MAP >65 with Vaso as Adjunct therapy    Pulm:  - Actively titrating Bipap to maintain SPO2 > 88%, will transition to HFNC                  GI:  - Cont IV Protonix 40mg IVP Daily,   - Enteral feeds as tolerated to avoid Stress ulceration and optimize nutritional state when indicated    Renal:  - Even to net negative fluid balance as tolerated by hemodynamics  - Continue to monitor Bun/Cr  - Replacing electrolytes as needed with Goal K> 4, PO> 3, Mg> 2               - Strict I&O's  - Avoid Nephro toxic medication  - Renally dose meds    Heme:  - Continue warfarin    ID:  - s/p one dose Vanco/Zosyn  - Microbiology and Radiology reviewed   - trend CBC with diff, CMP  and fever curve    Endo:  - ISS for aggressive glycemic control to limit FS glucose to < 180mg/dl.  - Keep Euthyroid    Critical Care Time (EXCLUSIVE of any non bundled procedures) :  **minutes were spent assessing the patient's presenting problems of acute illness that pose a high probability of life threatening  deterioration or end organ damage / dysfunction.  Medical desicion making includes initiation / continuation of plan or care review data/ labwork/ radiographic study, direct patient care bedside ,  discussions with  consultants regarding care,  evaluation and interpretation of vital signs, any necessary ventilator management,   NIV or BIPAP changes  or initiation,    discussions with multidisipliary team,  am or pm rounds, discussions of goals of care with patient and family all non-inclusive of procedures.    Plan discussed with  **** 86 y/o male with a PMHx of ESRD on dialysis, HLD, HTN, cardiomyopathy, CAD, AV fistula, COPD, Afib on Coumadin admitted to ICU for    # Acute Hypoxic Respiratory Failure  # Septic Shock  # Lactic Acidosis Type A  # ESRD HD  # PNA    Neuro:  - Prn Pain control    CV:  - Avoiding fluid overload  - 2 pressor shock actively titrating Levo to maintain MAP >65 with Vaso as Adjunct therapy  - Increasing pressor requirements, Stress dose steroids Hydrocortisone 100 mg x1, 50 mg Q8H    Pulm:  - Actively titrating Bipap to maintain SPO2 > 88%, will transition to HFNC                  GI:  - IV Protonix 40mg  Daily,   - Enteral feeds as tolerated to avoid Stress ulceration and optimize nutritional state when indicated    Renal:  - Even to net negative fluid balance as tolerated by hemodynamics  - Continue to monitor Bun/Cr  - Replacing electrolytes as needed with Goal K> 4, PO> 3, Mg> 2               - Strict I&O's  - Avoid Nephro toxic medication  - Renally dose meds    Heme:  - Will continue warfarin    ID:  - s/p one dose Vanco/Zosyn, will start Beny/azithro for PNA  - Microbiology and Radiology reviewed   - trend CBC with diff, CMP  and fever curve    Endo:  - ISS for aggressive glycemic control to limit FS glucose to < 180mg/dl.  - Keep Euthyroid    Critical Care Time (EXCLUSIVE of any non bundled procedures) :  > 60 minutes were spent assessing the patient's presenting problems of acute illness that pose a high probability of life threatening  deterioration or end organ damage / dysfunction.  Medical desicion making includes initiation / continuation of plan or care review data/ labwork/ radiographic study, direct patient care bedside ,  discussions with  consultants regarding care,  evaluation and interpretation of vital signs, any necessary ventilator management,   NIV or BIPAP changes  or initiation,    discussions with multidisipliary team,  am or pm rounds, discussions of goals of care with patient and family all non-inclusive of procedures.    Plan discussed with Dr Jonh DEAL Attending 86 y/o male with a PMHx of ESRD on dialysis, HLD, HTN, cardiomyopathy, CAD, AV fistula, COPD, Afib on Coumadin admitted to ICU for    # Acute Hypoxic Respiratory Failure  # Septic Shock  # Lactic Acidosis Type A  # ESRD HD  # PNA    Neuro:  - Prn Pain control    CV:  - Avoiding fluid overload  - 2 pressor shock actively titrating Levo to maintain MAP >65 with Vaso as Adjunct therapy  - Pt on 0.5 Levo, will double concentrate to avoid furthering fluid overload and continue current dosage  - Increasing pressor requirements, Stress dose steroids Hydrocortisone 100 mg x1, 50 mg Q8H    Pulm:  - Actively titrating Bipap to maintain SPO2 > 88%, will transition to HFNC                  GI:  - IV Protonix 40mg  Daily,   - Enteral feeds as tolerated to avoid Stress ulceration and optimize nutritional state when indicated    Renal:  - Even to net negative fluid balance as tolerated by hemodynamics  - Continue to monitor Bun/Cr  - Replacing electrolytes as needed with Goal K> 4, PO> 3, Mg> 2               - Strict I&O's  - Avoid Nephro toxic medication  - Renally dose meds    Heme:  - Will continue warfarin    ID:  - s/p one dose Vanco/Zosyn, will start Beny/azithro for PNA  - Microbiology and Radiology reviewed   - trend CBC with diff, CMP  and fever curve    Endo:  - ISS for aggressive glycemic control to limit FS glucose to < 180mg/dl.  - Keep Euthyroid    Critical Care Time (EXCLUSIVE of any non bundled procedures) :  > 60 minutes were spent assessing the patient's presenting problems of acute illness that pose a high probability of life threatening  deterioration or end organ damage / dysfunction.  Medical desicion making includes initiation / continuation of plan or care review data/ labwork/ radiographic study, direct patient care bedside ,  discussions with  consultants regarding care,  evaluation and interpretation of vital signs, any necessary ventilator management,   NIV or BIPAP changes  or initiation,    discussions with multidisipliary team,  am or pm rounds, discussions of goals of care with patient and family all non-inclusive of procedures.    Plan discussed with Dr Jonh DEAL Attending

## 2023-05-16 NOTE — ED ADULT TRIAGE NOTE - CHIEF COMPLAINT QUOTE
Pt BIBEMS from [facility] c/o AMS, hypoxia, and hypotension. Daughter called 911 while visiting patient after noticing a change in his mental status. Pt discharged from  last night. SpO2 67% on room air with EMS, pt placed on NRB. Pt full code. Pt taken to trauma room for further evaluation.

## 2023-05-16 NOTE — ED ADULT NURSE NOTE - NSFALLHARMRISKINTERV_ED_ALL_ED
Assistance OOB with selected safe patient handling equipment if applicable/Assistance with ambulation/Communicate risk of Fall with Harm to all staff, patient, and family/Monitor gait and stability/Monitor for mental status changes and reorient to person, place, and time, as needed/Move patient closer to nursing station/within visual sight of ED staff/Provide visual cue: red socks, yellow wristband, yellow gown, etc/Reinforce activity limits and safety measures with patient and family/Toileting schedule using arm’s reach rule for commode and bathroom/Use of alarms - bed, stretcher, chair and/or video monitoring/Bed in lowest position, wheels locked, appropriate side rails in place/Call bell, personal items and telephone in reach/Instruct patient to call for assistance before getting out of bed/chair/stretcher/Non-slip footwear applied when patient is off stretcher/[place] to call system/Physically safe environment - no spills, clutter or unnecessary equipment/Purposeful Proactive Rounding/Room/bathroom lighting operational, light cord in reach

## 2023-05-16 NOTE — ED PROVIDER NOTE - PROGRESS NOTE DETAILS
Javan OROZCO: Updated patient/HCP daughter with the results of the labs and imaging. Patient with hyptension requiring pressers, ICU consult and admission to CCU appreciated. Javan OROZCO: Updated patient/HCP daughter with the results of the labs and imaging. Patient with hypotension requiring pressers, ICU consult and admission to CCU appreciated.

## 2023-05-16 NOTE — PROVIDER CONTACT NOTE (EICU) - SITUATION
86 y/o male with a PMHx of ESRD on dialysis, HLD, HTN, cardiomyopathy, CAD, AV fistula, COPD, Afib on Coumadin presents to the ED BIBEMS from [facility]. Pt daughter was visiting him when he was found to be febrile, hypotensive, SOB, AMS. Daughter at bedside states pt was not talking like himself, was not making sense, contracted. Pt receives dialysis MWF, last received yesterday in  ED. Pt febrile, lactate 3.0, appearing in septic Shock started on levo, CVC inserted, added Vaso,  Case discussed with the ICU PA.  DNR/DNI

## 2023-05-16 NOTE — ED PROVIDER NOTE - MUSCULOSKELETAL, MLM
Spine appears normal, range of motion is not limited, no muscle or joint tenderness. mild to moderate LE swelling. Spine appears normal, range of motion is not limited, no muscle or joint tenderness. mild to moderate LE swelling. NO nuchal rigidity.

## 2023-05-16 NOTE — ED PROVIDER NOTE - OBJECTIVE STATEMENT
86 y/o male with a PMHx of ESRD on dialysis, HLD, HTN, cardiomyopathy, CAD, AV fistula, COPD, Afib on Coumadin presents to the ED BIBEMS from [facility]. Pt daughter was visiting him when he was found to be febrile, hypotensive, SOB, AMS. Pt daughter wants pt to be DNR but full code right now. Daughter at bedside states pt was not talking like himself, was not making sense, contracted. Pt receives dialysis MWF, last received yesterday in [facility].

## 2023-05-17 NOTE — CHART NOTE - NSCHARTNOTEFT_GEN_A_CORE
: Valentín Pimentel MD    INDICATION: septic shock, resp failure     PROCEDURE:  [X] LIMITED ECHO  [X] LIMITED CHEST  [ ] LIMITED RETROPERITONEAL  [ ] LIMITED ABDOMINAL  [ ] LIMITED DVT  [ ] NEEDLE GUIDANCE VASCULAR  [ ] NEEDLE GUIDANCE THORACENTESIS  [ ] NEEDLE GUIDANCE PARACENTESIS  [ ] NEEDLE GUIDANCE PERICARDIOCENTESIS  [ ] OTHER    FINDINGS: focal b/l b-lines with thickened pleura, no significant R sided effusion, R basilar consolidation, unable to assess for L pleural effusion, severe LV sys dysfn, dilated ventricles and atria, MR, TR, LVOT VTI 12.8 cm, IVC 3 cm without resp variation       INTERPRETATION: multifocal pneumonia, biventricular failure with dilated cardiomyopathy         **STUDIES STORED ON QPATH**

## 2023-05-17 NOTE — CONSULT NOTE ADULT - SUBJECTIVE AND OBJECTIVE BOX
Patient is a 87y old  Male who presents with a chief complaint of fever    HPI:  88 y/o male with h/of ESRD on dialysis, HLD, HTN, cardiomyopathy, CAD, AV fistula, COPD, Afib on Coumadin was admitted on  from [facility] for fever. Pt daughter was visiting him when he was found to be febrile, hypotensive, SOB, and more confused. Daughter stated pt was not talking like himself, was not making sense, contracted. Pt receives dialysis MWF. In ER, the patient was febrile, lactate 3.0, appearing in septic Shock started on levo, CVC inserted, added Vaso. He received meropenem and azithromycin.     PMH: as above  PSH: as above  Meds: per reconciliation sheet, noted below  MEDICATIONS  (STANDING):  albumin human 25% IVPB 50 milliLiter(s) IV Intermittent every 30 minutes  atorvastatin 20 milliGRAM(s) Oral at bedtime  azithromycin  IVPB 500 milliGRAM(s) IV Intermittent daily  budesonide 160 MICROgram(s)/formoterol 4.5 MICROgram(s) Inhaler 2 Puff(s) Inhalation two times a day  chlorhexidine 4% Liquid 1 Application(s) Topical <User Schedule>  hydrocortisone sodium succinate Injectable 50 milliGRAM(s) IV Push every 8 hours  levothyroxine 88 MICROGram(s) Oral daily  lidocaine   4% Patch 1 Patch Transdermal daily  meropenem  IVPB      meropenem  IVPB 500 milliGRAM(s) IV Intermittent every 24 hours  multivitamin 1 Tablet(s) Oral daily  norepinephrine Infusion 0.05 MICROgram(s)/kG/Min (3.83 mL/Hr) IV Continuous <Continuous>  vasopressin Infusion 0.04 Unit(s)/Min (6 mL/Hr) IV Continuous <Continuous>    MEDICATIONS  (PRN):  acetaminophen     Tablet .. 650 milliGRAM(s) Oral every 6 hours PRN Temp greater or equal to 38.5C (101.3F), Mild Pain (1 - 3)  albuterol    90 MICROgram(s) HFA Inhaler 2 Puff(s) Inhalation every 6 hours PRN Shortness of Breath and/or Wheezing  melatonin 3 milliGRAM(s) Oral at bedtime PRN Insomnia  oxyCODONE    IR 2.5 milliGRAM(s) Oral every 4 hours PRN for moderate to severe pain    Allergies    No Known Allergies    Intolerances      Social: no smoking, no alcohol, no illegal drugs; no recent travel, no exposure to TB  FAMILY HISTORY:  FH: emphysema (Father)      no history of premature cardiovascular disease in first degree relatives    ROS: the patient is confused, unobtainable  All other systems reviewed and are negative    Vital Signs Last 24 Hrs  T(C): 36.7 (17 May 2023 10:29), Max: 39.9 (16 May 2023 18:21)  T(F): 98 (17 May 2023 10:29), Max: 103.9 (16 May 2023 18:21)  HR: 112 (17 May 2023 11:33) (101 - 155)  BP: 87/52 (17 May 2023 11:33) (72/49 - 140/112)  BP(mean): 65 (17 May 2023 11:33) (30 - 99)  RR: 20 (17 May 2023 11:33) (12 - 34)  SpO2: 85% (17 May 2023 11:33) (80% - 100%)    Parameters below as of 17 May 2023 11:33      O2 Concentration (%): 40  Daily Height in cm: 177.8 (16 May 2023 18:04)    Daily Weight in k.3 (17 May 2023 00:00)    PE:    Constitutional:  No acute distress  HEENT: NC/AT, EOMI, PERRLA, conjunctivae clear; ears and nose atraumatic; pharynx benign  Neck: supple; thyroid not palpable  Back: no tenderness  Respiratory: respiratory effort normal; clear to auscultation  Cardiovascular: S1S2 regular, no murmurs  Abdomen: soft, not tender, not distended, positive BS; no liver or spleen organomegaly  Genitourinary: no suprapubic tenderness  Lymphatic: no LN palpable  Musculoskeletal: no muscle tenderness, no joint swelling or tenderness  Extremities: no pedal edema  Neurological/ Psychiatric: Alert, judgement and insight impaired; moving all extremities  Skin: no rashes; no palpable lesions    Labs: all available labs reviewed                        11.9   33.69 )-----------( 111      ( 17 May 2023 06:35 )             34.5     05-    135  |  102  |  40<H>  ----------------------------<  87  5.1   |  20<L>  |  5.39<H>    Ca    8.9      17 May 2023 06:35  Phos  5.0       Mg     2.0         TPro  6.6  /  Alb  2.6<L>  /  TBili  2.8<H>  /  DBili  x   /  AST  68<H>  /  ALT  33  /  AlkPhos  101       LIVER FUNCTIONS - ( 17 May 2023 06:35 )  Alb: 2.6 g/dL / Pro: 6.6 gm/dL / ALK PHOS: 101 U/L / ALT: 33 U/L / AST: 68 U/L / GGT: x           COVID-19 PCR: NotDetec (05-15-23 @ 06:00)    ( @ 18:43)  NotDetec      Culture - Blood (collected 16 May 2023 18:55)  Source: .Blood None  Gram Stain (17 May 2023 10:08):    Growth in aerobic bottle: Gram Positive Cocci in Clusters    Growth in anaerobic bottle: Gram Positive Cocci in Clusters  Preliminary Report (17 May 2023 10:08):    Growth in aerobic bottle: Gram Positive Cocci in Clusters    Growth in anaerobic bottle: Gram Positive Cocci in Clusters  Organism: Blood Culture PCR (17 May 2023 11:42)      Method Type: PCR      -  Methicillin SENSITIVE Staphylococcus aureus (MSSA): Detec Any isolate of Staphylococcus aureus from a blood culture is NOT considered a contaminant.    Culture - Blood (collected 16 May 2023 18:02)  Source: .Blood Blood-Peripheral  Gram Stain (17 May 2023 11:42):    Growth in aerobic bottle: Gram Positive Cocci in Clusters    Growth in anaerobic bottle: Gram Positive Cocci in Clusters  Preliminary Report (17 May 2023 11:42):    Growth in aerobic bottle: Gram Positive Cocci in Clusters    Growth in anaerobic bottle: Gram Positive Cocci in Clusters    Radiology: all available radiological tests reviewed    < from: CT Abdomen and Pelvis No Cont (23 @ 22:40) >  INTERPRETATION:  Advanced emphysematous and fibrotic changes within the lungs.  Small right and moderate left pleural effusion. Associated infiltrate and/or atelectasis at the lung bases.  Moderate cardiomegaly. Trace pericardial effusion.  Extensive atherosclerotic vascular calcification thoracic aorta and   coronary arteries. Stable dilatation ascending aorta.  No evidence of acute aortic syndrome.  Gallstones are present.  No bowel obstruction, free fluid, free air orabscess.  No pneumatosis or portal venous gas to suggest intestinal ischemia.  No hydronephrosis.  Stable compression deformities L1 and L3.  < end of copied text >      Advanced directives addressed: full resuscitation

## 2023-05-17 NOTE — PATIENT PROFILE ADULT - IS THERE A SUSPICION OF ABUSE/NEGLIGENCE?
-SHAYLA no

## 2023-05-17 NOTE — PROVIDER CONTACT NOTE (CRITICAL VALUE NOTIFICATION) - ASSESSMENT
[FreeTextEntry1] : IMPRESSION: \par # History of vomiting since two years - worsening since past couple of months \par -  Abdominal ultrasound 2022:  Nml\par - CT scan A/P on 2021: Three large diverticula of the second and third portions of duodenum. Sigmoid diverticulosis. Otherwise no evidence of acute inflammatory or obstructive process in the abd/pelvis. \par - Gastric emptying scan on 2020: Normal gastric emptying scan. \par - Also CT scan 3 or 4 years ago\par -  No marijuana use\par \par # History of acid reflux - worsening reflux - burning epigastric pain - stopped Dexilant in the past \par - Upper endoscopy 2 years ago - unremarkable from what she recalls - report still not in chart\par - Takes baby aspirin for year. \par \par # History of rectal bleeding from symptomatic internal hemorrhoids - seen colorectal surgeon - advised softening stools, with increased fiber, Miralax. Previously I discussed a colonoscopy, wanted to hold off since performed not too long ago in 2021 \par - Colonoscopy 2021 by Dr. Elisha Underwood which showed hemorrhoids and 4 mm polyp removed sigmoid colon, sigmoid diverticulosis, 8 mm polyp removed from ascending colon. 5 mm polyp removed from ascending colon. Pathology results not in chart. Repeat colonoscopy in 3 years. \par - She says she has a total of three colonoscopies in her life time - only has colonoscopy in 2021 polyps were found and removed.\par \par # Comorbidities: HTN, Hypercholesterolemia, obesity, elevated HgA1C\par \par # Surgeries: s/p , ectopic pregnancy, endometriosis\par \par \par PLAN: \par MRI of the abdomen with and without IV contrast \par HIDA scan with CCK \par She is scheduled for upper endoscopy \par She will go back on Dexilant 30 \par Discussed with patient that her symptoms can be linked to anxiety - advised to follow up with PCP. \par  patient on antibiotics and pressors

## 2023-05-17 NOTE — CONSULT NOTE ADULT - ASSESSMENT
86 y/o male with h/of ESRD on dialysis, HLD, HTN, cardiomyopathy, CAD, AV fistula, COPD, Afib on Coumadin was admitted on 5/16 from [facility] for fever. Pt daughter was visiting him when he was found to be febrile, hypotensive, SOB, and more confused. Daughter stated pt was not talking like himself, was not making sense, contracted. Pt receives dialysis MWF. In ER, the patient was febrile, lactate 3.0, appearing in septic Shock started on levo, CVC inserted, added Vaso. He received meropenem and azithromycin.     1. Febrile syndrome. Sepsis with MSSA. ESRD on HD. Possible b/l pneumonia. COPD.   -leukocytosis  -cultures reviewed  -start cefazolin 2 gm IV qHD  -reason for abx use and side effects reviewed with patient; monitor BMP  -respiratory care  -old chart reviewed to assess prior cultures  -monitor temps  -f/u CBC  -supportive care  2. Other issues:   -care per medicine           88 y/o male with h/of ESRD on dialysis, HLD, HTN, cardiomyopathy, CAD, AV fistula, COPD, Afib on Coumadin was admitted on 5/16 from [facility] for fever. Pt daughter was visiting him when he was found to be febrile, hypotensive, SOB, and more confused. Daughter stated pt was not talking like himself, was not making sense, contracted. Pt receives dialysis MWF. In ER, the patient was febrile, lactate 3.0, appearing in septic Shock started on levo, CVC inserted, added Vaso. He received meropenem and azithromycin.     1. Febrile syndrome. Sepsis with MSSA. ESRD on HD. Possible b/l pneumonia. COPD.   -source of MSSA sepsis is likely multiple skin breaks on arms  -leukocytosis  -cultures reviewed  -start cefazolin 2 gm IV qHD  -reason for abx use and side effects reviewed with patient; monitor BMP  -respiratory care  -old chart reviewed to assess prior cultures  -comfort care is considered  -monitor temps  -f/u CBC  -supportive care  2. Other issues:   -care per medicine    d/w wife at bedside and critical care team

## 2023-05-17 NOTE — PATIENT PROFILE ADULT - FALL HARM RISK - HARM RISK INTERVENTIONS

## 2023-05-17 NOTE — DISCHARGE NOTE FOR THE EXPIRED PATIENT - HOSPITAL COURSE
88 y/o male with history of atrial fibrillation, chronic systolic and diastolic HF, ESRD on HD, CAD, COPD/emphysema, recent fall with lumbar compression fracture, admitted at [facility] with septic and cardiogenic shock from MSSA bacteremia. His condition continued to deteriorate and he was transitioned to comfort measures only, he  at 1428 today with family at bedside.

## 2023-05-17 NOTE — GOALS OF CARE CONVERSATION - ADVANCED CARE PLANNING - CONVERSATION DETAILS
Spoke with Diane about his deteriorating clinical status including increasing pressor requirement, RVR, resp distress, distress due to pain     Given comorbidities including severe cardiomyopathy and now with bacteremia and septic/cardiogenic shock his prognosis is poor and unfavorable outcome     Daughter acknowledged his declining status in recent past and agrees to transitioning care to comfort measures only
Goals of Care (GOC)/Advance Care Planning (ACP)  Purpose of Discussion: In the setting of advanced age and multiple comorbidities, discussion of patient's wished should there be any deterioration in health status.   Parties Present/Discussed with: Pt and Daughter  Patient's Decision making capacity at the time of discussion: A & O X3  Presentation: As above  GOC: Code Status, HCP, Alternative Feeding Methods, Blood product Transfusions    PLAN:  Code Status: DNR/DNI  HCP: Kiley Santos [phone]  **May be ok with temporary NGT  however if clinicaly course has  no meaningful improvment may change decision to no feeding tube  Blood Product Transfusions: YES Agreeable  Pressors: YES Agreeable    ACP/GOC Time Spent: 23 minutes

## 2023-05-17 NOTE — PATIENT PROFILE ADULT - FUNCTIONAL ASSESSMENT - DAILY ACTIVITY 2.
2 = A lot of assistance Geneva Motta  OBSTETRICS AND GYNECOLOGY  [address]  [address]  [place] [address]  Phone: [phone]  Fax: [phone]  Follow Up Time:

## 2023-05-17 NOTE — PROGRESS NOTE ADULT - ASSESSMENT
87 M PMHx of aFib on warfarin, emphysema on home O2 2L, chronic back pain with compression fracture, ESRD on HD, HLD, CAD, COPD , Cardiomyopathy, HTN recently diagnosed with chronic L1 fracture and stable but likely chronic L4 compression fracture last week. Was evaluated at that time by neurosurgery who recc brace for comfort and o/p office follow up. Since then dtr  states patient cannot perform his ADLs and despite attempts at home, she is no longer able to care for him.   They otherwise deny urinary incontinence, bowel incontinence or saddle anethesia/Paresthesias to LEs. Plan for FRANCISCA placement.     Nephrology:  Pt admitted with back pain, chronic lumbar vertebrae fracture  No acute sxs  Seen on HD   Agreeable for rehab if needed  Next HD Monday  Dr hall covering weekend if needed    5/14 SY  --ESRD : HD in am  --Anemia : stable.  --BP : borderline : monitor closely  --Back pain with response to meds.  : rehab d/c planning.    5/15 MK   - esrd tolerating hd, mwf    inc uf with hd to 2 kg   - anemia fu trend of h/h  - Lumbar fx pain controlled for rehab    5/17 SY  --ESRD : Plan for HD today to correct mild acidosis.  Aim to minimize UF today.  --Sepsis : CT of chest/abd/pelvis only significant for small bilateral effusion with atelectesis --? PNA.   Continue meropenem  --Continue pressor support.  --Hold MARIS today.  
88 y/o male with PMH A.fib on warfarin, chronic systolic and diastolic HF, ESRD on HD, CAD, COPD/emphysema, recent fall with lumbar compression fracture    Now admitted for:     Severe sepsis with septic shock (POA) from MSSA bacteremia, likely skin source   Cardiogenic shock   Acute hypoxic resp failure   Multifocal pneumonia   Acute metabolic encephalopathy   A.fib with RVR      Overall worsening   Appears in distress  Constantly moaning and groaning, tender wherever I touch him       Recs:     Continue supportive care with vasoactive meds, steroids   On meropenem and azithro at this time, received a dose of vanc in ED   Consult ID   Dialysis per renal   Add Dilaudid for pain   NPO for now   INR >3, hold warfarin   Give 1 dose of Digoxin   DNR, DNI     Further w/u and mx based on clinical course     Patient critically ill with extremely poor prognosis

## 2023-05-19 DIAGNOSIS — A41.01 SEPSIS DUE TO METHICILLIN SUSCEPTIBLE STAPHYLOCOCCUS AUREUS: ICD-10-CM

## 2023-05-19 DIAGNOSIS — Z66 DO NOT RESUSCITATE: ICD-10-CM

## 2023-05-19 DIAGNOSIS — G89.29 OTHER CHRONIC PAIN: ICD-10-CM

## 2023-05-19 DIAGNOSIS — J18.9 PNEUMONIA, UNSPECIFIED ORGANISM: ICD-10-CM

## 2023-05-19 DIAGNOSIS — Z99.2 DEPENDENCE ON RENAL DIALYSIS: ICD-10-CM

## 2023-05-19 DIAGNOSIS — Z99.81 DEPENDENCE ON SUPPLEMENTAL OXYGEN: ICD-10-CM

## 2023-05-19 DIAGNOSIS — J96.10 CHRONIC RESPIRATORY FAILURE, UNSPECIFIED WHETHER WITH HYPOXIA OR HYPERCAPNIA: ICD-10-CM

## 2023-05-19 DIAGNOSIS — J43.9 EMPHYSEMA, UNSPECIFIED: ICD-10-CM

## 2023-05-19 DIAGNOSIS — E78.5 HYPERLIPIDEMIA, UNSPECIFIED: ICD-10-CM

## 2023-05-19 DIAGNOSIS — M48.56XA COLLAPSED VERTEBRA, NOT ELSEWHERE CLASSIFIED, LUMBAR REGION, INITIAL ENCOUNTER FOR FRACTURE: ICD-10-CM

## 2023-05-19 DIAGNOSIS — Z79.899 OTHER LONG TERM (CURRENT) DRUG THERAPY: ICD-10-CM

## 2023-05-19 DIAGNOSIS — F41.9 ANXIETY DISORDER, UNSPECIFIED: ICD-10-CM

## 2023-05-19 DIAGNOSIS — Z79.01 LONG TERM (CURRENT) USE OF ANTICOAGULANTS: ICD-10-CM

## 2023-05-19 DIAGNOSIS — E87.20 ACIDOSIS, UNSPECIFIED: ICD-10-CM

## 2023-05-19 DIAGNOSIS — N18.6 END STAGE RENAL DISEASE: ICD-10-CM

## 2023-05-19 DIAGNOSIS — Z20.822 CONTACT WITH AND (SUSPECTED) EXPOSURE TO COVID-19: ICD-10-CM

## 2023-05-19 DIAGNOSIS — I27.20 PULMONARY HYPERTENSION, UNSPECIFIED: ICD-10-CM

## 2023-05-19 DIAGNOSIS — Z95.828 PRESENCE OF OTHER VASCULAR IMPLANTS AND GRAFTS: ICD-10-CM

## 2023-05-19 DIAGNOSIS — I50.9 HEART FAILURE, UNSPECIFIED: ICD-10-CM

## 2023-05-19 DIAGNOSIS — S32.000A WEDGE COMPRESSION FRACTURE OF UNSPECIFIED LUMBAR VERTEBRA, INITIAL ENCOUNTER FOR CLOSED FRACTURE: ICD-10-CM

## 2023-05-19 DIAGNOSIS — I13.0 HYPERTENSIVE HEART AND CHRONIC KIDNEY DISEASE WITH HEART FAILURE AND STAGE 1 THROUGH STAGE 4 CHRONIC KIDNEY DISEASE, OR UNSPECIFIED CHRONIC KIDNEY DISEASE: ICD-10-CM
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
## 2023-05-19 DIAGNOSIS — E03.9 HYPOTHYROIDISM, UNSPECIFIED: ICD-10-CM

## 2023-05-19 DIAGNOSIS — I82.401 ACUTE EMBOLISM AND THROMBOSIS OF UNSPECIFIED DEEP VEINS OF RIGHT LOWER EXTREMITY: ICD-10-CM

## 2023-05-19 DIAGNOSIS — G93.41 METABOLIC ENCEPHALOPATHY: ICD-10-CM

## 2023-05-19 DIAGNOSIS — I13.2 HYPERTENSIVE HEART AND CHRONIC KIDNEY DISEASE WITH HEART FAILURE AND WITH STAGE 5 CHRONIC KIDNEY DISEASE, OR END STAGE RENAL DISEASE: ICD-10-CM

## 2023-05-19 DIAGNOSIS — I25.10 ATHEROSCLEROTIC HEART DISEASE OF NATIVE CORONARY ARTERY WITHOUT ANGINA PECTORIS: ICD-10-CM

## 2023-05-19 DIAGNOSIS — Z79.51 LONG TERM (CURRENT) USE OF INHALED STEROIDS: ICD-10-CM

## 2023-05-19 DIAGNOSIS — I48.91 UNSPECIFIED ATRIAL FIBRILLATION: ICD-10-CM
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
## 2023-05-19 DIAGNOSIS — I50.42 CHRONIC COMBINED SYSTOLIC (CONGESTIVE) AND DIASTOLIC (CONGESTIVE) HEART FAILURE: ICD-10-CM

## 2023-05-19 DIAGNOSIS — I42.0 DILATED CARDIOMYOPATHY: ICD-10-CM

## 2023-05-19 DIAGNOSIS — Z51.5 ENCOUNTER FOR PALLIATIVE CARE: ICD-10-CM

## 2023-05-19 DIAGNOSIS — I48.20 CHRONIC ATRIAL FIBRILLATION, UNSPECIFIED: ICD-10-CM

## 2023-05-19 DIAGNOSIS — J96.01 ACUTE RESPIRATORY FAILURE WITH HYPOXIA: ICD-10-CM

## 2023-05-19 DIAGNOSIS — R57.0 CARDIOGENIC SHOCK: ICD-10-CM

## 2023-05-19 LAB
-  AMPICILLIN/SULBACTAM: SIGNIFICANT CHANGE UP
-  CEFAZOLIN: SIGNIFICANT CHANGE UP
-  CLINDAMYCIN: SIGNIFICANT CHANGE UP
-  ERYTHROMYCIN: SIGNIFICANT CHANGE UP
-  GENTAMICIN: SIGNIFICANT CHANGE UP
-  OXACILLIN: SIGNIFICANT CHANGE UP
-  PENICILLIN: SIGNIFICANT CHANGE UP
-  RIFAMPIN: SIGNIFICANT CHANGE UP
-  TETRACYCLINE: SIGNIFICANT CHANGE UP
-  TRIMETHOPRIM/SULFAMETHOXAZOLE: SIGNIFICANT CHANGE UP
-  VANCOMYCIN: SIGNIFICANT CHANGE UP
CULTURE RESULTS: SIGNIFICANT CHANGE UP
CULTURE RESULTS: SIGNIFICANT CHANGE UP
METHOD TYPE: SIGNIFICANT CHANGE UP
ORGANISM # SPEC MICROSCOPIC CNT: SIGNIFICANT CHANGE UP
SPECIMEN SOURCE: SIGNIFICANT CHANGE UP
SPECIMEN SOURCE: SIGNIFICANT CHANGE UP

## 2023-07-13 NOTE — ED ADULT NURSE NOTE - NSICDXFAMILYHX_GEN_ALL_CORE_FT
2.1
FAMILY HISTORY:  Father  Still living? Unknown  FH: emphysema, Age at diagnosis: Age Unknown

## 2023-11-22 NOTE — ED STATDOCS - NSCAREINITIATED _GEN_ER
Paperwork completed and place on MA desk.  Please make a copy for scanning for us.   Diane Ruiz(Attending)

## 2024-08-09 NOTE — ED PROVIDER NOTE - DR. NAME
"From:Ariella Fuchs \"Sera\"       KARI KhanN RN  [facility]  " Fred

## 2024-08-29 NOTE — ED PROVIDER NOTE - NOSE [+], MLM
[Fully active, able to carry on all pre-disease performance without restriction] : Status 0 - Fully active, able to carry on all pre-disease performance without restriction [Normal] : normal appearance, no rash, nodules, vesicles, ulcers, erythema EPISTAXIS

## 2025-01-16 NOTE — PATIENT PROFILE ADULT - NSPROIMPLANTSMEDDEV_GEN_A_NUR
Subjective     Patient ID: Maico Chen is a 69 y.o. male.    Chief Complaint: Healthy You (Fasting for labs.) and Follow-up (Patient had abdominal ultrasound and was told to follow up with PCP.)    Pt would like to stop Ozempic due to side effects.     Pt is scheduled for colonoscopy tomorrow--had positive cologuard    Review of Systems   Constitutional:  Negative for activity change, appetite change, chills, fatigue and fever.   HENT:  Negative for nasal congestion, ear pain, hearing loss, postnasal drip and sore throat.    Respiratory:  Negative for cough, chest tightness, shortness of breath and wheezing.    Cardiovascular:  Negative for chest pain, palpitations, leg swelling and claudication.   Gastrointestinal:  Negative for abdominal pain, change in bowel habit, constipation, diarrhea, nausea and vomiting.   Genitourinary:  Negative for dysuria.   Musculoskeletal:  Negative for arthralgias, back pain, gait problem and myalgias.   Integumentary:  Negative for rash.   Neurological:  Negative for weakness and headaches.   Psychiatric/Behavioral:  Negative for suicidal ideas. The patient is not nervous/anxious.        Tobacco Use: Medium Risk (1/16/2025)    Patient History     Smoking Tobacco Use: Former     Smokeless Tobacco Use: Never     Passive Exposure: Past     Review of patient's allergies indicates:  No Known Allergies  Current Outpatient Medications   Medication Instructions    amLODIPine (NORVASC) 5 mg, Oral, Daily, FOR BLOOD PRESSURE    carvediloL (COREG) 12.5 mg, Oral, 2 times daily, For BLOOD PRESSURE    citalopram (CELEXA) 20 MG tablet TAKE ONE AND ONE-HALF TABLETS DAILY FOR MOOD    clotrimazole-betamethasone 1-0.05% (LOTRISONE) cream Use as directed to groin area.    EPINEPHrine (EPIPEN) 0.3 mg/0.3 mL AtIn USE 1 INJECTION UNDER THE SKIN AS NEEDED    glipiZIDE (GLUCOTROL) 5 MG tablet TAKE 1 TABLET TWICE A DAY WITH MEALS FOR DIABETES    hydrOXYzine HCL (ATARAX) 25 mg, Oral, 3 times daily     krill-om-3-dha-epa-phospho-ast (MEGARED OMEGA-3 KRILL OIL) 608-98-13-50 mg Cap Take by mouth.    lancets (ONETOUCH DELICA PLUS LANCET) 33 gauge Misc 1 lancet , Misc.(Non-Drug; Combo Route), Daily    lansoprazole (PREVACID SOLUTAB) 15 mg, Daily    loratadine (CLARITIN) 10 mg, Oral, Daily    losartan-hydrochlorothiazide 50-12.5 mg (HYZAAR) 50-12.5 mg per tablet 1 tablet, Oral, Daily, FOR BLOOD PRESSURE    meloxicam (MOBIC) 15 mg, Oral, Daily    metFORMIN (GLUCOPHAGE) 1000 MG tablet TAKE 1 TABLET TWICE A DAY WITH MEALS FOR DIABETES    ONETOUCH VERIO TEST STRIPS Strp 1 each, Misc.(Non-Drug; Combo Route), Daily    OZEMPIC 1 mg, Subcutaneous, Every 7 days    pravastatin (PRAVACHOL) 80 mg, Oral, Nightly    predniSONE (DELTASONE) 40 mg, Oral, Daily    tamsulosin (FLOMAX) 0.4 mg, Oral, Daily, For URINE FLOW    triamcinolone acetonide 0.1% (KENALOG) 0.1 % cream 2 times daily    vit C/Zn gluc/herbal no.325 (ELDERBERRY ZINC VIT C MM) by Mucous Membrane route.     Medications Discontinued During This Encounter   Medication Reason    tadalafiL (CIALIS) 5 MG tablet     tadalafiL (CIALIS) 20 MG Tab     predniSONE (DELTASONE) 20 MG tablet Reorder    amLODIPine (NORVASC) 5 MG tablet Reorder    carvediloL (COREG) 12.5 MG tablet Reorder    citalopram (CELEXA) 20 MG tablet Reorder    glipiZIDE (GLUCOTROL) 5 MG tablet Reorder    hydrOXYzine HCL (ATARAX) 25 MG tablet Reorder    losartan-hydrochlorothiazide 50-12.5 mg (HYZAAR) 50-12.5 mg per tablet Reorder    meloxicam (MOBIC) 15 MG tablet Reorder    metFORMIN (GLUCOPHAGE) 1000 MG tablet Reorder    pravastatin (PRAVACHOL) 80 MG tablet Reorder    tamsulosin (FLOMAX) 0.4 mg Cap Reorder       Past Medical History:   Diagnosis Date    Depression     Diabetes mellitus, type 2     Diabetic eye exam 01/18/2024    Dr. Jayro Daley - [facility]    Diabetic eye exam 10/29/2024    Dr. Jayro Daley - [facility]    Hypertension      Health Maintenance Topics with due status:  "Not Due       Topic Last Completion Date    Diabetes Urine Screening 07/17/2024    Foot Exam 07/17/2024    Lipid Panel 10/29/2024    Hemoglobin A1c 10/29/2024    Diabetic Eye Exam 10/29/2024    Colorectal Cancer Screening 11/05/2024    Low Dose Statin 01/16/2025     Immunization History   Administered Date(s) Administered    Tdap 11/14/2014       Objective     Body mass index is 33.43 kg/m².  Wt Readings from Last 3 Encounters:   01/16/25 105.7 kg (233 lb)   12/16/24 104.8 kg (231 lb)   10/29/24 104.8 kg (231 lb)     Ht Readings from Last 3 Encounters:   01/16/25 5' 10" (1.778 m)   12/16/24 5' 10" (1.778 m)   10/29/24 5' 10" (1.778 m)     BP Readings from Last 3 Encounters:   01/16/25 138/84   12/16/24 124/82   11/13/24 126/76     Temp Readings from Last 3 Encounters:   01/16/25 98.6 °F (37 °C) (Oral)   10/29/24 98.4 °F (36.9 °C) (Oral)   07/17/24 98.5 °F (36.9 °C) (Oral)     Pulse Readings from Last 3 Encounters:   01/16/25 (!) 53   12/16/24 63   10/29/24 63     Resp Readings from Last 3 Encounters:   01/16/25 18   10/29/24 18   07/17/24 18     PF Readings from Last 3 Encounters:   No data found for PF       Physical Exam  Vitals and nursing note reviewed.   Constitutional:       General: He is not in acute distress.     Appearance: Normal appearance. He is not ill-appearing.   Eyes:      Extraocular Movements: Extraocular movements intact.      Pupils: Pupils are equal, round, and reactive to light.   Cardiovascular:      Rate and Rhythm: Normal rate and regular rhythm.      Heart sounds: Normal heart sounds.   Pulmonary:      Effort: Pulmonary effort is normal.      Breath sounds: Normal breath sounds.   Abdominal:      General: Bowel sounds are normal.      Palpations: Abdomen is soft.   Musculoskeletal:         General: Normal range of motion.   Skin:     Findings: No rash.   Neurological:      General: No focal deficit present.      Mental Status: He is alert and oriented to person, place, and time. Mental " status is at baseline.   Psychiatric:         Mood and Affect: Mood normal.         Behavior: Behavior normal.       Assessment and Plan     Problem List Items Addressed This Visit          Psychiatric    Depression (Chronic)    Relevant Medications    citalopram (CELEXA) 20 MG tablet       Cardiac/Vascular    Hyperlipidemia (Chronic)    Relevant Medications    pravastatin (PRAVACHOL) 80 MG tablet    Hypertension (Chronic)    Relevant Medications    amLODIPine (NORVASC) 5 MG tablet    carvediloL (COREG) 12.5 MG tablet    losartan-hydrochlorothiazide 50-12.5 mg (HYZAAR) 50-12.5 mg per tablet       Endocrine    Diabetes mellitus, type 2 (Chronic)    Relevant Medications    glipiZIDE (GLUCOTROL) 5 MG tablet    metFORMIN (GLUCOPHAGE) 1000 MG tablet       Orthopedic    Primary osteoarthritis of both knees    Relevant Medications    meloxicam (MOBIC) 15 MG tablet     Other Visit Diagnoses       Encounter for general adult medical examination with abnormal findings    -  Primary    Dermatitis        Relevant Medications    predniSONE (DELTASONE) 20 MG tablet    Benign prostatic hyperplasia without lower urinary tract symptoms        Relevant Medications    tamsulosin (FLOMAX) 0.4 mg Cap    Screening for diabetes mellitus        Relevant Orders    Glucose, Fasting    Screening for lipoid disorders        Relevant Orders    Lipid Panel    Hepatomegaly        Renal cyst                Plan: [employer] Healthy You Wellness Plan    Overall Health Goal:   Healthy Lifestyle Choices  Improve Overall health  Weight Loss    Biometrics  Attend Regularly scheduled office visits  Monitor blood pressure and keep a log   Monitor glucose and keep a log    Lifestyle  Schedule colonoscopy--1/17/2025  Take medications as prescribed  Develop a good social support system    Nutrition  Avoiding adding table salt to food  Recommend weight loss  Eat well balanced meals  Decrease intake of fast foods    Exercise  Recommend physical activity for at  least 30 minutes, 5 days per week     Tobacco   Avoid all tobacco products        I have reviewed the medications, allergies, and problem list.     Will stop Ozempic and have pt monitor BS. Will recheck HgbA1c in 3 months.   Goal Actions:    What type of visit is the patient here for today?: Healthy You  Does the patient consent to enroll in [employer] Healthy?: Yes  Is this a Wellness Follow Up?: No  What is your overall wellness goal? (select at least one): Improve overall health  Choose 3: Lifestyle, Nutrition, Exercise  Lifestyle Actions : Develop good support system  Nurtrition Actions: Eat a well-balanced diet, drink 8-10 glasses of water per day, Avoid carbonated beverages  Exercise Actions: Recommend physical activity 30 minutes per day 3-5 times/week       None

## 2025-03-26 NOTE — ED ADULT NURSE NOTE - TEMPLATE LIST FOR HEAD TO TOE ASSESSMENT
Patient presents for Zarxio injection today.     I am an RN. Does the patient have an active anti-cancer treatment plan? (oral, injection, or IV) Yes.   Regimen: Taxol/Kanjinti  Cycle/Day: C4D1 given 3/25/25    ECOG:   ECOG [03/26/25 1418]   ECOG Performance Status 0       Oral Chemotherapy: No  Nursing Assessment:  A comprehensive nursing assessment was performed and the patient reports the following:    Anxiety/Depression/Insomnia:  NO  Pain: NO    Toxicity Assessment    Auditory/Ear  Assessment: Yes (Within Defined Limits)    Cardiac General  Assessment: Yes (Within Defined Limits)    Constitutional  Assessment: Yes (Within Defined Limits)    Dermatology/Skin  Assessment: Yes (w/ Exceptions to WDL)  Alopecia: Grade 2  Pruritus: Grade 1    Endocrine  Assessment: Yes (Within Defined Limits)    Gastrointestinal  Assessment: Yes (Within Defined Limits)    Hemorrhage/Bleeding  Assessment: Yes (Within Defined Limits)    Infection  Assessment: Yes (Within Defined Limits)    Lymphatics  Assessment: Yes (Within Defined Limits)    Musculoskeletal  Assessment: Yes (Within Defined Limits)    Neurology  Assessment: Yes (w/ Exceptions to WDL)  Paresthesia: Grade 1    Ocular  Assessment: Yes (Within Defined Limits)    Pain  Assessment: Yes (Within Defined Limits)    Pulmonary/Upper Respiratory  Assessment: Yes (Within Defined Limits)    Genitourinary  Assessment: Yes (Within Defined Limits)        Patient has valid pre-authorization, VS completed, and Patient instructed to call the office with any questions or concerns    Reviewed and verified Advanced Directives: No: Patient declined to create/provide document at this time     Pre-Injection Information: Allergies reviewed as required for injection type., Pt states feeling well, no complaints., and Pt denies signs and symptoms of infection.    Refer to MAR (medication administration record) for type of injection and medication given.  Needle Size: pre-packaged needle  Patient  tolerated well: Stable and Follow up appointment scheduled    Dr. Khan (on-call) is supervising clinician today.     General

## 2025-03-28 NOTE — PROGRESS NOTE ADULT - SUBJECTIVE AND OBJECTIVE BOX
NEPHROLOGY INTERVAL HPI/OVERNIGHT EVENTS:    Date of Service: 23 @ 09:30    --D/c'd to [facility] in stable condition yesterday.  Later noted with fever and confusion. Now in shock, on pressors. and on HF O2.           Unclear source of sepsis  5/15 no c/o for dc to [facility] no pain   --Resting comfortably.  No distress.  Back pain control acceptable.    HPI:   87 M PMHx of aFib on warfarin, emphysema on home O2 2L, chronic back pain with compression fracture, ESRD on HD, HLD, CAD, COPD , Cardiomyopathy, HTN recently diagnosed with chronic L1 fracture and stable but likely chronic L4 compression fracture last week. Was evaluated at that time by neurosurgery who recc brace for comfort and o/p office follow up. Since then dtr  states patient cannot perform his ADLs and despite attempts at home, she is no longer able to care for him.   They otherwise deny urinary incontinence, bowel incontinence or saddle anethesia/Paresthesias to LEs. Plan for [facility] placement.     Nephrology:  Pt admitted with back pain, chronic lumbar vertebrae fracture  No acute sxs  Seen on HD   Agreeable for rehab if needed       PAST MEDICAL HISTORY:  CAD (coronary artery disease)   Cardiomyopathy   ESRD on dialysis   HLD (hyperlipidemia)   HTN (hypertension).     MEDICATIONS  (STANDING):  albumin human 25% IVPB 50 milliLiter(s) IV Intermittent every 30 minutes  atorvastatin 20 milliGRAM(s) Oral at bedtime  azithromycin  IVPB 500 milliGRAM(s) IV Intermittent daily  budesonide 160 MICROgram(s)/formoterol 4.5 MICROgram(s) Inhaler 2 Puff(s) Inhalation two times a day  chlorhexidine 4% Liquid 1 Application(s) Topical <User Schedule>  digoxin  Injectable 250 MICROGram(s) IV Push once  hydrocortisone sodium succinate Injectable 50 milliGRAM(s) IV Push every 8 hours  levothyroxine 88 MICROGram(s) Oral daily  lidocaine   4% Patch 1 Patch Transdermal daily  meropenem  IVPB      meropenem  IVPB 500 milliGRAM(s) IV Intermittent every 24 hours  multivitamin 1 Tablet(s) Oral daily  norepinephrine Infusion 0.05 MICROgram(s)/kG/Min (3.83 mL/Hr) IV Continuous <Continuous>  vasopressin Infusion 0.04 Unit(s)/Min (6 mL/Hr) IV Continuous <Continuous>    MEDICATIONS  (PRN):  acetaminophen     Tablet .. 650 milliGRAM(s) Oral every 6 hours PRN Temp greater or equal to 38.5C (101.3F), Mild Pain (1 - 3)  albuterol    90 MICROgram(s) HFA Inhaler 2 Puff(s) Inhalation every 6 hours PRN Shortness of Breath and/or Wheezing  melatonin 3 milliGRAM(s) Oral at bedtime PRN Insomnia  oxyCODONE    IR 2.5 milliGRAM(s) Oral every 4 hours PRN for moderate to severe pain    Vital Signs Last 24 Hrs  T(C): 37.8 (17 May 2023 04:00), Max: 39.9 (16 May 2023 18:21)  T(F): 100 (17 May 2023 04:00), Max: 103.9 (16 May 2023 18:21)  HR: 126 (17 May 2023 09:00) (101 - 155)  BP: 120/55 (17 May 2023 09:00) (72/49 - 140/112)  BP(mean): 71 (17 May 2023 09:00) (30 - 88)  RR: 26 (17 May 2023 09:00) (14 - 34)  SpO2: 92% (17 May 2023 07:00) (80% - 100%)    Parameters below as of 17 May 2023 05:33  Patient On (Oxygen Delivery Method): nasal cannula, high flow  O2 Flow (L/min): 35  O2 Concentration (%): 50  Daily Height in cm: 177.8 (16 May 2023 18:04)    Daily Weight in k.3 (17 May 2023 00:00)    05-16 @ 07:01  -  -17 @ 07:00  --------------------------------------------------------  IN: 828 mL / OUT: 0 mL / NET: 828 mL    PHYSICAL EXAM:  GENERAL: On HF NC, lethargic  CHEST/LUNG: scattered rhonchi  HEART: S1S2 tachy  ABDOMEN: soft  EXTREMITIES: 1+ edema  SKIN:     LABS:                        11.9   33.69 )-----------( 111      ( 17 May 2023 06:35 )             34.5         135  |  102  |  40<H>  ----------------------------<  87  5.1   |  20<L>  |  5.39<H>    Ca    8.9      17 May 2023 06:35  Phos  5.0       Mg     2.0         TPro  6.6  /  Alb  2.6<L>  /  TBili  2.8<H>  /  DBili  x   /  AST  68<H>  /  ALT  33  /  AlkPhos  101      PT/INR - ( 17 May 2023 06:35 )   PT: 37.0 sec;   INR: 3.15 ratio         PTT - ( 16 May 2023 18:02 )  PTT:39.1 sec    Magnesium, Serum: 2.0 mg/dL ( @ 06:35)  Phosphorus Level, Serum: 5.0 mg/dL ( @ 06:35)  Magnesium, Serum: 1.8 mg/dL ( @ 00:00)  Phosphorus Level, Serum: 3.3 mg/dL ( @ 00:00)    ABG - ( 17 May 2023 00:04 )  pH, Arterial: 7.36  pH, Blood: x     /  pCO2: 35    /  pO2: 82    / HCO3: 20    / Base Excess: -4.9  /  SaO2: 97                    RADIOLOGY & ADDITIONAL TESTS:  
Physical Therapy Visit    Visit Type: Daily Treatment Note  Visit: 3  Referring Provider: Ephraim Moss MD  Medical Diagnosis (from order): M54.2 - Neck pain     SUBJECTIVE                                                                                                               Patient continues to have neck pain, especially after working all day. Feels pain is more frequent now. Does report he feels better after ultrasound and manual, but it doesn't last long. Is having some ringing in the right ear when he turns his head right.      OBJECTIVE                                                                                                                               Treatment    Mechanical Traction:  Mechanical Cervical Traction (26336)  - Position: hooklying  - intermittent Seconds (hold/relax): 90/45  - Pounds: 16  - Duration: 12 minutes    Ultrasound  - Location: bilateral cervical PSM, upper traps  - Position: sitting  - Duty Cycle: 100%  - Frequency: 1 Mhz  - Intensity (w/cm2): 1.5  - Duration: 8 minutes    Results: decreased pain  Reaction: no adverse reaction to treatment      Therapeutic Exercise  Arm bike, level 2 x 6 min (forward/back)  Seated upper trapezius stretching  Seated levator scap stretching  Seated cervical rotation  Supine cervical retraction with towel roll under head x 10      Manual Therapy   Seated soft tissue mobilization to bilateral upper traps.        ASSESSMENT                                                                                                            Responded well to traction and ultrasound with reports of decreased neck pain and stiffness. No ringing in his ear after session.  Education:   - Results of above outlined education: Verbalizes understanding and Needs reinforcement    PLAN                                                                                                                           Suggestions for next session as indicated: Progress per 
plan of care       Therapy procedure time and total treatment time can be found documented on the Time Entry flowsheet    
Patient is a 87y old  Male who presents with a chief complaint of fever (17 May 2023 11:43)    24 hour events:     Allergies    No Known Allergies    Intolerances      REVIEW OF SYSTEMS: SEE BELOW       ICU Vital Signs Last 24 Hrs  T(C): 36.7 (17 May 2023 10:29), Max: 39.9 (16 May 2023 18:21)  T(F): 98 (17 May 2023 10:29), Max: 103.9 (16 May 2023 18:21)  HR: 112 (17 May 2023 11:33) (101 - 155)  BP: 87/52 (17 May 2023 11:33) (72/49 - 140/112)  BP(mean): 65 (17 May 2023 11:33) (30 - 99)  ABP: 80/48 (17 May 2023 10:00) (80/48 - 116/63)  ABP(mean): 60 (17 May 2023 10:00) (59 - 83)  RR: 20 (17 May 2023 11:33) (12 - 34)  SpO2: 85% (17 May 2023 11:33) (80% - 100%)    O2 Parameters below as of 17 May 2023 11:33      O2 Concentration (%): 40        CAPILLARY BLOOD GLUCOSE          I&O's Summary    16 May 2023 07:01  -  17 May 2023 07:00  --------------------------------------------------------  IN: 828 mL / OUT: 0 mL / NET: 828 mL            MEDICATIONS  (STANDING):  azithromycin  IVPB 500 milliGRAM(s) IV Intermittent daily  ceFAZolin   IVPB 2000 milliGRAM(s) IV Intermittent <User Schedule>  chlorhexidine 4% Liquid 1 Application(s) Topical <User Schedule>  lidocaine   4% Patch 1 Patch Transdermal daily  norepinephrine Infusion 0.05 MICROgram(s)/kG/Min (3.83 mL/Hr) IV Continuous <Continuous>  vasopressin Infusion 0.04 Unit(s)/Min (6 mL/Hr) IV Continuous <Continuous>      MEDICATIONS  (PRN):  acetaminophen     Tablet .. 650 milliGRAM(s) Oral every 6 hours PRN Temp greater or equal to 38.5C (101.3F), Mild Pain (1 - 3)  albuterol    90 MICROgram(s) HFA Inhaler 2 Puff(s) Inhalation every 6 hours PRN Shortness of Breath and/or Wheezing  glycopyrrolate Injectable 0.1 milliGRAM(s) IV Push four times a day PRN secretions  HYDROmorphone  Injectable 0.5 milliGRAM(s) IV Push every 3 hours PRN Severe Pain (7 - 10)  LORazepam   Injectable 1 milliGRAM(s) IV Push every 4 hours PRN Anxiety      PHYSICAL EXAM: SEE BELOW                          11.9   33.69 )-----------( 111      ( 17 May 2023 06:35 )             34.5     Bands 10.0    05-17    135  |  102  |  40<H>  ----------------------------<  87  5.1   |  20<L>  |  5.39<H>    Ca    8.9      17 May 2023 06:35  Phos  5.0     05-17  Mg     2.0     05-17    TPro  6.6  /  Alb  2.6<L>  /  TBili  2.8<H>  /  DBili  x   /  AST  68<H>  /  ALT  33  /  AlkPhos  101  05-17    Lactate 3.6           05-17 @ 06:35    Lactate 3.0           05-17 @ 00:00    Lactate 3.6           05-16 @ 18:02          PT/INR - ( 17 May 2023 06:35 )   PT: 37.0 sec;   INR: 3.15 ratio         PTT - ( 16 May 2023 18:02 )  PTT:39.1 sec    .Blood None   Growth in aerobic bottle: Gram Positive Cocci in Clusters  Growth in anaerobic bottle: Gram Positive Cocci in Clusters  ***Blood Panel PCR results on this specimen are available  approximately 3 hours after the Gram stain result.***  Gram stain, PCR, and/or culture results may not always  correspond due to difference in methodologies.  ************************************************************  This PCR assay was performed by multiplex PCR. This  Assay tests for 66 bacterial and resistance gene targets.  Please refer to the [facility] test directory  at [email or web address] for details.   Growth in aerobic bottle: Gram Positive Cocci in Clusters  Growth in anaerobic bottle: Gram Positive Cocci in Clusters 05-16 @ 18:55  .Blood Blood-Peripheral   Growth in aerobic bottle: Gram Positive Cocci in Clusters  Growth in anaerobic bottle: Gram Positive Cocci in Clusters   Growth in aerobic bottle: Gram Positive Cocci in Clusters  Growth in anaerobic bottle: Gram Positive Cocci in Clusters 05-16 @ 18:02      Rapid RVP Result: NotDetec (05-16 @ 18:43)
